# Patient Record
Sex: MALE | Race: OTHER | NOT HISPANIC OR LATINO | URBAN - METROPOLITAN AREA
[De-identification: names, ages, dates, MRNs, and addresses within clinical notes are randomized per-mention and may not be internally consistent; named-entity substitution may affect disease eponyms.]

---

## 2018-09-02 ENCOUNTER — INPATIENT (INPATIENT)
Facility: HOSPITAL | Age: 43
LOS: 2 days | Discharge: HOME | DRG: 439 | End: 2018-09-05
Attending: INTERNAL MEDICINE | Admitting: INTERNAL MEDICINE
Payer: COMMERCIAL

## 2018-09-02 VITALS
DIASTOLIC BLOOD PRESSURE: 86 MMHG | HEIGHT: 74 IN | WEIGHT: 250.45 LBS | SYSTOLIC BLOOD PRESSURE: 150 MMHG | TEMPERATURE: 99 F | OXYGEN SATURATION: 96 % | RESPIRATION RATE: 20 BRPM | HEART RATE: 80 BPM

## 2018-09-02 DIAGNOSIS — Z95.0 PRESENCE OF CARDIAC PACEMAKER: Chronic | ICD-10-CM

## 2018-09-02 LAB
ALBUMIN SERPL ELPH-MCNC: 4.1 G/DL — SIGNIFICANT CHANGE UP (ref 3.3–5)
ALP SERPL-CCNC: 100 U/L — SIGNIFICANT CHANGE UP (ref 40–120)
ALT FLD-CCNC: 67 U/L — HIGH (ref 10–45)
ANION GAP SERPL CALC-SCNC: 12 MMOL/L — SIGNIFICANT CHANGE UP (ref 5–17)
APTT BLD: 28.2 SEC — SIGNIFICANT CHANGE UP (ref 27.5–37.4)
AST SERPL-CCNC: 49 U/L — HIGH (ref 10–40)
BILIRUB SERPL-MCNC: 1.9 MG/DL — HIGH (ref 0.2–1.2)
BLD GP AB SCN SERPL QL: NEGATIVE — SIGNIFICANT CHANGE UP
BUN SERPL-MCNC: 16 MG/DL — SIGNIFICANT CHANGE UP (ref 7–23)
CALCIUM SERPL-MCNC: 9.1 MG/DL — SIGNIFICANT CHANGE UP (ref 8.4–10.5)
CHLORIDE SERPL-SCNC: 101 MMOL/L — SIGNIFICANT CHANGE UP (ref 96–108)
CO2 SERPL-SCNC: 25 MMOL/L — SIGNIFICANT CHANGE UP (ref 22–31)
CREAT SERPL-MCNC: 0.87 MG/DL — SIGNIFICANT CHANGE UP (ref 0.5–1.3)
GLUCOSE SERPL-MCNC: 161 MG/DL — HIGH (ref 70–99)
HCT VFR BLD CALC: 45.3 % — SIGNIFICANT CHANGE UP (ref 39–50)
HGB BLD-MCNC: 15.6 G/DL — SIGNIFICANT CHANGE UP (ref 13–17)
INR BLD: 1.3 — HIGH (ref 0.88–1.16)
LACTATE SERPL-SCNC: 1.5 MMOL/L — SIGNIFICANT CHANGE UP (ref 0.5–2)
LIDOCAIN IGE QN: >600 U/L — HIGH (ref 7–60)
MCHC RBC-ENTMCNC: 32.1 PG — SIGNIFICANT CHANGE UP (ref 27–34)
MCHC RBC-ENTMCNC: 34.4 G/DL — SIGNIFICANT CHANGE UP (ref 32–36)
MCV RBC AUTO: 93.2 FL — SIGNIFICANT CHANGE UP (ref 80–100)
PLATELET # BLD AUTO: 190 K/UL — SIGNIFICANT CHANGE UP (ref 150–400)
POTASSIUM SERPL-MCNC: 3.9 MMOL/L — SIGNIFICANT CHANGE UP (ref 3.5–5.3)
POTASSIUM SERPL-SCNC: 3.9 MMOL/L — SIGNIFICANT CHANGE UP (ref 3.5–5.3)
PROT SERPL-MCNC: 6.9 G/DL — SIGNIFICANT CHANGE UP (ref 6–8.3)
PROTHROM AB SERPL-ACNC: 14.5 SEC — HIGH (ref 9.8–12.7)
RBC # BLD: 4.86 M/UL — SIGNIFICANT CHANGE UP (ref 4.2–5.8)
RBC # FLD: 13.3 % — SIGNIFICANT CHANGE UP (ref 10.3–16.9)
RH IG SCN BLD-IMP: POSITIVE — SIGNIFICANT CHANGE UP
SODIUM SERPL-SCNC: 138 MMOL/L — SIGNIFICANT CHANGE UP (ref 135–145)
WBC # BLD: 15.2 K/UL — HIGH (ref 3.8–10.5)
WBC # FLD AUTO: 15.2 K/UL — HIGH (ref 3.8–10.5)

## 2018-09-02 PROCEDURE — 71045 X-RAY EXAM CHEST 1 VIEW: CPT | Mod: 26

## 2018-09-02 PROCEDURE — 99291 CRITICAL CARE FIRST HOUR: CPT

## 2018-09-02 RX ORDER — SODIUM CHLORIDE 9 MG/ML
1000 INJECTION, SOLUTION INTRAVENOUS
Qty: 0 | Refills: 0 | Status: DISCONTINUED | OUTPATIENT
Start: 2018-09-02 | End: 2018-09-05

## 2018-09-02 RX ORDER — HEPARIN SODIUM 5000 [USP'U]/ML
5000 INJECTION INTRAVENOUS; SUBCUTANEOUS EVERY 8 HOURS
Qty: 0 | Refills: 0 | Status: DISCONTINUED | OUTPATIENT
Start: 2018-09-02 | End: 2018-09-05

## 2018-09-02 RX ORDER — HYDROMORPHONE HYDROCHLORIDE 2 MG/ML
0.5 INJECTION INTRAMUSCULAR; INTRAVENOUS; SUBCUTANEOUS
Qty: 0 | Refills: 0 | Status: DISCONTINUED | OUTPATIENT
Start: 2018-09-02 | End: 2018-09-05

## 2018-09-02 RX ORDER — HYDROMORPHONE HYDROCHLORIDE 2 MG/ML
0.5 INJECTION INTRAMUSCULAR; INTRAVENOUS; SUBCUTANEOUS EVERY 4 HOURS
Qty: 0 | Refills: 0 | Status: DISCONTINUED | OUTPATIENT
Start: 2018-09-02 | End: 2018-09-02

## 2018-09-02 RX ORDER — HYDROMORPHONE HYDROCHLORIDE 2 MG/ML
1 INJECTION INTRAMUSCULAR; INTRAVENOUS; SUBCUTANEOUS EVERY 4 HOURS
Qty: 0 | Refills: 0 | Status: DISCONTINUED | OUTPATIENT
Start: 2018-09-02 | End: 2018-09-02

## 2018-09-02 RX ORDER — HYDROMORPHONE HYDROCHLORIDE 2 MG/ML
1 INJECTION INTRAMUSCULAR; INTRAVENOUS; SUBCUTANEOUS
Qty: 0 | Refills: 0 | Status: DISCONTINUED | OUTPATIENT
Start: 2018-09-02 | End: 2018-09-05

## 2018-09-02 RX ADMIN — SODIUM CHLORIDE 150 MILLILITER(S): 9 INJECTION, SOLUTION INTRAVENOUS at 19:09

## 2018-09-02 NOTE — H&P ADULT - NSHPLABSRESULTS_GEN_ALL_CORE
15.6   15.2  )-----------( 190      ( 02 Sep 2018 18:39 )             45.3       09-02    138  |  101  |  16  ----------------------------<  161<H>  3.9   |  25  |  0.87    Ca    9.1      02 Sep 2018 18:39    TPro  6.9  /  Alb  4.1  /  TBili  1.9<H>  /  DBili  x   /  AST  49<H>  /  ALT  67<H>  /  AlkPhos  100  09-02                  PT/INR - ( 02 Sep 2018 18:39 )   PT: 14.5 sec;   INR: 1.30          PTT - ( 02 Sep 2018 18:39 )  PTT:28.2 sec    Lactate Trend  09-02 @ 18:39 Lactate:1.5             CAPILLARY BLOOD GLUCOSE

## 2018-09-02 NOTE — H&P ADULT - ASSESSMENT
41yo male with a PMH of MI (2015), PPM (2015), HF (EF 20% in 2015, now 55% as of 07/2018), and h/o heavy etoh use (used to drink heavily, now 9-12drinks per week) p/w 10/10 abdominal pain associated with nausea and vomiting. Found to have gallstone pancreatitis.    GI  # Gallstone pancreatitis  - transfer from [facility] with gallstone pancreatitis. Will need to obtain more collaterol  - pt afebrile, VSS, WBC 15.2. LA 1.3. Pt is A&Ox3, protecting airway  - pt's last drink was friday, states he had a few mojitos  - GI consulted, rec CT abd with IV contrast  - Pt cannot undergo MRCP d/t having a PPM  - NPO  -   - dilaudid for pain and zofran for nausea  - strict I's and O's  - lipase >600  - f/u lipid panel  - CBC/CMP/lipase/coags in AM  - CT abd with contrast tonight, f/u with GI for further recs    Cardio  # HF  - h/o MI in 2015. Echo performed after showed EF of 20%  - pt follows with cardiology at [facility], echo performed two months ago reveals EF55%  - AICD placed 2015  - pt has METS >14  - on home Toprol XL 25mg PO daily and Lisinopril 2.5mg PO daily\  - EKG and Echo  - 43yo male with a PMH of MI (2015), PPM (2015), HF (EF 20% in 2015, now 55% as of 07/2018), and h/o heavy etoh use (used to drink heavily, now 9-12drinks per week) p/w 10/10 abdominal pain associated with nausea and vomiting. Found to have gallstone pancreatitis.    GI  # Gallstone pancreatitis  - transfer from [facility] with gallstone pancreatitis. Will need to obtain more collaterol  - pt afebrile, VSS, WBC 15.2. LA 1.3. Pt is A&Ox3, protecting airway  - pt's last drink was friday, states he had a few mojitos  - GI consulted, rec CT abd with IV contrast  - Pt cannot undergo MRCP d/t having a AICD  - NPO  -   - dilaudid for pain and zofran for nausea  - strict I's and O's  - lipase >600  - f/u lipid panel  - CBC/CMP/lipase/coags in AM  - CT abd with contrast tonight, f/u with GI for further recs    Cardio  # HF  - h/o MI in 2015. Echo performed after showed EF of 20%  - pt follows with cardiology at [facility], echo performed two months ago reveals EF55%  - AICD placed 2015  - pt has METS >14  - on home Toprol XL 25mg PO daily and Lisinopril 2.5mg PO daily\  - EKG and Echo  - 43yo male with a PMH of MI (2015), PPM (2015), HF (EF 20% in 2015, now 55% as of 07/2018), and h/o heavy etoh use (used to drink heavily, now 9-12drinks per week) p/w 10/10 abdominal pain associated with nausea and vomiting. Found to have gallstone pancreatitis.    GI  # Gallstone pancreatitis  - transfer from [facility] with gallstone pancreatitis. Will need to obtain more collaterol  - pt afebrile, VSS, WBC 15.2. LA 1.3. Pt is A&Ox3, protecting airway  - pt's last drink was friday, states he had a few mojitos  - GI consulted, rec CT abd with IV contrast  - Pt cannot undergo MRCP d/t having a AICD  - NPO  -   - dilaudid for pain and zofran for nausea  - strict I's and O's  - lipase >600  - f/u lipid panel  - CBC/CMP/lipase/coags in AM  - CT abd with contrast tonight, f/u with GI for further recs    Cardio  # HF  - h/o MI in 2015. Echo performed after showed EF of 20%  - pt follows with cardiology at [facility], echo performed two months ago reveals EF55%  - AICD placed 2015  - pt has METS >14  - on home Toprol XL 25mg PO daily and Lisinopril 2.5mg PO daily\  - EKG and Echo

## 2018-09-02 NOTE — PATIENT PROFILE ADULT. - BILL OF RIGHTS/ADMISSION INFORMATION PROVIDED TO:
patient tolerated po, ambulated without difficulty. will continue to monitor for safety and comfort. Patient

## 2018-09-02 NOTE — H&P ADULT - HISTORY OF PRESENT ILLNESS
41yo male with a PMH of MI (2015), PPM (2015), HF (EF 20% in 2015, now 55% as of 07/2018), and h/o heavy etoh use p/w 10/10 abdominal pain associated with nausea and vomiting. Pt states he was in his usual state of health when yesterday evening he began to feel intense pain in the URQ and ULQ of his abdomen. 	He describes the pain as crampy, constant, not relieved or made worse by anything.     He denies eating a meal prior to experiencing the pain. He was admitted [facility] found to have gallstone pancreatitis, now being transferred to [facility] for possible intervention. 43yo male with a PMH of MI (2015), AICD (2015), HF (EF 20% in 2015, now 55% as of 07/2018), and h/o heavy etoh use (used to drink heavily, now 9-12drinks per week) p/w 10/10 abdominal pain associated with nausea and vomiting. Pt states he was in his usual state of health when yesterday evening he began to feel intense pain in the URQ and ULQ of his abdomen accompanied by intense diaphoresis, nausea, and NBNB vomiting. He has never felt a similar pain before. He describes the pain as crampy, nonradiating, not relieved or made worse by anything.  He denies eating a meal prior to experiencing the pain. Pt admitted to [facility] yesterday found to have gallstone pancreatitis, now being transferred to [facility] for higher level of care. Today his ROS is negative except for mild abd pain to palpation. All of his doctors and medical documents are at [facility].

## 2018-09-02 NOTE — H&P ADULT - NSHPREVIEWOFSYSTEMS_GEN_ALL_CORE
CONSTITUTIONAL: No weakness, fevers or chills  EYES/ENT: No visual changes;  No vertigo or throat pain   NECK: No pain or stiffness  RESPIRATORY: No cough, wheezing, hemoptysis; No shortness of breath  CARDIOVASCULAR: No chest pain or palpitations  GASTROINTESTINAL: + abdominal pain. No epigastric pain. No nausea, vomiting, or hematemesis; No diarrhea or constipation. No melena or hematochezia.  GENITOURINARY: No dysuria, frequency or hematuria  NEUROLOGICAL: No numbness or weakness  SKIN: No itching, burning, rashes, or lesions   All other review of systems is negative unless indicated above.

## 2018-09-02 NOTE — H&P ADULT - ATTENDING COMMENTS
Pt afebrile and nontoxic. Pain controlled and no evidence of cholangitis. No antibiotics for now as discussed with GI. Continue IVF with EF55% , less concerned. continue off antiHtn tonight and provide pain control.

## 2018-09-02 NOTE — H&P ADULT - PMH
ETOH abuse  h/o etoh abuse now moderate drinker  Heart failure, unspecified HF chronicity, unspecified heart failure type

## 2018-09-02 NOTE — H&P ADULT - NSHPPHYSICALEXAM_GEN_ALL_CORE
PHYSICAL EXAM:  GENERAL: NAD, well-developed  HEAD:  Atraumatic, Normocephalic  EYES: EOMI, PERRLA, conjunctiva and sclera clear  NECK: Supple, No JVD  CHEST/LUNG: mild crackles at base b/l  HEART: Regular rate and rhythm; No murmurs, rubs, or gallops  ABDOMEN: Soft, tender to palpation RUQ and LUQ, Nondistended; Bowel sounds present  EXTREMITIES:, No clubbing, cyanosis, or edema  PSYCH: AAOx3  NEUROLOGY: non-focal  SKIN: No rashes or lesions

## 2018-09-03 DIAGNOSIS — Z91.89 OTHER SPECIFIED PERSONAL RISK FACTORS, NOT ELSEWHERE CLASSIFIED: ICD-10-CM

## 2018-09-03 DIAGNOSIS — I50.9 HEART FAILURE, UNSPECIFIED: ICD-10-CM

## 2018-09-03 DIAGNOSIS — K85.10 BILIARY ACUTE PANCREATITIS WITHOUT NECROSIS OR INFECTION: ICD-10-CM

## 2018-09-03 DIAGNOSIS — I10 ESSENTIAL (PRIMARY) HYPERTENSION: ICD-10-CM

## 2018-09-03 DIAGNOSIS — F10.10 ALCOHOL ABUSE, UNCOMPLICATED: ICD-10-CM

## 2018-09-03 DIAGNOSIS — R63.8 OTHER SYMPTOMS AND SIGNS CONCERNING FOOD AND FLUID INTAKE: ICD-10-CM

## 2018-09-03 LAB
ALBUMIN SERPL ELPH-MCNC: 3.6 G/DL — SIGNIFICANT CHANGE UP (ref 3.3–5)
ALP SERPL-CCNC: 91 U/L — SIGNIFICANT CHANGE UP (ref 40–120)
ALT FLD-CCNC: 49 U/L — HIGH (ref 10–45)
ANION GAP SERPL CALC-SCNC: 13 MMOL/L — SIGNIFICANT CHANGE UP (ref 5–17)
AST SERPL-CCNC: 49 U/L — HIGH (ref 10–40)
BASOPHILS NFR BLD AUTO: 0.1 % — SIGNIFICANT CHANGE UP (ref 0–2)
BILIRUB SERPL-MCNC: 1.8 MG/DL — HIGH (ref 0.2–1.2)
BUN SERPL-MCNC: 16 MG/DL — SIGNIFICANT CHANGE UP (ref 7–23)
CALCIUM SERPL-MCNC: 9.2 MG/DL — SIGNIFICANT CHANGE UP (ref 8.4–10.5)
CHLORIDE SERPL-SCNC: 99 MMOL/L — SIGNIFICANT CHANGE UP (ref 96–108)
CO2 SERPL-SCNC: 25 MMOL/L — SIGNIFICANT CHANGE UP (ref 22–31)
CREAT SERPL-MCNC: 0.75 MG/DL — SIGNIFICANT CHANGE UP (ref 0.5–1.3)
EOSINOPHIL NFR BLD AUTO: 0.1 % — SIGNIFICANT CHANGE UP (ref 0–6)
GLUCOSE SERPL-MCNC: 142 MG/DL — HIGH (ref 70–99)
HCT VFR BLD CALC: 45.5 % — SIGNIFICANT CHANGE UP (ref 39–50)
HGB BLD-MCNC: 15.2 G/DL — SIGNIFICANT CHANGE UP (ref 13–17)
LIDOCAIN IGE QN: >600 U/L — HIGH (ref 7–60)
LYMPHOCYTES # BLD AUTO: 6.2 % — LOW (ref 13–44)
MAGNESIUM SERPL-MCNC: 2.1 MG/DL — SIGNIFICANT CHANGE UP (ref 1.6–2.6)
MCHC RBC-ENTMCNC: 31.9 PG — SIGNIFICANT CHANGE UP (ref 27–34)
MCHC RBC-ENTMCNC: 33.4 G/DL — SIGNIFICANT CHANGE UP (ref 32–36)
MCV RBC AUTO: 95.4 FL — SIGNIFICANT CHANGE UP (ref 80–100)
MONOCYTES NFR BLD AUTO: 5.8 % — SIGNIFICANT CHANGE UP (ref 2–14)
NEUTROPHILS NFR BLD AUTO: 87.8 % — HIGH (ref 43–77)
PHOSPHATE SERPL-MCNC: 3.1 MG/DL — SIGNIFICANT CHANGE UP (ref 2.5–4.5)
PLATELET # BLD AUTO: 175 K/UL — SIGNIFICANT CHANGE UP (ref 150–400)
POTASSIUM SERPL-MCNC: SIGNIFICANT CHANGE UP MMOL/L (ref 3.5–5.3)
POTASSIUM SERPL-SCNC: SIGNIFICANT CHANGE UP MMOL/L (ref 3.5–5.3)
PROT SERPL-MCNC: 7.3 G/DL — SIGNIFICANT CHANGE UP (ref 6–8.3)
RBC # BLD: 4.77 M/UL — SIGNIFICANT CHANGE UP (ref 4.2–5.8)
RBC # FLD: 13.7 % — SIGNIFICANT CHANGE UP (ref 10.3–16.9)
SODIUM SERPL-SCNC: 137 MMOL/L — SIGNIFICANT CHANGE UP (ref 135–145)
WBC # BLD: 15.5 K/UL — HIGH (ref 3.8–10.5)
WBC # FLD AUTO: 15.5 K/UL — HIGH (ref 3.8–10.5)

## 2018-09-03 PROCEDURE — 93306 TTE W/DOPPLER COMPLETE: CPT | Mod: 26

## 2018-09-03 PROCEDURE — 99233 SBSQ HOSP IP/OBS HIGH 50: CPT | Mod: GC

## 2018-09-03 PROCEDURE — 74177 CT ABD & PELVIS W/CONTRAST: CPT | Mod: 26

## 2018-09-03 PROCEDURE — 76705 ECHO EXAM OF ABDOMEN: CPT | Mod: 26

## 2018-09-03 RX ORDER — ACETAMINOPHEN 500 MG
1000 TABLET ORAL ONCE
Qty: 0 | Refills: 0 | Status: COMPLETED | OUTPATIENT
Start: 2018-09-03 | End: 2018-09-03

## 2018-09-03 RX ORDER — LISINOPRIL 2.5 MG/1
2.5 TABLET ORAL DAILY
Qty: 0 | Refills: 0 | Status: DISCONTINUED | OUTPATIENT
Start: 2018-09-03 | End: 2018-09-05

## 2018-09-03 RX ORDER — HYDRALAZINE HCL 50 MG
25 TABLET ORAL ONCE
Qty: 0 | Refills: 0 | Status: DISCONTINUED | OUTPATIENT
Start: 2018-09-03 | End: 2018-09-03

## 2018-09-03 RX ORDER — ONDANSETRON 8 MG/1
4 TABLET, FILM COATED ORAL ONCE
Qty: 0 | Refills: 0 | Status: COMPLETED | OUTPATIENT
Start: 2018-09-03 | End: 2018-09-03

## 2018-09-03 RX ORDER — LABETALOL HCL 100 MG
5 TABLET ORAL ONCE
Qty: 0 | Refills: 0 | Status: DISCONTINUED | OUTPATIENT
Start: 2018-09-03 | End: 2018-09-03

## 2018-09-03 RX ORDER — METOPROLOL TARTRATE 50 MG
25 TABLET ORAL DAILY
Qty: 0 | Refills: 0 | Status: DISCONTINUED | OUTPATIENT
Start: 2018-09-03 | End: 2018-09-05

## 2018-09-03 RX ORDER — PANTOPRAZOLE SODIUM 20 MG/1
40 TABLET, DELAYED RELEASE ORAL DAILY
Qty: 0 | Refills: 0 | Status: DISCONTINUED | OUTPATIENT
Start: 2018-09-03 | End: 2018-09-05

## 2018-09-03 RX ORDER — LISINOPRIL 2.5 MG/1
2.5 TABLET ORAL ONCE
Qty: 0 | Refills: 0 | Status: DISCONTINUED | OUTPATIENT
Start: 2018-09-03 | End: 2018-09-03

## 2018-09-03 RX ORDER — PIPERACILLIN AND TAZOBACTAM 4; .5 G/20ML; G/20ML
3.38 INJECTION, POWDER, LYOPHILIZED, FOR SOLUTION INTRAVENOUS EVERY 6 HOURS
Qty: 0 | Refills: 0 | Status: DISCONTINUED | OUTPATIENT
Start: 2018-09-03 | End: 2018-09-03

## 2018-09-03 RX ADMIN — ONDANSETRON 4 MILLIGRAM(S): 8 TABLET, FILM COATED ORAL at 07:20

## 2018-09-03 RX ADMIN — SODIUM CHLORIDE 150 MILLILITER(S): 9 INJECTION, SOLUTION INTRAVENOUS at 09:22

## 2018-09-03 RX ADMIN — PIPERACILLIN AND TAZOBACTAM 200 GRAM(S): 4; .5 INJECTION, POWDER, LYOPHILIZED, FOR SOLUTION INTRAVENOUS at 11:57

## 2018-09-03 RX ADMIN — PIPERACILLIN AND TAZOBACTAM 200 GRAM(S): 4; .5 INJECTION, POWDER, LYOPHILIZED, FOR SOLUTION INTRAVENOUS at 01:55

## 2018-09-03 RX ADMIN — HYDROMORPHONE HYDROCHLORIDE 1 MILLIGRAM(S): 2 INJECTION INTRAMUSCULAR; INTRAVENOUS; SUBCUTANEOUS at 20:30

## 2018-09-03 RX ADMIN — LISINOPRIL 2.5 MILLIGRAM(S): 2.5 TABLET ORAL at 11:57

## 2018-09-03 RX ADMIN — PANTOPRAZOLE SODIUM 40 MILLIGRAM(S): 20 TABLET, DELAYED RELEASE ORAL at 06:17

## 2018-09-03 RX ADMIN — HYDROMORPHONE HYDROCHLORIDE 1 MILLIGRAM(S): 2 INJECTION INTRAMUSCULAR; INTRAVENOUS; SUBCUTANEOUS at 21:00

## 2018-09-03 RX ADMIN — PIPERACILLIN AND TAZOBACTAM 200 GRAM(S): 4; .5 INJECTION, POWDER, LYOPHILIZED, FOR SOLUTION INTRAVENOUS at 06:17

## 2018-09-03 RX ADMIN — Medication 400 MILLIGRAM(S): at 09:19

## 2018-09-03 RX ADMIN — HEPARIN SODIUM 5000 UNIT(S): 5000 INJECTION INTRAVENOUS; SUBCUTANEOUS at 06:16

## 2018-09-03 RX ADMIN — HYDROMORPHONE HYDROCHLORIDE 1 MILLIGRAM(S): 2 INJECTION INTRAMUSCULAR; INTRAVENOUS; SUBCUTANEOUS at 08:30

## 2018-09-03 RX ADMIN — HYDROMORPHONE HYDROCHLORIDE 1 MILLIGRAM(S): 2 INJECTION INTRAMUSCULAR; INTRAVENOUS; SUBCUTANEOUS at 14:20

## 2018-09-03 RX ADMIN — Medication 1000 MILLIGRAM(S): at 09:30

## 2018-09-03 RX ADMIN — HYDROMORPHONE HYDROCHLORIDE 1 MILLIGRAM(S): 2 INJECTION INTRAMUSCULAR; INTRAVENOUS; SUBCUTANEOUS at 14:28

## 2018-09-03 RX ADMIN — Medication 1.25 MILLIGRAM(S): at 09:05

## 2018-09-03 RX ADMIN — HEPARIN SODIUM 5000 UNIT(S): 5000 INJECTION INTRAVENOUS; SUBCUTANEOUS at 22:58

## 2018-09-03 RX ADMIN — HEPARIN SODIUM 5000 UNIT(S): 5000 INJECTION INTRAVENOUS; SUBCUTANEOUS at 14:28

## 2018-09-03 RX ADMIN — Medication 25 MILLIGRAM(S): at 11:57

## 2018-09-03 RX ADMIN — HYDROMORPHONE HYDROCHLORIDE 1 MILLIGRAM(S): 2 INJECTION INTRAMUSCULAR; INTRAVENOUS; SUBCUTANEOUS at 08:29

## 2018-09-03 NOTE — CONSULT NOTE ADULT - SUBJECTIVE AND OBJECTIVE BOX
HPI:  41yo male with a PMH of MI (2015), AICD (2015), CHF (EF 20% in 2015, now 55% as of 07/2018), and h/o heavy etoh use, sleeve gastroplasty who presents with abdominal pain. Patient describes RUQ pain, radiating across his abdomen, constant, 10/10 at its worse, associated with nbnb emesis. Pt tried to take motrin at home, with no relief, prompting him to go to [facility] ER. At [facility], he was found to have Bili  4.1-->2, -->144, -->117, Alk Phos 143-->144, lipase >700. Abd US showed many gallstones, CBD 0.4cm. Pt was treated for gallstone pacreatitis with IVF and pain control. Patient was transferred from [facility] for evaluation for ERCP      Allergies    No Known Drug Allergies  shellfish (Unknown)    Intolerances      Home Medications:  lisinopril 2.5 mg oral tablet: 1 tab(s) orally once a day (02 Sep 2018 21:25)  Toprol-XL 25 mg oral tablet, extended release: 1 tab(s) orally once a day (02 Sep 2018 21:24)    MEDICATIONS:  MEDICATIONS  (STANDING):  heparin  Injectable 5000 Unit(s) SubCutaneous every 8 hours  lactated ringers. 1000 milliLiter(s) (150 mL/Hr) IV Continuous <Continuous>  lisinopril 2.5 milliGRAM(s) Oral daily  metoprolol succinate ER 25 milliGRAM(s) Oral daily  pantoprazole  Injectable 40 milliGRAM(s) IV Push daily    MEDICATIONS  (PRN):  HYDROmorphone  Injectable 0.5 milliGRAM(s) IV Push every 3 hours PRN Moderate Pain (4 - 6)  HYDROmorphone  Injectable 1 milliGRAM(s) IV Push every 3 hours PRN Severe Pain (7 - 10)    PAST MEDICAL & SURGICAL HISTORY:  ETOH abuse: h/o etoh abuse now moderate drinker  Heart failure, unspecified HF chronicity, unspecified heart failure type  Artificial pacemaker  sleeve gastroplasty    FAMILY HISTORY:  No pertinent family history in first degree relatives    SOCIAL HISTORY:  [place]: denies  Alcohol: pt reports heavy etoh use prior to MI in 2015, but still drinks approx 9 drinks/week.  Illicit Drugs: denies    REVIEW OF SYSTEMS:  CONSTITUTIONAL: No weakness, fevers or chills  HEENT: No visual changes; No vertigo or throat pain   NECK: No pain or stiffness  RESPIRATORY: No cough, wheezing, hemoptysis; No shortness of breath  CARDIOVASCULAR: No chest pain or palpitations  GASTROINTESTINAL: + abdominal pain   GENITOURINARY: No dysuria, frequency or hematuria  NEUROLOGICAL: No numbness or weakness  SKIN: No itching, burning, rashes, or lesions   All other 10 review of systems is negative unless indicated above.    Vital Signs Last 24 Hrs  T(C): 37.3 (03 Sep 2018 09:27), Max: 37.9 (02 Sep 2018 22:32)  T(F): 99.2 (03 Sep 2018 09:27), Max: 100.3 (02 Sep 2018 22:32)  HR: 88 (03 Sep 2018 12:00) (66 - 108)  BP: 148/87 (03 Sep 2018 12:00) (126/78 - 191/105)  BP(mean): 109 (03 Sep 2018 12:00) (90 - 138)  RR: 17 (03 Sep 2018 12:00) (14 - 32)  SpO2: 96% (03 Sep 2018 12:00) (94% - 97%)    09-02 @ 07:01 - 09-03 @ 07:00  --------------------------------------------------------  IN: 2100 mL / OUT: 1000 mL / NET: 1100 mL    09-03 @ 07:01 - 09-03 @ 13:03  --------------------------------------------------------  IN: 950 mL / OUT: 0 mL / NET: 950 mL        PHYSICAL EXAM:    General: pt uncomfortable, grimacing in pain   Eyes: Anicteric sclerae, moist conjunctivae  HENT: Moist mucous membranes  Neck: Trachea midline, supple  Lungs: Normal respiratory effors and no intercostal retractions  Cardiovascular: RRR  Abdomen: Soft, diffusely tender to palpation, no distention, no peritoneal signs  Extremities: Normal range of motion, No clubbing, cyanosis or edema  Neurological: Alert and oriented x3  Skin: Warm and dry. No obvious rash    LABS:                        15.2   15.5  )-----------( 175      ( 03 Sep 2018 07:44 )             45.5     09-03    137  |  99  |  16  ----------------------------<  142<H>  See note   |  25  |  0.75    Ca    9.2      03 Sep 2018 07:44  Phos  3.1     09-03  Mg     2.1     09-03    TPro  7.3  /  Alb  3.6  /  TBili  1.8<H>  /  DBili  x   /  AST  49<H>  /  ALT  49<H>  /  AlkPhos  91  09-03        PT/INR - ( 02 Sep 2018 18:39 )   PT: 14.5 sec;   INR: 1.30          PTT - ( 02 Sep 2018 18:39 )  PTT:28.2 sec    RADIOLOGY & ADDITIONAL STUDIES:

## 2018-09-03 NOTE — PROGRESS NOTE ADULT - ASSESSMENT
43yo male with a PMH of MI (2015), PPM (2015), HF (EF 20% in 2015, now 55% as of 07/2018), and h/o heavy etoh use (used to drink heavily, now 9-12drinks per week) p/w 10/10 abdominal pain associated with nausea and vomiting. Found to have gallstone pancreatitis.    GI  # Gallstone pancreatitis  - transfer from [facility] with gallstone pancreatitis. Will need to obtain more collaterol  - pt afebrile, VSS, WBC 15.2. LA 1.3. Pt is A&Ox3, protecting airway  - pt's last drink was friday, states he had a few mojitos  - GI consulted, rec CT abd with IV contrast  - Pt cannot undergo MRCP d/t having a AICD  - NPO  -   - dilaudid for pain and zofran for nausea  - strict I's and O's  - lipase >600  - f/u lipid panel  - CBC/CMP/lipase/coags in AM  - CT abd with contrast tonight, f/u with GI for further recs    Cardio  # HF  - h/o MI in 2015. Echo performed after showed EF of 20%  - pt follows with cardiology at [facility], echo performed two months ago reveals EF55%  - AICD placed 2015  - pt has METS >14  - on home Toprol XL 25mg PO daily and Lisinopril 2.5mg PO daily\  - EKG and Echo    # htn episode  -

## 2018-09-03 NOTE — PROGRESS NOTE ADULT - ATTENDING COMMENTS
Abd soft and no guarding. C/O pain with palpation of epigastric area and on deep palapation of RUQ. Pt  nontoxic. Low grade temps. Pain controlled and  no evidence of cholangitis. LFt's noted. D/C antibiotics for now as discussed with GI. Continue monitoring, pain control and IVF on 7 lach.

## 2018-09-03 NOTE — DIETITIAN INITIAL EVALUATION ADULT. - OTHER INFO
43y/o M with a PMH of MI (2015), PPM (2015), HF (EF 20% in 2015, now 55% as of 07/2018), and h/o heavy etoh use (used to drink heavily, now 9-12drinks per week) p/w 10/10 abdominal pain associated with nausea and vomiting; Found to have gallstone pancreatitis. Pt is NPO and seen resting in bed. He denies current N/V. C/o abd pain; RN aware. Denies mechanical issues. PTA pt normally with a good appetite and intake; denies wt changes. H/o ETOH abuse noted; please add thiamine, folic acid, and MVI. Discussed PO advancement once medically feasible. Will follow.

## 2018-09-03 NOTE — CONSULT NOTE ADULT - ASSESSMENT
41yo male with a PMH of MI (2015), AICD (2015), CHF w/ EF 55%, and h/o heavy etoh use, sleeve gastroplasty who presents with pancreatitis    1. Pancreatitis: given US showing gallstones and hyperbilirubinemia, abnormal liver chemistries there is high suspicion for gallstone pancreatitis. Pt also with history of etoh use. triglycerides wnl.  -plan for ERCP tomorrow  -c/w IVF and pain control  -f/u ct abdomen/pelvis with IVF contrast 41yo male with a PMH of MI (2015), AICD (2015), CHF w/ EF 55%, and h/o heavy etoh use, sleeve gastroplasty who presents with pancreatitis    1. Pancreatitis: given US showing gallstones and hyperbilirubinemia, abnormal liver chemistries there is high suspicion for gallstone pancreatitis. Pt also with history of etoh use. triglycerides wnl.  -plan for ERCP tomorrow  -c/w IVF and pain control  -f/u ct abdomen/pelvis with IVF contrast   -NPO at midnight.    Case discussed with Dr. Juárez

## 2018-09-03 NOTE — PROGRESS NOTE ADULT - PROBLEM SELECTOR PLAN 1
Pt medical history of ETOH abuse presented with stable vital signs, ruq and luq pain, with wbc: 15.2. Pt found to have gallstone pancreatitis on abodminal ultrasound. Last drink was friday (8/31): few mojitos. VSS, nontoxic appearing. Plan is for ERCP tomorrow.  -ALT/AST: 67/49--->49/49  -Lipase: x>600  -f/u lipid panel  - f/u CT abd with IV contrast  - f/u GI consult  -c/w  @ 150 cc/hr  - dilaudid PRN for pain  - Pt cannot undergo MRCP d/t having a AICD  - NPO after midnight for ERCP tomorrow

## 2018-09-03 NOTE — PROGRESS NOTE ADULT - SUBJECTIVE AND OBJECTIVE BOX
INTERVAL HPI/OVERNIGHT EVENTS:  Patient was seen and examined at bedside.    No o/n events, patient resting comfortably. No complaints at this time. Patient denies: fever, chills, dizziness, weakness, HA, Changes in vision, CP, palpitations, SOB, cough, N/V/D/C, dysuria, changes in bowel movements, LE edema. ROS otherwise negative.        VITAL SIGNS:  T(F): 99.1 (09-03-18 @ 14:31)  HR: 74 (09-03-18 @ 16:00)  BP: 126/75 (09-03-18 @ 16:00)  RR: 18 (09-03-18 @ 16:00)  SpO2: 96% (09-03-18 @ 15:00)  Wt(kg): --    PHYSICAL EXAM:    Constitutional: female/male, WDWN, NAD  HEENT: PERRL, EOMI, sclera non-icteric, neck supple, trachea midline, no masses, no JVD, MMM, good dentition  Respiratory: CTA b/l, good air entry b/l, no wheezing, no rhonchi, no rales, without accessory muscle use and no intercostal retractions  Cardiovascular: RRR, normal S1S2, no M/R/G  Gastrointestinal: soft, NTND, no masses palpable, BS normal  Extremities: Warm, well perfused, pulses equal bilateral upper and lower extremities, no edema, no clubbing  Neurological: AAOx3, CN Grossly intact  Skin: Normal temperature, warm, dry    MEDICATIONS  (STANDING):  heparin  Injectable 5000 Unit(s) SubCutaneous every 8 hours  lactated ringers. 1000 milliLiter(s) (150 mL/Hr) IV Continuous <Continuous>  lisinopril 2.5 milliGRAM(s) Oral daily  metoprolol succinate ER 25 milliGRAM(s) Oral daily  pantoprazole  Injectable 40 milliGRAM(s) IV Push daily    MEDICATIONS  (PRN):  HYDROmorphone  Injectable 0.5 milliGRAM(s) IV Push every 3 hours PRN Moderate Pain (4 - 6)  HYDROmorphone  Injectable 1 milliGRAM(s) IV Push every 3 hours PRN Severe Pain (7 - 10)      Allergies    No Known Drug Allergies  shellfish (Unknown)    Intolerances        LABS:                        15.2   15.5  )-----------( 175      ( 03 Sep 2018 07:44 )             45.5     09-03    137  |  99  |  16  ----------------------------<  142<H>  See note   |  25  |  0.75    Ca    9.2      03 Sep 2018 07:44  Phos  3.1     09-03  Mg     2.1     09-03    TPro  7.3  /  Alb  3.6  /  TBili  1.8<H>  /  DBili  x   /  AST  49<H>  /  ALT  49<H>  /  AlkPhos  91  09-03    PT/INR - ( 02 Sep 2018 18:39 )   PT: 14.5 sec;   INR: 1.30          PTT - ( 02 Sep 2018 18:39 )  PTT:28.2 sec      RADIOLOGY & ADDITIONAL TESTS:  Reviewed INTERVAL HPI/OVERNIGHT EVENTS:  Patient was seen and examined at bedside. No acute distress but complains of abdominal.   No o/n events, patient resting comfortably. No complaints at this time. Patient denies: fever, chills, dizziness, weakness, HA, Changes in vision, CP, palpitations, SOB, cough, N/V/D/C, dysuria, changes in bowel movements, LE edema. ROS otherwise negative.        VITAL SIGNS:  T(F): 99.1 (09-03-18 @ 14:31)  HR: 74 (09-03-18 @ 16:00)  BP: 126/75 (09-03-18 @ 16:00)  RR: 18 (09-03-18 @ 16:00)  SpO2: 96% (09-03-18 @ 15:00)  Wt(kg): --    PHYSICAL EXAM:    Constitutional:  , pleasant NAD  HEENT: PERRL, EOMI, sclera non-icteric, neck supple, trachea midline, no masses, no JVD, MMM, good dentition  Respiratory: CTAB, good air entry b/l, no wheezing, no rhonchi, no rales, without accessory muscle use and no intercostal retractions  Cardiovascular: RRR, normal S1S2, no M/R/G  Gastrointestinal: + ann's sign, pain on palpation on upper  right lower quadrant, soft,  no masses palpable, hypoactive bowel sounds  Extremities: Warm, well perfused, pulses equal bilateral upper and lower extremities, no edema, no clubbing  Neurological: AAOx3  Skin: Normal temperature, warm, dry    MEDICATIONS  (STANDING):  heparin  Injectable 5000 Unit(s) SubCutaneous every 8 hours  lactated ringers. 1000 milliLiter(s) (150 mL/Hr) IV Continuous <Continuous>  lisinopril 2.5 milliGRAM(s) Oral daily  metoprolol succinate ER 25 milliGRAM(s) Oral daily  pantoprazole  Injectable 40 milliGRAM(s) IV Push daily    MEDICATIONS  (PRN):  HYDROmorphone  Injectable 0.5 milliGRAM(s) IV Push every 3 hours PRN Moderate Pain (4 - 6)  HYDROmorphone  Injectable 1 milliGRAM(s) IV Push every 3 hours PRN Severe Pain (7 - 10)      Allergies    No Known Drug Allergies  shellfish (Unknown)    Intolerances        LABS:                        15.2   15.5  )-----------( 175      ( 03 Sep 2018 07:44 )             45.5     09-03    137  |  99  |  16  ----------------------------<  142<H>  See note   |  25  |  0.75    Ca    9.2      03 Sep 2018 07:44  Phos  3.1     09-03  Mg     2.1     09-03    TPro  7.3  /  Alb  3.6  /  TBili  1.8<H>  /  DBili  x   /  AST  49<H>  /  ALT  49<H>  /  AlkPhos  91  09-03    PT/INR - ( 02 Sep 2018 18:39 )   PT: 14.5 sec;   INR: 1.30          PTT - ( 02 Sep 2018 18:39 )  PTT:28.2 sec      RADIOLOGY & ADDITIONAL TESTS:  Reviewed 7 [facility] Acceptance Note    41yo male with a PMH of MI (2015), AICD (2015), HF (EF 20% in 2015, now 55% as of 07/2018), and h/o heavy etoh use (used to drink heavily, now 9-12drinks per week) p/w 10/10 abdominal pain associated with nausea and vomiting. Pt states he was in his usual state of health when yesterday evening he began to feel intense pain in the URQ and ULQ of his abdomen accompanied by intense diaphoresis, nausea, and NBNB vomiting. He has never felt a similar pain before. He describes the pain as crampy, nonradiating, not relieved or made worse by anything.  He denies eating a meal prior to experiencing the pain. Pt admitted to [facility] yesterday found to have gallstone pancreatitis, now being transferred to [facility] for higher level of care. Today his ROS is negative except for mild abd pain to palpation. All of his doctors and medical documents are at [facility].     GI consulted, recommended CTabd with contrast. Any interventions will be planned for tomorrow.     INTERVAL HPI/OVERNIGHT EVENTS:  Patient was seen and examined at bedside. No acute distress but complains of abdominal.   No o/n events, patient resting comfortably. No complaints at this time. Patient denies: fever, chills, dizziness, weakness, HA, Changes in vision, CP, palpitations, SOB, cough, N/V/D/C, dysuria, changes in bowel movements, LE edema. ROS otherwise negative.        VITAL SIGNS:  T(F): 99.1 (09-03-18 @ 14:31)  HR: 74 (09-03-18 @ 16:00)  BP: 126/75 (09-03-18 @ 16:00)  RR: 18 (09-03-18 @ 16:00)  SpO2: 96% (09-03-18 @ 15:00)  Wt(kg): --    PHYSICAL EXAM:    Constitutional:  , pleasant NAD  HEENT: PERRL, EOMI, sclera non-icteric, neck supple, trachea midline, no masses, no JVD, MMM, good dentition  Respiratory: CTAB, good air entry b/l, no wheezing, no rhonchi, no rales, without accessory muscle use and no intercostal retractions  Cardiovascular: RRR, normal S1S2, no M/R/G  Gastrointestinal: + ann's sign, pain on palpation on upper  right lower quadrant, soft,  no masses palpable, hypoactive bowel sounds  Extremities: Warm, well perfused, pulses equal bilateral upper and lower extremities, no edema, no clubbing  Neurological: AAOx3  Skin: Normal temperature, warm, dry    MEDICATIONS  (STANDING):  heparin  Injectable 5000 Unit(s) SubCutaneous every 8 hours  lactated ringers. 1000 milliLiter(s) (150 mL/Hr) IV Continuous <Continuous>  lisinopril 2.5 milliGRAM(s) Oral daily  metoprolol succinate ER 25 milliGRAM(s) Oral daily  pantoprazole  Injectable 40 milliGRAM(s) IV Push daily    MEDICATIONS  (PRN):  HYDROmorphone  Injectable 0.5 milliGRAM(s) IV Push every 3 hours PRN Moderate Pain (4 - 6)  HYDROmorphone  Injectable 1 milliGRAM(s) IV Push every 3 hours PRN Severe Pain (7 - 10)      Allergies    No Known Drug Allergies  shellfish (Unknown)    Intolerances        LABS:                        15.2   15.5  )-----------( 175      ( 03 Sep 2018 07:44 )             45.5     09-03    137  |  99  |  16  ----------------------------<  142<H>  See note   |  25  |  0.75    Ca    9.2      03 Sep 2018 07:44  Phos  3.1     09-03  Mg     2.1     09-03    TPro  7.3  /  Alb  3.6  /  TBili  1.8<H>  /  DBili  x   /  AST  49<H>  /  ALT  49<H>  /  AlkPhos  91  09-03    PT/INR - ( 02 Sep 2018 18:39 )   PT: 14.5 sec;   INR: 1.30          PTT - ( 02 Sep 2018 18:39 )  PTT:28.2 sec      RADIOLOGY & ADDITIONAL TESTS:  Reviewed

## 2018-09-03 NOTE — PROGRESS NOTE ADULT - PROBLEM SELECTOR PLAN 3
Systolic Heart failure  - h/o MI in 2015. Echo performed after showed EF of 20%  - pt follows with cardiology at [facility], echo performed two months ago revealed EF55%  - AICD placed 2015  - pt has METS >14  - c/w Toprol XL 25mg PO daily and Lisinopril 2.5mg PO daily  - f/u EKG and Echo

## 2018-09-03 NOTE — PROGRESS NOTE ADULT - PROBLEM SELECTOR PLAN 5
Fluids:  @ 150 cc/hr  Electrolytes: replete as necessary, K>4, Mg>2   Nutrition: NPo for now  Bowel regimen: none  DVT ppx: heparin subQ  Code: Full  Disposition: [facility]

## 2018-09-03 NOTE — PROGRESS NOTE ADULT - SUBJECTIVE AND OBJECTIVE BOX
HOSPITAL COURSE  43yo male with a PMH of MI (2015), AICD (2015), HF (EF 20% in 2015, now 55% as of 07/2018), and h/o heavy etoh use (used to drink heavily, now 9-12drinks per week) p/w 10/10 abdominal pain associated with nausea and vomiting. Pt states he was in his usual state of health when yesterday evening he began to feel intense pain in the URQ and ULQ of his abdomen accompanied by intense diaphoresis, nausea, and NBNB vomiting. He has never felt a similar pain before. He describes the pain as crampy, nonradiating, not relieved or made worse by anything.  He denies eating a meal prior to experiencing the pain. Pt admitted to [facility] yesterday found to have gallstone pancreatitis, now being transferred to [facility] for higher level of care. Today his ROS is negative except for mild abd pain to palpation. All of his doctors and medical documents are at [facility].     GI consulted, recommended CTabd with contrast. Any interventions will be planned for tomorrow. Dispo: 7L      INTERVAL HPI/OVERNIGHT EVENTS: no acute events overnight    SUBJECTIVE: Patient seen and examined at bedside. He reports abdominal pain, nausea, and vomiting. He also complains of a HA today. His SBP was into the 190's this AM.      OBJECTIVE:    VITAL SIGNS:  ICU Vital Signs Last 24 Hrs  T(C): 37.3 (03 Sep 2018 09:27), Max: 37.9 (02 Sep 2018 22:32)  T(F): 99.2 (03 Sep 2018 09:27), Max: 100.3 (02 Sep 2018 22:32)  HR: 82 (03 Sep 2018 10:00) (66 - 108)  BP: 157/91 (03 Sep 2018 10:00) (137/88 - 191/105)  BP(mean): 111 (03 Sep 2018 10:00) (103 - 138)  ABP: --  ABP(mean): --  RR: 17 (03 Sep 2018 10:00) (14 - 32)  SpO2: 95% (03 Sep 2018 09:00) (94% - 97%)        09-02 @ 07:01  -  09-03 @ 07:00  --------------------------------------------------------  IN: 2100 mL / OUT: 1000 mL / NET: 1100 mL    09-03 @ 07:01  -  09-03 @ 12:02  --------------------------------------------------------  IN: 700 mL / OUT: 0 mL / NET: 700 mL      CAPILLARY BLOOD GLUCOSE          PHYSICAL EXAM:    General: mild distress  HEENT: NC/AT; PERRL, clear conjunctiva  Neck: supple  Respiratory: crackles at bases b/l  Cardiovascular: +S1/S2; RRR  Abdomen: soft. Tender to palpation RUQ and LUQ. ND; +BS x4  Extremities: WWP, 2+ peripheral pulses b/l. Minimal edema RLE  Skin: normal color and turgor; no rash  Neurological: A&Ox3, moving limbs spontaneously, 5/5 strength    MEDICATIONS:  MEDICATIONS  (STANDING):  heparin  Injectable 5000 Unit(s) SubCutaneous every 8 hours  lactated ringers. 1000 milliLiter(s) (150 mL/Hr) IV Continuous <Continuous>  lisinopril 2.5 milliGRAM(s) Oral daily  metoprolol succinate ER 25 milliGRAM(s) Oral daily  pantoprazole  Injectable 40 milliGRAM(s) IV Push daily  piperacillin/tazobactam IVPB. 3.375 Gram(s) IV Intermittent every 6 hours    MEDICATIONS  (PRN):  HYDROmorphone  Injectable 0.5 milliGRAM(s) IV Push every 3 hours PRN Moderate Pain (4 - 6)  HYDROmorphone  Injectable 1 milliGRAM(s) IV Push every 3 hours PRN Severe Pain (7 - 10)      ALLERGIES:  Allergies    No Known Drug Allergies  shellfish (Unknown)    Intolerances        LABS:                        15.2   15.5  )-----------( 175      ( 03 Sep 2018 07:44 )             45.5     09-03    137  |  99  |  16  ----------------------------<  142<H>  See note   |  25  |  0.75    Ca    9.2      03 Sep 2018 07:44  Phos  3.1     09-03  Mg     2.1     09-03    TPro  7.3  /  Alb  3.6  /  TBili  1.8<H>  /  DBili  x   /  AST  49<H>  /  ALT  49<H>  /  AlkPhos  91  09-03    PT/INR - ( 02 Sep 2018 18:39 )   PT: 14.5 sec;   INR: 1.30          PTT - ( 02 Sep 2018 18:39 )  PTT:28.2 sec      RADIOLOGY & ADDITIONAL TESTS: Reviewed.

## 2018-09-03 NOTE — PROGRESS NOTE ADULT - ASSESSMENT
41yo male with a PMH of MI (2015), PPM (2015), HF (EF 20% in 2015, now 55% as of 07/2018), and h/o heavy etoh use (used to drink heavily, now 9-12drinks per week) p/w 10/10 abdominal pain associated with nausea and vomiting. Found to have pancreatitis 2/2 gallstone on U/S, stepped down to 7 [facility] for ERCP tomorrow

## 2018-09-04 LAB
ALBUMIN SERPL ELPH-MCNC: 3.1 G/DL — LOW (ref 3.3–5)
ALP SERPL-CCNC: 135 U/L — HIGH (ref 40–120)
ALT FLD-CCNC: 56 U/L — HIGH (ref 10–45)
ANION GAP SERPL CALC-SCNC: 13 MMOL/L — SIGNIFICANT CHANGE UP (ref 5–17)
APTT BLD: 29.7 SEC — SIGNIFICANT CHANGE UP (ref 27.5–37.4)
AST SERPL-CCNC: 35 U/L — SIGNIFICANT CHANGE UP (ref 10–40)
BASOPHILS NFR BLD AUTO: 0.1 % — SIGNIFICANT CHANGE UP (ref 0–2)
BILIRUB SERPL-MCNC: 1.6 MG/DL — HIGH (ref 0.2–1.2)
BLD GP AB SCN SERPL QL: NEGATIVE — SIGNIFICANT CHANGE UP
BUN SERPL-MCNC: 13 MG/DL — SIGNIFICANT CHANGE UP (ref 7–23)
CALCIUM SERPL-MCNC: 9 MG/DL — SIGNIFICANT CHANGE UP (ref 8.4–10.5)
CHLORIDE SERPL-SCNC: 99 MMOL/L — SIGNIFICANT CHANGE UP (ref 96–108)
CO2 SERPL-SCNC: 27 MMOL/L — SIGNIFICANT CHANGE UP (ref 22–31)
CREAT SERPL-MCNC: 0.9 MG/DL — SIGNIFICANT CHANGE UP (ref 0.5–1.3)
EOSINOPHIL NFR BLD AUTO: 0.1 % — SIGNIFICANT CHANGE UP (ref 0–6)
GLUCOSE SERPL-MCNC: 120 MG/DL — HIGH (ref 70–99)
HCT VFR BLD CALC: 40.2 % — SIGNIFICANT CHANGE UP (ref 39–50)
HGB BLD-MCNC: 13 G/DL — SIGNIFICANT CHANGE UP (ref 13–17)
INR BLD: 1.35 — HIGH (ref 0.88–1.16)
LYMPHOCYTES # BLD AUTO: 9.3 % — LOW (ref 13–44)
MAGNESIUM SERPL-MCNC: 1.9 MG/DL — SIGNIFICANT CHANGE UP (ref 1.6–2.6)
MCHC RBC-ENTMCNC: 31.3 PG — SIGNIFICANT CHANGE UP (ref 27–34)
MCHC RBC-ENTMCNC: 32.3 G/DL — SIGNIFICANT CHANGE UP (ref 32–36)
MCV RBC AUTO: 96.9 FL — SIGNIFICANT CHANGE UP (ref 80–100)
MONOCYTES NFR BLD AUTO: 6.6 % — SIGNIFICANT CHANGE UP (ref 2–14)
NEUTROPHILS NFR BLD AUTO: 83.9 % — HIGH (ref 43–77)
PHOSPHATE SERPL-MCNC: 2.4 MG/DL — LOW (ref 2.5–4.5)
PLATELET # BLD AUTO: 141 K/UL — LOW (ref 150–400)
POTASSIUM SERPL-MCNC: 3.7 MMOL/L — SIGNIFICANT CHANGE UP (ref 3.5–5.3)
POTASSIUM SERPL-SCNC: 3.7 MMOL/L — SIGNIFICANT CHANGE UP (ref 3.5–5.3)
PROT SERPL-MCNC: 6.5 G/DL — SIGNIFICANT CHANGE UP (ref 6–8.3)
PROTHROM AB SERPL-ACNC: 15.1 SEC — HIGH (ref 9.8–12.7)
RBC # BLD: 4.15 M/UL — LOW (ref 4.2–5.8)
RBC # FLD: 13.4 % — SIGNIFICANT CHANGE UP (ref 10.3–16.9)
RH IG SCN BLD-IMP: POSITIVE — SIGNIFICANT CHANGE UP
SODIUM SERPL-SCNC: 139 MMOL/L — SIGNIFICANT CHANGE UP (ref 135–145)
WBC # BLD: 12.3 K/UL — HIGH (ref 3.8–10.5)
WBC # FLD AUTO: 12.3 K/UL — HIGH (ref 3.8–10.5)

## 2018-09-04 PROCEDURE — 43262 ENDO CHOLANGIOPANCREATOGRAPH: CPT

## 2018-09-04 PROCEDURE — 43264 ERCP REMOVE DUCT CALCULI: CPT

## 2018-09-04 PROCEDURE — 99233 SBSQ HOSP IP/OBS HIGH 50: CPT | Mod: GC

## 2018-09-04 RX ADMIN — HYDROMORPHONE HYDROCHLORIDE 1 MILLIGRAM(S): 2 INJECTION INTRAMUSCULAR; INTRAVENOUS; SUBCUTANEOUS at 01:20

## 2018-09-04 RX ADMIN — Medication 25 MILLIGRAM(S): at 06:46

## 2018-09-04 RX ADMIN — HYDROMORPHONE HYDROCHLORIDE 1 MILLIGRAM(S): 2 INJECTION INTRAMUSCULAR; INTRAVENOUS; SUBCUTANEOUS at 11:02

## 2018-09-04 RX ADMIN — HEPARIN SODIUM 5000 UNIT(S): 5000 INJECTION INTRAVENOUS; SUBCUTANEOUS at 16:15

## 2018-09-04 RX ADMIN — LISINOPRIL 2.5 MILLIGRAM(S): 2.5 TABLET ORAL at 06:46

## 2018-09-04 RX ADMIN — HEPARIN SODIUM 5000 UNIT(S): 5000 INJECTION INTRAVENOUS; SUBCUTANEOUS at 06:46

## 2018-09-04 RX ADMIN — HYDROMORPHONE HYDROCHLORIDE 1 MILLIGRAM(S): 2 INJECTION INTRAMUSCULAR; INTRAVENOUS; SUBCUTANEOUS at 06:45

## 2018-09-04 RX ADMIN — HYDROMORPHONE HYDROCHLORIDE 1 MILLIGRAM(S): 2 INJECTION INTRAMUSCULAR; INTRAVENOUS; SUBCUTANEOUS at 06:46

## 2018-09-04 RX ADMIN — HYDROMORPHONE HYDROCHLORIDE 1 MILLIGRAM(S): 2 INJECTION INTRAMUSCULAR; INTRAVENOUS; SUBCUTANEOUS at 11:15

## 2018-09-04 RX ADMIN — HYDROMORPHONE HYDROCHLORIDE 1 MILLIGRAM(S): 2 INJECTION INTRAMUSCULAR; INTRAVENOUS; SUBCUTANEOUS at 01:40

## 2018-09-04 RX ADMIN — HEPARIN SODIUM 5000 UNIT(S): 5000 INJECTION INTRAVENOUS; SUBCUTANEOUS at 21:00

## 2018-09-04 RX ADMIN — PANTOPRAZOLE SODIUM 40 MILLIGRAM(S): 20 TABLET, DELAYED RELEASE ORAL at 11:03

## 2018-09-04 NOTE — PROGRESS NOTE ADULT - ASSESSMENT
43yo male with a PMH of MI (2015), PPM (2015), HF (EF 20% in 2015, now 55% as of 07/2018), and h/o heavy etoh use (used to drink heavily, now 9-12drinks per week) p/w 10/10 abdominal pain associated with nausea and vomiting. Found to have pancreatitis 2/2 gallstone on U/S, stepped down to 7 [facility] for ERCP procedure 41yo male with a PMH of MI s/p AICD (2015), HF (EF 20% in 2015, now 55% as of 07/2018), and h/o heavy etoh use (used to drink heavily, now 9-12drinks per week) p/w 10/10 abdominal pain associated with nausea and vomiting. Found to have pancreatitis 2/2 gallstone on U/S, stepped down to [facility] for ERCP procedure

## 2018-09-04 NOTE — PROGRESS NOTE ADULT - PROBLEM SELECTOR PLAN 2
HTN   -c/w lisinopril 2.5 mg po daily  -c/w metoprolol succinate ER 25mg po qd -c/w lisinopril 2.5 mg po daily  -c/w metoprolol succinate ER 25mg po qd

## 2018-09-04 NOTE — PROGRESS NOTE ADULT - PROBLEM SELECTOR PLAN 1
Pt medical history of ETOH abuse presented with stable vital signs, ruq and luq pain, with wbc: 15.2. Pt found to have gallstone pancreatitis on abodminal ultrasound. Last drink was friday (8/31): few mojitos. VSS, nontoxic appearing. Plan is for ERCP  -ALT/AST: 67/49--->49/49  -Lipase: x>600  -f/u lipid panel  - f/u CT abd with IV contrast  - f/u GI consult  -c/w  @ 150 cc/hr  - dilaudid PRN for pain  - Pt cannot undergo MRCP d/t having a AICD  - NPO after midnight for ERCP tomorrow Pt medical history of ETOH abuse presented with stable vital signs, ruq and luq pain, with wbc: 15.2, Lipase: x>600, Amylase 613. Pt found to have gallstone pancreatitis on abdominal ultrasound. Last drink was friday (8/31): few mojitos. VSS, nontoxic appearing. Plan is for ERCP  -ALT/AST: 67/49--->49/49--->56/35 (improving)  - f/u CT abd with IV contrast  - f/u GI consult  -c/w  @ 150 cc/hr  - dilaudid PRN for pain  - Pt cannot undergo MRCP because of AICD Pt medical history of ETOH abuse presented with stable vital signs, ruq and luq pain, with wbc: 15.2, Lipase: x>600, Amylase 613. Pt found to have gallstone pancreatitis on abdominal ultrasound. Last drink was friday (8/31): few mojitos. VSS, nontoxic appearing. Plan is for ERCP  -ALT/AST: 67/49--->49/49--->56/35 (improving)  - f/u CT abd with IV contrast  - f/u GI consult  - c/w  @ 150 cc/hr  - dilaudid PRN for pain  - Pt cannot undergo MRCP because of AICD Pt medical history of ETOH abuse presented with stable vital signs, ruq and luq pain, with wbc: 15.2, Lipase: x>600, Amylase 613. Pt found to have gallstone pancreatitis on abdominal ultrasound. Last drink was friday (8/31): few mojitos. VSS, nontoxic appearing. Plan is for ERCP  -ALT/AST: 67/49--->49/49--->56/35 (improving)  - CT abd with IV contrast: severe pancreatitis with some early necrosis, ascites b/l pleural effusion  - f/u GI consult  - c/w  @ 150 cc/hr  - dilaudid PRN for pain  - Pt cannot undergo MRCP because of AICD

## 2018-09-04 NOTE — PROGRESS NOTE ADULT - PROBLEM SELECTOR PLAN 5
Fluids:  @ 150 cc/hr  Electrolytes: replete as necessary, K>4, Mg>2   Nutrition: NPo for now  Bowel regimen: none  DVT ppx: heparin subQ  Code: Full  Disposition: [facility] Fluids:  @ 150 cc/hr  Electrolytes: replete as necessary, K>4, Mg>2   Nutrition: NPO for now  Bowel regimen: none  DVT ppx: heparin subQ  Code: Full  Disposition: 7 [facility]

## 2018-09-04 NOTE — PROGRESS NOTE ADULT - SUBJECTIVE AND OBJECTIVE BOX
INTERVAL HPI/OVERNIGHT EVENTS:  Patient was seen and examined at bedside. Overnight ice packs for a 100.3 fever.  Fever likely secondary to pain from gallstone pancreatitis. Still complaining of abdominal pain but slightly alleviated by his hydromorphone 1mg IVP q 3 hours. Currently awaiting his ERCP scheduled today.       VITAL SIGNS:  T(F): 98.4 (09-04-18 @ 09:52)  HR: 72 (09-04-18 @ 09:20)  BP: 135/77 (09-04-18 @ 09:20)  RR: 16 (09-04-18 @ 09:20)  SpO2: 97% (09-04-18 @ 09:20)  Wt(kg): --    PHYSICAL EXAM:    Constitutional:  male, pleasant, NAD  HEENT: PERRL, EOMI, sclera non-icteric, neck supple, trachea midline, no masses, no JVD, MMM, good dentition  Respiratory: CTAB, good air entry b/l, no wheezing, no rhonchi, no rales, without accessory muscle   use and no intercostal retractions  Cardiovascular: RRR, normal S1S2, no M/R/G  Gastrointestinal: + ann's sign, pain on palpation on upper  right lower quadrant, soft,  no masses palpable, hypoactive bowel sounds  Extremities: Warm, well perfused, pulses equal bilateral upper and lower extremities, no edema, no clubbing  Neurological: AAOx3  Skin: Normal temperature, warm, dry          MEDICATIONS  (STANDING):  heparin  Injectable 5000 Unit(s) SubCutaneous every 8 hours  lactated ringers. 1000 milliLiter(s) (150 mL/Hr) IV Continuous <Continuous>  lisinopril 2.5 milliGRAM(s) Oral daily  metoprolol succinate ER 25 milliGRAM(s) Oral daily  pantoprazole  Injectable 40 milliGRAM(s) IV Push daily    MEDICATIONS  (PRN):  HYDROmorphone  Injectable 0.5 milliGRAM(s) IV Push every 3 hours PRN Moderate Pain (4 - 6)  HYDROmorphone  Injectable 1 milliGRAM(s) IV Push every 3 hours PRN Severe Pain (7 - 10)      Allergies    No Known Drug Allergies  shellfish (Unknown)    Intolerances        LABS:                        13.0   12.3  )-----------( 141      ( 04 Sep 2018 06:42 )             40.2     09-04    139  |  99  |  13  ----------------------------<  120<H>  3.7   |  27  |  0.90    Ca    9.0      04 Sep 2018 06:42  Phos  2.4     09-04  Mg     1.9     09-04    TPro  6.5  /  Alb  3.1<L>  /  TBili  1.6<H>  /  DBili  x   /  AST  35  /  ALT  56<H>  /  AlkPhos  135<H>  09-04    PT/INR - ( 04 Sep 2018 06:42 )   PT: 15.1 sec;   INR: 1.35          PTT - ( 04 Sep 2018 06:42 )  PTT:29.7 sec      RADIOLOGY & ADDITIONAL TESTS:  Reviewed Hospital Course:    41yo male with a PMH of MI s/p AICD (2015), HF (EF 20% in 2015, now 55% as of 07/2018), and h/o heavy etoh use (used to drink heavily, now 9-12drinks per week) p/w 10/10 abdominal pain associated with nausea and vomiting. Pt states he was in his usual state of health when yesterday evening he began to feel intense pain in the URQ and ULQ of his abdomen accompanied by intense diaphoresis, nausea, and NBNB vomiting. He has never felt a similar pain before. He describes the pain as crampy, nonradiating, not relieved or made worse by anything.  He denies eating a meal prior to experiencing the pain. Pt [facility] on 9found to have gallstone pancreatitis, now being transferred to [facility] for higher level of care. Today his ROS is negative except for mild abd pain to palpation. All of his doctors and medical documents are at [facility].     GI consulted, recommended CTabd with contrast. Any interventions will be planned for tomorrow.           INTERVAL HPI/OVERNIGHT EVENTS:  Patient was seen and examined at bedside. Overnight ice packs for a 100.3 fever.  Fever likely secondary to pain from gallstone pancreatitis. Still complaining of abdominal pain but slightly alleviated by his hydromorphone 1mg IVP q 3 hours. Currently awaiting his ERCP scheduled today.       VITAL SIGNS:  T(F): 98.4 (09-04-18 @ 09:52)  HR: 72 (09-04-18 @ 09:20)  BP: 135/77 (09-04-18 @ 09:20)  RR: 16 (09-04-18 @ 09:20)  SpO2: 97% (09-04-18 @ 09:20)  Wt(kg): --    PHYSICAL EXAM:    Constitutional:  male, pleasant, NAD  HEENT: PERRL, EOMI, sclera non-icteric, neck supple, trachea midline, no masses, no JVD, MMM, good dentition  Respiratory: CTAB, good air entry b/l, no wheezing, no rhonchi, no rales, without accessory muscle   use and no intercostal retractions  Cardiovascular: RRR, normal S1S2, no M/R/G  Gastrointestinal: + ann's sign, pain on palpation on upper  right lower quadrant, soft,  no masses palpable, hypoactive bowel sounds  Extremities: Warm, well perfused, pulses equal bilateral upper and lower extremities, no edema, no clubbing  Neurological: AAOx3  Skin: Normal temperature, warm, dry          MEDICATIONS  (STANDING):  heparin  Injectable 5000 Unit(s) SubCutaneous every 8 hours  lactated ringers. 1000 milliLiter(s) (150 mL/Hr) IV Continuous <Continuous>  lisinopril 2.5 milliGRAM(s) Oral daily  metoprolol succinate ER 25 milliGRAM(s) Oral daily  pantoprazole  Injectable 40 milliGRAM(s) IV Push daily    MEDICATIONS  (PRN):  HYDROmorphone  Injectable 0.5 milliGRAM(s) IV Push every 3 hours PRN Moderate Pain (4 - 6)  HYDROmorphone  Injectable 1 milliGRAM(s) IV Push every 3 hours PRN Severe Pain (7 - 10)      Allergies    No Known Drug Allergies  shellfish (Unknown)    Intolerances        LABS:                        13.0   12.3  )-----------( 141      ( 04 Sep 2018 06:42 )             40.2     09-04    139  |  99  |  13  ----------------------------<  120<H>  3.7   |  27  |  0.90    Ca    9.0      04 Sep 2018 06:42  Phos  2.4     09-04  Mg     1.9     09-04    TPro  6.5  /  Alb  3.1<L>  /  TBili  1.6<H>  /  DBili  x   /  AST  35  /  ALT  56<H>  /  AlkPhos  135<H>  09-04    PT/INR - ( 04 Sep 2018 06:42 )   PT: 15.1 sec;   INR: 1.35          PTT - ( 04 Sep 2018 06:42 )  PTT:29.7 sec      RADIOLOGY & ADDITIONAL TESTS:  Reviewed Hospital Course:    41yo male with a PMH of MI s/p AICD (2015), HF (EF 20% in 2015, now 55% as of 07/2018), and h/o heavy etoh use (used to drink heavily, now 9-12drinks per week) p/w 10/10 abdominal pain associated with nausea and vomiting. Pt states he was in his usual state of health when yesterday evening he began to feel intense pain in the URQ and ULQ of his abdomen accompanied by intense diaphoresis, nausea, and NBNB vomiting. He has never felt a similar pain before. He describes the pain as crampy, nonradiating, not relieved or made worse by anything.  He denies eating a meal prior to experiencing the pain. Pt admitted to [facility] on 9/2 found to have gallstone pancreatitis on U/S. He was transferred to [facility] for higher level of care.   GI consulted, recommended CTabd with contrast. Any interventions will be planned for tomorrow.           INTERVAL HPI/OVERNIGHT EVENTS:  Patient was seen and examined at bedside. Overnight ice packs for a 100.3 fever.  Fever likely secondary to pain from gallstone pancreatitis. Still complaining of abdominal pain but slightly alleviated by his hydromorphone 1mg IVP q 3 hours. Currently awaiting his ERCP scheduled today.       VITAL SIGNS:  T(F): 98.4 (09-04-18 @ 09:52)  HR: 72 (09-04-18 @ 09:20)  BP: 135/77 (09-04-18 @ 09:20)  RR: 16 (09-04-18 @ 09:20)  SpO2: 97% (09-04-18 @ 09:20)  Wt(kg): --    PHYSICAL EXAM:    Constitutional:  male, pleasant, NAD  HEENT: PERRL, EOMI, sclera non-icteric, neck supple, trachea midline, no masses, no JVD, MMM, good dentition  Respiratory: CTAB, good air entry b/l, no wheezing, no rhonchi, no rales, without accessory muscle   use and no intercostal retractions  Cardiovascular: RRR, normal S1S2, no M/R/G  Gastrointestinal: + ann's sign, pain on palpation on upper  right lower quadrant, soft,  no masses palpable, hypoactive bowel sounds  Extremities: Warm, well perfused, pulses equal bilateral upper and lower extremities, no edema, no clubbing  Neurological: AAOx3  Skin: Normal temperature, warm, dry          MEDICATIONS  (STANDING):  heparin  Injectable 5000 Unit(s) SubCutaneous every 8 hours  lactated ringers. 1000 milliLiter(s) (150 mL/Hr) IV Continuous <Continuous>  lisinopril 2.5 milliGRAM(s) Oral daily  metoprolol succinate ER 25 milliGRAM(s) Oral daily  pantoprazole  Injectable 40 milliGRAM(s) IV Push daily    MEDICATIONS  (PRN):  HYDROmorphone  Injectable 0.5 milliGRAM(s) IV Push every 3 hours PRN Moderate Pain (4 - 6)  HYDROmorphone  Injectable 1 milliGRAM(s) IV Push every 3 hours PRN Severe Pain (7 - 10)      Allergies    No Known Drug Allergies  shellfish (Unknown)    Intolerances        LABS:                        13.0   12.3  )-----------( 141      ( 04 Sep 2018 06:42 )             40.2     09-04    139  |  99  |  13  ----------------------------<  120<H>  3.7   |  27  |  0.90    Ca    9.0      04 Sep 2018 06:42  Phos  2.4     09-04  Mg     1.9     09-04    TPro  6.5  /  Alb  3.1<L>  /  TBili  1.6<H>  /  DBili  x   /  AST  35  /  ALT  56<H>  /  AlkPhos  135<H>  09-04    PT/INR - ( 04 Sep 2018 06:42 )   PT: 15.1 sec;   INR: 1.35          PTT - ( 04 Sep 2018 06:42 )  PTT:29.7 sec      RADIOLOGY & ADDITIONAL TESTS:  Reviewed Hospital Course:    41yo male with a PMH of MI s/p AICD (2015), HF (EF 20% in 2015, now 55% as of 07/2018), and h/o heavy etoh use (used to drink heavily, now 9-12drinks per week) p/w 10/10 abdominal pain associated with nausea and vomiting. Pt states he was in his usual state of health when yesterday evening he began to feel intense pain in the URQ and ULQ of his abdomen accompanied by intense diaphoresis, nausea, and NBNB vomiting. He has never felt a similar pain before. He describes the pain as crampy, nonradiating, not relieved or made worse by anything.  He denies eating a meal prior to experiencing the pain. Pt admitted to [facility] on 9/2 found to have gallstone pancreatitis on U/S. He was transferred to [facility] for higher level of care. CTabd with contrast showed  Severe acute pancreatitis with regions of early pancreatic necrosis with large volume of ascites. On 9/4 patient underwent ERCP and underwent sphincterotomy.        INTERVAL HPI/OVERNIGHT EVENTS:  Patient was seen and examined at bedside. Overnight ice packs for a 100.3 fever.  Fever likely secondary to pain from gallstone pancreatitis. Still complaining of abdominal pain but slightly alleviated by his hydromorphone 1mg IVP q 3 hours. Currently awaiting his ERCP scheduled today.       VITAL SIGNS:  T(F): 98.4 (09-04-18 @ 09:52)  HR: 72 (09-04-18 @ 09:20)  BP: 135/77 (09-04-18 @ 09:20)  RR: 16 (09-04-18 @ 09:20)  SpO2: 97% (09-04-18 @ 09:20)  Wt(kg): --    PHYSICAL EXAM:    Constitutional:  male, pleasant, NAD  HEENT: PERRL, EOMI, sclera non-icteric, neck supple, trachea midline, no masses, no JVD, MMM, good dentition  Respiratory: CTAB, good air entry b/l, no wheezing, no rhonchi, no rales, without accessory muscle   use and no intercostal retractions  Cardiovascular: RRR, normal S1S2, no M/R/G  Gastrointestinal: + ann's sign, pain on palpation on upper  right lower quadrant, soft,  no masses palpable, hypoactive bowel sounds  Extremities: Warm, well perfused, pulses equal bilateral upper and lower extremities, no edema, no clubbing  Neurological: AAOx3  Skin: Normal temperature, warm, dry          MEDICATIONS  (STANDING):  heparin  Injectable 5000 Unit(s) SubCutaneous every 8 hours  lactated ringers. 1000 milliLiter(s) (150 mL/Hr) IV Continuous <Continuous>  lisinopril 2.5 milliGRAM(s) Oral daily  metoprolol succinate ER 25 milliGRAM(s) Oral daily  pantoprazole  Injectable 40 milliGRAM(s) IV Push daily    MEDICATIONS  (PRN):  HYDROmorphone  Injectable 0.5 milliGRAM(s) IV Push every 3 hours PRN Moderate Pain (4 - 6)  HYDROmorphone  Injectable 1 milliGRAM(s) IV Push every 3 hours PRN Severe Pain (7 - 10)      Allergies    No Known Drug Allergies  shellfish (Unknown)    Intolerances        LABS:                        13.0   12.3  )-----------( 141      ( 04 Sep 2018 06:42 )             40.2     09-04    139  |  99  |  13  ----------------------------<  120<H>  3.7   |  27  |  0.90    Ca    9.0      04 Sep 2018 06:42  Phos  2.4     09-04  Mg     1.9     09-04    TPro  6.5  /  Alb  3.1<L>  /  TBili  1.6<H>  /  DBili  x   /  AST  35  /  ALT  56<H>  /  AlkPhos  135<H>  09-04    PT/INR - ( 04 Sep 2018 06:42 )   PT: 15.1 sec;   INR: 1.35          PTT - ( 04 Sep 2018 06:42 )  PTT:29.7 sec      RADIOLOGY & ADDITIONAL TESTS:  Reviewed

## 2018-09-04 NOTE — PROGRESS NOTE ADULT - PROBLEM SELECTOR PLAN 4
Currently not having any ETOH withdrawal symptoms. CIWA 0 currently. Last drink was friday (8/31): few mojitos.  -continue to monitor  -lorazepam prn if needed
Currently not having any ETOH withdrawal symptoms. CIWA 0 currently. Last drink was friday (8/31): few mojitos.  -continue to monitor  -lorazepam prn if needed

## 2018-09-04 NOTE — CONSULT NOTE ADULT - SUBJECTIVE AND OBJECTIVE BOX
GENERAL SURGERY CONSULT NOTE    HPI:  41yo male with a PMH of MI (2015), AICD (2015), HF (EF 20% in 2015, now 55% as of 07/2018), and h/o heavy etoh use (used to drink heavily, now 9-12drinks per week) p/w 10/10 abdominal pain associated with nausea and vomiting. Pt states he was in his usual state of health, until he began to feel intense pain in the URQ and ULQ of his abdomen accompanied by intense diaphoresis, nausea, and NBNB vomiting. Pt admitted to [facility] found to have gallstone pancreatitis, was transferred to [facility] for higher level of care. Patient is now s/p ERCP - CBD found to be dilated, due to stones, underwent Sphincterotomy and completion cholangiogram. Patient reports resolution of pain, no N/V.    Surgery consulted for definitive operative management.      PAST MEDICAL & SURGICAL HISTORY:  ETOH abuse: h/o etoh abuse now moderate drinker  Heart failure, unspecified HF chronicity, unspecified heart failure type  Artificial pacemaker      REVIEW OF SYSTEMS:   Pertinent positives/negatives noted in HPI.     HOME MEDICATIONS:    ALLERGIES:  Allergies    No Known Drug Allergies  shellfish (Unknown)    Intolerances        SOCIAL HISTORY:    FAMILY HISTORY:  No pertinent family history in first degree relatives      Vital Signs Last 24 Hrs  T(C): 36.9 (04 Sep 2018 09:52), Max: 38.2 (03 Sep 2018 20:40)  T(F): 98.4 (04 Sep 2018 09:52), Max: 100.8 (03 Sep 2018 20:40)  HR: 72 (04 Sep 2018 16:00) (72 - 86)  BP: 123/69 (04 Sep 2018 16:00) (123/69 - 158/93)  BP(mean): 89 (04 Sep 2018 16:00) (89 - 120)  RR: 14 (04 Sep 2018 16:00) (14 - 23)  SpO2: 93% (04 Sep 2018 16:00) (93% - 98%)    PHYSICAL EXAM:  General: NAD  Neurology: A&Ox3, MARIA x 4. Denies tremors or shakes  Eyes: PERRLA/ EOMI, anicteric  ENT/Neck: Neck supple, trachea midline.  Respiratory: CTA B/L.  CV: RRR  Abdominal: Soft, NT, ND   Extremities: No edema, + peripheral pulses      LABS:                        13.0   12.3  )-----------( 141      ( 04 Sep 2018 06:42 )             40.2     09-04    139  |  99  |  13  ----------------------------<  120<H>  3.7   |  27  |  0.90    Ca    9.0      04 Sep 2018 06:42  Phos  2.4     09-04  Mg     1.9     09-04    TPro  6.5  /  Alb  3.1<L>  /  TBili  1.6<H>  /  DBili  x   /  AST  35  /  ALT  56<H>  /  AlkPhos  135<H>  09-04    PT/INR - ( 04 Sep 2018 06:42 )   PT: 15.1 sec;   INR: 1.35          PTT - ( 04 Sep 2018 06:42 )  PTT:29.7 sec GENERAL SURGERY CONSULT NOTE    HPI:  41yo male with a PMH of MI (2015), AICD (2015), HF (EF 20% in 2015, now 55% as of 07/2018), and h/o heavy etoh use (used to drink heavily, now 9-12drinks per week) p/w 10/10 abdominal pain associated with nausea and vomiting to [facility]. At [facility], he was found to have Bili  4.1-->2, -->144, -->117, Alk Phos 143-->144, lipase >700. Abd US showed many gallstones, CBD 0.4cm. Pt was treated for gallstone pancreatitis with IVF and pain control,, was transferred to [facility] for ERCP. Patient is now s/p ERCP - CBD found to be dilated, due to stones, underwent Sphincterotomy and completion cholangiogram. Patient reports resolution of pain, no N/V.    Surgery consulted for definitive operative management.      PAST MEDICAL & SURGICAL HISTORY:  ETOH abuse: h/o etoh abuse now moderate drinker  Heart failure, unspecified HF chronicity, unspecified heart failure type  Artificial pacemaker      REVIEW OF SYSTEMS:   Pertinent positives/negatives noted in HPI.     HOME MEDICATIONS:    ALLERGIES:  Allergies    No Known Drug Allergies  shellfish (Unknown)    Intolerances        SOCIAL HISTORY:    FAMILY HISTORY:  No pertinent family history in first degree relatives      Vital Signs Last 24 Hrs  T(C): 36.9 (04 Sep 2018 09:52), Max: 38.2 (03 Sep 2018 20:40)  T(F): 98.4 (04 Sep 2018 09:52), Max: 100.8 (03 Sep 2018 20:40)  HR: 72 (04 Sep 2018 16:00) (72 - 86)  BP: 123/69 (04 Sep 2018 16:00) (123/69 - 158/93)  BP(mean): 89 (04 Sep 2018 16:00) (89 - 120)  RR: 14 (04 Sep 2018 16:00) (14 - 23)  SpO2: 93% (04 Sep 2018 16:00) (93% - 98%)    PHYSICAL EXAM:  General: NAD  Neurology: A&Ox3, MARIA x 4. Denies tremors or shakes  Eyes: PERRLA/ EOMI, anicteric  ENT/Neck: Neck supple, trachea midline.  Respiratory: CTA B/L.  CV: RRR  Abdominal: Soft, NT, ND   Extremities: No edema, + peripheral pulses      LABS:                        13.0   12.3  )-----------( 141      ( 04 Sep 2018 06:42 )             40.2     09-04    139  |  99  |  13  ----------------------------<  120<H>  3.7   |  27  |  0.90    Ca    9.0      04 Sep 2018 06:42  Phos  2.4     09-04  Mg     1.9     09-04    TPro  6.5  /  Alb  3.1<L>  /  TBili  1.6<H>  /  DBili  x   /  AST  35  /  ALT  56<H>  /  AlkPhos  135<H>  09-04    PT/INR - ( 04 Sep 2018 06:42 )   PT: 15.1 sec;   INR: 1.35          PTT - ( 04 Sep 2018 06:42 )  PTT:29.7 sec

## 2018-09-04 NOTE — PROGRESS NOTE ADULT - PROBLEM SELECTOR PLAN 3
Systolic Heart failure  - h/o MI in 2015. Echo performed after showed EF of 20%  - pt follows with cardiology at [facility], echo performed two months ago revealed EF55%  - AICD placed 2015  - pt has METS >14  - c/w Toprol XL 25mg PO daily and Lisinopril 2.5mg PO daily  - f/u EKG and Echo Systolic Heart failure  - h/o MI in 2015. Echo performed after showed EF of 20%  - pt follows with cardiology at [facility], echo performed two months ago revealed EF55%  - AICD placed 2015  - pt has Metabolic equivalent  >14, fit for ERCP procedure  - c/w Toprol XL 25mg PO daily and Lisinopril 2.5mg PO daily  - f/u EKG and Echo as needed

## 2018-09-04 NOTE — CONSULT NOTE ADULT - ASSESSMENT
43 yo M admitted with gallstone pancreatitis, now s/p ERCP with sphincterotomy.     PENDING 43 yo M admitted with gallstone pancreatitis, now s/p ERCP with sphincterotomy.     Given patient's clinical status and admission for acute pancreatitis, will hold off on any surgical intervention at this time  Would continue hydration, while monitoring cardiac function, patient does have history of CHF s/p AICD placement, with now normal EF.  Continue care per primary team    Discussed with surgical attending

## 2018-09-04 NOTE — PROGRESS NOTE ADULT - PROBLEM SELECTOR PLAN 6
1) PCP Contacted on Admission: (Y/N) --> Name & Phone #:  2) Date of Contact with PCP:  3) PCP Contacted at Discharge: (Y/N)  4) Summary of Handoff Given to PCP:   5) Post-Discharge Appointment Date and Location:
1) PCP Contacted on Admission: (Y/N) --> Name & Phone #:  2) Date of Contact with PCP:  3) PCP Contacted at Discharge: (Y/N)  4) Summary of Handoff Given to PCP:   5) Post-Discharge Appointment Date and Location:

## 2018-09-05 VITALS — TEMPERATURE: 98 F

## 2018-09-05 LAB
ALBUMIN SERPL ELPH-MCNC: 3.2 G/DL — LOW (ref 3.3–5)
ALP SERPL-CCNC: 105 U/L — SIGNIFICANT CHANGE UP (ref 40–120)
ALT FLD-CCNC: 36 U/L — SIGNIFICANT CHANGE UP (ref 10–45)
ANION GAP SERPL CALC-SCNC: 10 MMOL/L — SIGNIFICANT CHANGE UP (ref 5–17)
APTT BLD: 29.1 SEC — SIGNIFICANT CHANGE UP (ref 27.5–37.4)
AST SERPL-CCNC: 21 U/L — SIGNIFICANT CHANGE UP (ref 10–40)
BASOPHILS NFR BLD AUTO: 0.1 % — SIGNIFICANT CHANGE UP (ref 0–2)
BILIRUB DIRECT SERPL-MCNC: 0.3 MG/DL — HIGH (ref 0–0.2)
BILIRUB INDIRECT FLD-MCNC: 0.8 MG/DL — SIGNIFICANT CHANGE UP (ref 0.2–1)
BILIRUB SERPL-MCNC: 1.1 MG/DL — SIGNIFICANT CHANGE UP (ref 0.2–1.2)
BUN SERPL-MCNC: 14 MG/DL — SIGNIFICANT CHANGE UP (ref 7–23)
CALCIUM SERPL-MCNC: 9.3 MG/DL — SIGNIFICANT CHANGE UP (ref 8.4–10.5)
CHLORIDE SERPL-SCNC: 101 MMOL/L — SIGNIFICANT CHANGE UP (ref 96–108)
CO2 SERPL-SCNC: 30 MMOL/L — SIGNIFICANT CHANGE UP (ref 22–31)
CREAT SERPL-MCNC: 0.8 MG/DL — SIGNIFICANT CHANGE UP (ref 0.5–1.3)
EOSINOPHIL NFR BLD AUTO: 0 % — SIGNIFICANT CHANGE UP (ref 0–6)
GLUCOSE SERPL-MCNC: 141 MG/DL — HIGH (ref 70–99)
HCT VFR BLD CALC: 38.6 % — LOW (ref 39–50)
HGB BLD-MCNC: 12.9 G/DL — LOW (ref 13–17)
INR BLD: 1.19 — HIGH (ref 0.88–1.16)
LYMPHOCYTES # BLD AUTO: 10.9 % — LOW (ref 13–44)
MAGNESIUM SERPL-MCNC: 2 MG/DL — SIGNIFICANT CHANGE UP (ref 1.6–2.6)
MCHC RBC-ENTMCNC: 32.7 PG — SIGNIFICANT CHANGE UP (ref 27–34)
MCHC RBC-ENTMCNC: 33.4 G/DL — SIGNIFICANT CHANGE UP (ref 32–36)
MCV RBC AUTO: 97.7 FL — SIGNIFICANT CHANGE UP (ref 80–100)
MONOCYTES NFR BLD AUTO: 5.8 % — SIGNIFICANT CHANGE UP (ref 2–14)
NEUTROPHILS NFR BLD AUTO: 83.2 % — HIGH (ref 43–77)
PLATELET # BLD AUTO: 178 K/UL — SIGNIFICANT CHANGE UP (ref 150–400)
POTASSIUM SERPL-MCNC: 4 MMOL/L — SIGNIFICANT CHANGE UP (ref 3.5–5.3)
POTASSIUM SERPL-SCNC: 4 MMOL/L — SIGNIFICANT CHANGE UP (ref 3.5–5.3)
PROT SERPL-MCNC: 6.7 G/DL — SIGNIFICANT CHANGE UP (ref 6–8.3)
PROTHROM AB SERPL-ACNC: 13.2 SEC — HIGH (ref 9.8–12.7)
RBC # BLD: 3.95 M/UL — LOW (ref 4.2–5.8)
RBC # FLD: 13.3 % — SIGNIFICANT CHANGE UP (ref 10.3–16.9)
SODIUM SERPL-SCNC: 141 MMOL/L — SIGNIFICANT CHANGE UP (ref 135–145)
WBC # BLD: 10.1 K/UL — SIGNIFICANT CHANGE UP (ref 3.8–10.5)
WBC # FLD AUTO: 10.1 K/UL — SIGNIFICANT CHANGE UP (ref 3.8–10.5)

## 2018-09-05 PROCEDURE — 85730 THROMBOPLASTIN TIME PARTIAL: CPT

## 2018-09-05 PROCEDURE — 84100 ASSAY OF PHOSPHORUS: CPT

## 2018-09-05 PROCEDURE — 76705 ECHO EXAM OF ABDOMEN: CPT

## 2018-09-05 PROCEDURE — 80048 BASIC METABOLIC PNL TOTAL CA: CPT

## 2018-09-05 PROCEDURE — 80076 HEPATIC FUNCTION PANEL: CPT

## 2018-09-05 PROCEDURE — 82150 ASSAY OF AMYLASE: CPT

## 2018-09-05 PROCEDURE — 86850 RBC ANTIBODY SCREEN: CPT

## 2018-09-05 PROCEDURE — 83690 ASSAY OF LIPASE: CPT

## 2018-09-05 PROCEDURE — 99233 SBSQ HOSP IP/OBS HIGH 50: CPT | Mod: GC

## 2018-09-05 PROCEDURE — 83605 ASSAY OF LACTIC ACID: CPT

## 2018-09-05 PROCEDURE — 86900 BLOOD TYPING SEROLOGIC ABO: CPT

## 2018-09-05 PROCEDURE — 90686 IIV4 VACC NO PRSV 0.5 ML IM: CPT

## 2018-09-05 PROCEDURE — 74177 CT ABD & PELVIS W/CONTRAST: CPT

## 2018-09-05 PROCEDURE — C1769: CPT

## 2018-09-05 PROCEDURE — 85027 COMPLETE CBC AUTOMATED: CPT

## 2018-09-05 PROCEDURE — 83735 ASSAY OF MAGNESIUM: CPT

## 2018-09-05 PROCEDURE — 93306 TTE W/DOPPLER COMPLETE: CPT

## 2018-09-05 PROCEDURE — 71045 X-RAY EXAM CHEST 1 VIEW: CPT

## 2018-09-05 PROCEDURE — 85610 PROTHROMBIN TIME: CPT

## 2018-09-05 PROCEDURE — 85025 COMPLETE CBC W/AUTO DIFF WBC: CPT

## 2018-09-05 PROCEDURE — 80053 COMPREHEN METABOLIC PANEL: CPT

## 2018-09-05 PROCEDURE — 86901 BLOOD TYPING SEROLOGIC RH(D): CPT

## 2018-09-05 PROCEDURE — 80061 LIPID PANEL: CPT

## 2018-09-05 PROCEDURE — 36415 COLL VENOUS BLD VENIPUNCTURE: CPT

## 2018-09-05 RX ORDER — INFLUENZA VIRUS VACCINE 15; 15; 15; 15 UG/.5ML; UG/.5ML; UG/.5ML; UG/.5ML
0.5 SUSPENSION INTRAMUSCULAR ONCE
Qty: 0 | Refills: 0 | Status: COMPLETED | OUTPATIENT
Start: 2018-09-05 | End: 2018-09-05

## 2018-09-05 RX ADMIN — INFLUENZA VIRUS VACCINE 0.5 MILLILITER(S): 15; 15; 15; 15 SUSPENSION INTRAMUSCULAR at 14:29

## 2018-09-05 RX ADMIN — LISINOPRIL 2.5 MILLIGRAM(S): 2.5 TABLET ORAL at 06:26

## 2018-09-05 RX ADMIN — SODIUM CHLORIDE 150 MILLILITER(S): 9 INJECTION, SOLUTION INTRAVENOUS at 11:34

## 2018-09-05 RX ADMIN — HEPARIN SODIUM 5000 UNIT(S): 5000 INJECTION INTRAVENOUS; SUBCUTANEOUS at 13:51

## 2018-09-05 RX ADMIN — PANTOPRAZOLE SODIUM 40 MILLIGRAM(S): 20 TABLET, DELAYED RELEASE ORAL at 11:35

## 2018-09-05 RX ADMIN — HEPARIN SODIUM 5000 UNIT(S): 5000 INJECTION INTRAVENOUS; SUBCUTANEOUS at 06:26

## 2018-09-05 RX ADMIN — Medication 25 MILLIGRAM(S): at 06:26

## 2018-09-05 NOTE — PROGRESS NOTE ADULT - SUBJECTIVE AND OBJECTIVE BOX
INTERVAL HPI/OVERNIGHT EVENTS:  Patient was seen and examined at bedside. As per nurse and patient, no o/n events, patient resting comfortably. No complaints at this time. Patient denies: fever, chills, dizziness, weakness, HA, Changes in vision, CP, palpitations, SOB, cough, N/V/D/C, dysuria, changes in bowel movements, LE edema. ROS otherwise negative.    ICU Vital Signs Last 24 Hrs  T(C): 36.8 (05 Sep 2018 06:12), Max: 37.7 (04 Sep 2018 21:00)  T(F): 98.3 (05 Sep 2018 06:12), Max: 99.9 (04 Sep 2018 21:00)  HR: 60 (05 Sep 2018 04:29) (60 - 86)  BP: 143/84 (05 Sep 2018 04:29) (123/69 - 146/73)  BP(mean): 108 (05 Sep 2018 04:29) (89 - 108)  RR: 18 (05 Sep 2018 04:29) (14 - 19)  SpO2: 94% (05 Sep 2018 04:29) (92% - 97%)      09-03-18 @ 07:01  -  09-04-18 @ 07:00  --------------------------------------------------------  IN: 2000 mL / OUT: 400 mL / NET: 1600 mL    09-04-18 @ 07:01  -  09-05-18 @ 06:27  --------------------------------------------------------  IN: 2551 mL / OUT: 500 mL / NET: 2051 mL        PHYSICAL EXAM:    Constitutional: WDWN, NAD  HEENT: PERRL, EOMI, sclera non-icteric, neck supple, trachea midline, no masses, no JVD, MMM, good dentition  Respiratory: CTA b/l, good air entry b/l, no wheezing, no rhonchi, no rales, without accessory muscle use and no intercostal retractions  Cardiovascular: RRR, normal S1S2, no M/R/G  Gastrointestinal: soft, NTND, no masses palpable, BS normal  Extremities: Warm, well perfused, pulses equal bilateral upper and lower extremities, no edema, no clubbing  Neurological: AAOx3, CN Grossly intact  Skin: Normal temperature, warm, dry    MEDICATIONS  (STANDING):  heparin  Injectable 5000 Unit(s) SubCutaneous every 8 hours  lactated ringers. 1000 milliLiter(s) (150 mL/Hr) IV Continuous <Continuous>  lisinopril 2.5 milliGRAM(s) Oral daily  metoprolol succinate ER 25 milliGRAM(s) Oral daily  pantoprazole  Injectable 40 milliGRAM(s) IV Push daily    MEDICATIONS  (PRN):  HYDROmorphone  Injectable 0.5 milliGRAM(s) IV Push every 3 hours PRN Moderate Pain (4 - 6)  HYDROmorphone  Injectable 1 milliGRAM(s) IV Push every 3 hours PRN Severe Pain (7 - 10)      Allergies    No Known Drug Allergies  shellfish (Unknown)    Intolerances        LABS:                        13.0   12.3  )-----------( 141      ( 04 Sep 2018 06:42 )             40.2     09-04    139  |  99  |  13  ----------------------------<  120<H>  3.7   |  27  |  0.90    Ca    9.0      04 Sep 2018 06:42  Phos  2.4     09-04  Mg     1.9     09-04    TPro  6.5  /  Alb  3.1<L>  /  TBili  1.6<H>  /  DBili  x   /  AST  35  /  ALT  56<H>  /  AlkPhos  135<H>  09-04    PT/INR - ( 04 Sep 2018 06:42 )   PT: 15.1 sec;   INR: 1.35          PTT - ( 04 Sep 2018 06:42 )  PTT:29.7 sec      RADIOLOGY & ADDITIONAL TESTS:  Reviewed

## 2018-09-05 NOTE — DISCHARGE NOTE ADULT - CARE PROVIDER_API CALL
Rachel Montano), Surgery; Surgical Oncology  [address]  [address]  [place] [address]  Phone: [phone]  Fax: [phone]

## 2018-09-05 NOTE — PROGRESS NOTE ADULT - SUBJECTIVE AND OBJECTIVE BOX
Pt seen and examined at bedside  No new c/o  Feels a bit better  Some mild residual nausea, and abdominal pain      MEDICATIONS:  MEDICATIONS  (STANDING):  heparin  Injectable 5000 Unit(s) SubCutaneous every 8 hours  lactated ringers. 1000 milliLiter(s) (150 mL/Hr) IV Continuous <Continuous>  lisinopril 2.5 milliGRAM(s) Oral daily  metoprolol succinate ER 25 milliGRAM(s) Oral daily  pantoprazole  Injectable 40 milliGRAM(s) IV Push daily    MEDICATIONS  (PRN):  HYDROmorphone  Injectable 0.5 milliGRAM(s) IV Push every 3 hours PRN Moderate Pain (4 - 6)  HYDROmorphone  Injectable 1 milliGRAM(s) IV Push every 3 hours PRN Severe Pain (7 - 10)      Allergies  No Known Drug Allergies  shellfish (Unknown)    Intolerances        Vital Signs Last 24 Hrs  T(C): 36.9 (05 Sep 2018 14:04), Max: 37.7 (04 Sep 2018 21:00)  T(F): 98.5 (05 Sep 2018 14:04), Max: 99.9 (04 Sep 2018 21:00)  HR: 62 (05 Sep 2018 11:39) (60 - 86)  BP: 150/89 (05 Sep 2018 11:39) (137/70 - 160/91)  BP(mean): 114 (05 Sep 2018 11:39) (94 - 120)  RR: 18 (05 Sep 2018 11:39) (16 - 19)  SpO2: 97% (05 Sep 2018 11:39) (92% - 97%)    09-04 @ 07:01 - 09-05 @ 07:00  --------------------------------------------------------  IN: 3151 mL / OUT: 500 mL / NET: 2651 mL    09-05 @ 07:01 - 09-05 @ 17:25  --------------------------------------------------------  IN: 750 mL / OUT: 0 mL / NET: 750 mL      PHYSICAL EXAM:  General: Well developed; well nourished; in no acute distress  HEENT: MMM, conjunctiva and sclera clear  Gastrointestinal: Soft, BS+, mild tenderness epigastric area, NR, NG no masses or organs palpated  Skin: Warm and dry. No obvious rash      LABS:  CBC Full  -  ( 05 Sep 2018 06:50 )  WBC Count : 10.1 K/uL  Hemoglobin : 12.9 g/dL  Hematocrit : 38.6 %  Platelet Count - Automated : 178 K/uL  Mean Cell Volume : 97.7 fL  Mean Cell Hemoglobin : 32.7 pg  Mean Cell Hemoglobin Concentration : 33.4 g/dL  Auto Neutrophil % : 83.2 %  Auto Lymphocyte % : 10.9 %  Auto Monocyte % : 5.8 %  Auto Eosinophil % : 0.0 %  Auto Basophil % : 0.1 %    141  |  101  |  14  ----------------------------<  141<H>  4.0   |  30  |  0.80    Ca    9.3      05 Sep 2018 06:50  Phos  2.4     09-04  Mg     2.0     09-05    TPro  6.7  /  Alb  3.2<L>  /  TBili  1.1  /  DBili  0.3<H>  /  AST  21  /  ALT  36  /  AlkPhos  105  09-05    PT/INR - ( 05 Sep 2018 06:50 )   PT: 13.2 sec;   INR: 1.19       PTT - ( 05 Sep 2018 06:50 )  PTT:29.1 sec          RADIOLOGY & ADDITIONAL STUDIES (The following images were personally reviewed):

## 2018-09-05 NOTE — DISCHARGE NOTE ADULT - ADDITIONAL INSTRUCTIONS
Please follow up with your primary doctor about the possibility of surgical intervention post discharge. Please follow up with Dr. Rachel Montano from surgery within 1 week of discharge. Please call to make an appointment.

## 2018-09-05 NOTE — DISCHARGE NOTE ADULT - PLAN OF CARE
Resolution of symptoms You came to the hospital because of abdominal pain. We found that your abdominal pain was likely because of gallstone pancreatitis. In gallstone pancreatitis, gall stones lead to inflammation of your pancreas causing pain. We treated your pancreatitis with fluids and a procedure known as ERCP with sphincterotomy. This procedure allowed the stone to be removed. You likely may still have existing gallstones. However, at this time, there is no surgical intervention needed as an inpatient. Please follow up with your primary doctor about the possibility of surgical intervention post discharge. You have a history of heart failure. We repeated your echocardiogram to look at your heart while you were admitted here. Your ejection fraction was improved from prior studies. The ejection fraction measures how well your heart muscle contracts. Previously, your ejection fraction was around 20 but now it is around 55. We continued your lisinopril and Lopressor for your heart failure and hypertension. We continued your home lisinopril and lopressor. Please resume these as an outpatient. You came to the hospital because of abdominal pain. We found that your abdominal pain was likely because of gallstone pancreatitis. In gallstone pancreatitis, gall stones lead to inflammation of your pancreas causing pain. We treated your pancreatitis with fluids and a procedure known as ERCP with sphincterotomy. This procedure allowed the stone to be removed. You likely may still have existing gallstones. However, at this time, there is no surgical intervention needed as an inpatient. Please follow up with your primary doctor about the possibility of surgical intervention post discharge. Please follow up with Dr. Rachel Montano from surgery within 1 week of discharge. Please call to make an appointment.

## 2018-09-05 NOTE — DISCHARGE NOTE ADULT - MEDICATION SUMMARY - MEDICATIONS TO TAKE
I will START or STAY ON the medications listed below when I get home from the hospital:    lisinopril 2.5 mg oral tablet  -- 1 tab(s) by mouth once a day  -- Indication: For Htn    Toprol-XL 25 mg oral tablet, extended release  -- 1 tab(s) by mouth once a day  -- Indication: For Htn

## 2018-09-05 NOTE — DISCHARGE NOTE ADULT - CARE PLAN
Principal Discharge DX:	Gallstone pancreatitis  Goal:	Resolution of symptoms  Assessment and plan of treatment:	You came to the hospital because of abdominal pain. We found that your abdominal pain was likely because of gallstone pancreatitis. In gallstone pancreatitis, gall stones lead to inflammation of your pancreas causing pain. We treated your pancreatitis with fluids and a procedure known as ERCP with sphincterotomy. This procedure allowed the stone to be removed. You likely may still have existing gallstones. However, at this time, there is no surgical intervention needed as an inpatient. Please follow up with your primary doctor about the possibility of surgical intervention post discharge.  Secondary Diagnosis:	Heart failure  Assessment and plan of treatment:	You have a history of heart failure. We repeated your echocardiogram to look at your heart while you were admitted here. Your ejection fraction was improved from prior studies. The ejection fraction measures how well your heart muscle contracts. Previously, your ejection fraction was around 20 but now it is around 55. We continued your lisinopril and Lopressor for your heart failure and hypertension.  Secondary Diagnosis:	Hypertension  Assessment and plan of treatment:	We continued your home lisinopril and lopressor. Please resume these as an outpatient. Principal Discharge DX:	Gallstone pancreatitis  Goal:	Resolution of symptoms  Assessment and plan of treatment:	You came to the hospital because of abdominal pain. We found that your abdominal pain was likely because of gallstone pancreatitis. In gallstone pancreatitis, gall stones lead to inflammation of your pancreas causing pain. We treated your pancreatitis with fluids and a procedure known as ERCP with sphincterotomy. This procedure allowed the stone to be removed. You likely may still have existing gallstones. However, at this time, there is no surgical intervention needed as an inpatient. Please follow up with your primary doctor about the possibility of surgical intervention post discharge. Please follow up with Dr. Rachel Montano from surgery within 1 week of discharge. Please call to make an appointment.  Secondary Diagnosis:	Heart failure  Assessment and plan of treatment:	You have a history of heart failure. We repeated your echocardiogram to look at your heart while you were admitted here. Your ejection fraction was improved from prior studies. The ejection fraction measures how well your heart muscle contracts. Previously, your ejection fraction was around 20 but now it is around 55. We continued your lisinopril and Lopressor for your heart failure and hypertension.  Secondary Diagnosis:	Hypertension  Assessment and plan of treatment:	We continued your home lisinopril and lopressor. Please resume these as an outpatient.

## 2018-09-05 NOTE — DISCHARGE NOTE ADULT - PATIENT PORTAL LINK FT
You can access the [email or web address], offered by [facility], by registering with the following website: [email or web address]

## 2018-09-05 NOTE — DISCHARGE NOTE ADULT - HOSPITAL COURSE
43yo male with a PMH of MI (2015), AICD (2015), HF (EF 20% in 2015, now 55% as of 07/2018), and h/o heavy etoh use (used to drink heavily, now 9-12drinks per week) p/w 10/10 abdominal pain associated with nausea and vomiting. Pt stated he was in his usual state of health when he began to feel intense crampy, nonradiating pain in the URQ and ULQ of his abdomen accompanied by intense diaphoresis, nausea, and NBNB vomiting. Pt admitted to [facility] yesterday found to have gallstone pancreatitis, now being transferred to [facility] for higher level of care. Today his ROS is negative except for mild abd pain to palpation. All of his doctors and medical documents are at [facility].     GI consulted, recommended CTabd with contrast. Any interventions will be planned for tomorrow. 41yo male with a PMH of MI (2015), AICD (2015), HF (EF 20% in 2015, now 55% as of 07/2018), and h/o heavy etoh use (used to drink heavily, now 9-12 drinks per week) p/w 10/10 abdominal pain associated with nausea and vomiting. Pt stated he was in his usual state of health when he began to feel intense crampy, nonradiating pain in the URQ and ULQ of his abdomen accompanied by intense diaphoresis, nausea, and NBNB vomiting. Pt was initially admitted to [facility] but found to have gallstone pancreatitis and transferred to [facility] for higher level of care. At [facility], his ROS were negative except for mild abd pain to palpation. His CT abd with IV contrast showed severe pancreatitis with some early necrosis, ascites b/l pleural effusion. Lipase: x>600, Amylase 613. He was given dilaudid PRN for pain.on 9/4, patient received ERCP with sphincterotomy. He was seen by surgery who determined no need for surgery for now. On 9/5, he was completely asymptomatic and tolerating low fat, low residue diet. He is now hemodynamically stable for discharge.

## 2018-09-05 NOTE — PROGRESS NOTE ADULT - ASSESSMENT
41yo male with a PMH of MI (2015), AICD (2015), CHF w/ EF 55%, and h/o heavy etoh use, sleeve gastroplasty who presents with pancreatitis    # Pancreatitis:   - given US showing gallstones and hyperbilirubinemia, abnormal liver chemistries there is high suspicion for gallstone pancreatitis.   - Pt also with history of etoh use. triglycerides wnl.  - sp ERCP 9/4/18 with sludge removal, sphincterotomy, with no stones in CBD or residual stones  - pain control per primary team  - consider feeding patient if he is hungry and advance as tolerated    Case discussed with attending Dr. Franck SIMON following

## 2018-09-11 DIAGNOSIS — I50.20 UNSPECIFIED SYSTOLIC (CONGESTIVE) HEART FAILURE: ICD-10-CM

## 2018-09-11 DIAGNOSIS — I25.10 ATHEROSCLEROTIC HEART DISEASE OF NATIVE CORONARY ARTERY WITHOUT ANGINA PECTORIS: ICD-10-CM

## 2018-09-11 DIAGNOSIS — K85.11 BILIARY ACUTE PANCREATITIS WITH UNINFECTED NECROSIS: ICD-10-CM

## 2018-09-11 DIAGNOSIS — I11.0 HYPERTENSIVE HEART DISEASE WITH HEART FAILURE: ICD-10-CM

## 2018-09-11 DIAGNOSIS — K44.9 DIAPHRAGMATIC HERNIA WITHOUT OBSTRUCTION OR GANGRENE: ICD-10-CM

## 2018-09-11 DIAGNOSIS — F10.10 ALCOHOL ABUSE, UNCOMPLICATED: ICD-10-CM

## 2018-09-11 DIAGNOSIS — K85.10 BILIARY ACUTE PANCREATITIS WITHOUT NECROSIS OR INFECTION: ICD-10-CM

## 2018-09-11 DIAGNOSIS — Z95.810 PRESENCE OF AUTOMATIC (IMPLANTABLE) CARDIAC DEFIBRILLATOR: ICD-10-CM

## 2018-09-11 DIAGNOSIS — R18.8 OTHER ASCITES: ICD-10-CM

## 2018-09-11 DIAGNOSIS — E78.5 HYPERLIPIDEMIA, UNSPECIFIED: ICD-10-CM

## 2019-09-16 NOTE — DIETITIAN INITIAL EVALUATION ADULT. - NS AS NUTRI INTERV MEALS SNACK
Discharge instructions reviewed in detail.  Pt verbalized understanding.  No further questions or concerns.     
Pt hyperventilating talked with him to slow his breathing down however being I had to start a new IV this didn't seem to help. Pt started having cramping in both hands and arms, yelling out in pain. MD called in. Pt is feel a lot better now following meds. Monitor    
Pt remains a/o x 4, denies CP, SOB. Is sitting up watching TV, no distress noted  
Repeat troponin sent, pt has no chest pain or SOA.  
Started with CP this am while at work around 830ish, while at work his pain got worse feeling like he was going to passoiut and he knew he needed to be seen. Did have some nausea at lunch causing him not to be able to eat more then a few bites. Denies , fever/chills, abd. Is having palpitations and dull ache in his heart. Was seen at [facility] and had f/u with cardiology but didn't follow through. He is having the same s/sx today as when he was seen at [facility] other then at that time he did pass out. He is a/o x 4.   
CL--> FL--> low fat, Dash/TLC diet/Fat - modified diet/Mineral - modified diet

## 2021-05-05 NOTE — PATIENT PROFILE ADULT. - NSALCOHOLPROBLEMSRELYN_GEN_A_CORE_SD
Detail Level: Detailed
Quality 110: Preventive Care And Screening: Influenza Immunization: Influenza Immunization not Administered because Patient Refused.
no

## 2023-11-16 NOTE — PROGRESS NOTE ADULT - PROBLEM/PLAN-1
Inpatient Rehabilitation - Occupational Therapy Treatment Note    Psychiatric     Patient Name: Flako Coreas  : 1968  MRN: [id number]    Today's Date: 2023                 Admit Date: 2023         ICD-10-CM ICD-9-CM   1. Closed head injury with concussion, without loss of consciousness, subsequent encounter  S06.0X0D V58.89   2. 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    Acute CVA (cerebrovascular accident)    HTN (hypertension)    Late effect of cerebrovascular accident (CVA)    Concussion    Fall    Head injury, closed, with concussion    Alcohol abuse       Past Medical History:   Diagnosis Date    ADHD (attention deficit hyperactivity disorder)     On going issue    Anxiety     Lepe's esophagus     Benign prostatic hyperplasia Surgery in 2021    Clotting disorder Intestinal    Depression     Diverticulitis     Diverticulosis     Duodenitis     Enteritis     Failure to thrive (child)     Family history of blood clots     GERD (gastroesophageal reflux disease)     Hyperlipidemia     Hypertension     Hypertensive emergency     Low testosterone     Microcytosis     Pancreatitis     Pneumonia     PONV (postoperative nausea and vomiting)     Seasonal affective disorder     Shoulder injury     Stroke     Unexplained weight loss        Past Surgical History:   Procedure Laterality Date    COLON SURGERY      COLONOSCOPY      COLONOSCOPY N/A 2022    Procedure: COLONOSCOPY INTO CECUM WITH HOT SNARE POLYPECTOMY;  Surgeon:  Albert Gallo MD;  Location: [facility] ENDOSCOPY;  Service: Gastroenterology;  Laterality: N/A;  PRE- GI BLEED  POST- DIVERTICULOSIS, POLYP    ENDOSCOPY N/A 12/10/2022    Procedure: ESOPHAGOGASTRODUODENOSCOPY;  Surgeon: Albert Gallo MD;  Location: [facility] ENDOSCOPY;  Service: Gastroenterology;  Laterality: N/A;  PRE- DARK STOOLS  POST- BARRETTS ESOPHAGUS    FRACTURE SURGERY  1980    for broken arm    FRACTURE SURGERY      for broken hand    INCISION AND DRAINAGE ABSCESS  2015    anal    PROSTATE SURGERY      SMALL INTESTINE SURGERY      TONSILLECTOMY               IRF OT ASSESSMENT FLOWSHEET (last 12 hours)       IRF OT Evaluation and Treatment       Row Name 11/16/23 1218          OT Time and Intention    Document Type daily treatment  -KA     Mode of Treatment individual therapy;occupational therapy  -KA     Patient Effort good  -KA     Symptoms Noted During/After Treatment none  -KA       Row Name 11/16/23 1218          General Information    Patient Profile Reviewed yes  -KA     Patient/Family/Caregiver Comments/Observations Seated EOB upon arrival for therapy  -KA     Existing Precautions/Restrictions fall  -KA       Row Name 11/16/23 1218          Pain Assessment    Pretreatment Pain Rating 0/10 - no pain  -KA     Posttreatment Pain Rating 0/10 - no pain  -KA       Row Name 11/16/23 1218          Cognition/Psychosocial    Affect/Mental Status (Cognition) WFL  -KA     Orientation Status (Cognition) oriented x 4  -KA     Follows Commands (Cognition) follows one-step commands  -KA     Personal Safety Interventions fall prevention program maintained;gait belt;nonskid shoes/slippers when out of bed  -KA       Row Name 11/16/23 1218          Bathing    Comment (Bathing) Requested not to shower this AM  -KA       Row Name 11/16/23 1218          Upper Body Dressing    Comment (Upper Body Dressing) Already dressed seated EOB requesting to go to gym  -       Row Name 11/16/23 1218          Lower Body Dressing     [place] Level (Lower Body Dressing) doff;don;pants/bottoms;shoes/slippers;underwear;socks;set up;standby assist;contact guard assist  -KA     Position (Lower Body Dressing) edge of bed sitting  -       Row Name 11/16/23 1218          Grooming    [place] Level (Grooming) grooming skills;deodorant application;oral care regimen;wash face, hands;set up;standby assist  -KA     Position (Grooming) sink side;supported standing  -     Set-up Assistance (Grooming) obtain supplies  -       Row Name 11/16/23 1218          Functional Mobility    Functional Mobility- Ind. Level set up required;contact guard assist  -     Functional Mobility- Device walker, front-wheeled  -     Functional Mobility- Comment Performed functional mobility from his bed to the bathroom and back in room to his w/c with rwx  -       Row Name 11/16/23 1218          Sit-Stand Transfer    Sit-Stand [place] (Transfers) standby assist  -     Assistive Device (Sit-Stand Transfers) wheelchair;walker, front-wheeled  -       Row Name 11/16/23 1218          Stand-Sit Transfer    Stand-Sit [place] (Transfers) standby assist  -     Assistive Device (Stand-Sit Transfers) wheelchair;walker, front-wheeled  -       Row Name 11/16/23 1218          Shoulder (Therapeutic Exercise)    Shoulder Strengthening (Therapeutic Exercise) bilateral;flexion;extension;aBduction;aDduction;horizontal aBduction/aDduction;sitting;15 repititions;3 sets  6# weight. Maksim gonzalez machine 4 sets of 15 reps 20# each side  -       Row Name 11/16/23 1218          Elbow/Forearm (Therapeutic Exercise)    Elbow/Forearm Strengthening (Therapeutic Exercise) bilateral;flexion;extension;supination;pronation;sitting;15 repititions;3 sets  6#  -       Row Name 11/16/23 1218          Wrist (Therapeutic Exercise)    Wrist Strengthening (Therapeutic Exercise) bilateral;flexion;extension;15 repititions;3 sets  6#  -       Row Name 11/16/23 1218          Balance     Balance Interventions UE activity with balance activity;dynamic;moderate challenge  Pt performed functional mobility within gym reaching for rings while using Rwx for support. Required min-mod A for balance at times while reaching for rings. Did better with therapists cues to slow down movements and take his time to control his balance.  -BIBI       Row Name 11/16/23 1218          Positioning and Restraints    Pre-Treatment Position in bed  -KA     Post Treatment Position bed  -KA     In Bed supine;call light within reach;encouraged to call for assist;exit alarm on  -KA               User Key  (r) = Recorded By, (t) = Taken By, (c) = Cosigned By      Initials Name Effective Dates    BIBI OrtizCrystal, OT 09/22/22 -                      Occupational Therapy Education       Title: PT OT SLP Therapies (Done)       Topic: Occupational Therapy (Done)       Point: ADL training (Done)       Description:   Instruct learner(s) on proper safety adaptation and remediation techniques during self care or transfers.   Instruct in proper use of assistive devices.                  Learning Progress Summary             Patient Acceptance, E, VU by CC at 11/7/2023 3170    Comment: Explanation of OT services, evaluation results and goal setting                         Point: Home exercise program (Done)       Description:   Instruct learner(s) on appropriate technique for monitoring, assisting and/or progressing therapeutic exercises/activities.                  Learning Progress Summary             Patient Acceptance, E, VU by CC at 11/7/2023 164    Comment: Explanation of OT services, evaluation results and goal setting                         Point: Precautions (Done)       Description:   Instruct learner(s) on prescribed precautions during self-care and functional transfers.                  Learning Progress Summary             Patient Acceptance, E, VU by CC at 11/7/2023 5455    Comment: Explanation of OT services, evaluation  DISPLAY PLAN FREE TEXT results and goal setting                         Point: Body mechanics (Done)       Description:   Instruct learner(s) on proper positioning and spine alignment during self-care, functional mobility activities and/or exercises.                  Learning Progress Summary             Patient Acceptance, E, VU by  at 11/7/2023 9278    Comment: Explanation of OT services, evaluation results and goal setting                                         User Key       Initials Effective Dates Name Provider Type Discipline     06/16/21 -  Rosa Chris OTR Occupational Therapist OT                        OT Recommendation and Plan                         Time Calculation:      Time Calculation- OT       Row Name 11/16/23 1235             Time Calculation- OT    OT Start Time 0800  -KA      OT Stop Time 0830  -KA      OT Time Calculation (min) 30 min  -KA                User Key  (r) = Recorded By, (t) = Taken By, (c) = Cosigned By      Initials Name Provider Type    Crystal Enrique OT Occupational Therapist                  Therapy Charges for Today       Code Description Service Date Service Provider Modifiers Qty    [id number]  OT THER PROC EA 15 MIN 11/16/2023 Crystal Ortiz OT GO 1    [id number]  OT THERAPEUTIC ACT EA 15 MIN 11/16/2023 Crystal Ortiz OT GO 1                     Crystal Ortiz OT  11/16/2023

## 2024-01-22 ENCOUNTER — EMERGENCY (EMERGENCY)
Facility: HOSPITAL | Age: 49
LOS: 1 days | Discharge: HOME | End: 2024-01-22
Attending: STUDENT IN AN ORGANIZED HEALTH CARE EDUCATION/TRAINING PROGRAM
Payer: MEDICAID

## 2024-01-22 VITALS
WEIGHT: 174.61 LBS | TEMPERATURE: 99 F | SYSTOLIC BLOOD PRESSURE: 134 MMHG | DIASTOLIC BLOOD PRESSURE: 76 MMHG | RESPIRATION RATE: 20 BRPM | OXYGEN SATURATION: 93 % | HEART RATE: 98 BPM

## 2024-01-22 VITALS
DIASTOLIC BLOOD PRESSURE: 80 MMHG | SYSTOLIC BLOOD PRESSURE: 129 MMHG | HEART RATE: 85 BPM | OXYGEN SATURATION: 94 % | RESPIRATION RATE: 20 BRPM | TEMPERATURE: 99 F

## 2024-01-22 DIAGNOSIS — Z95.0 PRESENCE OF CARDIAC PACEMAKER: Chronic | ICD-10-CM

## 2024-01-22 PROBLEM — F10.10 ALCOHOL ABUSE, UNCOMPLICATED: Chronic | Status: ACTIVE | Noted: 2018-09-02

## 2024-01-22 PROBLEM — I50.9 HEART FAILURE, UNSPECIFIED: Chronic | Status: ACTIVE | Noted: 2018-09-02

## 2024-01-22 PROCEDURE — 71045 X-RAY EXAM CHEST 1 VIEW: CPT

## 2024-01-22 PROCEDURE — 99284 EMERGENCY DEPT VISIT MOD MDM: CPT

## 2024-01-22 PROCEDURE — 99283 EMERGENCY DEPT VISIT LOW MDM: CPT | Mod: 25

## 2024-01-22 RX ORDER — OLANZAPINE 15 MG/1
2.5 TABLET, FILM COATED ORAL ONCE
Refills: 0 | Status: DISCONTINUED | OUTPATIENT
Start: 2024-01-22 | End: 2024-01-22

## 2024-01-22 RX ORDER — OLANZAPINE 15 MG/1
2.5 TABLET, FILM COATED ORAL ONCE
Refills: 0 | Status: COMPLETED | OUTPATIENT
Start: 2024-01-22 | End: 2024-01-22

## 2024-01-22 RX ADMIN — OLANZAPINE 2.5 MILLIGRAM(S): 15 TABLET, FILM COATED ORAL at 14:29

## 2024-01-22 NOTE — ED ADULT NURSE REASSESSMENT NOTE - NS ED NURSE REASSESS COMMENT FT1
pt alert resting in bed 30, waiting transportation no new needs at this time , rr even and unlabored, bed locked and in lowest positioned safety maintained comfort monitored

## 2024-01-22 NOTE — ED PROVIDER NOTE - OBJECTIVE STATEMENT
48M, pmh of htn, CAD, s/p intracranial hemorrhage, trach dependent, sent to the emergency department for trach evaluation. patient  unable to provide reliable history. paperwork from nursing home states the patient pulled out his trach over the weekend but it was replaced by respiratory.

## 2024-01-22 NOTE — ED ADULT NURSE NOTE - ED STAT RN HANDOFF DETAILS
pt IV removed, and trache suctioned, tolerated well, [facility] at bedside to take patient back to [facility]

## 2024-01-22 NOTE — ED ADULT TRIAGE NOTE - CHIEF COMPLAINT QUOTE
SENT FROM NH FOR EVALUATION. PT PULLED OUT TRACH ON 1/20/24 WHICH WAS REINSERTED. ON CONTACT FOR VRE

## 2024-01-22 NOTE — ED PROVIDER NOTE - CLINICAL SUMMARY MEDICAL DECISION MAKING FREE TEXT BOX
48M presenting sent to the ED for trach assessment. patient currently calm and cooperative in ED. will obtain cxr to assess. will attempt to obtain additional collateral from nursing home staff or patient's pcp. 48M presenting sent to the ED for trach assessment. patient currently calm and cooperative in ED. will obtain cxr to assess. will attempt to obtain additional collateral from nursing home staff or patient's pcp.    spoke with patient's daughter who confirmed the patient recently pulled out his trach and attempted to pull it out today. facility requesting medication to keep patient calm. cannot prescribe medication to be given at facility but instructed daughter to discuss management with facility physician. trach in place on my review of the cxr. stable for outpatient followup.

## 2024-01-22 NOTE — ED ADULT NURSE NOTE - CAS EDN DISCHARGE ASSESSMENT
Can you find out how soon this cath needs to be scheduled please? Not sure who needs to be contacted as far as who contacted you to schedule cath.  trm   Alert and oriented to person, place and time/Patient baseline mental status/Awake

## 2024-01-22 NOTE — ED PROVIDER NOTE - NSFOLLOWUPINSTRUCTIONS_ED_ALL_ED_FT
You were seen in the emergency department for trach management. Patient was given Zyprexa for behavioral management.     Please follow-up with your primary care doctor within the next 24-48 hours.    If you have any worsening symptoms, severe headache, chest pain, trouble breathing, please return to the emergency department.

## 2024-01-22 NOTE — ED PROVIDER NOTE - PHYSICAL EXAMINATION
General: well appearing male, no acute distress   HEENT: normocephalic, atraumatic   Respiratory: normal work of breathing, trach in place  Skin: warm, dry

## 2024-01-22 NOTE — ED ADULT NURSE NOTE - NSFALLRISKINTERV_ED_ALL_ED

## 2024-01-22 NOTE — ED PROVIDER NOTE - CONDITION AT DISCHARGE:
Impression: Other secondary cataract, right eye: H26.491. Plan: Discussed risk/benefit/alternative to yag-capsulotomy, including risk of IOL dislocation and retinal detachment, pt wishes to proceed. ok for yag-cap OD in ASC RL1. Satisfactory

## 2024-01-22 NOTE — ED PROVIDER NOTE - PATIENT PORTAL LINK FT
You can access the FollowMyHealth Patient Portal offered by [facility] by registering at the following website: [email or web address]. By joining [employer]’s FollowMyHealth portal, you will also be able to view your health information using other applications (apps) compatible with our system.

## 2024-01-23 PROCEDURE — 71045 X-RAY EXAM CHEST 1 VIEW: CPT | Mod: 26

## 2024-02-05 ENCOUNTER — EMERGENCY (EMERGENCY)
Facility: HOSPITAL | Age: 49
LOS: 1 days | Discharge: HOME | End: 2024-02-05
Attending: EMERGENCY MEDICINE
Payer: MEDICAID

## 2024-02-05 VITALS
SYSTOLIC BLOOD PRESSURE: 120 MMHG | WEIGHT: 173.94 LBS | DIASTOLIC BLOOD PRESSURE: 80 MMHG | TEMPERATURE: 98 F | HEART RATE: 74 BPM | RESPIRATION RATE: 18 BRPM | OXYGEN SATURATION: 97 %

## 2024-02-05 VITALS
DIASTOLIC BLOOD PRESSURE: 65 MMHG | HEART RATE: 70 BPM | TEMPERATURE: 98 F | SYSTOLIC BLOOD PRESSURE: 101 MMHG | OXYGEN SATURATION: 98 % | RESPIRATION RATE: 17 BRPM

## 2024-02-05 DIAGNOSIS — Z95.0 PRESENCE OF CARDIAC PACEMAKER: Chronic | ICD-10-CM

## 2024-02-05 PROCEDURE — 71045 X-RAY EXAM CHEST 1 VIEW: CPT | Mod: 26

## 2024-02-05 PROCEDURE — 99284 EMERGENCY DEPT VISIT MOD MDM: CPT

## 2024-02-05 NOTE — ED ADULT NURSE NOTE - NSFALLRISKFACTORS_ED_ALL_ED
Pt TBI hx On Eliquis/Coagulation: Bleeding disorder either through use of anticoagulants or underlying clinical condition(s)

## 2024-02-05 NOTE — ED PROVIDER NOTE - OBJECTIVE STATEMENT
48 year old male presents for trach tube replacement. NH attempted to replace but was unable so sent patient to the ED for replacement. hx limited from pt.

## 2024-02-05 NOTE — ED ADULT NURSE NOTE - NSFALLRISKINTERV_ED_ALL_ED
Assistance OOB with selected safe patient handling equipment if applicable/Assistance with ambulation/Communicate fall risk and risk factors to all staff, patient, and family/Monitor gait and stability/Monitor for mental status changes and reorient to person, place, and time, as needed/Move patient closer to nursing station/within visual sight of ED staff/Provide patient with walking aids/Provide visual cue: yellow wristband, yellow gown, etc/Reinforce activity limits and safety measures with patient and family/Toileting schedule using arm’s reach rule for commode and bathroom/Use of alarms - bed, stretcher, chair and/or video monitoring/Call bell, personal items and telephone in reach/Instruct patient to call for assistance before getting out of bed/chair/stretcher/Non-slip footwear applied when patient is off stretcher/[place] to call system/Physically safe environment - no spills, clutter or unnecessary equipment/Purposeful Proactive Rounding/Room/bathroom lighting operational, light cord in reach

## 2024-02-05 NOTE — ED PROVIDER NOTE - PROGRESS NOTE
Patient's first and last name, , procedure, and correct site confirmed prior to the start of procedure.
Patient's first and last name, , procedure, and correct site confirmed prior to the start of procedure.
Stable.

## 2024-02-05 NOTE — ED PROVIDER NOTE - PATIENT PORTAL LINK FT
You can access the FollowMyHealth Patient Portal offered by [facility] by registering at the following website: [email or web address]. By joining [employer]’s FollowMyHealth portal, you will also be able to view your health information using other applications (apps) compatible with our system.

## 2024-02-05 NOTE — ED PROVIDER NOTE - PROGRESS NOTE DETAILS
trach replaced by surgical team. on cxr trach in place. will dc via ambulance. f/u PMD. return precautions discussed.

## 2024-02-05 NOTE — ED ADULT NURSE NOTE - NSFALLHARMRISKINTERV_ED_ALL_ED
Assistance OOB with selected safe patient handling equipment if applicable/Assistance with ambulation/Communicate risk of Fall with Harm to all staff, patient, and family/Monitor gait and stability/Monitor for mental status changes and reorient to person, place, and time, as needed/Move patient closer to nursing station/within visual sight of ED staff/Provide patient with walking aids/Provide visual cue: red socks, yellow wristband, yellow gown, etc/Reinforce activity limits and safety measures with patient and family/Toileting schedule using arm’s reach rule for commode and bathroom/Use of alarms - bed, stretcher, chair and/or video monitoring/Bed in lowest position, wheels locked, appropriate side rails in place/Call bell, personal items and telephone in reach/Instruct patient to call for assistance before getting out of bed/chair/stretcher/Non-slip footwear applied when patient is off stretcher/[place] to call system/Physically safe environment - no spills, clutter or unnecessary equipment/Purposeful Proactive Rounding/Room/bathroom lighting operational, light cord in reach

## 2024-02-08 ENCOUNTER — INPATIENT (INPATIENT)
Facility: HOSPITAL | Age: 49
LOS: 12 days | Discharge: NURSING, REHAB OR LONG-TERM CARE | DRG: 393 | End: 2024-02-21
Attending: INTERNAL MEDICINE | Admitting: INTERNAL MEDICINE
Payer: MEDICAID

## 2024-02-08 VITALS
TEMPERATURE: 99 F | HEART RATE: 90 BPM | OXYGEN SATURATION: 95 % | RESPIRATION RATE: 19 BRPM | DIASTOLIC BLOOD PRESSURE: 75 MMHG | SYSTOLIC BLOOD PRESSURE: 112 MMHG

## 2024-02-08 DIAGNOSIS — Z93.0 TRACHEOSTOMY STATUS: ICD-10-CM

## 2024-02-08 DIAGNOSIS — K94.23 GASTROSTOMY MALFUNCTION: ICD-10-CM

## 2024-02-08 DIAGNOSIS — I50.9 HEART FAILURE, UNSPECIFIED: ICD-10-CM

## 2024-02-08 DIAGNOSIS — R56.9 UNSPECIFIED CONVULSIONS: ICD-10-CM

## 2024-02-08 DIAGNOSIS — Z29.9 ENCOUNTER FOR PROPHYLACTIC MEASURES, UNSPECIFIED: ICD-10-CM

## 2024-02-08 DIAGNOSIS — I63.9 CEREBRAL INFARCTION, UNSPECIFIED: ICD-10-CM

## 2024-02-08 DIAGNOSIS — Z95.0 PRESENCE OF CARDIAC PACEMAKER: Chronic | ICD-10-CM

## 2024-02-08 DIAGNOSIS — I10 ESSENTIAL (PRIMARY) HYPERTENSION: ICD-10-CM

## 2024-02-08 DIAGNOSIS — I82.409 ACUTE EMBOLISM AND THROMBOSIS OF UNSPECIFIED DEEP VEINS OF UNSPECIFIED LOWER EXTREMITY: ICD-10-CM

## 2024-02-08 DIAGNOSIS — Z71.89 OTHER SPECIFIED COUNSELING: ICD-10-CM

## 2024-02-08 DIAGNOSIS — I26.99 OTHER PULMONARY EMBOLISM WITHOUT ACUTE COR PULMONALE: ICD-10-CM

## 2024-02-08 LAB
ALBUMIN SERPL ELPH-MCNC: 2.1 G/DL — LOW (ref 3.5–5)
ALP SERPL-CCNC: 75 U/L — SIGNIFICANT CHANGE UP (ref 40–120)
ALT FLD-CCNC: 11 U/L DA — SIGNIFICANT CHANGE UP (ref 10–60)
ANION GAP SERPL CALC-SCNC: 3 MMOL/L — LOW (ref 5–17)
AST SERPL-CCNC: 18 U/L — SIGNIFICANT CHANGE UP (ref 10–40)
BASOPHILS # BLD AUTO: 0.03 K/UL — SIGNIFICANT CHANGE UP (ref 0–0.2)
BASOPHILS NFR BLD AUTO: 0.4 % — SIGNIFICANT CHANGE UP (ref 0–2)
BILIRUB SERPL-MCNC: 0.3 MG/DL — SIGNIFICANT CHANGE UP (ref 0.2–1.2)
BLD GP AB SCN SERPL QL: SIGNIFICANT CHANGE UP
BUN SERPL-MCNC: 16 MG/DL — SIGNIFICANT CHANGE UP (ref 7–18)
CALCIUM SERPL-MCNC: 8.7 MG/DL — SIGNIFICANT CHANGE UP (ref 8.4–10.5)
CHLORIDE SERPL-SCNC: 105 MMOL/L — SIGNIFICANT CHANGE UP (ref 96–108)
CO2 SERPL-SCNC: 33 MMOL/L — HIGH (ref 22–31)
CREAT SERPL-MCNC: 0.55 MG/DL — SIGNIFICANT CHANGE UP (ref 0.5–1.3)
EGFR: 121 ML/MIN/1.73M2 — SIGNIFICANT CHANGE UP
EOSINOPHIL # BLD AUTO: 0.09 K/UL — SIGNIFICANT CHANGE UP (ref 0–0.5)
EOSINOPHIL NFR BLD AUTO: 1.3 % — SIGNIFICANT CHANGE UP (ref 0–6)
GLUCOSE SERPL-MCNC: 118 MG/DL — HIGH (ref 70–99)
HCT VFR BLD CALC: 32.2 % — LOW (ref 39–50)
HCT VFR BLD CALC: 34.3 % — LOW (ref 39–50)
HGB BLD-MCNC: 10.2 G/DL — LOW (ref 13–17)
HGB BLD-MCNC: 10.7 G/DL — LOW (ref 13–17)
IMM GRANULOCYTES NFR BLD AUTO: 0.1 % — SIGNIFICANT CHANGE UP (ref 0–0.9)
LYMPHOCYTES # BLD AUTO: 1.83 K/UL — SIGNIFICANT CHANGE UP (ref 1–3.3)
LYMPHOCYTES # BLD AUTO: 26.7 % — SIGNIFICANT CHANGE UP (ref 13–44)
MCHC RBC-ENTMCNC: 31.2 GM/DL — LOW (ref 32–36)
MCHC RBC-ENTMCNC: 31.7 GM/DL — LOW (ref 32–36)
MCHC RBC-ENTMCNC: 31.8 PG — SIGNIFICANT CHANGE UP (ref 27–34)
MCHC RBC-ENTMCNC: 32.9 PG — SIGNIFICANT CHANGE UP (ref 27–34)
MCV RBC AUTO: 102.1 FL — HIGH (ref 80–100)
MCV RBC AUTO: 103.9 FL — HIGH (ref 80–100)
MONOCYTES # BLD AUTO: 0.7 K/UL — SIGNIFICANT CHANGE UP (ref 0–0.9)
MONOCYTES NFR BLD AUTO: 10.2 % — SIGNIFICANT CHANGE UP (ref 2–14)
NEUTROPHILS # BLD AUTO: 4.19 K/UL — SIGNIFICANT CHANGE UP (ref 1.8–7.4)
NEUTROPHILS NFR BLD AUTO: 61.3 % — SIGNIFICANT CHANGE UP (ref 43–77)
NRBC # BLD: 0 /100 WBCS — SIGNIFICANT CHANGE UP (ref 0–0)
NRBC # BLD: 0 /100 WBCS — SIGNIFICANT CHANGE UP (ref 0–0)
PLATELET # BLD AUTO: 369 K/UL — SIGNIFICANT CHANGE UP (ref 150–400)
PLATELET # BLD AUTO: 409 K/UL — HIGH (ref 150–400)
POTASSIUM SERPL-MCNC: 3.7 MMOL/L — SIGNIFICANT CHANGE UP (ref 3.5–5.3)
POTASSIUM SERPL-SCNC: 3.7 MMOL/L — SIGNIFICANT CHANGE UP (ref 3.5–5.3)
PROT SERPL-MCNC: 7.7 G/DL — SIGNIFICANT CHANGE UP (ref 6–8.3)
RAPID RVP RESULT: SIGNIFICANT CHANGE UP
RBC # BLD: 3.1 M/UL — LOW (ref 4.2–5.8)
RBC # BLD: 3.36 M/UL — LOW (ref 4.2–5.8)
RBC # FLD: 13.5 % — SIGNIFICANT CHANGE UP (ref 10.3–14.5)
RBC # FLD: 13.6 % — SIGNIFICANT CHANGE UP (ref 10.3–14.5)
SARS-COV-2 RNA SPEC QL NAA+PROBE: SIGNIFICANT CHANGE UP
SODIUM SERPL-SCNC: 141 MMOL/L — SIGNIFICANT CHANGE UP (ref 135–145)
WBC # BLD: 6.5 K/UL — SIGNIFICANT CHANGE UP (ref 3.8–10.5)
WBC # BLD: 6.85 K/UL — SIGNIFICANT CHANGE UP (ref 3.8–10.5)
WBC # FLD AUTO: 6.5 K/UL — SIGNIFICANT CHANGE UP (ref 3.8–10.5)
WBC # FLD AUTO: 6.85 K/UL — SIGNIFICANT CHANGE UP (ref 3.8–10.5)

## 2024-02-08 PROCEDURE — 71045 X-RAY EXAM CHEST 1 VIEW: CPT | Mod: 26

## 2024-02-08 PROCEDURE — 93010 ELECTROCARDIOGRAM REPORT: CPT

## 2024-02-08 PROCEDURE — 99285 EMERGENCY DEPT VISIT HI MDM: CPT

## 2024-02-08 RX ORDER — OXYCODONE HYDROCHLORIDE 5 MG/1
1 TABLET ORAL
Refills: 0 | DISCHARGE

## 2024-02-08 RX ORDER — SODIUM CHLORIDE 9 MG/ML
1000 INJECTION, SOLUTION INTRAVENOUS
Refills: 0 | Status: DISCONTINUED | OUTPATIENT
Start: 2024-02-08 | End: 2024-02-13

## 2024-02-08 RX ORDER — SCOPALAMINE 1 MG/3D
1 PATCH, EXTENDED RELEASE TRANSDERMAL
Refills: 0 | DISCHARGE

## 2024-02-08 RX ORDER — ACETAMINOPHEN 500 MG
2 TABLET ORAL
Refills: 0 | DISCHARGE

## 2024-02-08 RX ORDER — SODIUM CHLORIDE 9 MG/ML
3 INJECTION INTRAMUSCULAR; INTRAVENOUS; SUBCUTANEOUS EVERY 8 HOURS
Refills: 0 | Status: DISCONTINUED | OUTPATIENT
Start: 2024-02-08 | End: 2024-02-09

## 2024-02-08 RX ORDER — ALBUTEROL 90 UG/1
3 AEROSOL, METERED ORAL
Refills: 0 | DISCHARGE

## 2024-02-08 RX ORDER — COLLAGENASE CLOSTRIDIUM HIST. 250 UNIT/G
1 OINTMENT (GRAM) TOPICAL
Refills: 0 | DISCHARGE

## 2024-02-08 RX ORDER — ASCORBIC ACID 60 MG
1 TABLET,CHEWABLE ORAL
Refills: 0 | DISCHARGE

## 2024-02-08 RX ORDER — METOPROLOL TARTRATE 50 MG
5 TABLET ORAL EVERY 12 HOURS
Refills: 0 | Status: DISCONTINUED | OUTPATIENT
Start: 2024-02-08 | End: 2024-02-17

## 2024-02-08 RX ORDER — MORPHINE SULFATE 50 MG/1
1 CAPSULE, EXTENDED RELEASE ORAL EVERY 6 HOURS
Refills: 0 | Status: DISCONTINUED | OUTPATIENT
Start: 2024-02-08 | End: 2024-02-14

## 2024-02-08 RX ORDER — VALPROIC ACID (AS SODIUM SALT) 250 MG/5ML
1250 SOLUTION, ORAL ORAL
Refills: 0 | Status: DISCONTINUED | OUTPATIENT
Start: 2024-02-08 | End: 2024-02-17

## 2024-02-08 RX ORDER — LISINOPRIL 2.5 MG/1
1 TABLET ORAL
Refills: 0 | DISCHARGE

## 2024-02-08 RX ORDER — CHLORHEXIDINE GLUCONATE 213 G/1000ML
1 SOLUTION TOPICAL
Refills: 0 | Status: DISCONTINUED | OUTPATIENT
Start: 2024-02-08 | End: 2024-02-21

## 2024-02-08 RX ORDER — LACOSAMIDE 50 MG/1
200 TABLET ORAL
Refills: 0 | Status: DISCONTINUED | OUTPATIENT
Start: 2024-02-08 | End: 2024-02-17

## 2024-02-08 RX ORDER — ATORVASTATIN CALCIUM 80 MG/1
1 TABLET, FILM COATED ORAL
Refills: 0 | DISCHARGE

## 2024-02-08 RX ORDER — LACOSAMIDE 50 MG/1
1 TABLET ORAL
Refills: 0 | DISCHARGE

## 2024-02-08 RX ORDER — METOPROLOL TARTRATE 50 MG
20 TABLET ORAL EVERY 12 HOURS
Refills: 0 | Status: DISCONTINUED | OUTPATIENT
Start: 2024-02-08 | End: 2024-02-08

## 2024-02-08 RX ORDER — VALPROIC ACID (AS SODIUM SALT) 250 MG/5ML
25 SOLUTION, ORAL ORAL
Refills: 0 | DISCHARGE

## 2024-02-08 RX ORDER — METOPROLOL TARTRATE 50 MG
1 TABLET ORAL
Refills: 0 | DISCHARGE

## 2024-02-08 RX ORDER — METOPROLOL TARTRATE 50 MG
1 TABLET ORAL
Qty: 0 | Refills: 0 | DISCHARGE

## 2024-02-08 RX ORDER — APIXABAN 2.5 MG/1
1 TABLET, FILM COATED ORAL
Refills: 0 | DISCHARGE

## 2024-02-08 RX ORDER — LEVETIRACETAM 250 MG/1
2000 TABLET, FILM COATED ORAL
Refills: 0 | Status: DISCONTINUED | OUTPATIENT
Start: 2024-02-08 | End: 2024-02-17

## 2024-02-08 RX ORDER — ALBUTEROL 90 UG/1
2.5 AEROSOL, METERED ORAL EVERY 6 HOURS
Refills: 0 | Status: DISCONTINUED | OUTPATIENT
Start: 2024-02-08 | End: 2024-02-21

## 2024-02-08 RX ORDER — LANOLIN ALCOHOL/MO/W.PET/CERES
1 CREAM (GRAM) TOPICAL
Refills: 0 | DISCHARGE

## 2024-02-08 RX ORDER — PSYLLIUM SEED (WITH DEXTROSE)
1 POWDER (GRAM) ORAL
Refills: 0 | DISCHARGE

## 2024-02-08 RX ORDER — FOLIC ACID 0.8 MG
1 TABLET ORAL
Refills: 0 | DISCHARGE

## 2024-02-08 RX ORDER — LEVETIRACETAM 250 MG/1
20 TABLET, FILM COATED ORAL
Refills: 0 | DISCHARGE

## 2024-02-08 RX ADMIN — LEVETIRACETAM 600 MILLIGRAM(S): 250 TABLET, FILM COATED ORAL at 21:05

## 2024-02-08 RX ADMIN — SODIUM CHLORIDE 75 MILLILITER(S): 9 INJECTION, SOLUTION INTRAVENOUS at 19:02

## 2024-02-08 RX ADMIN — Medication 62.5 MILLIGRAM(S): at 21:05

## 2024-02-08 RX ADMIN — LACOSAMIDE 140 MILLIGRAM(S): 50 TABLET ORAL at 21:05

## 2024-02-08 RX ADMIN — ALBUTEROL 2.5 MILLIGRAM(S): 90 AEROSOL, METERED ORAL at 20:13

## 2024-02-08 NOTE — PROGRESS NOTE ADULT - PROBLEM SELECTOR PLAN 4
C/w Metoprolol IV form   Hold Lisinopril (No NGT 2/2 risk for bleeding on insertion)  No PEG  May require other IV agents if BP not well controlled.

## 2024-02-08 NOTE — ED PROVIDER NOTE - CLINICAL SUMMARY MEDICAL DECISION MAKING FREE TEXT BOX
48-year-old male hx of HTN, CAD, ICH now bedbound/trach/PEG, BIBEMS from NH for dislosged PEG tube. Unable to replace - suspect tract is sealing. Spoke to Dr. Colbert of GI, plan for admission to Medicine and OR for repeat PEG placement jazmyn in a few days.

## 2024-02-08 NOTE — ED ADULT NURSE NOTE - NSFALLUNIVINTERV_ED_ALL_ED
Bed/Stretcher in lowest position, wheels locked, appropriate side rails in place/Call bell, personal items and telephone in reach/Instruct patient to call for assistance before getting out of bed/chair/stretcher/Non-slip footwear applied when patient is off stretcher/[place] to call system/Physically safe environment - no spills, clutter or unnecessary equipment/Purposeful proactive rounding/Room/bathroom lighting operational, light cord in reach

## 2024-02-08 NOTE — H&P ADULT - ASSESSMENT
ASSESSMENT  48 year old male from nursing home with medical history of  HTN, CAD, CHF, etoh use disorder in the past, intracranial hemorrhage bedbound s/p trach and peg presenting to ED for PEG tube malfunctio          _________CNS___________  Patient is at baseline mental status AAOX3     #Traumatic brain injury  -s/p trach and peg    #seizures  -c/w home meds vimpat, valproic acid, and Keppra         _________CVS___________  #HTN  -c/w lisinopril    #CAD  - history of MI in 2015 on eliquis  -c/w eliquis    #CHF  -hx of chf   -c/w lisinopril       _________RESP__________  #Tracheostomy  -Tracheostomy care      ___________GI____________  #Dislodged PEG Tube  -GI consulted Dr. Colbert for peg tube replacement     ________ RENAL__________  #No active issues     __________MSK___________  #No active issues       ___________ID____________  #No active issues       _________ENDO__________  #No active issues       ______HEME/ONC_______  #No active issues       _________SKIN____________  #No active issues       ________PROPHY_______  #DVT eliquis   #GI     ______GOC/DISPO___________  FULL CODE        ASSESSMENT  48 year old male from nursing home with medical history of  HTN, CAD, CHF, etoh use disorder in the past, intracranial hemorrhage bedbound s/p trach and peg presenting to ED for PEG tube malfunctio          _________CNS___________  Patient is at baseline mental status AAOX3     #Traumatic brain injury  -s/p trach and peg    #seizures  -c/w home meds vimpat, valproic acid, and Keppra         _________CVS___________  #HTN  -c/w lisinopril    #CAD  - history of MI in 2015 on eliquis?  -HOLD ELIQUIS AFTER LAST DOSE ON FRIDAY NIGHT. Plan for OR On monday with Dr. Colbert GI   -c/w eliquis    #CHF  -hx of chf   -c/w lisinopril       _________RESP__________  #Tracheostomy  -Tracheostomy care      ___________GI____________  #Dislodged PEG Tube  -GI consulted Dr. Colbert for peg tube replacement . Plan for OR On monday  -will place NGT in ED and f/u with xray     ________ RENAL__________  #No active issues     __________MSK___________  #No active issues       ___________ID____________  #No active issues       _________ENDO__________  #No active issues       ______HEME/ONC_______  #No active issues       _________SKIN____________  #No active issues       ________PROPHY_______  #DVT eliquis   #GI     ______GOC/DISPO___________  FULL CODE        ASSESSMENT  49yo M pt with PMHx CVA (3/30/2022 with residual aphasia and right sided residual weakness), seizures, HTN, HLD, CHF w/ ICD (initially rEF 30%->55-60% on 03/2021), MDD, Recurrent PE, Afib (on Eliquis), obesity s/p bariatric intervention  presenting to ED for PEG tube malfunction admitted for further care.           _________CNS___________  Patient is at baseline mental status AAOX3     #CVA/ TBI   -s/p trach and peg    #seizures  -c/w home meds vimpat, valproic acid, and Keppra         _________CVS___________  #HTN  -c/w lisinopril    #PE/ AFIb  -on eliquis and metopolol at home  -hold eliquis Friday night for planned OR on Monday    #CHF  -hx of chf   -c/w lisinopril, metoprolol       _________RESP__________  #Tracheostomy  -Tracheostomy care      ___________GI____________  #Dislodged PEG Tube  -GI consulted Dr. Colbert for peg tube replacement . Plan for OR On monday  -will place NGT in ED and f/u with xray   -last dose of eliquis will be friday night, hold after that for planned OR on monday     ________ RENAL__________  #No active issues     __________MSK___________  #No active issues       ___________ID____________  #No active issues       _________ENDO__________  #No active issues       ______HEME/ONC_______  #MDD  _________SKIN____________  #No active issues       ________PROPHY_______  #DVT eliquis   #GI      ______GOC/DISPO___________  FULL CODE  AI        ASSESSMENT  49yo M pt with PMHx CVA (3/30/2022 with residual aphasia and right sided residual weakness), seizures, HTN, HLD, CHF w/ ICD (initially rEF 30%->55-60% on 03/2021), MDD, Recurrent PE, Afib (on Eliquis), obesity s/p bariatric intervention  presenting to ED for PEG tube malfunction admitted for further care.           _________CNS___________  Patient is at baseline mental status AAOX3     #CVA/ TBI   -s/p trach and peg    #seizures  -c/w home meds vimpat, valproic acid, and Keppra         _________CVS___________  #HTN  -c/w lisinopril    #PE/ AFIb  -on eliquis and metopolol at home  -Patient with bleeding from nose during NGT insertion in ED, will hold eliquis for now   -SCD      #CHF  -hx of chf   -c/w lisinopril, metoprolol       _________RESP__________  #Tracheostomy  -Tracheostomy care      ___________GI____________  #Dislodged PEG Tube  -GI consulted Dr. Colbert for peg tube replacement . Plan for OR On monday  -Attempted to place NGT in ED however patient with significant bleeding from nose (pt is on eliquis). Will wait 24 hours before attempting to re insert NG tube   -NPO including medications     ________ RENAL__________  #No active issues     __________MSK___________  #No active issues       ___________ID____________  #No active issues       _________ENDO__________  #No active issues       ______HEME/ONC_______  #MDD  _________SKIN____________  #No active issues       ________PROPHY_______  #DVT SCD  #GI      ______GOC/DISPO___________  FULL CODE  AI

## 2024-02-08 NOTE — PROGRESS NOTE ADULT - ASSESSMENT
47yo M pt with PMHx CVA (3/30/2022 with residual aphasia and right sided residual weakness), seizures, HTN, HLD, CHF w/ ICD (initially rEF 30%->55-60% on 03/2021), MDD, Recurrent PE, Afib (on Eliquis), obesity s/p bariatric intervention presenting to ED for PEG tube malfunction admitted for further care. Per endorsement, PEG tube insertion attempted in ED without success. NG tube also attempted but due to bleeding the insertion was aborted and AC not restarted (home med). Patient was transferred to  for further care given that patient has a trach. Patient has copious amounts of secretion from trach requiring constant suctioning. Sputum cx sent. CXR noted. Afebrile. Concern for MDRO and therefore was placed on contact  isolation until cx comes back. GI consulted and examined patient at bedside. GI spoke with patient's proxy. Plan for OR on Monday for new PEG tube insertion.   Patient's seizure meds switched to IV form. BB IV. Monitor BP per unit protocol.

## 2024-02-08 NOTE — PROGRESS NOTE ADULT - PROBLEM SELECTOR PLAN 9
OR on Monday for PEG placement   AC on hold   IVF  Trach care  Sputum cx F/u  Contact Isolation for possible MDRO  Dispo: return to [facility] after PEG placement

## 2024-02-08 NOTE — CONSULT NOTE ADULT - ASSESSMENT
48M w/ dislodged PEG  - Endoscopic PEG placement scheduled for 2/12/24  - further care per primary team 48M w/ dislodged PEG  - Endoscopic PEG placement scheduled for 2/12/24  - As per  Valorie Buchanan (patient's daughter), the patient has partial mental capacity, therefore she will provide consent for peg tube placement  - further care per primary team 48M w/dysphagia and dislodged PEG.  - Endoscopic PEG tube placement scheduled for 2/12/24  - As per Valorie Dewayne (patient's daughter), the patient has partial mental capacity, therefore she will provide consent for the peg tube placement  - further care per primary team

## 2024-02-08 NOTE — CONSULT NOTE ADULT - SUBJECTIVE AND OBJECTIVE BOX
GI INITIAL CONSULT    HPI: 49yo M pt with PMHx CVA (3/30/2022 with residual aphasia and right sided residual weakness), seizures, HTN, HLD, CHF w/ ICD (initially rEF 30%->55-60% on 03/2021), MDD, Recurrent PE, Afib (on Eliquis), obesity s/p bariatric intervention  presenting to ED for PEG tube malfunction. Patient unsure how peg tube was dislodged but denies any abdominal pain. GI consulted to replace PEG tube endoscopically.    PMH: CVA, seizures, HTN, HLD, CHF w/ ICD, MDD, Recurrent PE, Afib,   PSH: Bariatric surgery    Meds: MEDICATIONS  (STANDING):  chlorhexidine 2% Cloths 1 Application(s) Topical <User Schedule>    MEDICATIONS  (PRN):  sodium chloride 0.9% for Nebulization 3 milliLiter(s) Nebulizer every 8 hours PRN Secretions    SH: not contributory  FH: not contributory    ROS:  CONSTITUTIONAL: No fever, weight loss, or fatigue  EYES: No eye pain, visual disturbances, or discharge  ENT:  No difficulty hearing, tinnitus, vertigo; No sinus or throat pain  NECK: No pain or stiffness  RESPIRATORY: No cough, wheezing, chills or hemoptysis, shortness of Breath  CARDIOVASCULAR: No chest pain, palpitations, passing out, dizziness, or leg swelling  GASTROINTESTINAL: see HPI  GENITOURINARY: No dysuria, frequency, hematuria, or incontinence  NEUROLOGICAL: No headaches, memory loss, loss of strength, numbness, or tremors  SKIN: No itching, burning, rashes, or lesions   MUSCULOSKELETAL: No arthralgia, myalgia, or back pain.     Vitals: Vital Signs Last 24 Hrs  T(C): 36.4 (02-08-24 @ 14:46), Max: 37.2 (02-08-24 @ 02:39)  T(F): 97.6 (02-08-24 @ 14:46), Max: 98.9 (02-08-24 @ 02:39)  HR: 79 (02-08-24 @ 14:46) (76 - 90)  BP: 141/86 (02-08-24 @ 14:46) (112/75 - 141/86)  RR: 18 (02-08-24 @ 14:46) (18 - 19)  SpO2: 100% (02-08-24 @ 14:46) (93% - 100%)                          10.7   6.50  )-----------( 409      ( 08 Feb 2024 14:05 )             34.3   02-08    141  |  105  |  16  ----------------------------<  118<H>  3.7   |  33<H>  |  0.55    Ca    8.7      08 Feb 2024 08:55    TPro  7.7  /  Alb  2.1<L>  /  TBili  0.3  /  DBili  x   /  AST  18  /  ALT  11  /  AlkPhos  75  02-08      Gen: NAD  CVS: S1/S2  Chest: CTABL  Abd: S/ND/NT    Imaging: < from: Xray Chest 1 View- PORTABLE-Urgent (Xray Chest 1 View- PORTABLE-Urgent .) (02.08.24 @ 10:17) >  IMPRESSION: Tracheostomy and pacer as before. No focal infiltrate. Some   elevation of the right hemidiaphragm. Heart is at the upper limits of   normal in its transthoracic diameter. Regional osseous structures   appropriate for age

## 2024-02-08 NOTE — ED ADULT NURSE NOTE - OBJECTIVE STATEMENT
the patient  is a 48 y Male   brought from [facility] .  pt  is alert . trach to trache collar . suctioned large amount of thick  clear  secretions . peg is  kept clamped , it is blocked . . sacral  decubitus the patient  is a 48 y Male   brought from [facility] .  pt  is alert . trach to trache collar . suctioned large amount of thick  clear  secretions . peg is  kept clamped , it is  dislodged  . . sacral  decubitus the patient  is a 48 y Male   brought from [facility] .  pt  is alert . trach to trache collar . suctioned large amount of thick  clear  secretions . peg is  kept clamped , it is  dislodged  . . sacral  decubitus 6X5 cm with 6 cm undermining  unstagable . decubitus dressing done . trache and peg dressing done

## 2024-02-08 NOTE — ED PROVIDER NOTE - OBJECTIVE STATEMENT
48-year-old male hx of HTN, CAD, ICH now bedbound/trach/PEG, BIBEMS from NH for dislosged PEG tube. Patient unable to provide further history.

## 2024-02-08 NOTE — ED ADULT TRIAGE NOTE - AS TEMP SITE
After Visit Summary   9/5/2017    Jannet San    MRN: [id number]           Patient Information     Date Of Birth          1991        Visit Information        Provider Department      9/5/2017 9:45 AM Leona Matthews MD [facility] [place]        Today's Diagnoses     Acute pyelonephritis    -  1    Severe episode of recurrent major depressive disorder, without psychotic features (H)        PTSD (post-traumatic stress disorder)           Follow-ups after your visit        Additional Services     MENTAL HEALTH REFERRAL       Your provider has referred you to: [facility] Hospitalization Program - [place] [phone] [email or web address]    All scheduling is subject to the client's specific insurance plan & benefits, provider/location availability, and provider clinical specialities.  Please arrive 15 minutes early for your first appointment and bring your completed paperwork.    Please be aware that coverage of these services is subject to the terms and limitations of your health insurance plan.  Call member services at your health plan with any benefit or coverage questions.                  Follow-up notes from your care team     Return in about 1 week (around 9/12/2017).      Your next 10 appointments already scheduled     Sep 12, 2017  8:45 AM CDT   (Arrive by 8:30 AM)   SHORT with MD [place] [facility]ijano ([facility] [place] )    [address] [place] [id number]   [phone]            Oct 20, 2017  4:30 PM CDT   (Arrive by 4:15 PM)   Return Visit with Melina Benson NP   [place] [facility] ([facility] [place] )    [address] [place] [address]  [place] [id number] [phone]              Who to contact     If you have questions or need follow up information about today's clinic visit or your schedule please contact [place] [facility]UIJAN[facility] directly at  "[phone].  Normal or non-critical lab and imaging results will be communicated to you by [email or web address], letter or phone within 4 business days after the clinic has received the results. If you do not hear from us within 7 days, please contact the clinic through [place] or phone. If you have a critical or abnormal lab result, we will notify you by phone as soon as possible.  Submit refill requests through Urbita or call your pharmacy and they will forward the refill request to us. Please allow 3 business days for your refill to be completed.          Additional Information About Your Visit        [email or web address] Information     Urbita gives you secure access to your electronic health record. If you see a primary care provider, you can also send messages to your care team and make appointments. If you have questions, please call your primary care clinic.  If you do not have a primary care provider, please call [phone] and they will assist you.        Care EveryWhere ID     This is your Care EveryWhere ID. This could be used by other organizations to access your [place] medical records  [id number]        Your Vitals Were     Pulse Temperature Respirations Height Pulse Oximetry Breastfeeding?    95 98.3  F (36.8  C) (Tympanic) 16 5' 11\" (1.803 m) 99% No    BMI (Body Mass Index)                   24.41 kg/m2            Blood Pressure from Last 3 Encounters:   09/05/17 112/64   08/30/17 113/70   08/24/17 117/86    Weight from Last 3 Encounters:   09/05/17 175 lb (79.4 kg)   08/24/17 176 lb (79.8 kg)   08/17/17 175 lb (79.4 kg)              We Performed the Following     CBC with platelets and differential     Comprehensive metabolic panel     Lactic acid     MENTAL HEALTH REFERRAL          Where to get your medicines      Some of these will need a paper prescription and others can be bought over the counter.  Ask your nurse if you have questions.     Bring a paper prescription for each of these medications     " HYDROcodone-acetaminophen 5-325 MG per tablet          Primary Care Provider Office Phone # Fax #    Leona Matthews [phone]       [facility] [place] [address]  [facility] [address]        Equal Access to Services     JIMENABARBARA NASIR : Hadii aad ku hadaylao Soomaali, waaxda luqadaha, qaybta kaalmada adeegyada, waxskyler idiin haykaylan adepepper an emilia hartmann. So [facility] [phone].    ATENCIÓN: Si habla español, tiene a mcdermott disposición servicios gratuitos de asistencia lingüística. Llame al [phone].    We comply with applicable federal civil rights laws and [place] laws. We do not discriminate on the basis of race, color, national origin, age, disability sex, sexual orientation or gender identity.            Thank you!     Thank you for choosing [facility]  for your care. Our goal is always to provide you with excellent care. Hearing back from our patients is one way we can continue to improve our services. Please take a few minutes to complete the written survey that you may receive in the mail after your visit with us. Thank you!             Your Updated Medication List - Protect others around you: Learn how to safely use, store and throw away your medicines at www.disposemymeds.org.          This list is accurate as of: 9/5/17 10:50 AM.  Always use your most recent med list.                   Brand Name Dispense Instructions for use Diagnosis    BENADRYL PO      Take 75 mg by mouth nightly as needed        ciprofloxacin 500 MG tablet    CIPRO    14 tablet    Take 1 tablet (500 mg) by mouth 2 times daily for 7 days        HYDROcodone-acetaminophen 5-325 MG per tablet    NORCO    15 tablet    Take 1-2 tablets by mouth every 4 hours as needed for moderate to severe pain    Acute pyelonephritis       ibuprofen 800 MG tablet    ADVIL/MOTRIN    40 tablet    Take 1 tablet (800 mg) by mouth every 8 hours as needed for moderate pain    Post-op pain       lisdexamfetamine 30 MG capsule     VYVANSE    60 capsule    Take 1 capsule (30 mg) by mouth 2 times daily    Attention deficit hyperactivity disorder (ADHD), predominantly inattentive type       MAGNESIUM OXIDE PO      Take 500 mg by mouth        MELATONIN PO      Take 10 mg by mouth At Bedtime        nitroFURantoin (macrocrystal-monohydrate) 100 MG capsule    MACROBID    14 capsule    Take 1 capsule (100 mg) by mouth 2 times daily        norethindrone-mestranol 1-50 MG-MCG Tabs    NORINYL1-50/ORTHO NOVUM 1-50    84 tablet    Take 1 tablet by mouth daily for 365 doses    Ovulation pain       order for DME     1 Units    Right ASO brace - right ankle    Acute right ankle pain       prazosin 1 MG capsule    MINIPRESS    60 capsule    Take 1 mg for two nights then increase    Attention deficit hyperactivity disorder (ADHD), predominantly inattentive type       sertraline 100 MG tablet    ZOLOFT    30 tablet    Take 1 tablet (100 mg) by mouth daily    Bipolar 2 disorder (H)       TYLENOL PO      Take 1,000 mg by mouth           oral

## 2024-02-08 NOTE — ED PROVIDER NOTE - PROGRESS NOTE DETAILS
Arthur:  Patient was endorsed to me by Dr. Guidry, awaiting labs and plan to admit to AI unit under Dr. Gottlieb.  Labs unremarkable.

## 2024-02-08 NOTE — CHART NOTE - NSCHARTNOTEFT_GEN_A_CORE
EVENT: Called to assess pt for attempting to pull trach    BRIEF HPI: 47yo M pt with PMH CVA (3/30/2022 with residual aphasia and right sided residual weakness), seizures, HTN, HLD, CHF w/ ICD (initially rEF 30%->55-60% on 03/2021), MDD, Recurrent PE, Afib (on Eliquis), obesity s/p bariatric intervention presenting to ED for PEG tube malfunction admitted for further care. Per endorsement, PEG tube insertion attempted in ED without success. NG tube also attempted but due to bleeding the insertion was aborted and AC not restarted (home med). Patient was transferred to  for further care given that patient has a trach. Patient has copious amounts of secretion from trach requiring constant suctioning. Sputum cx sent. CXR noted. Afebrile. Concern for MDRO and therefore was placed on contact  isolation until cx comes back. GI consulted and examined patient at bedside. GI spoke with patient's proxy. Plan for OR on Monday for new PEG tube insertion. Patient's seizure meds switched to IV form. BB IV. Monitor BP per unit protocol, on IV metoprolol w parameters.     OBJECTIVE:  Vital Signs Last 24 Hrs  T(C): 37.4 (08 Feb 2024 21:50), Max: 37.4 (08 Feb 2024 21:50)  T(F): 99.4 (08 Feb 2024 21:50), Max: 99.4 (08 Feb 2024 21:50)  HR: 71 (08 Feb 2024 21:50) (71 - 90)  BP: 123/81 (08 Feb 2024 21:50) (112/75 - 141/86)  BP(mean): --  RR: 20 (08 Feb 2024 21:50) (18 - 20)  SpO2: 98% (08 Feb 2024 21:50) (93% - 100%)    Parameters below as of 08 Feb 2024 21:50  Patient On (Oxygen Delivery Method): tracheostomy collar    FOCUSED PHYSICAL EXAM:  EXT: Rt arm splint LROM, LUE AROM  NEURO: Monosyllabic, no speaking valve, states first name  CV: S1, S2, regular  RESP: Even, unlabored    LABS:                        10.7   6.50  )-----------( 409      ( 08 Feb 2024 14:05 )             34.3     02-08    141  |  105  |  16  ----------------------------<  118<H>  3.7   |  33<H>  |  0.55    Ca    8.7      08 Feb 2024 08:55    TPro  7.7  /  Alb  2.1<L>  /  TBili  0.3  /  DBili  x   /  AST  18  /  ALT  11  /  AlkPhos  75  02-08    PLAN:   Left arm secured restraint to prevent pt injury X 24 hrs    FOLLOW UP / RESULT: effectiveness of intervention

## 2024-02-08 NOTE — PROGRESS NOTE ADULT - SUBJECTIVE AND OBJECTIVE BOX
DANNIE SHIRLEY    SCU progress note    INTERVAL HPI/OVERNIGHT EVENTS: Patient transferred from ED to SCU.     DNR [ ]   DNI  [  ]    Covid - 19 PCR:     The 4Ms    What Matters Most: see GOC  Age appropriate Medications/Screen for High Risk Medication: Yes  Mentation: see CAM below  Mobility: defer to physical exam    The Confusion Assessment Method (CAM) Diagnostic Algorithm     1: Acute Onset or Fluctuating Course  - Is there evidence of an acute change in mental status from the patient’s baseline? Did the (abnormal) behavior  fluctuate during the day, that is, tend to come and go, or increase and decrease in severity?  [ ] YES [x ] NO     2: Inattention  - Did the patient have difficulty focusing attention, being easily distractible, or having difficulty keeping track of what was being said?  [ ] YES [x ] NO     3: Disorganized thinking  -Was the patient’s thinking disorganized or incoherent, such as rambling or irrelevant conversation, unclear or illogical flow of ideas, or unpredictable switching from subject to subject?  [ ] YES [ ] NO Unable to assess    4: Altered Level of consciousness?  [ ] YES [x ] NO    The diagnosis of delirium by CAM requires the presence of features 1 and 2 and either 3 or 4.    PRESSORS: [ ] YES [ x] NO    Cardiovascular:  Heart Failure  Acute   Acute on Chronic  Chronic         Pulmonary:    Hematalogic:    Other:  chlorhexidine 2% Cloths 1 Application(s) Topical <User Schedule>    chlorhexidine 2% Cloths 1 Application(s) Topical <User Schedule>    Drug Dosing Weight    Weight (kg): 81.7 (08 Feb 2024 10:00)    CENTRAL LINE: [ ] YES [x ] NO  LOCATION:   DATE INSERTED:  REMOVE: [ ] YES [ ] NO  EXPLAIN:    FAULKNER: [ ] YES [x ] NO    DATE INSERTED:  REMOVE:  [ ] YES [ ] NO  EXPLAIN:    PAST MEDICAL & SURGICAL HISTORY:  Heart failure, unspecified HF chronicity, unspecified heart failure type      ETOH abuse  h/o etoh abuse now moderate drinker      Artificial pacemaker      ROS: Limited.     PHYSICAL EXAM:    GENERAL: NAD, well-groomed, well-developed  HEAD:  Atraumatic, Normocephalic  EYES: EOMI, PERRLA, conjunctiva and sclera clear  ENMT: No tonsillar erythema, exudates, or enlargement; Moist mucous membranes, Good dentition, No lesions  NECK: Supple, No JVD, Normal thyroid  NERVOUS SYSTEM:  Alert & Oriented X3, Good concentration; Motor Strength 5/5 B/L upper and lower extremities; DTRs 2+ intact and symmetric  CHEST/LUNG: Clear to percussion bilaterally; No rales, rhonchi, wheezing, or rubs  HEART: Regular rate and rhythm; No murmurs, rubs, or gallops  ABDOMEN: Soft, Nontender, Nondistended; Bowel sounds present  EXTREMITIES:  2+ Peripheral Pulses, No clubbing, cyanosis, or edema  LYMPH: No lymphadenopathy noted  SKIN: No rashes or lesions      LABS:  CBC Full  -  ( 08 Feb 2024 14:05 )  WBC Count : 6.50 K/uL  RBC Count : 3.36 M/uL  Hemoglobin : 10.7 g/dL  Hematocrit : 34.3 %  Platelet Count - Automated : 409 K/uL  Mean Cell Volume : 102.1 fl  Mean Cell Hemoglobin : 31.8 pg  Mean Cell Hemoglobin Concentration : 31.2 gm/dL  Auto Neutrophil # : x  Auto Lymphocyte # : x  Auto Monocyte # : x  Auto Eosinophil # : x  Auto Basophil # : x  Auto Neutrophil % : x  Auto Lymphocyte % : x  Auto Monocyte % : x  Auto Eosinophil % : x  Auto Basophil % : x    02-08    141  |  105  |  16  ----------------------------<  118<H>  3.7   |  33<H>  |  0.55    Ca    8.7      08 Feb 2024 08:55    TPro  7.7  /  Alb  2.1<L>  /  TBili  0.3  /  DBili  x   /  AST  18  /  ALT  11  /  AlkPhos  75  02-08      Urinalysis Basic - ( 08 Feb 2024 08:55 )    Color: x / Appearance: x / SG: x / pH: x  Gluc: 118 mg/dL / Ketone: x  / Bili: x / Urobili: x   Blood: x / Protein: x / Nitrite: x   Leuk Esterase: x / RBC: x / WBC x   Sq Epi: x / Non Sq Epi: x / Bacteria: x            [  ]  DVT Prophylaxis  [  ]  Nutrition, Brand, Rate         Goal Rate        Abnormal Nutritional Status -  Malnutrition   Cachexia      Morbid Obesity BMI >/=40    RADIOLOGY & ADDITIONAL STUDIES:  ***    Goals of Care Discussion with Family/Proxy/Other   - see note from/family meeting set up for...     DANNIE SHIRLEY    SCU progress note    INTERVAL HPI/OVERNIGHT EVENTS: Patient transferred from ED to SCU.     FULL CODE    Covid - 19 PCR: non-reactive 2/8/2023    The 4Ms    What Matters Most: see GOC  Age appropriate Medications/Screen for High Risk Medication: Yes  Mentation: see CAM below  Mobility: defer to physical exam    The Confusion Assessment Method (CAM) Diagnostic Algorithm     1: Acute Onset or Fluctuating Course  - Is there evidence of an acute change in mental status from the patient’s baseline? Did the (abnormal) behavior  fluctuate during the day, that is, tend to come and go, or increase and decrease in severity?  [ ] YES [x ] NO     2: Inattention  - Did the patient have difficulty focusing attention, being easily distractible, or having difficulty keeping track of what was being said?  [ ] YES [x ] NO     3: Disorganized thinking  -Was the patient’s thinking disorganized or incoherent, such as rambling or irrelevant conversation, unclear or illogical flow of ideas, or unpredictable switching from subject to subject?  [ ] YES [ ] NO Unable to assess    4: Altered Level of consciousness?  [ ] YES [x ] NO    The diagnosis of delirium by CAM requires the presence of features 1 and 2 and either 3 or 4.    PRESSORS: [ ] YES [ x] NO    Cardiovascular:  Heart Failure  Acute   Acute on Chronic  Chronic         Pulmonary:    Hematalogic:    Other:  chlorhexidine 2% Cloths 1 Application(s) Topical <User Schedule>    chlorhexidine 2% Cloths 1 Application(s) Topical <User Schedule>    Drug Dosing Weight    Weight (kg): 81.7 (08 Feb 2024 10:00)    CENTRAL LINE: [ ] YES [x ] NO  LOCATION:   DATE INSERTED:  REMOVE: [ ] YES [ ] NO  EXPLAIN:    FAULKNER: [ ] YES [x ] NO    DATE INSERTED:  REMOVE:  [ ] YES [ ] NO  EXPLAIN:    PAST MEDICAL & SURGICAL HISTORY:  Heart failure, unspecified HF chronicity, unspecified heart failure type      ETOH abuse  h/o etoh abuse now moderate drinker      Artificial pacemaker      ROS: Limited 2/2 confusion     PHYSICAL EXAM:  GENERAL: NAD  HEAD:  Atraumatic, Normocephalic  EYES: EOMI, PERRLA, conjunctiva and sclera clear  ENMT: No tonsillar erythema, exudates, or enlargement; Moist mucous membranes, Good dentition, No lesions  NECK: Trach collar   NERVOUS SYSTEM:  Alert & Oriented to self. When asked if he was at home or hospital, answered home. When asked if year was 1998 or 2024, answered 1998.  Good concentration; Right sided weakness. Residual aphasia. Upper exts AG.   CHEST/LUNG: Diminished at the bases bilaterally; No rales, rhonchi, wheezing, or rubs. Trach with copious amount of green and pink tinged sputum, thin.   HEART: Regular rate and rhythm; No murmurs, rubs, or gallops  ABDOMEN: Soft, Nontender, Nondistended; Bowel sounds present. Stoma from PEG tube, no secretion. Erythema to area.   EXTREMITIES:  2+ Peripheral Pulses, No clubbing, cyanosis, or edema  SKIN: Stage 4 sacral ulcer       LABS:  CBC Full  -  ( 08 Feb 2024 14:05 )  WBC Count : 6.50 K/uL  RBC Count : 3.36 M/uL  Hemoglobin : 10.7 g/dL  Hematocrit : 34.3 %  Platelet Count - Automated : 409 K/uL  Mean Cell Volume : 102.1 fl  Mean Cell Hemoglobin : 31.8 pg  Mean Cell Hemoglobin Concentration : 31.2 gm/dL  Auto Neutrophil # : x  Auto Lymphocyte # : x  Auto Monocyte # : x  Auto Eosinophil # : x  Auto Basophil # : x  Auto Neutrophil % : x  Auto Lymphocyte % : x  Auto Monocyte % : x  Auto Eosinophil % : x  Auto Basophil % : x    02-08    141  |  105  |  16  ----------------------------<  118<H>  3.7   |  33<H>  |  0.55    Ca    8.7      08 Feb 2024 08:55    TPro  7.7  /  Alb  2.1<L>  /  TBili  0.3  /  DBili  x   /  AST  18  /  ALT  11  /  AlkPhos  75  02-08      Urinalysis Basic - ( 08 Feb 2024 08:55 )    Color: x / Appearance: x / SG: x / pH: x  Gluc: 118 mg/dL / Ketone: x  / Bili: x / Urobili: x   Blood: x / Protein: x / Nitrite: x   Leuk Esterase: x / RBC: x / WBC x   Sq Epi: x / Non Sq Epi: x / Bacteria: x    [ x ]  DVT Prophylaxis. SCDs. AC on hold due to bleeding when ED resident attempted to insert NG tube   [  ]  Nutrition: NPO. On IVF    RADIOLOGY & ADDITIONAL STUDIES:   < from: Xray Chest 1 View- PORTABLE-Urgent (Xray Chest 1 View- PORTABLE-Urgent .) (02.08.24 @ 10:17) >  ACC: [id number] EXAM:  XR CHEST PORTABLE URGENT 1V   ORDERED BY: DEANGELO NARVAEZ     PROCEDURE DATE:  02/08/2024          INTERPRETATION:  EXAM: XR CHEST URGENT    INDICATION: Admitting Dxs: K94.23 GASTROSTOMY MALFUNCTION dislodged peg   HXR    COMPARISON: February 5, 2024    IMPRESSION: Tracheostomy and pacer as before. No focal infiltrate. Some   elevation of the right hemidiaphragm. Heart is at the upper limits of   normal in its transthoracic diameter. Regional osseous structures   appropriate for age    --- End of Report ---      REED DAVIS MD; Attending Radiologist  This document has been electronically signed. Feb 8 2024 10:53AM    < end of copied text >

## 2024-02-08 NOTE — PATIENT PROFILE ADULT - FALL HARM RISK - HARM RISK INTERVENTIONS

## 2024-02-08 NOTE — PROGRESS NOTE ADULT - PROBLEM SELECTOR PLAN 3
Hx of TBI and seizures   c/w home meds Vimpat, valproic acid, and Keppra IV form  Seizure precautions

## 2024-02-08 NOTE — H&P ADULT - HISTORY OF PRESENT ILLNESS
HPI: 48 year old male from nursing home with medical history of  HTN, CAD, CHF, etoh use disorder in the past, intracranial hemorrhage bedbound s/p trach and peg presenting to ED for PEG tube malfunction. Patient unsure how peg tube was dislodged but denies any abdominal pain. Patient reports no fevers, chills, headaches, dizziness, chest pain, cough, sob, abd pain, n/v, diarrhea, constipation hematuria, dysuria.     In ED VS: T 98.9, HR 90, /75, RR 19, Spo2 100% 4LNC  Peg tube unable to be replaced in ED, will admit for repeat PEG placement          PAST MEDICAL & SURGICAL HISTORY: CHF, CAD, HTN, TBI, Trach and PEG, Etoh use Dx     SOCIAL HX:  No current smoking, etoh, or drug use.     FAMILY HISTORY:  No pertinent family history in first degree relatives    :  No known cardiovascular family history     ROS:  See HPI     Allergies    shellfish (Unknown)  No Known Drug Allergies    Intolerances          PHYSICAL EXAM    ICU Vital Signs Last 24 Hrs  T(C): 37.1 (08 Feb 2024 07:32), Max: 37.2 (08 Feb 2024 02:39)  T(F): 98.7 (08 Feb 2024 07:32), Max: 98.9 (08 Feb 2024 02:39)  HR: 81 (08 Feb 2024 07:32) (81 - 90)  BP: 114/74 (08 Feb 2024 07:32) (112/75 - 114/74)  BP(mean): --  ABP: --  ABP(mean): --  RR: 18 (08 Feb 2024 07:32) (18 - 19)  SpO2: 93% (08 Feb 2024 07:32) (93% - 95%)    O2 Parameters below as of 08 Feb 2024 07:32  Patient On (Oxygen Delivery Method): tracheostomy collar  O2 Flow (L/min): 4          General: middle aged male lying in bed in no visible respiratory distress  HEENT:  left skull deformity    Lymphatic system: No LN  Lungs: Bilateral BS  Cardiovascular: Regular  Gastrointestinal: PEG Tube stoma site site c/d/i   Musculoskeletal: No clubbing.  Moves all extremities.    Skin: Warm.  Intact  Neurological: No motor or sensory deficit         LABS:                          10.2   6.85  )-----------( 369      ( 08 Feb 2024 08:55 )             32.2                                               02-08    141  |  105  |  16  ----------------------------<  118<H>  3.7   |  33<H>  |  0.55    Ca    8.7      08 Feb 2024 08:55    TPro  7.7  /  Alb  2.1<L>  /  TBili  0.3  /  DBili  x   /  AST  18  /  ALT  11  /  AlkPhos  75  02-08                                             Urinalysis Basic - ( 08 Feb 2024 08:55 )    Color: x / Appearance: x / SG: x / pH: x  Gluc: 118 mg/dL / Ketone: x  / Bili: x / Urobili: x   Blood: x / Protein: x / Nitrite: x   Leuk Esterase: x / RBC: x / WBC x   Sq Epi: x / Non Sq Epi: x / Bacteria: x                                                  LIVER FUNCTIONS - ( 08 Feb 2024 08:55 )  Alb: 2.1 g/dL / Pro: 7.7 g/dL / ALK PHOS: 75 U/L / ALT: 11 U/L DA / AST: 18 U/L / GGT: x                                                                                                                                       CXR:    ECHO:    MEDICATIONS  (STANDING):    MEDICATIONS  (PRN):         HPI: 49yo M pt with PMHx CVA (3/30/2022 with residual aphasia and right sided residual weakness), seizures, HTN, HLD, CHF w/ ICD (initially rEF 30%->55-60% on 03/2021), MDD, Recurrent PE, Afib (on Eliquis), obesity s/p bariatric intervention  presenting to ED for PEG tube malfunction. Patient unsure how peg tube was dislodged but denies any abdominal pain. Patient reports no fevers, chills, headaches, dizziness, chest pain, cough, sob, abd pain, n/v, diarrhea, constipation hematuria, dysuria.     In ED VS: T 98.9, HR 90, /75, RR 19, Spo2 100% 4LNC  Peg tube unable to be replaced in ED, will admit for repeat PEG placement          PAST MEDICAL & SURGICAL HISTORY: CHF, CAD, HTN, TBI, Trach and PEG, Etoh use Dx     SOCIAL HX:  No current smoking, etoh, or drug use.     FAMILY HISTORY:  No pertinent family history in first degree relatives    :  No known cardiovascular family history     ROS:  See HPI     Allergies    shellfish (Unknown)  No Known Drug Allergies    Intolerances          PHYSICAL EXAM    ICU Vital Signs Last 24 Hrs  T(C): 37.1 (08 Feb 2024 07:32), Max: 37.2 (08 Feb 2024 02:39)  T(F): 98.7 (08 Feb 2024 07:32), Max: 98.9 (08 Feb 2024 02:39)  HR: 81 (08 Feb 2024 07:32) (81 - 90)  BP: 114/74 (08 Feb 2024 07:32) (112/75 - 114/74)  BP(mean): --  ABP: --  ABP(mean): --  RR: 18 (08 Feb 2024 07:32) (18 - 19)  SpO2: 93% (08 Feb 2024 07:32) (93% - 95%)    O2 Parameters below as of 08 Feb 2024 07:32  Patient On (Oxygen Delivery Method): tracheostomy collar  O2 Flow (L/min): 4          General: middle aged male lying in bed in no visible respiratory distress  HEENT:  left skull deformity    Lymphatic system: No LN  Lungs: Bilateral BS  Cardiovascular: Regular  Gastrointestinal: PEG Tube stoma site site c/d/i   Musculoskeletal: No clubbing.  Moves all extremities.    Skin: Warm.  Intact  Neurological: No motor or sensory deficit         LABS:                          10.2   6.85  )-----------( 369      ( 08 Feb 2024 08:55 )             32.2                                               02-08    141  |  105  |  16  ----------------------------<  118<H>  3.7   |  33<H>  |  0.55    Ca    8.7      08 Feb 2024 08:55    TPro  7.7  /  Alb  2.1<L>  /  TBili  0.3  /  DBili  x   /  AST  18  /  ALT  11  /  AlkPhos  75  02-08                                             Urinalysis Basic - ( 08 Feb 2024 08:55 )    Color: x / Appearance: x / SG: x / pH: x  Gluc: 118 mg/dL / Ketone: x  / Bili: x / Urobili: x   Blood: x / Protein: x / Nitrite: x   Leuk Esterase: x / RBC: x / WBC x   Sq Epi: x / Non Sq Epi: x / Bacteria: x                                                  LIVER FUNCTIONS - ( 08 Feb 2024 08:55 )  Alb: 2.1 g/dL / Pro: 7.7 g/dL / ALK PHOS: 75 U/L / ALT: 11 U/L DA / AST: 18 U/L / GGT: x                                                                                                                                       CXR:    ECHO:    MEDICATIONS  (STANDING):    MEDICATIONS  (PRN):

## 2024-02-08 NOTE — H&P ADULT - ATTENDING COMMENTS
IMP: This is a 48 yr old man  with CVA (3/30/2022 with residual aphasia and right sided residual weakness), seizures, HTN, HLD, CHF w/ ICD (initially rEF 30%->55-60% on 03/2021), MDD, Recurrent PE, Afib (on Eliquis), obesity s/p bariatric intervention  presenting to ED for PEG tube malfunction admitted for further care.           _________CNS___________  Patient is at baseline mental status AAOX3     #CVA/ TBI   -s/p trach and peg    #seizures  -c/w home meds vimpat, valproic acid, and Keppra         _________CVS___________  #HTN  -c/w lisinopril    #PE/ AFIb  -on eliquis and metopolol at home  -Patient with bleeding from nose during NGT insertion in ED,  PEG replacement 2/12  -Hold eliqis 2/9 ( last dose )   -SCD      #CHF  -hx of chf   -c/w lisinopril, metoprolol       _________RESP__________  #Tracheostomy  -Tracheostomy care      ___________GI____________  #Dislodged PEG Tube  -GI consulted Dr. Colbert for peg tube replacement . Plan for OR On monday  -Attempted to place NGT in ED however patient with significant bleeding from nose (pt is on eliquis). Will wait 24 hours before attempting to re insert NG tube   -NPO including medications     ________ RENAL__________  #No active issues     __________MSK___________  #No active issues       ___________ID____________  #No active issues       _________ENDO__________  #No active issues       ______HEME/ONC_______  #MDD  _________SKIN____________  #No active issues       ________PROPHY_______  #DVT SCD  #GI      ______GOC/DISPO___________  FULL CODE

## 2024-02-09 LAB
ALBUMIN SERPL ELPH-MCNC: 1.9 G/DL — LOW (ref 3.5–5)
ALP SERPL-CCNC: 69 U/L — SIGNIFICANT CHANGE UP (ref 40–120)
ALT FLD-CCNC: 12 U/L DA — SIGNIFICANT CHANGE UP (ref 10–60)
ANION GAP SERPL CALC-SCNC: 5 MMOL/L — SIGNIFICANT CHANGE UP (ref 5–17)
APTT BLD: 35.6 SEC — SIGNIFICANT CHANGE UP (ref 24.5–35.6)
AST SERPL-CCNC: 13 U/L — SIGNIFICANT CHANGE UP (ref 10–40)
BILIRUB SERPL-MCNC: 0.3 MG/DL — SIGNIFICANT CHANGE UP (ref 0.2–1.2)
BUN SERPL-MCNC: 16 MG/DL — SIGNIFICANT CHANGE UP (ref 7–18)
CALCIUM SERPL-MCNC: 8.6 MG/DL — SIGNIFICANT CHANGE UP (ref 8.4–10.5)
CHLORIDE SERPL-SCNC: 108 MMOL/L — SIGNIFICANT CHANGE UP (ref 96–108)
CO2 SERPL-SCNC: 31 MMOL/L — SIGNIFICANT CHANGE UP (ref 22–31)
CREAT SERPL-MCNC: 0.52 MG/DL — SIGNIFICANT CHANGE UP (ref 0.5–1.3)
EGFR: 124 ML/MIN/1.73M2 — SIGNIFICANT CHANGE UP
GLUCOSE BLDC GLUCOMTR-MCNC: 120 MG/DL — HIGH (ref 70–99)
GLUCOSE BLDC GLUCOMTR-MCNC: 143 MG/DL — HIGH (ref 70–99)
GLUCOSE SERPL-MCNC: 127 MG/DL — HIGH (ref 70–99)
GRAM STN FLD: ABNORMAL
HCT VFR BLD CALC: 29.5 % — LOW (ref 39–50)
HGB BLD-MCNC: 9.5 G/DL — LOW (ref 13–17)
INR BLD: 1.23 RATIO — HIGH (ref 0.85–1.18)
MAGNESIUM SERPL-MCNC: 2.2 MG/DL — SIGNIFICANT CHANGE UP (ref 1.6–2.6)
MCHC RBC-ENTMCNC: 32.2 GM/DL — SIGNIFICANT CHANGE UP (ref 32–36)
MCHC RBC-ENTMCNC: 32.2 PG — SIGNIFICANT CHANGE UP (ref 27–34)
MCV RBC AUTO: 100 FL — SIGNIFICANT CHANGE UP (ref 80–100)
MRSA PCR RESULT.: SIGNIFICANT CHANGE UP
NRBC # BLD: 0 /100 WBCS — SIGNIFICANT CHANGE UP (ref 0–0)
PHOSPHATE SERPL-MCNC: 4.6 MG/DL — HIGH (ref 2.5–4.5)
PLATELET # BLD AUTO: 336 K/UL — SIGNIFICANT CHANGE UP (ref 150–400)
POTASSIUM SERPL-MCNC: 3.4 MMOL/L — LOW (ref 3.5–5.3)
POTASSIUM SERPL-SCNC: 3.4 MMOL/L — LOW (ref 3.5–5.3)
PROT SERPL-MCNC: 6.8 G/DL — SIGNIFICANT CHANGE UP (ref 6–8.3)
PROTHROM AB SERPL-ACNC: 13.9 SEC — HIGH (ref 9.5–13)
RBC # BLD: 2.95 M/UL — LOW (ref 4.2–5.8)
RBC # FLD: 13.2 % — SIGNIFICANT CHANGE UP (ref 10.3–14.5)
S AUREUS DNA NOSE QL NAA+PROBE: DETECTED
SODIUM SERPL-SCNC: 144 MMOL/L — SIGNIFICANT CHANGE UP (ref 135–145)
SPECIMEN SOURCE: SIGNIFICANT CHANGE UP
WBC # BLD: 4.84 K/UL — SIGNIFICANT CHANGE UP (ref 3.8–10.5)
WBC # FLD AUTO: 4.84 K/UL — SIGNIFICANT CHANGE UP (ref 3.8–10.5)

## 2024-02-09 RX ORDER — SODIUM CHLORIDE 9 MG/ML
1000 INJECTION, SOLUTION INTRAVENOUS
Refills: 0 | Status: DISCONTINUED | OUTPATIENT
Start: 2024-02-09 | End: 2024-02-13

## 2024-02-09 RX ORDER — MUPIROCIN 20 MG/G
1 OINTMENT TOPICAL
Refills: 0 | Status: COMPLETED | OUTPATIENT
Start: 2024-02-09 | End: 2024-02-14

## 2024-02-09 RX ORDER — POTASSIUM CHLORIDE 20 MEQ
10 PACKET (EA) ORAL
Refills: 0 | Status: COMPLETED | OUTPATIENT
Start: 2024-02-09 | End: 2024-02-09

## 2024-02-09 RX ORDER — ALBUMIN HUMAN 25 %
50 VIAL (ML) INTRAVENOUS EVERY 6 HOURS
Refills: 0 | Status: COMPLETED | OUTPATIENT
Start: 2024-02-09 | End: 2024-02-10

## 2024-02-09 RX ORDER — OLANZAPINE 15 MG/1
2.5 TABLET, FILM COATED ORAL ONCE
Refills: 0 | Status: COMPLETED | OUTPATIENT
Start: 2024-02-09 | End: 2024-02-09

## 2024-02-09 RX ADMIN — CHLORHEXIDINE GLUCONATE 1 APPLICATION(S): 213 SOLUTION TOPICAL at 05:39

## 2024-02-09 RX ADMIN — ALBUTEROL 2.5 MILLIGRAM(S): 90 AEROSOL, METERED ORAL at 16:16

## 2024-02-09 RX ADMIN — Medication 50 MILLILITER(S): at 18:30

## 2024-02-09 RX ADMIN — Medication 62.5 MILLIGRAM(S): at 13:07

## 2024-02-09 RX ADMIN — ALBUTEROL 2.5 MILLIGRAM(S): 90 AEROSOL, METERED ORAL at 20:09

## 2024-02-09 RX ADMIN — Medication 5 MILLIGRAM(S): at 05:38

## 2024-02-09 RX ADMIN — LACOSAMIDE 140 MILLIGRAM(S): 50 TABLET ORAL at 09:47

## 2024-02-09 RX ADMIN — LEVETIRACETAM 600 MILLIGRAM(S): 250 TABLET, FILM COATED ORAL at 21:43

## 2024-02-09 RX ADMIN — LACOSAMIDE 140 MILLIGRAM(S): 50 TABLET ORAL at 21:42

## 2024-02-09 RX ADMIN — MUPIROCIN 1 APPLICATION(S): 20 OINTMENT TOPICAL at 17:35

## 2024-02-09 RX ADMIN — Medication 100 MILLIEQUIVALENT(S): at 14:15

## 2024-02-09 RX ADMIN — SODIUM CHLORIDE 75 MILLILITER(S): 9 INJECTION, SOLUTION INTRAVENOUS at 18:07

## 2024-02-09 RX ADMIN — ALBUTEROL 2.5 MILLIGRAM(S): 90 AEROSOL, METERED ORAL at 03:17

## 2024-02-09 RX ADMIN — OLANZAPINE 2.5 MILLIGRAM(S): 15 TABLET, FILM COATED ORAL at 22:56

## 2024-02-09 RX ADMIN — ALBUTEROL 2.5 MILLIGRAM(S): 90 AEROSOL, METERED ORAL at 08:43

## 2024-02-09 RX ADMIN — Medication 62.5 MILLIGRAM(S): at 20:19

## 2024-02-09 RX ADMIN — Medication 62.5 MILLIGRAM(S): at 05:38

## 2024-02-09 RX ADMIN — LEVETIRACETAM 600 MILLIGRAM(S): 250 TABLET, FILM COATED ORAL at 10:25

## 2024-02-09 RX ADMIN — Medication 100 MILLIEQUIVALENT(S): at 15:18

## 2024-02-09 NOTE — PROGRESS NOTE ADULT - ASSESSMENT
47yo M pt with PMHx CVA (3/30/2022 with residual aphasia and right sided residual weakness), seizures, HTN, HLD, CHF w/ ICD (initially rEF 30%->55-60% on 03/2021), MDD, Recurrent PE, Afib (on Eliquis), obesity s/p bariatric intervention presenting to ED for PEG tube malfunction admitted for further care. Per endorsement, PEG tube insertion attempted in ED without success. NG tube also attempted but due to bleeding the insertion was aborted and AC not restarted (home med). Patient was transferred to  for further care given that patient has a trach. Patient has copious amounts of secretion from trach requiring constant suctioning. Sputum cx sent. CXR noted. Afebrile. Concern for MDRO and therefore was placed on contact  isolation until cx comes back. GI consulted and examined patient at bedside. GI spoke with patient's proxy. Plan for OR on Monday for new PEG tube insertion.   Patient's seizure meds switched to IV form. BB IV. Monitor BP per unit protocol.   02/09: OR on Monday for PEG. Dispo is to return to [facility] once PEg placed,

## 2024-02-09 NOTE — PROGRESS NOTE ADULT - PROBLEM SELECTOR PLAN 9
OR on Monday for PEG placement   AC on hold   IVF  Trach care  Sputum cx F/u  Contact Isolation for possible MDRO  Dispo: return to [facility] after PEG placement

## 2024-02-09 NOTE — PROGRESS NOTE ADULT - SUBJECTIVE AND OBJECTIVE BOX
DANNIE SHIRLEY    SCU progress note    INTERVAL HPI/OVERNIGHT EVENTS: No acute findings.     Full code    Covid - 19 PCR: non-reactive    The 4Ms    What Matters Most: see GOC  Age appropriate Medications/Screen for High Risk Medication: Yes  Mentation: see CAM below  Mobility: defer to physical exam    The Confusion Assessment Method (CAM) Diagnostic Algorithm     1: Acute Onset or Fluctuating Course  - Is there evidence of an acute change in mental status from the patient’s baseline? Did the (abnormal) behavior  fluctuate during the day, that is, tend to come and go, or increase and decrease in severity?  [ ] YES [x ] NO     2: Inattention  - Did the patient have difficulty focusing attention, being easily distractible, or having difficulty keeping track of what was being said?  [ ] YES [x ] NO     3: Disorganized thinking  -Was the patient’s thinking disorganized or incoherent, such as rambling or irrelevant conversation, unclear or illogical flow of ideas, or unpredictable switching from subject to subject?  [ ] YES [x ] NO    4: Altered Level of consciousness?  [ ] YES [x ] NO    The diagnosis of delirium by CAM requires the presence of features 1 and 2 and either 3 or 4.    PRESSORS: [ ] YES [x ] NO    Cardiovascular:  Heart Failure  Acute   Acute on Chronic  Chronic       metoprolol tartrate Injectable 5 milliGRAM(s) IV Push every 12 hours    Pulmonary:  albuterol    0.083% 2.5 milliGRAM(s) Nebulizer every 6 hours  sodium chloride 0.9% for Nebulization 3 milliLiter(s) Nebulizer every 8 hours PRN    Hematalogic:    Other:  chlorhexidine 2% Cloths 1 Application(s) Topical <User Schedule>  dextrose 5% + lactated ringers. 1000 milliLiter(s) IV Continuous <Continuous>  lacosamide IVPB 200 milliGRAM(s) IV Intermittent <User Schedule>  levETIRAcetam  IVPB 2000 milliGRAM(s) IV Intermittent <User Schedule>  morphine  - Injectable 1 milliGRAM(s) IV Push every 6 hours PRN  valproate sodium  IVPB 1250 milliGRAM(s) IV Intermittent <User Schedule>    albuterol    0.083% 2.5 milliGRAM(s) Nebulizer every 6 hours  chlorhexidine 2% Cloths 1 Application(s) Topical <User Schedule>  dextrose 5% + lactated ringers. 1000 milliLiter(s) IV Continuous <Continuous>  lacosamide IVPB 200 milliGRAM(s) IV Intermittent <User Schedule>  levETIRAcetam  IVPB 2000 milliGRAM(s) IV Intermittent <User Schedule>  metoprolol tartrate Injectable 5 milliGRAM(s) IV Push every 12 hours  morphine  - Injectable 1 milliGRAM(s) IV Push every 6 hours PRN  sodium chloride 0.9% for Nebulization 3 milliLiter(s) Nebulizer every 8 hours PRN  valproate sodium  IVPB 1250 milliGRAM(s) IV Intermittent <User Schedule>    Drug Dosing Weight    Weight (kg): 81.7 (08 Feb 2024 10:00)    CENTRAL LINE: [ ] YES [x ] NO  LOCATION:   DATE INSERTED:  REMOVE: [ ] YES [ ] NO  EXPLAIN:    FAULKNER: [ ] YES [x ] NO    DATE INSERTED:  REMOVE:  [ ] YES [ ] NO  EXPLAIN:    PAST MEDICAL & SURGICAL HISTORY:  Heart failure, unspecified HF chronicity, unspecified heart failure type      ETOH abuse  h/o etoh abuse now moderate drinker    Artificial pacemaker    ROS: Limited. No pain.     PHYSICAL EXAM:  GENERAL: NAD  HEAD:  Atraumatic, Normocephalic  EYES: EOMI, PERRLA, conjunctiva and sclera clear  ENMT: No tonsillar erythema, exudates, or enlargement; Moist mucous membranes, Good dentition, No lesions  NECK: Trach collar   NERVOUS SYSTEM:  Alert & Oriented to self. When asked if he was at home or hospital, answered home. When asked if year was 1998 or 2024, answered 1998.  Good concentration; Right sided weakness. Residual aphasia. Upper exts AG.   CHEST/LUNG: Diminished at the bases bilaterally; No rales, rhonchi, wheezing, or rubs. Trach with copious amount of green and pink tinged sputum, thin.   HEART: Regular rate and rhythm; No murmurs, rubs, or gallops  ABDOMEN: Soft, Nontender, Nondistended; Bowel sounds present. Stoma from PEG tube, no secretion. Erythema to area.   EXTREMITIES:  2+ Peripheral Pulses, No clubbing, cyanosis, or edema  SKIN: Stage 4 sacral ulcer         LABS:  CBC Full  -  ( 09 Feb 2024 06:17 )  WBC Count : 4.84 K/uL  RBC Count : 2.95 M/uL  Hemoglobin : 9.5 g/dL  Hematocrit : 29.5 %  Platelet Count - Automated : 336 K/uL  Mean Cell Volume : 100.0 fl  Mean Cell Hemoglobin : 32.2 pg  Mean Cell Hemoglobin Concentration : 32.2 gm/dL  Auto Neutrophil # : x  Auto Lymphocyte # : x  Auto Monocyte # : x  Auto Eosinophil # : x  Auto Basophil # : x  Auto Neutrophil % : x  Auto Lymphocyte % : x  Auto Monocyte % : x  Auto Eosinophil % : x  Auto Basophil % : x    02-09    144  |  108  |  16  ----------------------------<  127<H>  3.4<L>   |  31  |  0.52    Ca    8.6      09 Feb 2024 06:17  Phos  4.6     02-09  Mg     2.2     02-09    TPro  6.8  /  Alb  1.9<L>  /  TBili  0.3  /  DBili  x   /  AST  13  /  ALT  12  /  AlkPhos  69  02-09    PT/INR - ( 09 Feb 2024 06:17 )   PT: 13.9 sec;   INR: 1.23 ratio         PTT - ( 09 Feb 2024 06:17 )  PTT:35.6 sec  Urinalysis Basic - ( 09 Feb 2024 06:17 )    Color: x / Appearance: x / SG: x / pH: x  Gluc: 127 mg/dL / Ketone: x  / Bili: x / Urobili: x   Blood: x / Protein: x / Nitrite: x   Leuk Esterase: x / RBC: x / WBC x   Sq Epi: x / Non Sq Epi: x / Bacteria: x    [ x ]  DVT Prophylaxis  [ x ] NPO for PEG    RADIOLOGY & ADDITIONAL STUDIES:   < from: Xray Chest 1 View- PORTABLE-Urgent (Xray Chest 1 View- PORTABLE-Urgent .) (02.08.24 @ 10:17) >    ACC: [id number] EXAM:  XR CHEST PORTABLE URGENT 1V   ORDERED BY: DEANGELO NARVAEZ     PROCEDURE DATE:  02/08/2024          INTERPRETATION:  EXAM: XR CHEST URGENT    INDICATION: Admitting Dxs: K94.23 GASTROSTOMY MALFUNCTION dislodged peg   HXR    COMPARISON: February 5, 2024    IMPRESSION: Tracheostomy and pacer as before. No focal infiltrate. Some   elevation of the right hemidiaphragm. Heart is at the upper limits of   normal in its transthoracic diameter. Regional osseous structures   appropriate for age    --- End of Report ---      REED AUSTER MD; Attending Radiologist  This document has been electronically signed. Feb 8 2024 10:53AM    < end of copied text >

## 2024-02-10 LAB
-  AZTREONAM: SIGNIFICANT CHANGE UP
-  CEFEPIME: SIGNIFICANT CHANGE UP
-  CEFTAZIDIME: SIGNIFICANT CHANGE UP
-  CIPROFLOXACIN: SIGNIFICANT CHANGE UP
-  IMIPENEM: SIGNIFICANT CHANGE UP
-  LEVOFLOXACIN: SIGNIFICANT CHANGE UP
-  MEROPENEM: SIGNIFICANT CHANGE UP
-  PIPERACILLIN/TAZOBACTAM: SIGNIFICANT CHANGE UP
ANION GAP SERPL CALC-SCNC: 3 MMOL/L — LOW (ref 5–17)
BUN SERPL-MCNC: 11 MG/DL — SIGNIFICANT CHANGE UP (ref 7–18)
CALCIUM SERPL-MCNC: 8.6 MG/DL — SIGNIFICANT CHANGE UP (ref 8.4–10.5)
CHLORIDE SERPL-SCNC: 110 MMOL/L — HIGH (ref 96–108)
CO2 SERPL-SCNC: 33 MMOL/L — HIGH (ref 22–31)
CREAT SERPL-MCNC: 0.38 MG/DL — LOW (ref 0.5–1.3)
CULTURE RESULTS: ABNORMAL
EGFR: 136 ML/MIN/1.73M2 — SIGNIFICANT CHANGE UP
GLUCOSE BLDC GLUCOMTR-MCNC: 109 MG/DL — HIGH (ref 70–99)
GLUCOSE BLDC GLUCOMTR-MCNC: 98 MG/DL — SIGNIFICANT CHANGE UP (ref 70–99)
GLUCOSE SERPL-MCNC: 106 MG/DL — HIGH (ref 70–99)
HCT VFR BLD CALC: 27.6 % — LOW (ref 39–50)
HGB BLD-MCNC: 8.8 G/DL — LOW (ref 13–17)
MAGNESIUM SERPL-MCNC: 2.1 MG/DL — SIGNIFICANT CHANGE UP (ref 1.6–2.6)
MCHC RBC-ENTMCNC: 31.9 GM/DL — LOW (ref 32–36)
MCHC RBC-ENTMCNC: 32.2 PG — SIGNIFICANT CHANGE UP (ref 27–34)
MCV RBC AUTO: 101.1 FL — HIGH (ref 80–100)
METHOD TYPE: SIGNIFICANT CHANGE UP
NRBC # BLD: 0 /100 WBCS — SIGNIFICANT CHANGE UP (ref 0–0)
ORGANISM # SPEC MICROSCOPIC CNT: ABNORMAL
ORGANISM # SPEC MICROSCOPIC CNT: ABNORMAL
PLATELET # BLD AUTO: 293 K/UL — SIGNIFICANT CHANGE UP (ref 150–400)
POTASSIUM SERPL-MCNC: 3 MMOL/L — LOW (ref 3.5–5.3)
POTASSIUM SERPL-SCNC: 3 MMOL/L — LOW (ref 3.5–5.3)
RBC # BLD: 2.73 M/UL — LOW (ref 4.2–5.8)
RBC # FLD: 13.1 % — SIGNIFICANT CHANGE UP (ref 10.3–14.5)
SODIUM SERPL-SCNC: 146 MMOL/L — HIGH (ref 135–145)
SPECIMEN SOURCE: SIGNIFICANT CHANGE UP
WBC # BLD: 4.67 K/UL — SIGNIFICANT CHANGE UP (ref 3.8–10.5)
WBC # FLD AUTO: 4.67 K/UL — SIGNIFICANT CHANGE UP (ref 3.8–10.5)

## 2024-02-10 RX ORDER — OLANZAPINE 15 MG/1
2.5 TABLET, FILM COATED ORAL EVERY 12 HOURS
Refills: 0 | Status: DISCONTINUED | OUTPATIENT
Start: 2024-02-10 | End: 2024-02-11

## 2024-02-10 RX ORDER — POTASSIUM CHLORIDE 20 MEQ
10 PACKET (EA) ORAL
Refills: 0 | Status: COMPLETED | OUTPATIENT
Start: 2024-02-10 | End: 2024-02-10

## 2024-02-10 RX ADMIN — Medication 50 MILLILITER(S): at 05:14

## 2024-02-10 RX ADMIN — LACOSAMIDE 140 MILLIGRAM(S): 50 TABLET ORAL at 21:09

## 2024-02-10 RX ADMIN — MUPIROCIN 1 APPLICATION(S): 20 OINTMENT TOPICAL at 18:44

## 2024-02-10 RX ADMIN — Medication 100 MILLIEQUIVALENT(S): at 16:52

## 2024-02-10 RX ADMIN — Medication 50 MILLILITER(S): at 12:30

## 2024-02-10 RX ADMIN — Medication 50 MILLILITER(S): at 00:05

## 2024-02-10 RX ADMIN — OLANZAPINE 2.5 MILLIGRAM(S): 15 TABLET, FILM COATED ORAL at 20:15

## 2024-02-10 RX ADMIN — Medication 62.5 MILLIGRAM(S): at 22:13

## 2024-02-10 RX ADMIN — LEVETIRACETAM 600 MILLIGRAM(S): 250 TABLET, FILM COATED ORAL at 21:09

## 2024-02-10 RX ADMIN — Medication 62.5 MILLIGRAM(S): at 12:29

## 2024-02-10 RX ADMIN — MUPIROCIN 1 APPLICATION(S): 20 OINTMENT TOPICAL at 05:15

## 2024-02-10 RX ADMIN — LACOSAMIDE 140 MILLIGRAM(S): 50 TABLET ORAL at 09:51

## 2024-02-10 RX ADMIN — CHLORHEXIDINE GLUCONATE 1 APPLICATION(S): 213 SOLUTION TOPICAL at 05:28

## 2024-02-10 RX ADMIN — Medication 62.5 MILLIGRAM(S): at 05:14

## 2024-02-10 RX ADMIN — LEVETIRACETAM 600 MILLIGRAM(S): 250 TABLET, FILM COATED ORAL at 09:51

## 2024-02-10 RX ADMIN — ALBUTEROL 2.5 MILLIGRAM(S): 90 AEROSOL, METERED ORAL at 15:38

## 2024-02-10 RX ADMIN — Medication 50 MILLILITER(S): at 18:31

## 2024-02-10 RX ADMIN — ALBUTEROL 2.5 MILLIGRAM(S): 90 AEROSOL, METERED ORAL at 20:36

## 2024-02-10 RX ADMIN — Medication 100 MILLIEQUIVALENT(S): at 18:30

## 2024-02-10 RX ADMIN — Medication 5 MILLIGRAM(S): at 05:15

## 2024-02-10 RX ADMIN — Medication 100 MILLIEQUIVALENT(S): at 12:31

## 2024-02-10 RX ADMIN — ALBUTEROL 2.5 MILLIGRAM(S): 90 AEROSOL, METERED ORAL at 08:52

## 2024-02-10 NOTE — PROGRESS NOTE ADULT - PROBLEM SELECTOR PLAN 1
PEG tube dislodged   s/p attempt for NGT insertion on 2/8 aborted after bleeding noted in setting of AC.   Plan for OR on Monday for new PEG insertion  AC on hold (since 2/8)

## 2024-02-10 NOTE — PROGRESS NOTE ADULT - PROBLEM SELECTOR PLAN 7
Trach care  Frequent suctioning  Copious amount of secretion   Sputum cx growing moderate Pseudomonas aeruginosa . Afebrile , no leukocytosis.   Contact isolation.

## 2024-02-10 NOTE — DIETITIAN INITIAL EVALUATION ADULT - MALNUTRITION
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

## 2024-02-10 NOTE — DIETITIAN INITIAL EVALUATION ADULT - ADD RECOMMEND
(nutren or other 2 kcal formula RTH not available)/ closet Nepro (1.8 kcal/ ml): Nepro 45x24 (1080 ml,1944 kcals,87 gm protein).Add 1 Pkt Prosource TID (+45gm protein, 180 kcals). (+ Vitamin C/ MVI as PTA as medically feasible). MD to monitor. RD available.

## 2024-02-10 NOTE — DIETITIAN INITIAL EVALUATION ADULT - REASON INDICATOR FOR ASSESSMENT
Met with pt to discuss role as  in helping pt to develop discharge plan and to help pt carry out their plan  Pt lives in a house with her   Pt has 6 SUZANNE  Pt has 13 steps on the inside  Pt has her bedroom on the 2nd floor  Pt has bathroom on the 1rst and 2nd floor  Pt has no DME at home  Pt and her  both cook  Pt and her  both drive  Pt has never had home care or any services in the home  Pt uses the [facility]  Pt's PCP is Dr Roberto Bailon  Pt with no Case Management needs  MST 2 or greater. NPO day #3, Admit w/ mallfunctioning GT (now out)

## 2024-02-10 NOTE — CHART NOTE - NSCHARTNOTEFT_GEN_A_CORE
Following pt teaching and positioning , NGT /dobhoff inserted via R nare without problem. Air bolus auscultated in stomach.   Pt tolerated procedure well . D/w pt safety measures. Pt nods head , expressing understanding, will not pull on NGT.     F/u CXR for placement.     Adamaris Vargas NP Medicine /SCU

## 2024-02-10 NOTE — DIETITIAN INITIAL EVALUATION ADULT - PERTINENT LABORATORY DATA
02-09    144  |  108  |  16  ----------------------------<  127<H>  3.4<L>   |  31  |  0.52    Ca    8.6      09 Feb 2024 06:17  Phos  4.6     02-09  Mg     2.2     02-09    TPro  6.8  /  Alb  1.9<L>  /  TBili  0.3  /  DBili  x   /  AST  13  /  ALT  12  /  AlkPhos  69  02-09  POCT Blood Glucose.: 120 mg/dL (02-09-24 @ 17:37)

## 2024-02-10 NOTE — PROGRESS NOTE ADULT - ASSESSMENT
47yo M pt with PMHx CVA (3/30/2022 with residual aphasia and right sided residual weakness), seizures, HTN, HLD, CHF w/ ICD (initially rEF 30%->55-60% on 03/2021), MDD, Recurrent PE, Afib (on Eliquis), obesity s/p bariatric intervention presenting to ED for PEG tube malfunction admitted for further care. Per endorsement, PEG tube insertion attempted in ED without success. NG tube also attempted but due to bleeding the insertion was aborted and AC not restarted (home med). Patient was transferred to  for further care given that patient has a trach. Patient has copious amounts of secretion from trach requiring constant suctioning. Sputum cx sent. CXR noted. Afebrile. Concern for MDRO and therefore was placed on contact  isolation until cx comes back. GI consulted and examined patient at bedside. GI spoke with patient's proxy. Plan for OR on Monday for new PEG tube insertion.   Patient's seizure meds switched to IV form. BB IV. Monitor BP per unit protocol.   02/09: OR on Monday for PEG. Dispo is to return to [facility] once PEg placed,

## 2024-02-10 NOTE — DIETITIAN INITIAL EVALUATION ADULT - NSICDXPASTMEDICALHX_GEN_ALL_CORE_FT
PAST MEDICAL HISTORY:  ETOH abuse h/o etoh abuse now moderate drinker    Heart failure, unspecified HF chronicity, unspecified heart failure type

## 2024-02-10 NOTE — CHART NOTE - NSCHARTNOTEFT_GEN_A_CORE
EVENT:   2/09/24, 10;41pm, patient with hx. ETOH abuse, now moderate drinker, CVA with residual aphasia and right sided weakness, became restless and agitated. He attempted to jump out of bed. Gait - unsteady.    HPI:    49 y/o male pt with PMH CVA (3/30/2022 with residual aphasia and right sided residual weakness), seizures, HTN, HLD, CHF w/ ICD (initially rEF 30%->55-60% on 03/2021), MDD, Recurrent PE, Afib (on Eliquis), obesity s/p bariatric intervention  presenting to ED for PEG tube malfunction. Patient unsure how peg tube was dislodged but denies any abdominal pain. Patient reports no fevers, chills, headaches, dizziness, chest pain, cough, sob, abd pain, n/v, diarrhea, constipation hematuria, dysuria.   OBJECTIVE:  Vital Signs Last 24 Hrs  T(C): 36.9 (10 Feb 2024 05:00), Max: 36.9 (10 Feb 2024 05:00)  T(F): 98.4 (10 Feb 2024 05:00), Max: 98.4 (10 Feb 2024 05:00)  HR: 62 (10 Feb 2024 05:00) (62 - 85)  BP: 120/80 (10 Feb 2024 05:00) (97/65 - 120/80)  BP(mean): --  RR: 18 (10 Feb 2024 05:00) (18 - 18)  SpO2: 100% (10 Feb 2024 05:00) (95% - 100%)    Parameters below as of 10 Feb 2024 05:00  Patient On (Oxygen Delivery Method): hydrotrach  O2 Flow (L/min): 4      FOCUSED PHYSICAL EXAM:    LABS:                        9.5    4.84  )-----------( 336      ( 09 Feb 2024 06:17 )             29.5     02-09    144  |  108  |  16  ----------------------------<  127<H>  3.4<L>   |  31  |  0.52    Ca    8.6      09 Feb 2024 06:17  Phos  4.6     02-09  Mg     2.2     02-09    TPro  6.8  /  Alb  1.9<L>  /  TBili  0.3  /  DBili  x   /  AST  13  /  ALT  12  /  AlkPhos  69  02-09      EKG:   IMGAGING:    ASSESSMENT:  HPI:  HPI: 47yo M pt with PMHx CVA (3/30/2022 with residual aphasia and right sided residual weakness), seizures, HTN, HLD, CHF w/ ICD (initially rEF 30%->55-60% on 03/2021), MDD, Recurrent PE, Afib (on Eliquis), obesity s/p bariatric intervention  presenting to ED for PEG tube malfunction. Patient unsure how peg tube was dislodged but denies any abdominal pain. Patient reports no fevers, chills, headaches, dizziness, chest pain, cough, sob, abd pain, n/v, diarrhea, constipation hematuria, dysuria.     In ED VS: T 98.9, HR 90, /75, RR 19, Spo2 100% 4LNC  Peg tube unable to be replaced in ED, will admit for repeat PEG placement          PAST MEDICAL & SURGICAL HISTORY: CHF, CAD, HTN, TBI, Trach and PEG, Etoh use Dx     SOCIAL HX:  No current smoking, etoh, or drug use.     FAMILY HISTORY:  No pertinent family history in first degree relatives    :  No known cardiovascular family history     ROS:  See HPI     Allergies    shellfish (Unknown)  No Known Drug Allergies    Intolerances          PHYSICAL EXAM    ICU Vital Signs Last 24 Hrs  T(C): 37.1 (08 Feb 2024 07:32), Max: 37.2 (08 Feb 2024 02:39)  T(F): 98.7 (08 Feb 2024 07:32), Max: 98.9 (08 Feb 2024 02:39)  HR: 81 (08 Feb 2024 07:32) (81 - 90)  BP: 114/74 (08 Feb 2024 07:32) (112/75 - 114/74)  BP(mean): --  ABP: --  ABP(mean): --  RR: 18 (08 Feb 2024 07:32) (18 - 19)  SpO2: 93% (08 Feb 2024 07:32) (93% - 95%)    O2 Parameters below as of 08 Feb 2024 07:32  Patient On (Oxygen Delivery Method): tracheostomy collar  O2 Flow (L/min): 4          General: middle aged male lying in bed in no visible respiratory distress  HEENT:  left skull deformity    Lymphatic system: No LN  Lungs: Bilateral BS  Cardiovascular: Regular  Gastrointestinal: PEG Tube stoma site site c/d/i   Musculoskeletal: No clubbing.  Moves all extremities.    Skin: Warm.  Intact  Neurological: No motor or sensory deficit         LABS:                          10.2   6.85  )-----------( 369      ( 08 Feb 2024 08:55 )             32.2                                               02-08    141  |  105  |  16  ----------------------------<  118<H>  3.7   |  33<H>  |  0.55    Ca    8.7      08 Feb 2024 08:55    TPro  7.7  /  Alb  2.1<L>  /  TBili  0.3  /  DBili  x   /  AST  18  /  ALT  11  /  AlkPhos  75  02-08          PLAN:   - Zyprexa 2.5 mg IM x 1 dose PRN for agitation.    FOLLOW UP / RESULT:  - Re-evaluate patient condition,   - Maintain safety and comfort measures,  - Continue supportive care.

## 2024-02-10 NOTE — PROGRESS NOTE ADULT - PROBLEM SELECTOR PLAN 4
Controlled  C/w Metoprolol IV form   Hold Lisinopril (No NGT 2/2 risk for bleeding on insertion)  No PEG  May require other IV agents if BP not well controlled.

## 2024-02-10 NOTE — PROGRESS NOTE ADULT - SUBJECTIVE AND OBJECTIVE BOX
DANNIE SHIRLEY    SCU progress note    INTERVAL HPI/OVERNIGHT EVENTS: ***    DNR [ ]   DNI  [  ]    Covid - 19 PCR: SARS-CoV-2: Lisatec (08 Feb 2024 16:00)      The 4Ms    What Matters Most: see GOC  Age appropriate Medications/Screen for High Risk Medication: Yes  Mentation: see CAM below  Mobility: defer to physical exam    The Confusion Assessment Method (CAM) Diagnostic Algorithm     1: Acute Onset or Fluctuating Course  - Is there evidence of an acute change in mental status from the patient’s baseline? Did the (abnormal) behavior  fluctuate during the day, that is, tend to come and go, or increase and decrease in severity?  [ ] YES [ ] NO     2: Inattention  - Did the patient have difficulty focusing attention, being easily distractible, or having difficulty keeping track of what was being said?  [ ] YES [ ] NO     3: Disorganized thinking  -Was the patient’s thinking disorganized or incoherent, such as rambling or irrelevant conversation, unclear or illogical flow of ideas, or unpredictable switching from subject to subject?  [ ] YES [ ] NO    4: Altered Level of consciousness?  [ ] YES [ ] NO    The diagnosis of delirium by CAM requires the presence of features 1 and 2 and either 3 or 4.    PRESSORS: [ ] YES [ ] NO    Cardiovascular:  Heart Failure  Acute   Acute on Chronic  Chronic       metoprolol tartrate Injectable 5 milliGRAM(s) IV Push every 12 hours    Pulmonary:  albuterol    0.083% 2.5 milliGRAM(s) Nebulizer every 6 hours    Hematologic:    Other:  albumin human 25% IVPB 50 milliLiter(s) IV Intermittent every 6 hours  chlorhexidine 2% Cloths 1 Application(s) Topical <User Schedule>  dextrose 5% + lactated ringers. 1000 milliLiter(s) IV Continuous <Continuous>  dextrose 5% + lactated ringers. 1000 milliLiter(s) IV Continuous <Continuous>  lacosamide IVPB 200 milliGRAM(s) IV Intermittent <User Schedule>  levETIRAcetam  IVPB 2000 milliGRAM(s) IV Intermittent <User Schedule>  morphine  - Injectable 1 milliGRAM(s) IV Push every 6 hours PRN  mupirocin 2% Ointment 1 Application(s) Both Nostrils two times a day  valproate sodium  IVPB 1250 milliGRAM(s) IV Intermittent <User Schedule>    albumin human 25% IVPB 50 milliLiter(s) IV Intermittent every 6 hours  albuterol    0.083% 2.5 milliGRAM(s) Nebulizer every 6 hours  chlorhexidine 2% Cloths 1 Application(s) Topical <User Schedule>  dextrose 5% + lactated ringers. 1000 milliLiter(s) IV Continuous <Continuous>  dextrose 5% + lactated ringers. 1000 milliLiter(s) IV Continuous <Continuous>  lacosamide IVPB 200 milliGRAM(s) IV Intermittent <User Schedule>  levETIRAcetam  IVPB 2000 milliGRAM(s) IV Intermittent <User Schedule>  metoprolol tartrate Injectable 5 milliGRAM(s) IV Push every 12 hours  morphine  - Injectable 1 milliGRAM(s) IV Push every 6 hours PRN  mupirocin 2% Ointment 1 Application(s) Both Nostrils two times a day  valproate sodium  IVPB 1250 milliGRAM(s) IV Intermittent <User Schedule>    Drug Dosing Weight    Weight (kg): 81.7 (08 Feb 2024 10:00)    CENTRAL LINE: [ ] YES [ ] NO  LOCATION:   DATE INSERTED:  REMOVE: [ ] YES [ ] NO  EXPLAIN:    FAULKNER: [ ] YES [ ] NO    DATE INSERTED:  REMOVE:  [ ] YES [ ] NO  EXPLAIN:    PAST MEDICAL & SURGICAL HISTORY:  Heart failure, unspecified HF chronicity, unspecified heart failure type      ETOH abuse  h/o etoh abuse now moderate drinker      Artificial pacemaker                        PHYSICAL EXAM:        LABS:  CBC Full  -  ( 09 Feb 2024 06:17 )  WBC Count : 4.84 K/uL  RBC Count : 2.95 M/uL  Hemoglobin : 9.5 g/dL  Hematocrit : 29.5 %  Platelet Count - Automated : 336 K/uL  Mean Cell Volume : 100.0 fl  Mean Cell Hemoglobin : 32.2 pg  Mean Cell Hemoglobin Concentration : 32.2 gm/dL  Auto Neutrophil # : x  Auto Lymphocyte # : x  Auto Monocyte # : x  Auto Eosinophil # : x  Auto Basophil # : x  Auto Neutrophil % : x  Auto Lymphocyte % : x  Auto Monocyte % : x  Auto Eosinophil % : x  Auto Basophil % : x    02-09    144  |  108  |  16  ----------------------------<  127<H>  3.4<L>   |  31  |  0.52    Ca    8.6      09 Feb 2024 06:17  Phos  4.6     02-09  Mg     2.2     02-09    TPro  6.8  /  Alb  1.9<L>  /  TBili  0.3  /  DBili  x   /  AST  13  /  ALT  12  /  AlkPhos  69  02-09    PT/INR - ( 09 Feb 2024 06:17 )   PT: 13.9 sec;   INR: 1.23 ratio         PTT - ( 09 Feb 2024 06:17 )  PTT:35.6 sec  Urinalysis Basic - ( 09 Feb 2024 06:17 )    Color: x / Appearance: x / SG: x / pH: x  Gluc: 127 mg/dL / Ketone: x  / Bili: x / Urobili: x   Blood: x / Protein: x / Nitrite: x   Leuk Esterase: x / RBC: x / WBC x   Sq Epi: x / Non Sq Epi: x / Bacteria: x            [  ]  DVT Prophylaxis  [  ]   Abnormal Nutritional Status -  Malnutrition   Cachexia      Morbid Obesity BMI >/=40  Diet, NPO (02-08-24 @ 12:25) [Active]          RADIOLOGY & ADDITIONAL STUDIES:  ***    Goals of Care Discussion with Family/Proxy/Other   - see note from/family meeting set up for...     DANNIE SHIRLEY    SCU progress note    INTERVAL HPI/OVERNIGHT EVENTS: ***S/p Zyprexa early this morning for agitation, attempting OOB.   Pt seen and examined this morning. Pt awake/alert, able to speak few words at a time, endorses feeling okay. NAD.  Reinforced safety precautions to pt. Pt agrees. Continue  safety measures.     Full code.     Covid - 19 PCR: SARS-CoV-2: NotDetec (08 Feb 2024 16:00)      The 4Ms    What Matters Most: see GOC  Age appropriate Medications/Screen for High Risk Medication: Yes  Mentation: see CAM below  Mobility: defer to physical exam    The Confusion Assessment Method (CAM) Diagnostic Algorithm     1: Acute Onset or Fluctuating Course  - Is there evidence of an acute change in mental status from the patient’s baseline? Did the (abnormal) behavior  fluctuate during the day, that is, tend to come and go, or increase and decrease in severity?  [ ] YES [ x] NO     2: Inattention  - Did the patient have difficulty focusing attention, being easily distractible, or having difficulty keeping track of what was being said?  [ ] YES x[ ] NO       3: Disorganized thinking  -Was the patient’s thinking disorganized or incoherent, such as rambling or irrelevant conversation, unclear or illogical flow of ideas, or unpredictable switching from subject to subject?  [ ] YES [x ] NO    4: Altered Level of consciousness?  [ ] YES [ x] NO    The diagnosis of delirium by CAM requires the presence of features 1 and 2 and either 3 or 4.    PRESSORS: [ ] YES [ x] NO    Cardiovascular:  Heart Failure  Acute   Acute on Chronic  Chronic       metoprolol tartrate Injectable 5 milliGRAM(s) IV Push every 12 hours    Pulmonary:  albuterol    0.083% 2.5 milliGRAM(s) Nebulizer every 6 hours    Hematologic:    Other:  albumin human 25% IVPB 50 milliLiter(s) IV Intermittent every 6 hours  chlorhexidine 2% Cloths 1 Application(s) Topical <User Schedule>  dextrose 5% + lactated ringers. 1000 milliLiter(s) IV Continuous <Continuous>  dextrose 5% + lactated ringers. 1000 milliLiter(s) IV Continuous <Continuous>  lacosamide IVPB 200 milliGRAM(s) IV Intermittent <User Schedule>  levETIRAcetam  IVPB 2000 milliGRAM(s) IV Intermittent <User Schedule>  morphine  - Injectable 1 milliGRAM(s) IV Push every 6 hours PRN  mupirocin 2% Ointment 1 Application(s) Both Nostrils two times a day  valproate sodium  IVPB 1250 milliGRAM(s) IV Intermittent <User Schedule>    albumin human 25% IVPB 50 milliLiter(s) IV Intermittent every 6 hours  albuterol    0.083% 2.5 milliGRAM(s) Nebulizer every 6 hours  chlorhexidine 2% Cloths 1 Application(s) Topical <User Schedule>  dextrose 5% + lactated ringers. 1000 milliLiter(s) IV Continuous <Continuous>  dextrose 5% + lactated ringers. 1000 milliLiter(s) IV Continuous <Continuous>  lacosamide IVPB 200 milliGRAM(s) IV Intermittent <User Schedule>  levETIRAcetam  IVPB 2000 milliGRAM(s) IV Intermittent <User Schedule>  metoprolol tartrate Injectable 5 milliGRAM(s) IV Push every 12 hours  morphine  - Injectable 1 milliGRAM(s) IV Push every 6 hours PRN  mupirocin 2% Ointment 1 Application(s) Both Nostrils two times a day  valproate sodium  IVPB 1250 milliGRAM(s) IV Intermittent <User Schedule>    Drug Dosing Weight    Weight (kg): 81.7 (08 Feb 2024 10:00)    CENTRAL LINE: [ ] YES [x ] NO  LOCATION:   DATE INSERTED:  REMOVE: [ ] YES [ ] NO  EXPLAIN:    FAULKNER: [ ] YES [x ] NO    DATE INSERTED:  REMOVE:  [ ] YES [ ] NO  EXPLAIN:    PAST MEDICAL & SURGICAL HISTORY:  Heart failure, unspecified HF chronicity, unspecified heart failure type      ETOH abuse  h/o etoh abuse now moderate drinker  Artificial pacemaker    PHYSICAL EXAM:  GENERAL: NAD  HEAD:  Left skull deformity.   EYES: EOMI, PERRLA, conjunctiva and sclera clear  ENMT: No tonsillar erythema, exudates, or enlargement; Moist mucous membranes, Good dentition, No lesions  NECK: Supple, No JVD,Tracheostomy.   NERVOUS SYSTEM:  Alert & Oriented to self and place. Speaks few words. Follows few commands.  Motor Strength 5/5 left side extremities. Flaccid RUE, RLE.   CHEST/LUNG:Tracheostomy . Clear breath sounds. No rales Clear to percussion bilaterally; No rales, rhonchi, wheezing, or rubs  HEART: Regular rate and rhythm; No murmurs, rubs, or gallops  ABDOMEN: Soft, Nontender, Nondistended; Bowel sounds present. Dsg dry/intact.   EXTREMITIES:  2+ Peripheral Pulses, No clubbing, cyanosis, or edema  LYMPH: No lymphadenopathy noted  SKIN: Stage 4 sacral ulcer.         LABS:           CBC Full  -  ( 09 Feb 2024 06:17 )  WBC Count : 4.84 K/uL  RBC Count : 2.95 M/uL  Hemoglobin : 9.5 g/dL  Hematocrit : 29.5 %  Platelet Count - Automated : 336 K/uL  Mean Cell Volume : 100.0 fl  Mean Cell Hemoglobin : 32.2 pg  Mean Cell Hemoglobin Concentration : 32.2 gm/dL  Auto Neutrophil # : x  Auto Lymphocyte # : x  Auto Monocyte # : x  Auto Eosinophil # : x  Auto Basophil # : x  Auto Neutrophil % : x  Auto Lymphocyte % : x  Auto Monocyte % : x  Auto Eosinophil % : x  Auto Basophil % : x    02-09    144  |  108  |  16  ----------------------------<  127<H>  3.4<L>   |  31  |  0.52    Ca    8.6      09 Feb 2024 06:17  Phos  4.6     02-09  Mg     2.2     02-09    TPro  6.8  /  Alb  1.9<L>  /  TBili  0.3  /  DBili  x   /  AST  13  /  ALT  12  /  AlkPhos  69  02-09    PT/INR - ( 09 Feb 2024 06:17 )   PT: 13.9 sec;   INR: 1.23 ratio         PTT - ( 09 Feb 2024 06:17 )  PTT:35.6 sec  Urinalysis Basic - ( 09 Feb 2024 06:17 )    Color: x / Appearance: x / SG: x / pH: x  Gluc: 127 mg/dL / Ketone: x  / Bili: x / Urobili: x   Blood: x / Protein: x / Nitrite: x   Leuk Esterase: x / RBC: x / WBC x   Sq Epi: x / Non Sq Epi: x / Bacteria: x            [  ]  DVT Prophylaxis  [  ]   Abnormal Nutritional Status -  Malnutrition   Cachexia        Diet, NPO (02-08-24 @ 12:25) [Active]      RADIOLOGY & ADDITIONAL STUDIES:  ***    < from: Xray Chest 1 View- PORTABLE-Urgent (Xray Chest 1 View- PORTABLE-Urgent .) (02.08.24 @ 10:17) >  IMPRESSION: Tracheostomy and pacer as before. No focal infiltrate. Some   elevation of the right hemidiaphragm. Heart is at the upper limits of   normal in its transthoracic diameter. Regional osseous structures   appropriate for age    < end of copied text >      Goals of Care Discussion with Family/Proxy/Other   - see note from/family meeting set up for...

## 2024-02-10 NOTE — CHART NOTE - NSCHARTNOTEFT_GEN_A_CORE
Notified by RN of pt pulled out NGT.   C/w IVF .     Adamaris Vargas NP Medicine Notified by RN of pt pulled out NGT, and trying to get OOB.   Zyprexa 2.5mg IM q12h prn agitation.   C/w IVF .     Adamaris Vargas NP Medicine

## 2024-02-10 NOTE — DIETITIAN INITIAL EVALUATION ADULT - OTHER INFO
Patient with bleeding from nose during NGT insertion in ED,  PEG replacement 2/12. Hold eliquis 2/9 ( last dose ) noted. Pt seen, on IVF. Discussed w/ NP.  PMH: CVA, seizures, HTN, HLD, CHF w/ ICD, MDD, Recurrent PE, Afib, PSH: Bariatric surgery noted. Stage 4 sacral ulcer noted. Pt noted on 1000mg Vitamin C and MVI 1/ day via GT. Pt pulled out trach1/22 (ED sheet, wt 79.2 kg). Noted w/ PCM (mild degree) PTA

## 2024-02-10 NOTE — PROGRESS NOTE ADULT - PROBLEM SELECTOR PLAN 5
Hx of recurrent PE  AC on hold 2/2 bleeding  Monitor CBC   Continue supplemental oxygen .   Tolerating 4L hydrotrache   Monitor oxygenation.

## 2024-02-10 NOTE — DIETITIAN INITIAL EVALUATION ADULT - PERTINENT MEDS FT
MEDICATIONS  (STANDING):  albumin human 25% IVPB 50 milliLiter(s) IV Intermittent every 6 hours  albuterol    0.083% 2.5 milliGRAM(s) Nebulizer every 6 hours  chlorhexidine 2% Cloths 1 Application(s) Topical <User Schedule>  dextrose 5% + lactated ringers. 1000 milliLiter(s) (75 mL/Hr) IV Continuous <Continuous>  dextrose 5% + lactated ringers. 1000 milliLiter(s) (75 mL/Hr) IV Continuous <Continuous>  lacosamide IVPB 200 milliGRAM(s) IV Intermittent <User Schedule>  levETIRAcetam  IVPB 2000 milliGRAM(s) IV Intermittent <User Schedule>  metoprolol tartrate Injectable 5 milliGRAM(s) IV Push every 12 hours  mupirocin 2% Ointment 1 Application(s) Both Nostrils two times a day  valproate sodium  IVPB 1250 milliGRAM(s) IV Intermittent <User Schedule>    MEDICATIONS  (PRN):  morphine  - Injectable 1 milliGRAM(s) IV Push every 6 hours PRN Severe Pain (7 - 10)

## 2024-02-10 NOTE — PROGRESS NOTE ADULT - PROBLEM SELECTOR PLAN 9
OR on Monday for PEG placement   AC on hold   IVF  Trach care  Sputum cx F/u  Contact Isolation for possible MDRO  Dispo: return to [facility] after PEG placement

## 2024-02-10 NOTE — PROGRESS NOTE ADULT - NS ATTEND AMEND GEN_ALL_CORE FT
Problem/Plan - 1:  ·  Problem: PEG tube malfunction.   ·  Plan: PEG tube dislodged   s/p attempt for NGT insertion on 2/8 aborted after bleeding noted in setting of AC.   Plan for OR on Monday for new PEG insertion  AC on hold (since 2/8).     Problem/Plan - 2:  ·  Problem: CVA (cerebrovascular accident).   ·  Plan: #Hx of TBI  s/p trach and peg  Safety precautions.     Problem/Plan - 3:  ·  Problem: Seizures.   ·  Plan: Hx of TBI and seizures   c/w home meds Vimpat, valproic acid, and Keppra IV form  Seizure precautions.     Problem/Plan - 4:  ·  Problem: HTN (hypertension).   ·  Plan: Controlled  C/w Metoprolol IV form   Hold Lisinopril (No NGT 2/2 risk for bleeding on insertion)  No PEG  May require other IV agents if BP not well controlled.     Problem/Plan - 5:  ·  Problem: Pulmonary embolism.   ·  Plan: Hx of recurrent PE  AC on hold 2/2 bleeding  Monitor CBC   Continue supplemental oxygen .   Tolerating 4L hydrotrache   Monitor oxygenation.     Problem/Plan - 6:  ·  Problem: CHF (congestive heart failure).   ·  Plan: Hx of CHF w/ ICD (initially rEF 30%->55-60% on 03/2021), MDD.   No signs of overload  CXR noted.

## 2024-02-11 LAB
ANION GAP SERPL CALC-SCNC: 4 MMOL/L — LOW (ref 5–17)
BUN SERPL-MCNC: 9 MG/DL — SIGNIFICANT CHANGE UP (ref 7–18)
CALCIUM SERPL-MCNC: 8.9 MG/DL — SIGNIFICANT CHANGE UP (ref 8.4–10.5)
CHLORIDE SERPL-SCNC: 108 MMOL/L — SIGNIFICANT CHANGE UP (ref 96–108)
CO2 SERPL-SCNC: 30 MMOL/L — SIGNIFICANT CHANGE UP (ref 22–31)
CREAT SERPL-MCNC: 0.5 MG/DL — SIGNIFICANT CHANGE UP (ref 0.5–1.3)
EGFR: 125 ML/MIN/1.73M2 — SIGNIFICANT CHANGE UP
GLUCOSE BLDC GLUCOMTR-MCNC: 104 MG/DL — HIGH (ref 70–99)
GLUCOSE BLDC GLUCOMTR-MCNC: 115 MG/DL — HIGH (ref 70–99)
GLUCOSE BLDC GLUCOMTR-MCNC: 96 MG/DL — SIGNIFICANT CHANGE UP (ref 70–99)
GLUCOSE BLDC GLUCOMTR-MCNC: 96 MG/DL — SIGNIFICANT CHANGE UP (ref 70–99)
GLUCOSE SERPL-MCNC: 100 MG/DL — HIGH (ref 70–99)
HCT VFR BLD CALC: 33.7 % — LOW (ref 39–50)
HGB BLD-MCNC: 10.5 G/DL — LOW (ref 13–17)
MAGNESIUM SERPL-MCNC: 2.2 MG/DL — SIGNIFICANT CHANGE UP (ref 1.6–2.6)
MCHC RBC-ENTMCNC: 31.2 GM/DL — LOW (ref 32–36)
MCHC RBC-ENTMCNC: 31.7 PG — SIGNIFICANT CHANGE UP (ref 27–34)
MCV RBC AUTO: 101.8 FL — HIGH (ref 80–100)
NRBC # BLD: 0 /100 WBCS — SIGNIFICANT CHANGE UP (ref 0–0)
PLATELET # BLD AUTO: 308 K/UL — SIGNIFICANT CHANGE UP (ref 150–400)
POTASSIUM SERPL-MCNC: 3.4 MMOL/L — LOW (ref 3.5–5.3)
POTASSIUM SERPL-MCNC: 4 MMOL/L — SIGNIFICANT CHANGE UP (ref 3.5–5.3)
POTASSIUM SERPL-SCNC: 3.4 MMOL/L — LOW (ref 3.5–5.3)
POTASSIUM SERPL-SCNC: 4 MMOL/L — SIGNIFICANT CHANGE UP (ref 3.5–5.3)
RBC # BLD: 3.31 M/UL — LOW (ref 4.2–5.8)
RBC # FLD: 13 % — SIGNIFICANT CHANGE UP (ref 10.3–14.5)
SODIUM SERPL-SCNC: 142 MMOL/L — SIGNIFICANT CHANGE UP (ref 135–145)
WBC # BLD: 3.96 K/UL — SIGNIFICANT CHANGE UP (ref 3.8–10.5)
WBC # FLD AUTO: 3.96 K/UL — SIGNIFICANT CHANGE UP (ref 3.8–10.5)

## 2024-02-11 RX ORDER — POTASSIUM CHLORIDE 20 MEQ
10 PACKET (EA) ORAL
Refills: 0 | Status: COMPLETED | OUTPATIENT
Start: 2024-02-11 | End: 2024-02-11

## 2024-02-11 RX ADMIN — Medication 5 MILLIGRAM(S): at 05:35

## 2024-02-11 RX ADMIN — LACOSAMIDE 140 MILLIGRAM(S): 50 TABLET ORAL at 10:39

## 2024-02-11 RX ADMIN — Medication 62.5 MILLIGRAM(S): at 08:50

## 2024-02-11 RX ADMIN — ALBUTEROL 2.5 MILLIGRAM(S): 90 AEROSOL, METERED ORAL at 16:21

## 2024-02-11 RX ADMIN — LACOSAMIDE 140 MILLIGRAM(S): 50 TABLET ORAL at 22:55

## 2024-02-11 RX ADMIN — Medication 62.5 MILLIGRAM(S): at 13:51

## 2024-02-11 RX ADMIN — Medication 62.5 MILLIGRAM(S): at 20:47

## 2024-02-11 RX ADMIN — CHLORHEXIDINE GLUCONATE 1 APPLICATION(S): 213 SOLUTION TOPICAL at 05:36

## 2024-02-11 RX ADMIN — MUPIROCIN 1 APPLICATION(S): 20 OINTMENT TOPICAL at 05:35

## 2024-02-11 RX ADMIN — ALBUTEROL 2.5 MILLIGRAM(S): 90 AEROSOL, METERED ORAL at 03:18

## 2024-02-11 RX ADMIN — MUPIROCIN 1 APPLICATION(S): 20 OINTMENT TOPICAL at 18:23

## 2024-02-11 RX ADMIN — ALBUTEROL 2.5 MILLIGRAM(S): 90 AEROSOL, METERED ORAL at 20:06

## 2024-02-11 RX ADMIN — ALBUTEROL 2.5 MILLIGRAM(S): 90 AEROSOL, METERED ORAL at 09:00

## 2024-02-11 RX ADMIN — Medication 100 MILLIEQUIVALENT(S): at 04:34

## 2024-02-11 RX ADMIN — LEVETIRACETAM 600 MILLIGRAM(S): 250 TABLET, FILM COATED ORAL at 22:32

## 2024-02-11 RX ADMIN — Medication 100 MILLIEQUIVALENT(S): at 03:24

## 2024-02-11 RX ADMIN — Medication 5 MILLIGRAM(S): at 17:32

## 2024-02-11 RX ADMIN — LEVETIRACETAM 600 MILLIGRAM(S): 250 TABLET, FILM COATED ORAL at 11:13

## 2024-02-11 RX ADMIN — Medication 100 MILLIEQUIVALENT(S): at 05:46

## 2024-02-11 NOTE — PROGRESS NOTE ADULT - PROBLEM SELECTOR PLAN 1
PEG tube dislodged   s/p attempt for NGT insertion on 2/8 aborted after bleeding noted in setting of AC.   AC on hold (since 2/8)  NGT placed 2/10 which pt pulled out.   New PEG placement Monday, 2/12

## 2024-02-11 NOTE — PROGRESS NOTE ADULT - PROBLEM SELECTOR PLAN 4
Controlled  C/w Metoprolol IV form   Hold Lisinopril in setting of no enteral route.   May require other IV agents if BP not well controlled.

## 2024-02-11 NOTE — PROGRESS NOTE ADULT - PROBLEM SELECTOR PLAN 9
OR on Monday for PEG placement   AC on hold   IVF  Trach care  Contact Isolation for possible MDRO  Dispo: return to [facility] after PEG placement

## 2024-02-11 NOTE — PROGRESS NOTE ADULT - SUBJECTIVE AND OBJECTIVE BOX
DANNIE SHIRLEY    SCU progress note    INTERVAL HPI/OVERNIGHT EVENTS: ***    DNR [ ]   DNI  [  ]    Covid - 19 PCR: SARS-CoV-2: Lisatec (08 Feb 2024 16:00)      The 4Ms    What Matters Most: see GOC  Age appropriate Medications/Screen for High Risk Medication: Yes  Mentation: see CAM below  Mobility: defer to physical exam    The Confusion Assessment Method (CAM) Diagnostic Algorithm     1: Acute Onset or Fluctuating Course  - Is there evidence of an acute change in mental status from the patient’s baseline? Did the (abnormal) behavior  fluctuate during the day, that is, tend to come and go, or increase and decrease in severity?  [ ] YES [ ] NO     2: Inattention  - Did the patient have difficulty focusing attention, being easily distractible, or having difficulty keeping track of what was being said?  [ ] YES [ ] NO     3: Disorganized thinking  -Was the patient’s thinking disorganized or incoherent, such as rambling or irrelevant conversation, unclear or illogical flow of ideas, or unpredictable switching from subject to subject?  [ ] YES [ ] NO    4: Altered Level of consciousness?  [ ] YES [ ] NO    The diagnosis of delirium by CAM requires the presence of features 1 and 2 and either 3 or 4.    PRESSORS: [ ] YES [ ] NO    Cardiovascular:  Heart Failure  Acute   Acute on Chronic  Chronic       metoprolol tartrate Injectable 5 milliGRAM(s) IV Push every 12 hours    Pulmonary:  albuterol    0.083% 2.5 milliGRAM(s) Nebulizer every 6 hours    Hematologic:    Other:  chlorhexidine 2% Cloths 1 Application(s) Topical <User Schedule>  dextrose 5% + lactated ringers. 1000 milliLiter(s) IV Continuous <Continuous>  dextrose 5% + lactated ringers. 1000 milliLiter(s) IV Continuous <Continuous>  lacosamide IVPB 200 milliGRAM(s) IV Intermittent <User Schedule>  levETIRAcetam  IVPB 2000 milliGRAM(s) IV Intermittent <User Schedule>  morphine  - Injectable 1 milliGRAM(s) IV Push every 6 hours PRN  mupirocin 2% Ointment 1 Application(s) Both Nostrils two times a day  valproate sodium  IVPB 1250 milliGRAM(s) IV Intermittent <User Schedule>    albuterol    0.083% 2.5 milliGRAM(s) Nebulizer every 6 hours  chlorhexidine 2% Cloths 1 Application(s) Topical <User Schedule>  dextrose 5% + lactated ringers. 1000 milliLiter(s) IV Continuous <Continuous>  dextrose 5% + lactated ringers. 1000 milliLiter(s) IV Continuous <Continuous>  lacosamide IVPB 200 milliGRAM(s) IV Intermittent <User Schedule>  levETIRAcetam  IVPB 2000 milliGRAM(s) IV Intermittent <User Schedule>  metoprolol tartrate Injectable 5 milliGRAM(s) IV Push every 12 hours  morphine  - Injectable 1 milliGRAM(s) IV Push every 6 hours PRN  mupirocin 2% Ointment 1 Application(s) Both Nostrils two times a day  valproate sodium  IVPB 1250 milliGRAM(s) IV Intermittent <User Schedule>    Drug Dosing Weight    Weight (kg): 81.7 (08 Feb 2024 10:00)    CENTRAL LINE: [ ] YES [ ] NO  LOCATION:   DATE INSERTED:  REMOVE: [ ] YES [ ] NO  EXPLAIN:    FAULKNER: [ ] YES [ ] NO    DATE INSERTED:  REMOVE:  [ ] YES [ ] NO  EXPLAIN:    PAST MEDICAL & SURGICAL HISTORY:  Heart failure, unspecified HF chronicity, unspecified heart failure type      ETOH abuse  h/o etoh abuse now moderate drinker      Artificial pacemaker                        PHYSICAL EXAM:        LABS:  CBC Full  -  ( 11 Feb 2024 06:47 )  WBC Count : 3.96 K/uL  RBC Count : 3.31 M/uL  Hemoglobin : 10.5 g/dL  Hematocrit : 33.7 %  Platelet Count - Automated : 308 K/uL  Mean Cell Volume : 101.8 fl  Mean Cell Hemoglobin : 31.7 pg  Mean Cell Hemoglobin Concentration : 31.2 gm/dL  Auto Neutrophil # : x  Auto Lymphocyte # : x  Auto Monocyte # : x  Auto Eosinophil # : x  Auto Basophil # : x  Auto Neutrophil % : x  Auto Lymphocyte % : x  Auto Monocyte % : x  Auto Eosinophil % : x  Auto Basophil % : x    02-11    142  |  108  |  9   ----------------------------<  100<H>  4.0   |  30  |  0.50    Ca    8.9      11 Feb 2024 06:47  Mg     2.2     02-11        Urinalysis Basic - ( 11 Feb 2024 06:47 )    Color: x / Appearance: x / SG: x / pH: x  Gluc: 100 mg/dL / Ketone: x  / Bili: x / Urobili: x   Blood: x / Protein: x / Nitrite: x   Leuk Esterase: x / RBC: x / WBC x   Sq Epi: x / Non Sq Epi: x / Bacteria: x            [  ]  DVT Prophylaxis  [  ]   Abnormal Nutritional Status -  Malnutrition   Cachexia      Morbid Obesity BMI >/=40  Diet, NPO (02-08-24 @ 12:25) [Active]          RADIOLOGY & ADDITIONAL STUDIES:  ***    Goals of Care Discussion with Family/Proxy/Other   - see note from/family meeting set up for...     DANNIE SHIRLEY    SCU progress note    INTERVAL HPI/OVERNIGHT EVENTS: ***Required Zyprexa overnight for agitation.  Pt seen and examined this morning. Pt awake/alert, speaking "No, no " and gesturing to abd when I informed him of PEG placement for tomorrow.  Per GI notes: pt sister to give consent.     Full code.     Covid - 19 PCR: SARS-CoV-2: NotDetec (08 Feb 2024 16:00)      The 4Ms    What Matters Most: see GOC  Age appropriate Medications/Screen for High Risk Medication: Yes  Mentation: see CAM below  Mobility: defer to physical exam    The Confusion Assessment Method (CAM) Diagnostic Algorithm     1: Acute Onset or Fluctuating Course  - Is there evidence of an acute change in mental status from the patient’s baseline? Did the (abnormal) behavior  fluctuate during the day, that is, tend to come and go, or increase and decrease in severity?  [ ] YES [x ] NO     2: Inattention  - Did the patient have difficulty focusing attention, being easily distractible, or having difficulty keeping track of what was being said?  [ ] YES [ ] NO  Unable to assess (x)     3: Disorganized thinking  -Was the patient’s thinking disorganized or incoherent, such as rambling or irrelevant conversation, unclear or illogical flow of ideas, or unpredictable switching from subject to subject?  [ ] YES [ ] NO  Unable to assess (x)    4: Altered Level of consciousness?  [ ] YES [x ] NO    The diagnosis of delirium by CAM requires the presence of features 1 and 2 and either 3 or 4.    PRESSORS: [ ] YES [ x] NO    Cardiovascular:  Heart Failure  Acute   Acute on Chronic  Chronic       metoprolol tartrate Injectable 5 milliGRAM(s) IV Push every 12 hours    Pulmonary:  albuterol    0.083% 2.5 milliGRAM(s) Nebulizer every 6 hours    Hematologic:    Other:  chlorhexidine 2% Cloths 1 Application(s) Topical <User Schedule>  dextrose 5% + lactated ringers. 1000 milliLiter(s) IV Continuous <Continuous>  dextrose 5% + lactated ringers. 1000 milliLiter(s) IV Continuous <Continuous>  lacosamide IVPB 200 milliGRAM(s) IV Intermittent <User Schedule>  levETIRAcetam  IVPB 2000 milliGRAM(s) IV Intermittent <User Schedule>  morphine  - Injectable 1 milliGRAM(s) IV Push every 6 hours PRN  mupirocin 2% Ointment 1 Application(s) Both Nostrils two times a day  valproate sodium  IVPB 1250 milliGRAM(s) IV Intermittent <User Schedule>    albuterol    0.083% 2.5 milliGRAM(s) Nebulizer every 6 hours  chlorhexidine 2% Cloths 1 Application(s) Topical <User Schedule>  dextrose 5% + lactated ringers. 1000 milliLiter(s) IV Continuous <Continuous>  dextrose 5% + lactated ringers. 1000 milliLiter(s) IV Continuous <Continuous>  lacosamide IVPB 200 milliGRAM(s) IV Intermittent <User Schedule>  levETIRAcetam  IVPB 2000 milliGRAM(s) IV Intermittent <User Schedule>  metoprolol tartrate Injectable 5 milliGRAM(s) IV Push every 12 hours  morphine  - Injectable 1 milliGRAM(s) IV Push every 6 hours PRN  mupirocin 2% Ointment 1 Application(s) Both Nostrils two times a day  valproate sodium  IVPB 1250 milliGRAM(s) IV Intermittent <User Schedule>    Drug Dosing Weight    Weight (kg): 81.7 (08 Feb 2024 10:00)    CENTRAL LINE: [ ] YES [ x] NO  LOCATION:   DATE INSERTED:  REMOVE: [ ] YES [ ] NO  EXPLAIN:    FAULKNER: [ ] YES [x ] NO    DATE INSERTED:  REMOVE:  [ ] YES [ ] NO  EXPLAIN:    PAST MEDICAL & SURGICAL HISTORY:  Heart failure, unspecified HF chronicity, unspecified heart failure type      ETOH abuse  h/o etoh abuse now moderate drinker  Artificial pacemaker      PHYSICAL EXAM:  GENERAL: NAD  HEAD:  left skull deformity  EYES: EOMI, PERRLA, conjunctiva and sclera clear  ENMT: No tonsillar erythema, exudates  NECK: Supple, No JVD, Tracheostomy  NERVOUS SYSTEM: Awake/alert, oriented to self and place. Mouths few words. Follows few simple commands. Motor Strength 5/5 LUE and LLE. Flaccid right side extremities.   CHEST/LUNG:Trach to O2 4L hydrotrache.  Clear upper lung fields  bilaterally; No rales, rhonchi, wheezing, or rubs. Suctioning small  amt auf white  tracheal secretions.  HEART: Regular rate and rhythm; No murmurs, rubs, or gallops  ABDOMEN: Soft, Nontender, Nondistended; Bowel sounds present. Allevyn foam dsg C/D/I  EXTREMITIES:Muscle wasting BLE.   2+ Peripheral Pulses, No clubbing, cyanosis, or edema  LYMPH: No lymphadenopathy noted  SKIN: St IV Sacral ulcer         LABS:  CBC Full  -  ( 11 Feb 2024 06:47 )  WBC Count : 3.96 K/uL  RBC Count : 3.31 M/uL  Hemoglobin : 10.5 g/dL  Hematocrit : 33.7 %  Platelet Count - Automated : 308 K/uL  Mean Cell Volume : 101.8 fl  Mean Cell Hemoglobin : 31.7 pg  Mean Cell Hemoglobin Concentration : 31.2 gm/dL  Auto Neutrophil # : x  Auto Lymphocyte # : x  Auto Monocyte # : x  Auto Eosinophil # : x  Auto Basophil # : x  Auto Neutrophil % : x  Auto Lymphocyte % : x  Auto Monocyte % : x  Auto Eosinophil % : x  Auto Basophil % : x    02-11    142  |  108  |  9   ----------------------------<  100<H>  4.0   |  30  |  0.50    Ca    8.9      11 Feb 2024 06:47  Mg     2.2     02-11        Urinalysis Basic - ( 11 Feb 2024 06:47 )    Color: x / Appearance: x / SG: x / pH: x  Gluc: 100 mg/dL / Ketone: x  / Bili: x / Urobili: x   Blood: x / Protein: x / Nitrite: x   Leuk Esterase: x / RBC: x / WBC x   Sq Epi: x / Non Sq Epi: x / Bacteria: x        [  ]  DVT Prophylaxis  [  ]   Abnormal Nutritional Status -  Malnutrition   Cachexia        Diet, NPO (02-08-24 @ 12:25) [Active]    RADIOLOGY & ADDITIONAL STUDIES:  ***  < from: Xray Chest 1 View- PORTABLE-Urgent (Xray Chest 1 View- PORTABLE-Urgent .) (02.08.24 @ 10:17) >  COMPARISON: February 5, 2024    IMPRESSION: Tracheostomy and pacer as before. No focal infiltrate. Some   elevation of the right hemidiaphragm. Heart is at the upper limits of   normal in its transthoracic diameter. Regional osseous structures   appropriate for age    < end of copied text >      Goals of Care Discussion with Family/Proxy/Other   - see note from/family meeting set up for...

## 2024-02-11 NOTE — PROGRESS NOTE ADULT - PROBLEM SELECTOR PLAN 3
Hx of TBI and seizures   c/w home meds lacosamide, valproic acid, and Keppra IV form  Seizure precautions

## 2024-02-11 NOTE — PROGRESS NOTE ADULT - NS ATTEND AMEND GEN_ALL_CORE FT
Problem/Plan - 1:  ·  Problem: PEG tube malfunction.   ·  Plan: PEG tube dislodged   s/p attempt for NGT insertion on 2/8 aborted after bleeding noted in setting of AC.   AC on hold (since 2/8)  NGT placed 2/10 which pt pulled out.   New PEG placement Monday, 2/12.     Problem/Plan - 2:  ·  Problem: CVA (cerebrovascular accident).   ·  Plan: #Hx of TBI  s/p trach and peg  Continue lacosamide, keppra, valproate   Safety precautions.     Problem/Plan - 3:  ·  Problem: Seizures.   ·  Plan: Hx of TBI and seizures   c/w home meds lacosamide, valproic acid, and Keppra IV form  Seizure precautions.     Problem/Plan - 4:  ·  Problem: HTN (hypertension).   ·  Plan: Controlled  C/w Metoprolol IV form   Hold Lisinopril in setting of no enteral route.   May require other IV agents if BP not well controlled.     Problem/Plan - 5:  ·  Problem: Pulmonary embolism.   ·  Plan: Hx of recurrent PE  AC on hold 2/2 bleeding  Monitor CBC   Continue supplemental oxygen .   Tolerating 4L hydrotrache   Monitor oxygenation.     Problem/Plan - 6:  ·  Problem: CHF (congestive heart failure).   ·  Plan: Hx of CHF w/ ICD (initially rEF 30%->55-60% on 03/2021), MDD.   No signs of overload  CXR noted.     Problem/Plan - 7:  ·  Problem: Tracheostomy present.   ·  Plan: Trach care  Frequent suctioning  Copious amount of secretion   Sputum cx growing moderate Pseudomonas aeruginosa . Afebrile , no leukocytosis.   Contact isolation.

## 2024-02-11 NOTE — PROGRESS NOTE ADULT - ASSESSMENT
47yo M pt with PMHx CVA (3/30/2022 with residual aphasia and right sided residual weakness), seizures, HTN, HLD, CHF w/ ICD (initially rEF 30%->55-60% on 03/2021), MDD, Recurrent PE, Afib (on Eliquis), obesity s/p bariatric intervention presenting to ED for PEG tube malfunction admitted for further care. Per endorsement, PEG tube insertion attempted in ED without success. NG tube also attempted but due to bleeding the insertion was aborted and AC not restarted (home med). Patient was transferred to  for further care given that patient has a trach. Patient has copious amounts of secretion from trach requiring constant suctioning. Sputum cx sent. CXR noted. Afebrile. Concern for MDRO and therefore was placed on contact  isolation until cx comes back. GI consulted and examined patient at bedside. GI spoke with patient's proxy. Plan for OR on Monday for new PEG tube insertion.   Patient's seizure meds switched to IV form. BB IV. Monitor BP per unit protocol.   02/09: OR on Monday for PEG. Dispo is to return to [facility] once PEg placed,

## 2024-02-12 DIAGNOSIS — D63.8 ANEMIA IN OTHER CHRONIC DISEASES CLASSIFIED ELSEWHERE: ICD-10-CM

## 2024-02-12 DIAGNOSIS — G93.41 METABOLIC ENCEPHALOPATHY: ICD-10-CM

## 2024-02-12 DIAGNOSIS — J96.21 ACUTE AND CHRONIC RESPIRATORY FAILURE WITH HYPOXIA: ICD-10-CM

## 2024-02-12 LAB
ANION GAP SERPL CALC-SCNC: 3 MMOL/L — LOW (ref 5–17)
BUN SERPL-MCNC: 6 MG/DL — LOW (ref 7–18)
CALCIUM SERPL-MCNC: 8.5 MG/DL — SIGNIFICANT CHANGE UP (ref 8.4–10.5)
CHLORIDE SERPL-SCNC: 109 MMOL/L — HIGH (ref 96–108)
CO2 SERPL-SCNC: 30 MMOL/L — SIGNIFICANT CHANGE UP (ref 22–31)
CREAT SERPL-MCNC: 0.52 MG/DL — SIGNIFICANT CHANGE UP (ref 0.5–1.3)
CULTURE RESULTS: SIGNIFICANT CHANGE UP
EGFR: 124 ML/MIN/1.73M2 — SIGNIFICANT CHANGE UP
GLUCOSE BLDC GLUCOMTR-MCNC: 83 MG/DL — SIGNIFICANT CHANGE UP (ref 70–99)
GLUCOSE BLDC GLUCOMTR-MCNC: 89 MG/DL — SIGNIFICANT CHANGE UP (ref 70–99)
GLUCOSE SERPL-MCNC: 87 MG/DL — SIGNIFICANT CHANGE UP (ref 70–99)
HCT VFR BLD CALC: 30.1 % — LOW (ref 39–50)
HGB BLD-MCNC: 9.4 G/DL — LOW (ref 13–17)
MAGNESIUM SERPL-MCNC: 1.9 MG/DL — SIGNIFICANT CHANGE UP (ref 1.6–2.6)
MCHC RBC-ENTMCNC: 31.2 GM/DL — LOW (ref 32–36)
MCHC RBC-ENTMCNC: 32.2 PG — SIGNIFICANT CHANGE UP (ref 27–34)
MCV RBC AUTO: 103.1 FL — HIGH (ref 80–100)
NRBC # BLD: 0 /100 WBCS — SIGNIFICANT CHANGE UP (ref 0–0)
PHOSPHATE SERPL-MCNC: 3.3 MG/DL — SIGNIFICANT CHANGE UP (ref 2.5–4.5)
PLATELET # BLD AUTO: 307 K/UL — SIGNIFICANT CHANGE UP (ref 150–400)
POTASSIUM SERPL-MCNC: 3.6 MMOL/L — SIGNIFICANT CHANGE UP (ref 3.5–5.3)
POTASSIUM SERPL-SCNC: 3.6 MMOL/L — SIGNIFICANT CHANGE UP (ref 3.5–5.3)
RBC # BLD: 2.92 M/UL — LOW (ref 4.2–5.8)
RBC # FLD: 13 % — SIGNIFICANT CHANGE UP (ref 10.3–14.5)
SODIUM SERPL-SCNC: 142 MMOL/L — SIGNIFICANT CHANGE UP (ref 135–145)
SPECIMEN SOURCE: SIGNIFICANT CHANGE UP
WBC # BLD: 5.45 K/UL — SIGNIFICANT CHANGE UP (ref 3.8–10.5)
WBC # FLD AUTO: 5.45 K/UL — SIGNIFICANT CHANGE UP (ref 3.8–10.5)

## 2024-02-12 PROCEDURE — 88305 TISSUE EXAM BY PATHOLOGIST: CPT | Mod: 26

## 2024-02-12 PROCEDURE — 88312 SPECIAL STAINS GROUP 1: CPT | Mod: 26

## 2024-02-12 PROCEDURE — 99233 SBSQ HOSP IP/OBS HIGH 50: CPT

## 2024-02-12 RX ORDER — OLANZAPINE 15 MG/1
2.5 TABLET, FILM COATED ORAL EVERY 12 HOURS
Refills: 0 | Status: DISCONTINUED | OUTPATIENT
Start: 2024-02-12 | End: 2024-02-13

## 2024-02-12 RX ADMIN — Medication 62.5 MILLIGRAM(S): at 12:54

## 2024-02-12 RX ADMIN — ALBUTEROL 2.5 MILLIGRAM(S): 90 AEROSOL, METERED ORAL at 14:32

## 2024-02-12 RX ADMIN — Medication 5 MILLIGRAM(S): at 06:24

## 2024-02-12 RX ADMIN — MUPIROCIN 1 APPLICATION(S): 20 OINTMENT TOPICAL at 06:15

## 2024-02-12 RX ADMIN — LEVETIRACETAM 600 MILLIGRAM(S): 250 TABLET, FILM COATED ORAL at 10:51

## 2024-02-12 RX ADMIN — Medication 62.5 MILLIGRAM(S): at 06:15

## 2024-02-12 RX ADMIN — LEVETIRACETAM 600 MILLIGRAM(S): 250 TABLET, FILM COATED ORAL at 21:14

## 2024-02-12 RX ADMIN — OLANZAPINE 2.5 MILLIGRAM(S): 15 TABLET, FILM COATED ORAL at 21:21

## 2024-02-12 RX ADMIN — LACOSAMIDE 140 MILLIGRAM(S): 50 TABLET ORAL at 21:14

## 2024-02-12 RX ADMIN — MUPIROCIN 1 APPLICATION(S): 20 OINTMENT TOPICAL at 17:58

## 2024-02-12 RX ADMIN — ALBUTEROL 2.5 MILLIGRAM(S): 90 AEROSOL, METERED ORAL at 08:16

## 2024-02-12 RX ADMIN — LACOSAMIDE 140 MILLIGRAM(S): 50 TABLET ORAL at 10:51

## 2024-02-12 RX ADMIN — CHLORHEXIDINE GLUCONATE 1 APPLICATION(S): 213 SOLUTION TOPICAL at 06:24

## 2024-02-12 RX ADMIN — Medication 62.5 MILLIGRAM(S): at 21:14

## 2024-02-12 RX ADMIN — Medication 5 MILLIGRAM(S): at 17:58

## 2024-02-12 RX ADMIN — ALBUTEROL 2.5 MILLIGRAM(S): 90 AEROSOL, METERED ORAL at 20:52

## 2024-02-12 NOTE — PROGRESS NOTE ADULT - PROBLEM SELECTOR PLAN 1
Patient pulled out Peg tube at rehab.  Unable to place new Peg in ED.  Dr Filemon SIMON was unable to fine window in endoscopy this morning. IR reviewed old CT Abdomen and agrees.  Surgery called for consult.  S/P NGT insertion on 2/8 aborted after bleeding  AC placed on hold.  Nasal feeding tube placed 2/10 which patient pulled out.

## 2024-02-12 NOTE — CONSULT NOTE ADULT - NS ATTEND AMEND GEN_ALL_CORE FT
Impression: This is a 47 Y/O male from [facility] . Presenting to ED for PEG tube malfunction. For pulmonary evaluation due to Chronic Respiratory Failure , Has Trach . Has Chronic Pulmonary Embolism and off AC due to GI Bleed. Sputum cx growing moderate Pseudomonas aeruginosa .     Suggestion:  O2 saturation 98% and continue hydrotrach O2 supplement.  Contact isolation.  Oral, trach care, suction.  Not a candidate for decannulation at this time. Can have speaking valve as tolerated .   Aspiration precautions with HOB elevation.   On Albuterol via nebulization Q 6 Hours.

## 2024-02-12 NOTE — PROGRESS NOTE ADULT - ASSESSMENT
displaced peg, unable to be replaced vie endo or IR  hx bariatric Sx in past  hiatal hernia reported at endoscopy      Case discussed withDr Elmore, on call Gen Surgeon who will examine pt and determine if pt amenable to Open Gastrostomy tube placement  ivf  tube feeding as needed  will follow

## 2024-02-12 NOTE — PROGRESS NOTE ADULT - PROBLEM SELECTOR PLAN 2
Continue hydro-trach collar.    Monitor oxygen saturation.  Continue bronchodilators via nebulizer.  CXR shows elevated right hemidiaphragm. +AICD  Speech and swallow for [place] valve.

## 2024-02-12 NOTE — CONSULT NOTE ADULT - SUBJECTIVE AND OBJECTIVE BOX
Time of visit:    CHIEF COMPLAINT: Patient is a 48y old  Male who presents with a chief complaint of malfunction peg tube (11 Feb 2024 09:43)      HPI:  HPI: 49yo M pt with PMHx CVA (3/30/2022 with residual aphasia and right sided residual weakness), seizures, HTN, HLD, CHF w/ ICD (initially rEF 30%->55-60% on 03/2021), MDD, Recurrent PE, Afib (on Eliquis), obesity s/p bariatric intervention  presenting to ED for PEG tube malfunction. Patient unsure how peg tube was dislodged but denies any abdominal pain. Patient reports no fevers, chills, headaches, dizziness, chest pain, cough, sob, abd pain, n/v, diarrhea, constipation hematuria, dysuria.     In ED VS: T 98.9, HR 90, /75, RR 19, Spo2 100% 4LNC  Peg tube unable to be replaced in ED, will admit for repeat PEG placement          PAST MEDICAL & SURGICAL HISTORY: CHF, CAD, HTN, TBI, Trach and PEG, Etoh use Dx     SOCIAL HX:  No current smoking, etoh, or drug use.     FAMILY HISTORY:  No pertinent family history in first degree relatives    :  No known cardiovascular family history     ROS:  See HPI     Allergies    shellfish (Unknown)  No Known Drug Allergies    Intolerances          PHYSICAL EXAM    ICU Vital Signs Last 24 Hrs  T(C): 37.1 (08 Feb 2024 07:32), Max: 37.2 (08 Feb 2024 02:39)  T(F): 98.7 (08 Feb 2024 07:32), Max: 98.9 (08 Feb 2024 02:39)  HR: 81 (08 Feb 2024 07:32) (81 - 90)  BP: 114/74 (08 Feb 2024 07:32) (112/75 - 114/74)  BP(mean): --  ABP: --  ABP(mean): --  RR: 18 (08 Feb 2024 07:32) (18 - 19)  SpO2: 93% (08 Feb 2024 07:32) (93% - 95%)    O2 Parameters below as of 08 Feb 2024 07:32  Patient On (Oxygen Delivery Method): tracheostomy collar  O2 Flow (L/min): 4              LABS:                          10.2   6.85  )-----------( 369      ( 08 Feb 2024 08:55 )             32.2                                               02-08    141  |  105  |  16  ----------------------------<  118<H>  3.7   |  33<H>  |  0.55    Ca    8.7      08 Feb 2024 08:55    TPro  7.7  /  Alb  2.1<L>  /  TBili  0.3  /  DBili  x   /  AST  18  /  ALT  11  /  AlkPhos  75  02-08                                             Urinalysis Basic - ( 08 Feb 2024 08:55 )    Color: x / Appearance: x / SG: x / pH: x  Gluc: 118 mg/dL / Ketone: x  / Bili: x / Urobili: x   Blood: x / Protein: x / Nitrite: x   Leuk Esterase: x / RBC: x / WBC x   Sq Epi: x / Non Sq Epi: x / Bacteria: x                                                  LIVER FUNCTIONS - ( 08 Feb 2024 08:55 )  Alb: 2.1 g/dL / Pro: 7.7 g/dL / ALK PHOS: 75 U/L / ALT: 11 U/L DA / AST: 18 U/L / GGT: x                                                                                                                                       CXR:    ECHO:    MEDICATIONS  (STANDING):    MEDICATIONS  (PRN):         (08 Feb 2024 11:44)   Patient seen and examined.     PAST MEDICAL & SURGICAL HISTORY:  Heart failure, unspecified HF chronicity, unspecified heart failure type      ETOH abuse  h/o etoh abuse now moderate drinker      Artificial pacemaker          Allergies    shellfish (Unknown)  No Known Drug Allergies    Intolerances        MEDICATIONS  (STANDING):  albuterol    0.083% 2.5 milliGRAM(s) Nebulizer every 6 hours  chlorhexidine 2% Cloths 1 Application(s) Topical <User Schedule>  dextrose 5% + lactated ringers. 1000 milliLiter(s) (75 mL/Hr) IV Continuous <Continuous>  dextrose 5% + lactated ringers. 1000 milliLiter(s) (75 mL/Hr) IV Continuous <Continuous>  lacosamide IVPB 200 milliGRAM(s) IV Intermittent <User Schedule>  levETIRAcetam  IVPB 2000 milliGRAM(s) IV Intermittent <User Schedule>  metoprolol tartrate Injectable 5 milliGRAM(s) IV Push every 12 hours  mupirocin 2% Ointment 1 Application(s) Both Nostrils two times a day  valproate sodium  IVPB 1250 milliGRAM(s) IV Intermittent <User Schedule>      MEDICATIONS  (PRN):  morphine  - Injectable 1 milliGRAM(s) IV Push every 6 hours PRN Severe Pain (7 - 10)   Medications up to date at time of exam.    Medications up to date at time of exam.    FAMILY HISTORY:  No pertinent family history in first degree relatives        SOCIAL HISTORY  REVIEW OF SYSTEMS:    CONSTITUTIONAL:  No fevers, chills on exam.     HEENT:  No runny nose.    CARDIOVASCULAR:  No facial grimace of chest discomfort.     RESPIRATORY:  No s/sx of SOB. No cough , nor wheezing.    GASTROINTESTINAL:  No facial grimace of  abdominal pain. No vomiting nor diarrhea.    GENITOURINARY: No hematuria.     NEUROLOGIC:  No seizure on exam.     PSYCHIATRIC:  No emotional distress on exam .     PHYSICAL EXAMINATION:      Vital Signs Last 24 Hrs  T(C): 37 (12 Feb 2024 12:47), Max: 37.1 (11 Feb 2024 20:15)  T(F): 98.6 (12 Feb 2024 12:47), Max: 98.8 (11 Feb 2024 20:15)  HR: 64 (12 Feb 2024 12:47) (60 - 76)  BP: 119/74 (12 Feb 2024 12:47) (116/84 - 133/89)  BP(mean): --  RR: 16 (12 Feb 2024 12:47) (16 - 20)  SpO2: 98% (12 Feb 2024 12:47) (96% - 99%)    Parameters below as of 12 Feb 2024 12:47  Patient On (Oxygen Delivery Method): tracheostomy collar       (if applicable)    General : Alert and follow simple command. No acute distress .       HEENT: Left skull deformity . No nasal tenderness.  Mucous membranes are moist.     NECK: Has non infected Trach .    LUNGS: Non labored. No wheezing. No use of accessory muscle.     HEART: S1 S2 irregular rate .    ABDOMEN: Soft, nontender, and nondistended. Active bowel sounds.     EXTREMITIES: Total care. Non ambulatory.     NEUROLOGIC: Awake, alert, oriented x 1. Right side weakness.     SKIN: Warm, dry, good turgor. Wound care to Sacral ulcer.       LABS:                        9.4    5.45  )-----------( 307      ( 12 Feb 2024 06:00 )             30.1     02-12    142  |  109<H>  |  6<L>  ----------------------------<  87  3.6   |  30  |  0.52    Ca    8.5      12 Feb 2024 06:00  Phos  3.3     02-12  Mg     1.9     02-12        Urinalysis Basic - ( 12 Feb 2024 06:00 )    Color: x / Appearance: x / SG: x / pH: x  Gluc: 87 mg/dL / Ketone: x  / Bili: x / Urobili: x   Blood: x / Protein: x / Nitrite: x   Leuk Esterase: x / RBC: x / WBC x   Sq Epi: x / Non Sq Epi: x / Bacteria: x    MICROBIOLOGY: (if applicable)    RADIOLOGY & ADDITIONAL STUDIES:  EKG:   CXR: < from: Xray Chest 1 View- PORTABLE-Urgent (Xray Chest 1 View- PORTABLE-Urgent .) (02.08.24 @ 10:17) >    ACC: [id number] EXAM:  XR CHEST PORTABLE URGENT 1V   ORDERED BY: DEANGELO NARVAEZ     PROCEDURE DATE:  02/08/2024          INTERPRETATION:  EXAM: XR CHEST URGENT    INDICATION: Admitting Dxs: K94.23 GASTROSTOMY MALFUNCTION dislodged peg   HXR    COMPARISON: February 5, 2024    IMPRESSION: Tracheostomy and pacer as before. No focal infiltrate. Some   elevation of the right hemidiaphragm. Heart is at the upper limits of   normal in its transthoracic diameter. Regional osseous structures   appropriate for age    --- End of Report ---            REED DAVIS MD; Attending Radiologist  This document has been electronically signed. Feb 8 2024 10:53AM    < end of copied text >    ECHO:    IMPRESSION: 48y Male PAST MEDICAL & SURGICAL HISTORY:  Heart failure, unspecified HF chronicity, unspecified heart failure type      ETOH abuse  h/o etoh abuse now moderate drinker      Artificial pacemaker    Impression: This is a 47 Y/O male from [facility] . Presenting to ED for PEG tube malfunction. For pulmonary evaluation due to Chronic Respiratory Failure , Has Trach . Has Chronic Pulmonary Embolism and off AC due to GI Bleed. Sputum cx growing moderate Pseudomonas aeruginosa .     Suggestion:  O2 saturation 98% and continue hydrotrach O2 supplement.  Contact isolation.  Oral, trach care, suction.  Not a candidate for decannulation at this time. Can have speaking valve as tolerated .   Aspiration precautions with HOB elevation.   On Albuterol via nebulization Q 6 Hours.

## 2024-02-12 NOTE — SPEAKING VALVE EVALUATION - COMMENTS
Patient seen for passy-luis alfredo speaking valve evaluation. Low pressure valve placed on the hub of the trach.

## 2024-02-12 NOTE — SPEAKING VALVE EVALUATION - RECOMMENDATIONS
Pt is a candidate to wear PMV as tolerated: for increased durations of time (10, 20, 30 mins, etc) without s/s of distress, in order to restore airway patency to oral and nasal cavities, and improve voice production.

## 2024-02-12 NOTE — ADVANCED PRACTICE NURSE CONSULT - RECOMMEDATIONS
-Clean all wounds with normal saline and apply skin prep to the surrounding skin  -Apply a non-adherent dry dressing to the L. Upper Abdominal Quadrant stoma Daily PRN  -Pack the Coccyx wound with Silver Calcium Alginate (Aquacel Ag) and cover with a Foam dressing Q 72hrs PRN  -Elevate/float the patients heels using heel protectors and reposition the patient Q 2hrs using wedges or pillows

## 2024-02-12 NOTE — PROGRESS NOTE ADULT - PROBLEM SELECTOR PLAN 3
+Hx TBI with seizures.  Continue keppra, vimpat and valproic acid. Send drug levels in am.  Maintain seizure precautions.

## 2024-02-12 NOTE — PROGRESS NOTE ADULT - SUBJECTIVE AND OBJECTIVE BOX
Called for pt with displaced PEG tube that GI is unable to replace due to finding of hiatal hernia.  Pt is also post bariatric surgery in past.  PEG has been replaced numerous times at [facility] when tube displaces, however this hospital admission this was deemed not amenable and further a review by IR showed no window for IR placement of a gastrostomy tube.  Pt denies abd pain.   ICU Vital Signs Last 24 Hrs  T(C): 37 (12 Feb 2024 12:47), Max: 37.1 (11 Feb 2024 20:15)  T(F): 98.6 (12 Feb 2024 12:47), Max: 98.8 (11 Feb 2024 20:15)  HR: 64 (12 Feb 2024 12:47) (60 - 74)  BP: 119/74 (12 Feb 2024 12:47) (116/84 - 127/86)  BP(mean): --  ABP: --  ABP(mean): --  RR: 16 (12 Feb 2024 12:47) (16 - 20)  SpO2: 98% (12 Feb 2024 12:47) (96% - 99%)    O2 Parameters below as of 12 Feb 2024 12:47  Patient On (Oxygen Delivery Method): tracheostomy collar        Alert  ICU Vital Signs Last 24 Hrs  T(C): 37 (12 Feb 2024 12:47), Max: 37.1 (11 Feb 2024 20:15)  T(F): 98.6 (12 Feb 2024 12:47), Max: 98.8 (11 Feb 2024 20:15)  HR: 64 (12 Feb 2024 12:47) (60 - 74)  BP: 119/74 (12 Feb 2024 12:47) (116/84 - 127/86)  BP(mean): --  ABP: --  ABP(mean): --  RR: 16 (12 Feb 2024 12:47) (16 - 20)  SpO2: 98% (12 Feb 2024 12:47) (96% - 99%)    O2 Parameters below as of 12 Feb 2024 12:47  Patient On (Oxygen Delivery Method): tracheostomy collar        Abd: soft nt nd,  granulation at prior PEG tube site, no purulence, no gastric contents leaking.  several small healed incision sites noted in epigastric area.  no hernias, no fluctuance no crepitus                          9.4    5.45  )-----------( 307      ( 12 Feb 2024 06:00 )             30.1     02-12    142  |  109<H>  |  6<L>  ----------------------------<  87  3.6   |  30  |  0.52    Ca    8.5      12 Feb 2024 06:00  Phos  3.3     02-12  Mg     1.9     02-12

## 2024-02-12 NOTE — SPEAKING VALVE EVALUATION - SLP PERTINENT HISTORY OF CURRENT PROBLEM
As per chart review, pt is  47yo M pt with PMHx CVA (3/30/2022 with residual aphasia and right sided residual weakness), seizures, HTN, HLD, CHF w/ ICD (initially rEF 30%->55-60% on 03/2021), MDD, Recurrent PE, Afib (on Eliquis), obesity s/p bariatric intervention  presenting to ED for PEG tube malfunction. Patient unsure how peg tube was dislodged but denies any abdominal pain. Patient reports no fevers, chills, headaches, dizziness, chest pain, cough, sob, abd pain, n/v, diarrhea, constipation hematuria, dysuria.

## 2024-02-12 NOTE — PROGRESS NOTE ADULT - NS ATTEND AMEND GEN_ALL_CORE FT
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 Now pending surgical PEG placement after unable to replace endoscopically w/ GI.    ASSESSMENT:  #PEG malfunction  #CVA  #Tracheostomy status  #Seizures  #H/o recurrent PE  #Afib  #HFmrEF    Plan:  - unable to place PEG w/ GI 2/12 d/t anatomy limitations and h/o bypass  - IR and GI agree w/ surgical consultation  - f/u surgery recs for surgical PEG placement  - patient has repeatedly ripped out NGT in the interim  - holding AC pending procedure  - home AEDs for seizure  - PRN IV antihypertensives pending enteral access   - cont 4L hydrotrach  - routine trach care and suctioning  - passymuir valve eval

## 2024-02-12 NOTE — PROGRESS NOTE ADULT - PROBLEM SELECTOR PLAN 5
Hiistory of CHF with ICD  Strict I&Os  Daily weights.  Continue metoprolol 5mg IVP every 12 hours with parameters.

## 2024-02-12 NOTE — PROGRESS NOTE ADULT - SUBJECTIVE AND OBJECTIVE BOX
DANNIE SHIRLEY    SCU progress note    INTERVAL HPI/OVERNIGHT EVENTS: ***Intermittently agitated. Requiring mittens. Continues to try and pull out trach and pulling out IV lines.    DNR [ ]   DNI  [  ]   FULL CODE    Covid - 19 PCR:     The 4Ms    What Matters Most: see GOC  Age appropriate Medications/Screen for High Risk Medication: Yes  Mentation: see CAM below  Mobility: defer to physical exam    The Confusion Assessment Method (CAM) Diagnostic Algorithm     1: Acute Onset or Fluctuating Course  - Is there evidence of an acute change in mental status from the patient’s baseline? Did the (abnormal) behavior  fluctuate during the day, that is, tend to come and go, or increase and decrease in severity?  [x ] YES [ ] NO     2: Inattention  - Did the patient have difficulty focusing attention, being easily distractible, or having difficulty keeping track of what was being said?  [ ] YES [ ] NO  unable to access     3: Disorganized thinking  -Was the patient’s thinking disorganized or incoherent, such as rambling or irrelevant conversation, unclear or illogical flow of ideas, or unpredictable switching from subject to subject?  [ ] YES [ ] NO   unable to access    4: Altered Level of consciousness?  [ ] YES [x ] NO    The diagnosis of delirium by CAM requires the presence of features 1 and 2 and either 3 or 4.    PRESSORS: [ ] YES [x ] NO    Cardiovascular:  Heart Failure  Acute   Acute on Chronic  Chronic       metoprolol tartrate Injectable 5 milliGRAM(s) IV Push every 12 hours    Pulmonary:  albuterol    0.083% 2.5 milliGRAM(s) Nebulizer every 6 hours    Hematalogic:    Other:  chlorhexidine 2% Cloths 1 Application(s) Topical <User Schedule>  dextrose 5% + lactated ringers. 1000 milliLiter(s) IV Continuous <Continuous>  dextrose 5% + lactated ringers. 1000 milliLiter(s) IV Continuous <Continuous>  lacosamide IVPB 200 milliGRAM(s) IV Intermittent <User Schedule>  levETIRAcetam  IVPB 2000 milliGRAM(s) IV Intermittent <User Schedule>  morphine  - Injectable 1 milliGRAM(s) IV Push every 6 hours PRN  mupirocin 2% Ointment 1 Application(s) Both Nostrils two times a day  valproate sodium  IVPB 1250 milliGRAM(s) IV Intermittent <User Schedule>    albuterol    0.083% 2.5 milliGRAM(s) Nebulizer every 6 hours  chlorhexidine 2% Cloths 1 Application(s) Topical <User Schedule>  dextrose 5% + lactated ringers. 1000 milliLiter(s) IV Continuous <Continuous>  dextrose 5% + lactated ringers. 1000 milliLiter(s) IV Continuous <Continuous>  lacosamide IVPB 200 milliGRAM(s) IV Intermittent <User Schedule>  levETIRAcetam  IVPB 2000 milliGRAM(s) IV Intermittent <User Schedule>  metoprolol tartrate Injectable 5 milliGRAM(s) IV Push every 12 hours  morphine  - Injectable 1 milliGRAM(s) IV Push every 6 hours PRN  mupirocin 2% Ointment 1 Application(s) Both Nostrils two times a day  valproate sodium  IVPB 1250 milliGRAM(s) IV Intermittent <User Schedule>    Drug Dosing Weight    Weight (kg): 81.7 (08 Feb 2024 10:00)    CENTRAL LINE: [ ] YES [x ] NO  LOCATION:   DATE INSERTED:  REMOVE: [ ] YES [ ] NO  EXPLAIN:    KAUSHIK: [ ] YES [x ] NO    DATE INSERTED:  REMOVE:  [ ] YES [ ] NO  EXPLAIN:    PAST MEDICAL & SURGICAL HISTORY:  Heart failure, unspecified HF chronicity, unspecified heart failure type      ETOH abuse  h/o etoh abuse       Artificial pacemaker                  02-11 @ 07:01  -  02-12 @ 07:00  --------------------------------------------------------  IN: 0 mL / OUT: 1000 mL / NET: -1000 mL            PHYSICAL EXAM:    GENERAL: NAD, well-groomed, well-developed  HEAD:  +Craniofacial defect.  EYES: EOMI, PERRLA, conjunctiva and sclera clear  ENMT: No tonsillar erythema, exudates  NECK: Supple, No JVD, tracheostomy intact  NERVOUS SYSTEM:  Awake and alert. Right sided hemiparesis. Partially contracted right side.  Moving left side well.  Follows simple commands.  CHEST/LUNG: Clear to percussion bilaterally; No rales, rhonchi, wheezing, or rubs  HEART: Regular rate and rhythm; No murmurs, rubs, or gallops  ABDOMEN: Soft, Nontender, Nondistended; Bowel sounds present. Old Peg site covered with dry sterile dressing.  EXTREMITIES:  Right hemiparesis. Moving left side well.  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 x 2.5cm x 3cm) with bone, pink tissue, drainage, white wound edges, and undermining (up to 4cm at 360 degrees)  Left Upper Abdominal Quadrant pink stoma with scant drainage on the dressing      LABS:  CBC Full  -  ( 12 Feb 2024 06:00 )  WBC Count : 5.45 K/uL  RBC Count : 2.92 M/uL  Hemoglobin : 9.4 g/dL  Hematocrit : 30.1 %  Platelet Count - Automated : 307 K/uL  Mean Cell Volume : 103.1 fl  Mean Cell Hemoglobin : 32.2 pg  Mean Cell Hemoglobin Concentration : 31.2 gm/dL  Auto Neutrophil # : x  Auto Lymphocyte # : x  Auto Monocyte # : x  Auto Eosinophil # : x  Auto Basophil # : x  Auto Neutrophil % : x  Auto Lymphocyte % : x  Auto Monocyte % : x  Auto Eosinophil % : x  Auto Basophil % : x    02-12    142  |  109<H>  |  6<L>  ----------------------------<  87  3.6   |  30  |  0.52    Ca    8.5      12 Feb 2024 06:00  Phos  3.3     02-12  Mg     1.9     02-12        Urinalysis Basic - ( 12 Feb 2024 06:00 )    Color: x / Appearance: x / SG: x / pH: x  Gluc: 87 mg/dL / Ketone: x  / Bili: x / Urobili: x   Blood: x / Protein: x / Nitrite: x   Leuk Esterase: x / RBC: x / WBC x   Sq Epi: x / Non Sq Epi: x / Bacteria: x            [  ]  DVT Prophylaxis  [  ]  Nutrition, Brand, Rate         Goal Rate        Abnormal Nutritional Status -  Malnutrition   Cachexia       RADIOLOGY & ADDITIONAL STUDIES:  ***  < from: Xray Chest 1 View- PORTABLE-Urgent (Xray Chest 1 View- PORTABLE-Urgent .) (02.08.24 @ 10:17) >  INTERPRETATION:  EXAM: XR CHEST URGENT    INDICATION: Admitting Dxs: K94.23 GASTROSTOMY MALFUNCTION dislodged peg   HXR    COMPARISON: February 5, 2024    IMPRESSION: Tracheostomy and pacer as before. No focal infiltrate. Some   elevation of the right hemidiaphragm. Heart is at the upper limits of   normal in its transthoracic diameter. Regional osseous structures   appropriate for age    --- End of Report ---            REED DAVIS MD; Attending Radiologist  This document has been electronically signed. Feb 8 2024 10:53AM    < end of copied text >    Goals of Care Discussion with Family/Proxy/Other   - see note from

## 2024-02-12 NOTE — SPEAKING VALVE EVALUATION - REMOVE VALVE FOR
SpO2 < 92%/Increase RR (1.5 x baseline)/Increased HR (1.5 x baseline)/Increased work of breathing/Evidence of air trapping/Excessive coughing/Sleep/Subjective complaints

## 2024-02-12 NOTE — PROGRESS NOTE ADULT - PROBLEM SELECTOR PLAN 8
Positive history of recurrent PEs  AC currently on hold secondary to bleeding.  trend CBC  Monitor oxygen saturation.  SCB for DVT prophylaxis.

## 2024-02-12 NOTE — PROGRESS NOTE ADULT - PROBLEM SELECTOR PLAN 9
SCB for DVT prophylaxis  Continue to hold AC  Surgical consult for Peg placement.  Continue oxygen support via hydro-trache  Monitor saturation.  Speech eval for Passey [place] valve.  Continue antiepileptics.  Contact Isolation for possible MDRO  Dispo: return to [facility] after PEG placement.

## 2024-02-12 NOTE — ADVANCED PRACTICE NURSE CONSULT - ASSESSMENT
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 Upper Abdominal Quadrant pink stoma with scant drainage on the dressing

## 2024-02-12 NOTE — PROGRESS NOTE ADULT - ASSESSMENT
47yo M pt with PMHx CVA (3/30/2022 with residual aphasia and right sided residual weakness), seizures, HTN, HLD, CHF w/ ICD (initially rEF 30%->55-60% on 03/2021), MDD, Recurrent PE, Afib (on Eliquis), obesity s/p bariatric intervention presenting to ED for PEG tube malfunction admitted for further care. Per endorsement, PEG tube insertion attempted in ED without success. NG tube also attempted but due to bleeding the insertion was aborted and AC not restarted (home med). Patient was transferred to  for further care given that patient has a trach. Patient has copious amounts of secretion from trach requiring constant suctioning. Sputum cx sent. CXR noted. Afebrile. Concern for MDRO and therefore was placed on contact  isolation until cx comes back. GI consulted and examined patient at bedside. GI spoke with patient's proxy. Plan for OR on Monday for new PEG tube insertion.   Patient's seizure meds switched to IV form. BB IV. Monitor BP per unit protocol.   02/09: OR on Monday for PEG. Dispo is to return to [facility] once PEg placed,   2/12  GI had no window to place peg. IR agrees with GI.  Surgery called for consult.

## 2024-02-12 NOTE — SPEAKING VALVE EVALUATION - OBSERVATIONS
Patient remained dysphonic; Vocal quality was strained. Patient tolerated valve >10 mins. Vital signs stable throughout assessment (FIO2 93%). No signs of respiratory distress. No increased work of breathing. No signs of air trapping. Valve remained on after end of exam.

## 2024-02-13 LAB
ALBUMIN SERPL ELPH-MCNC: 2.2 G/DL — LOW (ref 3.5–5)
ALP SERPL-CCNC: 63 U/L — SIGNIFICANT CHANGE UP (ref 40–120)
ALT FLD-CCNC: 11 U/L DA — SIGNIFICANT CHANGE UP (ref 10–60)
ANION GAP SERPL CALC-SCNC: 6 MMOL/L — SIGNIFICANT CHANGE UP (ref 5–17)
AST SERPL-CCNC: 15 U/L — SIGNIFICANT CHANGE UP (ref 10–40)
BILIRUB SERPL-MCNC: 0.3 MG/DL — SIGNIFICANT CHANGE UP (ref 0.2–1.2)
BUN SERPL-MCNC: 9 MG/DL — SIGNIFICANT CHANGE UP (ref 7–18)
CALCIUM SERPL-MCNC: 8.6 MG/DL — SIGNIFICANT CHANGE UP (ref 8.4–10.5)
CHLORIDE SERPL-SCNC: 107 MMOL/L — SIGNIFICANT CHANGE UP (ref 96–108)
CO2 SERPL-SCNC: 29 MMOL/L — SIGNIFICANT CHANGE UP (ref 22–31)
CREAT SERPL-MCNC: 0.52 MG/DL — SIGNIFICANT CHANGE UP (ref 0.5–1.3)
EGFR: 124 ML/MIN/1.73M2 — SIGNIFICANT CHANGE UP
GLUCOSE BLDC GLUCOMTR-MCNC: 73 MG/DL — SIGNIFICANT CHANGE UP (ref 70–99)
GLUCOSE BLDC GLUCOMTR-MCNC: 81 MG/DL — SIGNIFICANT CHANGE UP (ref 70–99)
GLUCOSE BLDC GLUCOMTR-MCNC: 83 MG/DL — SIGNIFICANT CHANGE UP (ref 70–99)
GLUCOSE BLDC GLUCOMTR-MCNC: 89 MG/DL — SIGNIFICANT CHANGE UP (ref 70–99)
GLUCOSE SERPL-MCNC: 93 MG/DL — SIGNIFICANT CHANGE UP (ref 70–99)
HCT VFR BLD CALC: 31.6 % — LOW (ref 39–50)
HGB BLD-MCNC: 9.9 G/DL — LOW (ref 13–17)
MAGNESIUM SERPL-MCNC: 1.9 MG/DL — SIGNIFICANT CHANGE UP (ref 1.6–2.6)
MCHC RBC-ENTMCNC: 31.3 GM/DL — LOW (ref 32–36)
MCHC RBC-ENTMCNC: 32.1 PG — SIGNIFICANT CHANGE UP (ref 27–34)
MCV RBC AUTO: 102.6 FL — HIGH (ref 80–100)
NRBC # BLD: 0 /100 WBCS — SIGNIFICANT CHANGE UP (ref 0–0)
PHOSPHATE SERPL-MCNC: 4 MG/DL — SIGNIFICANT CHANGE UP (ref 2.5–4.5)
PLATELET # BLD AUTO: 278 K/UL — SIGNIFICANT CHANGE UP (ref 150–400)
POTASSIUM SERPL-MCNC: 3.2 MMOL/L — LOW (ref 3.5–5.3)
POTASSIUM SERPL-SCNC: 3.2 MMOL/L — LOW (ref 3.5–5.3)
PROT SERPL-MCNC: 6.6 G/DL — SIGNIFICANT CHANGE UP (ref 6–8.3)
RBC # BLD: 3.08 M/UL — LOW (ref 4.2–5.8)
RBC # FLD: 12.8 % — SIGNIFICANT CHANGE UP (ref 10.3–14.5)
SODIUM SERPL-SCNC: 142 MMOL/L — SIGNIFICANT CHANGE UP (ref 135–145)
SURGICAL PATHOLOGY STUDY: SIGNIFICANT CHANGE UP
WBC # BLD: 4.3 K/UL — SIGNIFICANT CHANGE UP (ref 3.8–10.5)
WBC # FLD AUTO: 4.3 K/UL — SIGNIFICANT CHANGE UP (ref 3.8–10.5)

## 2024-02-13 PROCEDURE — 99233 SBSQ HOSP IP/OBS HIGH 50: CPT

## 2024-02-13 RX ORDER — SODIUM CHLORIDE 9 MG/ML
1000 INJECTION, SOLUTION INTRAVENOUS
Refills: 0 | Status: DISCONTINUED | OUTPATIENT
Start: 2024-02-13 | End: 2024-02-17

## 2024-02-13 RX ORDER — POTASSIUM CHLORIDE 20 MEQ
10 PACKET (EA) ORAL
Refills: 0 | Status: COMPLETED | OUTPATIENT
Start: 2024-02-13 | End: 2024-02-13

## 2024-02-13 RX ADMIN — Medication 62.5 MILLIGRAM(S): at 20:50

## 2024-02-13 RX ADMIN — MUPIROCIN 1 APPLICATION(S): 20 OINTMENT TOPICAL at 18:11

## 2024-02-13 RX ADMIN — LACOSAMIDE 140 MILLIGRAM(S): 50 TABLET ORAL at 08:33

## 2024-02-13 RX ADMIN — Medication 62.5 MILLIGRAM(S): at 04:31

## 2024-02-13 RX ADMIN — ALBUTEROL 2.5 MILLIGRAM(S): 90 AEROSOL, METERED ORAL at 14:34

## 2024-02-13 RX ADMIN — SODIUM CHLORIDE 75 MILLILITER(S): 9 INJECTION, SOLUTION INTRAVENOUS at 16:06

## 2024-02-13 RX ADMIN — Medication 100 MILLIEQUIVALENT(S): at 12:10

## 2024-02-13 RX ADMIN — CHLORHEXIDINE GLUCONATE 1 APPLICATION(S): 213 SOLUTION TOPICAL at 05:14

## 2024-02-13 RX ADMIN — ALBUTEROL 2.5 MILLIGRAM(S): 90 AEROSOL, METERED ORAL at 03:14

## 2024-02-13 RX ADMIN — SODIUM CHLORIDE 75 MILLILITER(S): 9 INJECTION, SOLUTION INTRAVENOUS at 04:32

## 2024-02-13 RX ADMIN — ALBUTEROL 2.5 MILLIGRAM(S): 90 AEROSOL, METERED ORAL at 09:50

## 2024-02-13 RX ADMIN — Medication 5 MILLIGRAM(S): at 18:08

## 2024-02-13 RX ADMIN — ALBUTEROL 2.5 MILLIGRAM(S): 90 AEROSOL, METERED ORAL at 20:19

## 2024-02-13 RX ADMIN — LACOSAMIDE 140 MILLIGRAM(S): 50 TABLET ORAL at 21:14

## 2024-02-13 RX ADMIN — LEVETIRACETAM 600 MILLIGRAM(S): 250 TABLET, FILM COATED ORAL at 21:14

## 2024-02-13 RX ADMIN — Medication 100 MILLIEQUIVALENT(S): at 10:58

## 2024-02-13 RX ADMIN — MUPIROCIN 1 APPLICATION(S): 20 OINTMENT TOPICAL at 05:14

## 2024-02-13 RX ADMIN — Medication 100 MILLIEQUIVALENT(S): at 09:53

## 2024-02-13 RX ADMIN — LEVETIRACETAM 600 MILLIGRAM(S): 250 TABLET, FILM COATED ORAL at 09:18

## 2024-02-13 RX ADMIN — Medication 62.5 MILLIGRAM(S): at 13:41

## 2024-02-13 NOTE — PROGRESS NOTE ADULT - NS ATTEND AMEND GEN_ALL_CORE FT
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 Now pending surgical PEG placement after unable to replace endoscopically w/ GI.    ASSESSMENT:  #PEG malfunction  #CVA  #Tracheostomy status  #Seizures  #H/o recurrent PE  #Afib  #HFmrEF    Plan:  - unable to place PEG w/ GI 2/12 d/t anatomy limitations and h/o bypass  - IR and GI agree w/ surgical consultation  - f/u surgery recs for surgical PEG placement - pending OR date  - patient has repeatedly ripped out NGT in the interim  - holding AC pending procedure  - home AEDs for seizure  - PRN IV antihypertensives pending enteral access   - cont 4L hydrotrach  - routine trach care and suctioning  - passymuir valve eval passed

## 2024-02-13 NOTE — PROGRESS NOTE ADULT - PROBLEM SELECTOR PLAN 2
Continue hydro-trach collar.    Monitor oxygen saturation.  Continue bronchodilators via nebulizer.  CXR shows elevated right hemidiaphragm. +AICD  Speech eval appreciated. Passey [place] attached to trach. Tolerated for 30 minutes

## 2024-02-13 NOTE — PROGRESS NOTE ADULT - SUBJECTIVE AND OBJECTIVE BOX
DANNIE SHIRLEY    SCU progress note    INTERVAL HPI/OVERNIGHT EVENTS: ***Received Zyprexa lat last night secondary to agitation.    DNR [ ]   DNI  [  ]  FULL CODE    Covid - 19 PCR:     The 4Ms    What Matters Most: see GOC  Age appropriate Medications/Screen for High Risk Medication: Yes  Mentation: see CAM below  Mobility: defer to physical exam    The Confusion Assessment Method (CAM) Diagnostic Algorithm     1: Acute Onset or Fluctuating Course  - Is there evidence of an acute change in mental status from the patient’s baseline? Did the (abnormal) behavior  fluctuate during the day, that is, tend to come and go, or increase and decrease in severity?  [ ] YES [x ] NO     2: Inattention  - Did the patient have difficulty focusing attention, being easily distractible, or having difficulty keeping track of what was being said?  [ ] YES [ ] NO   Unable to access     3: Disorganized thinking  -Was the patient’s thinking disorganized or incoherent, such as rambling or irrelevant conversation, unclear or illogical flow of ideas, or unpredictable switching from subject to subject?  [ ] YES [ ] NO   Unable to access    4: Altered Level of consciousness?  [ ] YES [ ] NO    The diagnosis of delirium by CAM requires the presence of features 1 and 2 and either 3 or 4.    PRESSORS: [ ] YES [x ] NO    Cardiovascular:  Heart Failure  Acute   Acute on Chronic  Chronic       metoprolol tartrate Injectable 5 milliGRAM(s) IV Push every 12 hours    Pulmonary:  albuterol    0.083% 2.5 milliGRAM(s) Nebulizer every 6 hours    Hematalogic:    Other:  chlorhexidine 2% Cloths 1 Application(s) Topical <User Schedule>  dextrose 5% + lactated ringers. 1000 milliLiter(s) IV Continuous <Continuous>  lacosamide IVPB 200 milliGRAM(s) IV Intermittent <User Schedule>  levETIRAcetam  IVPB 2000 milliGRAM(s) IV Intermittent <User Schedule>  morphine  - Injectable 1 milliGRAM(s) IV Push every 6 hours PRN  mupirocin 2% Ointment 1 Application(s) Both Nostrils two times a day  OLANZapine Injectable 2.5 milliGRAM(s) IntraMuscular every 12 hours PRN  potassium chloride  10 mEq/100 mL IVPB 10 milliEquivalent(s) IV Intermittent every 1 hour  valproate sodium  IVPB 1250 milliGRAM(s) IV Intermittent <User Schedule>    albuterol    0.083% 2.5 milliGRAM(s) Nebulizer every 6 hours  chlorhexidine 2% Cloths 1 Application(s) Topical <User Schedule>  dextrose 5% + lactated ringers. 1000 milliLiter(s) IV Continuous <Continuous>  lacosamide IVPB 200 milliGRAM(s) IV Intermittent <User Schedule>  levETIRAcetam  IVPB 2000 milliGRAM(s) IV Intermittent <User Schedule>  metoprolol tartrate Injectable 5 milliGRAM(s) IV Push every 12 hours  morphine  - Injectable 1 milliGRAM(s) IV Push every 6 hours PRN  mupirocin 2% Ointment 1 Application(s) Both Nostrils two times a day  OLANZapine Injectable 2.5 milliGRAM(s) IntraMuscular every 12 hours PRN  potassium chloride  10 mEq/100 mL IVPB 10 milliEquivalent(s) IV Intermittent every 1 hour  valproate sodium  IVPB 1250 milliGRAM(s) IV Intermittent <User Schedule>    Drug Dosing Weight    Weight (kg): 81.7 (08 Feb 2024 10:00)    CENTRAL LINE: [ ] YES [x ] NO  LOCATION:   DATE INSERTED:  REMOVE: [ ] YES [ ] NO  EXPLAIN:    FAULKNER: [ ] YES [x ] NO    DATE INSERTED:  REMOVE:  [ ] YES [ ] NO  EXPLAIN:    PAST MEDICAL & SURGICAL HISTORY:  Heart failure, unspecified HF chronicity, unspecified heart failure type      ETOH abuse  h/o etoh abuse now moderate drinker      Artificial pacemaker                        PHYSICAL EXAM:    GENERAL: NAD, well-groomed, well-developed  HEAD:  +craniofacial defect  EYES: EOMI, PERRLA, conjunctiva and sclera clear  ENMT: No tonsillar erythema, exudates  NECK: Supple, No JVD, tracheostomy intact  NERVOUS SYSTEM:  Awake and alert. Intermittent agitation overnight. +R  CHEST/LUNG: Clear to percussion bilaterally; No rales, rhonchi, wheezing, or rubs  HEART: Regular rate and rhythm; No murmurs, rubs, or gallops  ABDOMEN: Soft, Nontender, Nondistended; Bowel sounds present  EXTREMITIES:  2+ Peripheral Pulses, No clubbing, cyanosis, or edema  LYMPH: No lymphadenopathy noted  SKIN: No rashes or lesions      LABS:  CBC Full  -  ( 13 Feb 2024 05:22 )  WBC Count : 4.30 K/uL  RBC Count : 3.08 M/uL  Hemoglobin : 9.9 g/dL  Hematocrit : 31.6 %  Platelet Count - Automated : 278 K/uL  Mean Cell Volume : 102.6 fl  Mean Cell Hemoglobin : 32.1 pg  Mean Cell Hemoglobin Concentration : 31.3 gm/dL  Auto Neutrophil # : x  Auto Lymphocyte # : x  Auto Monocyte # : x  Auto Eosinophil # : x  Auto Basophil # : x  Auto Neutrophil % : x  Auto Lymphocyte % : x  Auto Monocyte % : x  Auto Eosinophil % : x  Auto Basophil % : x    02-13    142  |  107  |  9   ----------------------------<  93  3.2<L>   |  29  |  0.52    Ca    8.6      13 Feb 2024 05:22  Phos  4.0     02-13  Mg     1.9     02-13    TPro  6.6  /  Alb  2.2<L>  /  TBili  0.3  /  DBili  x   /  AST  15  /  ALT  11  /  AlkPhos  63  02-13      Urinalysis Basic - ( 13 Feb 2024 05:22 )    Color: x / Appearance: x / SG: x / pH: x  Gluc: 93 mg/dL / Ketone: x  / Bili: x / Urobili: x   Blood: x / Protein: x / Nitrite: x   Leuk Esterase: x / RBC: x / WBC x   Sq Epi: x / Non Sq Epi: x / Bacteria: x            [  ]  DVT Prophylaxis  [  ]  Nutrition, Brand, Rate         Goal Rate        Abnormal Nutritional Status -  Malnutrition   Cachexia      Morbid Obesity BMI >/=40    RADIOLOGY & ADDITIONAL STUDIES:  ***    Goals of Care Discussion with Family/Proxy/Other   - see note from/family meeting set up for...     DANNIE SHIRLEY    SCU progress note    INTERVAL HPI/OVERNIGHT EVENTS: ***Received Zyprexa lat last night secondary to agitation.    DNR [ ]   DNI  [  ]  FULL CODE    Covid - 19 PCR:     The 4Ms    What Matters Most: see GOC  Age appropriate Medications/Screen for High Risk Medication: Yes  Mentation: see CAM below  Mobility: defer to physical exam    The Confusion Assessment Method (CAM) Diagnostic Algorithm     1: Acute Onset or Fluctuating Course  - Is there evidence of an acute change in mental status from the patient’s baseline? Did the (abnormal) behavior  fluctuate during the day, that is, tend to come and go, or increase and decrease in severity?  [ ] YES [x ] NO     2: Inattention  - Did the patient have difficulty focusing attention, being easily distractible, or having difficulty keeping track of what was being said?  [ ] YES [ ] NO   Unable to access     3: Disorganized thinking  -Was the patient’s thinking disorganized or incoherent, such as rambling or irrelevant conversation, unclear or illogical flow of ideas, or unpredictable switching from subject to subject?  [ ] YES [ ] NO   Unable to access    4: Altered Level of consciousness?  [ ] YES [ ] NO    The diagnosis of delirium by CAM requires the presence of features 1 and 2 and either 3 or 4.    PRESSORS: [ ] YES [x ] NO    Cardiovascular:  Heart Failure  Acute   Acute on Chronic  Chronic       metoprolol tartrate Injectable 5 milliGRAM(s) IV Push every 12 hours    Pulmonary:  albuterol    0.083% 2.5 milliGRAM(s) Nebulizer every 6 hours    Hematalogic:    Other:  chlorhexidine 2% Cloths 1 Application(s) Topical <User Schedule>  dextrose 5% + lactated ringers. 1000 milliLiter(s) IV Continuous <Continuous>  lacosamide IVPB 200 milliGRAM(s) IV Intermittent <User Schedule>  levETIRAcetam  IVPB 2000 milliGRAM(s) IV Intermittent <User Schedule>  morphine  - Injectable 1 milliGRAM(s) IV Push every 6 hours PRN  mupirocin 2% Ointment 1 Application(s) Both Nostrils two times a day  OLANZapine Injectable 2.5 milliGRAM(s) IntraMuscular every 12 hours PRN  potassium chloride  10 mEq/100 mL IVPB 10 milliEquivalent(s) IV Intermittent every 1 hour  valproate sodium  IVPB 1250 milliGRAM(s) IV Intermittent <User Schedule>    albuterol    0.083% 2.5 milliGRAM(s) Nebulizer every 6 hours  chlorhexidine 2% Cloths 1 Application(s) Topical <User Schedule>  dextrose 5% + lactated ringers. 1000 milliLiter(s) IV Continuous <Continuous>  lacosamide IVPB 200 milliGRAM(s) IV Intermittent <User Schedule>  levETIRAcetam  IVPB 2000 milliGRAM(s) IV Intermittent <User Schedule>  metoprolol tartrate Injectable 5 milliGRAM(s) IV Push every 12 hours  morphine  - Injectable 1 milliGRAM(s) IV Push every 6 hours PRN  mupirocin 2% Ointment 1 Application(s) Both Nostrils two times a day  OLANZapine Injectable 2.5 milliGRAM(s) IntraMuscular every 12 hours PRN  potassium chloride  10 mEq/100 mL IVPB 10 milliEquivalent(s) IV Intermittent every 1 hour  valproate sodium  IVPB 1250 milliGRAM(s) IV Intermittent <User Schedule>    Drug Dosing Weight    Weight (kg): 81.7 (08 Feb 2024 10:00)    CENTRAL LINE: [ ] YES [x ] NO  LOCATION:   DATE INSERTED:  REMOVE: [ ] YES [ ] NO  EXPLAIN:    FAULKNER: [ ] YES [x ] NO    DATE INSERTED:  REMOVE:  [ ] YES [ ] NO  EXPLAIN:    PAST MEDICAL & SURGICAL HISTORY:  Heart failure, unspecified HF chronicity, unspecified heart failure type      ETOH abuse  h/o etoh abuse now moderate drinker      Artificial pacemaker                        PHYSICAL EXAM:    GENERAL: NAD, well-groomed, well-developed  HEAD:  +craniofacial defect  EYES: EOMI, PERRLA, conjunctiva and sclera clear  ENMT: No tonsillar erythema, exudates  NECK: Supple, No JVD, tracheostomy intact  NERVOUS SYSTEM:  Awake and alert. Intermittent agitation overnight. +R sided hemiparesis. Partially contracted right side.  Moving left side well. Intermittently following commands.  CHEST/LUNG: Clear to percussion bilaterally; No rales, rhonchi, wheezing, or rubs  HEART: Regular rate and rhythm; No murmurs, rubs, or gallops  ABDOMEN: Soft, Nontender, Nondistended; Bowel sounds present. old Peg site covered with DSD.  EXTREMITIES:  +Right sided hemiparesis. +Contractures right side. Moving left arm and leg well. +Splint/ arm brace right arm.  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 x 2.5cm x 3cm) with bone, pink tissue, drainage, white wound edges, and undermining (up to 4cm at 360 degrees)  Left Upper Abdominal Quadrant pink stoma with scant drainage on the dressing          LABS:  CBC Full  -  ( 13 Feb 2024 05:22 )  WBC Count : 4.30 K/uL  RBC Count : 3.08 M/uL  Hemoglobin : 9.9 g/dL  Hematocrit : 31.6 %  Platelet Count - Automated : 278 K/uL  Mean Cell Volume : 102.6 fl  Mean Cell Hemoglobin : 32.1 pg  Mean Cell Hemoglobin Concentration : 31.3 gm/dL  Auto Neutrophil # : x  Auto Lymphocyte # : x  Auto Monocyte # : x  Auto Eosinophil # : x  Auto Basophil # : x  Auto Neutrophil % : x  Auto Lymphocyte % : x  Auto Monocyte % : x  Auto Eosinophil % : x  Auto Basophil % : x    02-13    142  |  107  |  9   ----------------------------<  93  3.2<L>   |  29  |  0.52    Ca    8.6      13 Feb 2024 05:22  Phos  4.0     02-13  Mg     1.9     02-13    TPro  6.6  /  Alb  2.2<L>  /  TBili  0.3  /  DBili  x   /  AST  15  /  ALT  11  /  AlkPhos  63  02-13      Urinalysis Basic - ( 13 Feb 2024 05:22 )    Color: x / Appearance: x / SG: x / pH: x  Gluc: 93 mg/dL / Ketone: x  / Bili: x / Urobili: x   Blood: x / Protein: x / Nitrite: x   Leuk Esterase: x / RBC: x / WBC x   Sq Epi: x / Non Sq Epi: x / Bacteria: x            [  ]  DVT Prophylaxis  [  ]  Nutrition, Brand, Rate         Goal Rate        Abnormal Nutritional Status -  Malnutrition   Cachexia          RADIOLOGY & ADDITIONAL STUDIES:  ***  < from: Xray Chest 1 View- PORTABLE-Urgent (Xray Chest 1 View- PORTABLE-Urgent .) (02.08.24 @ 10:17) >  COMPARISON: February 5, 2024    IMPRESSION: Tracheostomy and pacer as before. No focal infiltrate. Some   elevation of the right hemidiaphragm. Heart is at the upper limits of   normal in its transthoracic diameter. Regional osseous structures   appropriate for age    --- End of Report ---    < end of copied text >    Goals of Care Discussion with Family/Proxy/Other

## 2024-02-13 NOTE — PROGRESS NOTE ADULT - SUBJECTIVE AND OBJECTIVE BOX
Time of Visit:  Patient seen and examined.     MEDICATIONS  (STANDING):  albuterol    0.083% 2.5 milliGRAM(s) Nebulizer every 6 hours  chlorhexidine 2% Cloths 1 Application(s) Topical <User Schedule>  dextrose 5% + lactated ringers. 1000 milliLiter(s) (75 mL/Hr) IV Continuous <Continuous>  lacosamide IVPB 200 milliGRAM(s) IV Intermittent <User Schedule>  levETIRAcetam  IVPB 2000 milliGRAM(s) IV Intermittent <User Schedule>  metoprolol tartrate Injectable 5 milliGRAM(s) IV Push every 12 hours  mupirocin 2% Ointment 1 Application(s) Both Nostrils two times a day  valproate sodium  IVPB 1250 milliGRAM(s) IV Intermittent <User Schedule>      MEDICATIONS  (PRN):  morphine  - Injectable 1 milliGRAM(s) IV Push every 6 hours PRN Severe Pain (7 - 10)       Medications up to date at time of exam.      PHYSICAL EXAMINATION:  Patient has no new complaints.  GENERAL: The patient is a well-developed, well-nourished, in no apparent distress.     Vital Signs Last 24 Hrs  T(C): 36.5 (13 Feb 2024 11:40), Max: 36.7 (12 Feb 2024 20:20)  T(F): 97.7 (13 Feb 2024 11:40), Max: 98.1 (12 Feb 2024 20:20)  HR: 65 (13 Feb 2024 18:12) (60 - 86)  BP: 126/75 (13 Feb 2024 18:12) (107/70 - 142/83)  BP(mean): 97 (12 Feb 2024 20:20) (97 - 97)  RR: 17 (13 Feb 2024 11:40) (17 - 18)  SpO2: 96% (13 Feb 2024 11:40) (91% - 100%)    Parameters below as of 13 Feb 2024 16:29  Patient On (Oxygen Delivery Method): Hydrotrach  O2 Flow (L/min): 1     (if applicable)    Chest Tube (if applicable)    HEENT: Head is normocephalic and atraumatic. Extraocular muscles are intact. Mucous membranes are moist.     NECK: Supple, no palpable adenopathy. + Trach     LUNGS: Clear to auscultation, no wheezing, rales, or rhonchi.    HEART: Regular rate and rhythm without murmur.    ABDOMEN: Soft, nontender, and nondistended.  No hepatosplenomegaly is noted.    : No painful voiding, no pelvic pain    EXTREMITIES: Without any cyanosis, clubbing, rash, lesions or edema.    NEUROLOGIC: Awake, alert, oriented, grossly intact    SKIN: Warm, dry, good turgor.      LABS:                        9.9    4.30  )-----------( 278      ( 13 Feb 2024 05:22 )             31.6     02-13    142  |  107  |  9   ----------------------------<  93  3.2<L>   |  29  |  0.52    Ca    8.6      13 Feb 2024 05:22  Phos  4.0     02-13  Mg     1.9     02-13    TPro  6.6  /  Alb  2.2<L>  /  TBili  0.3  /  DBili  x   /  AST  15  /  ALT  11  /  AlkPhos  63  02-13      Urinalysis Basic - ( 13 Feb 2024 05:22 )    Color: x / Appearance: x / SG: x / pH: x  Gluc: 93 mg/dL / Ketone: x  / Bili: x / Urobili: x   Blood: x / Protein: x / Nitrite: x   Leuk Esterase: x / RBC: x / WBC x   Sq Epi: x / Non Sq Epi: x / Bacteria: x                      MICROBIOLOGY: (if applicable)    RADIOLOGY & ADDITIONAL STUDIES:  EKG:   CXR:  ECHO:    IMPRESSION: 48y Male PAST MEDICAL & SURGICAL HISTORY:  Heart failure, unspecified HF chronicity, unspecified heart failure type      ETOH abuse  h/o etoh abuse now moderate drinker      Artificial pacemaker       p/w           RECOMMENDATIONS:   Time of Visit:  Patient seen and examined.     MEDICATIONS  (STANDING):  albuterol    0.083% 2.5 milliGRAM(s) Nebulizer every 6 hours  chlorhexidine 2% Cloths 1 Application(s) Topical <User Schedule>  dextrose 5% + lactated ringers. 1000 milliLiter(s) (75 mL/Hr) IV Continuous <Continuous>  lacosamide IVPB 200 milliGRAM(s) IV Intermittent <User Schedule>  levETIRAcetam  IVPB 2000 milliGRAM(s) IV Intermittent <User Schedule>  metoprolol tartrate Injectable 5 milliGRAM(s) IV Push every 12 hours  mupirocin 2% Ointment 1 Application(s) Both Nostrils two times a day  valproate sodium  IVPB 1250 milliGRAM(s) IV Intermittent <User Schedule>      MEDICATIONS  (PRN):  morphine  - Injectable 1 milliGRAM(s) IV Push every 6 hours PRN Severe Pain (7 - 10)       Medications up to date at time of exam.      PHYSICAL EXAMINATION:  Patient has no new complaints.  GENERAL: The patient is a well-developed, well-nourished, in no apparent distress.     Vital Signs Last 24 Hrs  T(C): 36.5 (13 Feb 2024 11:40), Max: 36.7 (12 Feb 2024 20:20)  T(F): 97.7 (13 Feb 2024 11:40), Max: 98.1 (12 Feb 2024 20:20)  HR: 65 (13 Feb 2024 18:12) (60 - 86)  BP: 126/75 (13 Feb 2024 18:12) (107/70 - 142/83)  BP(mean): 97 (12 Feb 2024 20:20) (97 - 97)  RR: 17 (13 Feb 2024 11:40) (17 - 18)  SpO2: 96% (13 Feb 2024 11:40) (91% - 100%)    Parameters below as of 13 Feb 2024 16:29  Patient On (Oxygen Delivery Method): Hydrotrach  O2 Flow (L/min): 1     (if applicable)    Chest Tube (if applicable)    HEENT: Head is normocephalic and atraumatic. Extraocular muscles are intact. Mucous membranes are moist.     NECK: Supple, no palpable adenopathy. + Trach     LUNGS: Clear to auscultation, no wheezing, rales, or rhonchi.    HEART: Regular rate and rhythm without murmur.    ABDOMEN: Soft, nontender, and nondistended.  No hepatosplenomegaly is noted.    : No painful voiding, no pelvic pain    EXTREMITIES: Without any cyanosis, clubbing, rash, lesions or edema.    NEUROLOGIC: Awake, alert, left side weakness     SKIN: Warm, dry, good turgor.      LABS:                        9.9    4.30  )-----------( 278      ( 13 Feb 2024 05:22 )             31.6     02-13    142  |  107  |  9   ----------------------------<  93  3.2<L>   |  29  |  0.52    Ca    8.6      13 Feb 2024 05:22  Phos  4.0     02-13  Mg     1.9     02-13    TPro  6.6  /  Alb  2.2<L>  /  TBili  0.3  /  DBili  x   /  AST  15  /  ALT  11  /  AlkPhos  63  02-13      Urinalysis Basic - ( 13 Feb 2024 05:22 )    Color: x / Appearance: x / SG: x / pH: x  Gluc: 93 mg/dL / Ketone: x  / Bili: x / Urobili: x   Blood: x / Protein: x / Nitrite: x   Leuk Esterase: x / RBC: x / WBC x   Sq Epi: x / Non Sq Epi: x / Bacteria: x      MICROBIOLOGY: (if applicable)    RADIOLOGY & ADDITIONAL STUDIES:  EKG:   CXR:  ECHO:    IMPRESSION: 48y Male PAST MEDICAL & SURGICAL HISTORY:  Heart failure, unspecified HF chronicity, unspecified heart failure type      ETOH abuse  h/o etoh abuse now moderate drinker      Artificial pacemaker       p/w         Impression: This is a 49 Y/O male from [facility] . Presenting to ED for PEG tube malfunction. For pulmonary evaluation due to Chronic Respiratory Failure , Has Trach . Has Chronic Pulmonary Embolism and off AC due to GI Bleed. Sputum cx growing moderate Pseudomonas aeruginosa .     Suggestion:  O2 saturation 98% and continue hydrotrach O2 supplement.  Contact isolation.  Oral, trach care, suction.  Not a candidate for decannulation at this time. Can have speaking valve as tolerated .   Aspiration precautions with HOB elevation.   On Albuterol via nebulization Q 6 Hours.

## 2024-02-13 NOTE — PROGRESS NOTE ADULT - ASSESSMENT
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 Per endorsement, PEG tube insertion attempted in ED without success. NG tube also attempted but due to bleeding the insertion was aborted and AC not restarted (home med). Patient was transferred to  for further care given that patient has a trach. Patient has copious amounts of secretion from trach requiring constant suctioning. Sputum cx sent. CXR noted. Afebrile. Concern for MDRO and therefore was placed on contact  isolation until cx comes back. GI consulted and examined patient at bedside. GI spoke with patient's proxy. Plan for OR on Monday for new PEG tube insertion.   Patient's seizure meds switched to IV form. BB IV. Monitor BP per unit protocol.   02/09: OR on Monday for PEG. Dispo is to return to [facility] once PEg placed,   2/12  GI had no window to place peg. IR agrees with GI.  Surgery called for consult.

## 2024-02-13 NOTE — CHART NOTE - NSCHARTNOTEFT_GEN_A_CORE
Daughter  Valorie Buchanan  ([phone])  updated on test results and treatment plan.  Discussed that surgery will place a new gastrostomy tube, Date of procedure is not known yet.  All questions answered.

## 2024-02-13 NOTE — PROGRESS NOTE ADULT - PROBLEM SELECTOR PLAN 9
SCB for DVT prophylaxis  Continue to hold AC  Surgical consult for Peg placement.  Continue oxygen support via hydro-trache  Monitor saturation.  Speech eval for [place] valve.  Continue antiepileptics.  Contact Isolation for possible MDRO  Dispo: return to [facility] after PEG placement.

## 2024-02-14 LAB
ALBUMIN SERPL ELPH-MCNC: 2.2 G/DL — LOW (ref 3.5–5)
ALP SERPL-CCNC: 62 U/L — SIGNIFICANT CHANGE UP (ref 40–120)
ALT FLD-CCNC: 13 U/L DA — SIGNIFICANT CHANGE UP (ref 10–60)
ANION GAP SERPL CALC-SCNC: 7 MMOL/L — SIGNIFICANT CHANGE UP (ref 5–17)
AST SERPL-CCNC: 22 U/L — SIGNIFICANT CHANGE UP (ref 10–40)
BILIRUB SERPL-MCNC: 0.2 MG/DL — SIGNIFICANT CHANGE UP (ref 0.2–1.2)
BUN SERPL-MCNC: 6 MG/DL — LOW (ref 7–18)
CALCIUM SERPL-MCNC: 8.3 MG/DL — LOW (ref 8.4–10.5)
CHLORIDE SERPL-SCNC: 107 MMOL/L — SIGNIFICANT CHANGE UP (ref 96–108)
CO2 SERPL-SCNC: 29 MMOL/L — SIGNIFICANT CHANGE UP (ref 22–31)
CREAT SERPL-MCNC: 0.5 MG/DL — SIGNIFICANT CHANGE UP (ref 0.5–1.3)
EGFR: 125 ML/MIN/1.73M2 — SIGNIFICANT CHANGE UP
GLUCOSE BLDC GLUCOMTR-MCNC: 83 MG/DL — SIGNIFICANT CHANGE UP (ref 70–99)
GLUCOSE BLDC GLUCOMTR-MCNC: 84 MG/DL — SIGNIFICANT CHANGE UP (ref 70–99)
GLUCOSE SERPL-MCNC: 83 MG/DL — SIGNIFICANT CHANGE UP (ref 70–99)
HCT VFR BLD CALC: 32 % — LOW (ref 39–50)
HGB BLD-MCNC: 10.1 G/DL — LOW (ref 13–17)
MAGNESIUM SERPL-MCNC: 1.9 MG/DL — SIGNIFICANT CHANGE UP (ref 1.6–2.6)
MCHC RBC-ENTMCNC: 31.6 GM/DL — LOW (ref 32–36)
MCHC RBC-ENTMCNC: 31.9 PG — SIGNIFICANT CHANGE UP (ref 27–34)
MCV RBC AUTO: 100.9 FL — HIGH (ref 80–100)
NRBC # BLD: 0 /100 WBCS — SIGNIFICANT CHANGE UP (ref 0–0)
PHOSPHATE SERPL-MCNC: 3.3 MG/DL — SIGNIFICANT CHANGE UP (ref 2.5–4.5)
PLATELET # BLD AUTO: 260 K/UL — SIGNIFICANT CHANGE UP (ref 150–400)
POTASSIUM SERPL-MCNC: 3.4 MMOL/L — LOW (ref 3.5–5.3)
POTASSIUM SERPL-SCNC: 3.4 MMOL/L — LOW (ref 3.5–5.3)
PROT SERPL-MCNC: 6.7 G/DL — SIGNIFICANT CHANGE UP (ref 6–8.3)
RBC # BLD: 3.17 M/UL — LOW (ref 4.2–5.8)
RBC # FLD: 13.1 % — SIGNIFICANT CHANGE UP (ref 10.3–14.5)
SODIUM SERPL-SCNC: 143 MMOL/L — SIGNIFICANT CHANGE UP (ref 135–145)
VALPROATE SERPL-MCNC: 114 UG/ML — HIGH (ref 50–100)
WBC # BLD: 4.42 K/UL — SIGNIFICANT CHANGE UP (ref 3.8–10.5)
WBC # FLD AUTO: 4.42 K/UL — SIGNIFICANT CHANGE UP (ref 3.8–10.5)

## 2024-02-14 PROCEDURE — 99233 SBSQ HOSP IP/OBS HIGH 50: CPT

## 2024-02-14 RX ORDER — POTASSIUM CHLORIDE 20 MEQ
10 PACKET (EA) ORAL
Refills: 0 | Status: COMPLETED | OUTPATIENT
Start: 2024-02-14 | End: 2024-02-15

## 2024-02-14 RX ORDER — MORPHINE SULFATE 50 MG/1
1 CAPSULE, EXTENDED RELEASE ORAL EVERY 6 HOURS
Refills: 0 | Status: DISCONTINUED | OUTPATIENT
Start: 2024-02-14 | End: 2024-02-21

## 2024-02-14 RX ORDER — OLANZAPINE 15 MG/1
2.5 TABLET, FILM COATED ORAL ONCE
Refills: 0 | Status: COMPLETED | OUTPATIENT
Start: 2024-02-14 | End: 2024-02-14

## 2024-02-14 RX ADMIN — LACOSAMIDE 140 MILLIGRAM(S): 50 TABLET ORAL at 22:42

## 2024-02-14 RX ADMIN — LEVETIRACETAM 600 MILLIGRAM(S): 250 TABLET, FILM COATED ORAL at 21:53

## 2024-02-14 RX ADMIN — Medication 62.5 MILLIGRAM(S): at 21:57

## 2024-02-14 RX ADMIN — Medication 62.5 MILLIGRAM(S): at 06:16

## 2024-02-14 RX ADMIN — LACOSAMIDE 140 MILLIGRAM(S): 50 TABLET ORAL at 09:10

## 2024-02-14 RX ADMIN — MUPIROCIN 1 APPLICATION(S): 20 OINTMENT TOPICAL at 06:22

## 2024-02-14 RX ADMIN — Medication 5 MILLIGRAM(S): at 06:18

## 2024-02-14 RX ADMIN — ALBUTEROL 2.5 MILLIGRAM(S): 90 AEROSOL, METERED ORAL at 08:16

## 2024-02-14 RX ADMIN — ALBUTEROL 2.5 MILLIGRAM(S): 90 AEROSOL, METERED ORAL at 20:08

## 2024-02-14 RX ADMIN — Medication 62.5 MILLIGRAM(S): at 13:00

## 2024-02-14 RX ADMIN — Medication 100 MILLIEQUIVALENT(S): at 22:54

## 2024-02-14 RX ADMIN — ALBUTEROL 2.5 MILLIGRAM(S): 90 AEROSOL, METERED ORAL at 03:09

## 2024-02-14 RX ADMIN — OLANZAPINE 2.5 MILLIGRAM(S): 15 TABLET, FILM COATED ORAL at 01:34

## 2024-02-14 RX ADMIN — CHLORHEXIDINE GLUCONATE 1 APPLICATION(S): 213 SOLUTION TOPICAL at 06:27

## 2024-02-14 RX ADMIN — ALBUTEROL 2.5 MILLIGRAM(S): 90 AEROSOL, METERED ORAL at 15:26

## 2024-02-14 RX ADMIN — Medication 5 MILLIGRAM(S): at 18:19

## 2024-02-14 NOTE — PROGRESS NOTE ADULT - NS ATTEND AMEND GEN_ALL_CORE FT
Agree with above. Prior PEG site granulated closed with no obvious open stoma tract. IR and GI unable to replace gastrostomy tube.   For laparoscopic possible open placement of gastrostomy tube tomorrow.

## 2024-02-14 NOTE — CHART NOTE - NSCHARTNOTEFT_GEN_A_CORE
EVENT: Potassium: 3.4 mmol/L (02.14.24 @ 09:10)    BRIEF HPI: 49yo M pt with PMH CVA (3/30/2022 with residual aphasia and right sided residual weakness), seizures, HTN, HDL, CHF w/ ICD (initially rEF 30%->55-60% on 03/2021), MDD, Recurrent PE, Afib (on Eliquis), obesity s/p bariatric intervention presenting to ED for PEG tube malfunction admitted for further care. Per endorsement, PEG tube insertion attempted in ED without success. NG tube also attempted but due to bleeding the insertion was aborted and AC not restarted (home med). Patient was transferred to  for further care given that patient has a trach. Patient has copious amounts of secretion from trach requiring constant suctioning. Sputum cx sent. CXR noted. Afebrile. Concern for MDRO and therefore was placed on contact  isolation until cx comes back. GI consulted and examined patient at bedside. GI spoke with patient's proxy. Plan for OR 2/15 consent obtained from daughter. Dispo is to return to [facility] once PEG placed,    OBJECTIVE:  Vital Signs Last 24 Hrs  T(C): 36.6 (14 Feb 2024 20:38), Max: 36.9 (14 Feb 2024 05:30)  T(F): 97.9 (14 Feb 2024 20:38), Max: 98.4 (14 Feb 2024 05:30)  HR: 64 (14 Feb 2024 21:11) (59 - 73)  BP: 119/76 (14 Feb 2024 20:38) (111/71 - 126/81)  BP(mean): --  RR: 18 (14 Feb 2024 21:11) (15 - 18)  SpO2: 99% (14 Feb 2024 21:11) (93% - 100%)    Parameters below as of 14 Feb 2024 21:11  Patient On (Oxygen Delivery Method): hydro-trach  O2 Flow (L/min): 4    FOCUSED PHYSICAL EXAM:  CV: S1 S2, regular  RESP: Unlabored    LABS:                        10.1   4.42  )-----------( 260      ( 14 Feb 2024 09:10 )             32.0     02-14    143  |  107  |  6<L>  ----------------------------<  83  3.4<L>   |  29  |  0.50    Ca    8.3<L>      14 Feb 2024 09:10  Phos  3.3     02-14  Mg     1.9     02-14    TPro  6.7  /  Alb  2.2<L>  /  TBili  0.2  /  DBili  x   /  AST  22  /  ALT  13  /  Alk Phos  62  02-14    PROBLEM: Hypokalemia probably due to poor oral intake                                PLAN:   Potassium chloride  10 mEq/100 mL IVPB 10 milliequivalent(s) IV Intermittent every 1 hour X 3    FOLLOW UP / RESULT: AM labs

## 2024-02-14 NOTE — PROGRESS NOTE ADULT - PROBLEM SELECTOR PLAN 4
Secondary to CVA  Maintain seizure precautions.  Maintain Safety measures.  Keep head of bed elevated at all times.

## 2024-02-14 NOTE — PROGRESS NOTE ADULT - PROBLEM SELECTOR PLAN 2
Continue hydro-trach collar.    Monitor oxygen saturation.  Continue bronchodilators via nebulizer.  CXR shows elevated right hemidiaphragm. +AICD  Speech eval appreciated. Passey [place] attached to trach. Tolerated for 30-40 minutes.

## 2024-02-14 NOTE — CHART NOTE - NSCHARTNOTEFT_GEN_A_CORE
[place] interpretor [id number]  Explained to mother at bedside that Dr Elmore will perform  surgery tomorrow to insert feeding tube.  All questions answered.

## 2024-02-14 NOTE — PROGRESS NOTE ADULT - PROBLEM SELECTOR PLAN 9
SCB for DVT prophylaxis  Continue to hold AC  Surgical consult for Peg placement.  Continue oxygen support via hydro-trache  Monitor saturation.  Speech eval for Passey Mcdonough valve.  Continue antiepileptics.  Contact Isolation for possible MDRO  Dispo: return to [facility] after PEG placement. SCB for DVT prophylaxis  Continue to hold   Surgical consult for Peg placement.  Continue oxygen support via hydro-trache  Monitor saturation.  Continue using Passey Shani valve.  Continue antiepileptics.  Contact Isolation for possible MDRO  Dispo: return to [facility] after PEG placement.  Medically optimized for Peg placement tomorrow.

## 2024-02-14 NOTE — PROGRESS NOTE ADULT - ASSESSMENT
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 Per endorsement, PEG tube insertion attempted in ED without success. NG tube also attempted but due to bleeding the insertion was aborted and AC not restarted (home med). Patient was transferred to  for further care given that patient has a trach. Patient has copious amounts of secretion from trach requiring constant suctioning. Sputum cx sent. CXR noted. Afebrile. Concern for MDRO and therefore was placed on contact  isolation until cx comes back. GI consulted and examined patient at bedside. GI spoke with patient's proxy. Plan for OR on Monday for new PEG tube insertion.   Patient's seizure meds switched to IV form. BB IV. Monitor BP per unit protocol.   02/09: OR on Monday for PEG. Dispo is to return to [facility] once PEg placed,   2/12  GI had no window to place peg. IR agrees with GI.  Surgery called for consult.

## 2024-02-14 NOTE — PROGRESS NOTE ADULT - SUBJECTIVE AND OBJECTIVE BOX
DANNIE SHIRLEY    SCU progress note    INTERVAL HPI/OVERNIGHT EVENTS: ***No overnight events.    DNR [ ]   DNI  [  ]   FULL CODE    Covid - 19 PCR:     The 4Ms    What Matters Most: see GOC  Age appropriate Medications/Screen for High Risk Medication: Yes  Mentation: see CAM below  Mobility: defer to physical exam    The Confusion Assessment Method (CAM) Diagnostic Algorithm     1: Acute Onset or Fluctuating Course  - Is there evidence of an acute change in mental status from the patient’s baseline? Did the (abnormal) behavior  fluctuate during the day, that is, tend to come and go, or increase and decrease in severity?  [x ] YES [ ] NO     2: Inattention  - Did the patient have difficulty focusing attention, being easily distractible, or having difficulty keeping track of what was being said?  [ ] YES [ ] NO   Unable to access     3: Disorganized thinking  -Was the patient’s thinking disorganized or incoherent, such as rambling or irrelevant conversation, unclear or illogical flow of ideas, or unpredictable switching from subject to subject?  [ ] YES [ ] NO  Unable to access    4: Altered Level of consciousness?  [ ] YES [ ] NO    The diagnosis of delirium by CAM requires the presence of features 1 and 2 and either 3 or 4.    PRESSORS: [ ] YES [x ] NO    Cardiovascular:  Heart Failure  Acute   Acute on Chronic  Chronic       metoprolol tartrate Injectable 5 milliGRAM(s) IV Push every 12 hours    Pulmonary:  albuterol    0.083% 2.5 milliGRAM(s) Nebulizer every 6 hours    Hematalogic:    Other:  chlorhexidine 2% Cloths 1 Application(s) Topical <User Schedule>  dextrose 5% + lactated ringers. 1000 milliLiter(s) IV Continuous <Continuous>  lacosamide IVPB 200 milliGRAM(s) IV Intermittent <User Schedule>  levETIRAcetam  IVPB 2000 milliGRAM(s) IV Intermittent <User Schedule>  morphine  - Injectable 1 milliGRAM(s) IV Push every 6 hours PRN  valproate sodium  IVPB 1250 milliGRAM(s) IV Intermittent <User Schedule>    albuterol    0.083% 2.5 milliGRAM(s) Nebulizer every 6 hours  chlorhexidine 2% Cloths 1 Application(s) Topical <User Schedule>  dextrose 5% + lactated ringers. 1000 milliLiter(s) IV Continuous <Continuous>  lacosamide IVPB 200 milliGRAM(s) IV Intermittent <User Schedule>  levETIRAcetam  IVPB 2000 milliGRAM(s) IV Intermittent <User Schedule>  metoprolol tartrate Injectable 5 milliGRAM(s) IV Push every 12 hours  morphine  - Injectable 1 milliGRAM(s) IV Push every 6 hours PRN  valproate sodium  IVPB 1250 milliGRAM(s) IV Intermittent <User Schedule>    Drug Dosing Weight    Weight (kg): 81.7 (08 Feb 2024 10:00)    CENTRAL LINE: [ ] YES [x ] NO  LOCATION:   DATE INSERTED:  REMOVE: [ ] YES [ ] NO  EXPLAIN:    FAULKNER: [ ] YES [x ] NO    DATE INSERTED:  REMOVE:  [ ] YES [ ] NO  EXPLAIN:    PAST MEDICAL & SURGICAL HISTORY:  Heart failure, unspecified HF chronicity, unspecified heart failure type      ETOH abuse  h/o etoh abuse now moderate drinker      Artificial pacemaker                        PHYSICAL EXAM:    GENERAL: NAD, well-groomed, well-developed  HEAD:  +Craniofacial defect  EYES: EOMI, PERRLA, conjunctiva and sclera clear  ENMT: No tonsillar erythema, exudates  NECK: Supple, No JVD, tracheostomy intact  NERVOUS SYSTEM:  Awake and alert. Intermittent agitation overnight. +R sided hemiparesis. Partially contracted right side.  Moving left side well. Intermittently following commands.  CHEST/LUNG: Clear to percussion bilaterally; No rales, rhonchi, wheezing, or rubs  HEART: Regular rate and rhythm; No murmurs, rubs, or gallops  ABDOMEN: Soft, Nontender, Nondistended; Bowel sounds present;  Dressing over old Peg site dry and intact  EXTREMITIES:+Right sided hemiparesis. +Contractures right side. Moving left arm and leg well. +Splint/ arm brace right arm.   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 (4cm x 2.5cm x 3cm) with bone, pink tissue, drainage, white wound edges, and undermining (up to 4cm at 360 degrees)  Left Upper Abdominal Quadrant pink stoma with scant drainage on the dressing      LABS:  CBC Full  -  ( 14 Feb 2024 09:10 )  WBC Count : 4.42 K/uL  RBC Count : 3.17 M/uL  Hemoglobin : 10.1 g/dL  Hematocrit : 32.0 %  Platelet Count - Automated : 260 K/uL  Mean Cell Volume : 100.9 fl  Mean Cell Hemoglobin : 31.9 pg  Mean Cell Hemoglobin Concentration : 31.6 gm/dL  Auto Neutrophil # : x  Auto Lymphocyte # : x  Auto Monocyte # : x  Auto Eosinophil # : x  Auto Basophil # : x  Auto Neutrophil % : x  Auto Lymphocyte % : x  Auto Monocyte % : x  Auto Eosinophil % : x  Auto Basophil % : x    02-13    142  |  107  |  9   ----------------------------<  93  3.2<L>   |  29  |  0.52    Ca    8.6      13 Feb 2024 05:22  Phos  4.0     02-13  Mg     1.9     02-13    TPro  6.6  /  Alb  2.2<L>  /  TBili  0.3  /  DBili  x   /  AST  15  /  ALT  11  /  AlkPhos  63  02-13      Urinalysis Basic - ( 13 Feb 2024 05:22 )    Color: x / Appearance: x / SG: x / pH: x  Gluc: 93 mg/dL / Ketone: x  / Bili: x / Urobili: x   Blood: x / Protein: x / Nitrite: x   Leuk Esterase: x / RBC: x / WBC x   Sq Epi: x / Non Sq Epi: x / Bacteria: x            [  ]  DVT Prophylaxis  [  ]  Nutrition, Brand, Rate         Goal Rate        Abnormal Nutritional Status -  Malnutrition   Cachexia          RADIOLOGY & ADDITIONAL STUDIES:  ***  < from: Xray Chest 1 View- PORTABLE-Urgent (Xray Chest 1 View- PORTABLE-Urgent .) (02.08.24 @ 10:17) >  COMPARISON: February 5, 2024    IMPRESSION: Tracheostomy and pacer as before. No focal infiltrate. Some   elevation of the right hemidiaphragm. Heart is at the upper limits of   normal in its transthoracic diameter. Regional osseous structures   appropriate for age    --- End of Report ---      < end of copied text >    Goals of Care Discussion with Family/Proxy/Other

## 2024-02-14 NOTE — PROGRESS NOTE ADULT - ASSESSMENT
47 y/o male with displaced peg w/ hx of bariatric surgery     GI reported no safe window during endoscopy. IR reviewed prior CT abdomen and agrees.    Plan   - plan for laparoscopic, possible open gastrostomy tube placement - date tbd  - tube feeds as needed  - IVF  - medical optimization / medical clearance for OR, pt will undergo general anesthesia  - remainder of care as per primary team          47 y/o male with displaced peg w/ hx of bariatric surgery     GI reported no safe window during endoscopy. IR reviewed prior CT abdomen and agrees.    Plan   - plan for laparoscopic, possible open gastrostomy tube placement tomorrow 2/15  - NPO / hold feeds at midnight   - IVF when NPO  - pre op labs   - medical optimization / document medical clearance for OR, pt will undergo general anesthesia  - remainder of care as per primary team

## 2024-02-14 NOTE — PROGRESS NOTE ADULT - PROBLEM SELECTOR PLAN 1
Patient pulled out Peg tube at rehab.  Unable to place new Peg in ED.  Dr Filemon SIMON was unable to fine window in endoscopy this morning. IR reviewed old CT Abdomen and agrees.  Surgery called for consult. Dr Elmore following  S/P NGT insertion on 2/8 aborted after bleeding  AC placed on hold.  Nasal feeding tube placed 2/10 which patient pulled out. Patient pulled out Peg tube at rehab.  Unable to place new Peg in ED.  Dr Filemon SIMON was unable to fine window in endoscopy this morning. IR reviewed old CT Abdomen and agrees.  Surgery called for consult. Dr Elmore following  S/P NGT insertion on 2/8 aborted after bleeding  AC placed on hold.  Nasal feeding tube placed 2/10 which patient pulled out.  Dr Elmore will place g tube tomorrow 2/15.

## 2024-02-14 NOTE — PROGRESS NOTE ADULT - SUBJECTIVE AND OBJECTIVE BOX
INTERVAL HPI/OVERNIGHT EVENTS:  Pt resting comfortably. No acute complaints. No overnight events.         MEDICATIONS  (STANDING):  albuterol    0.083% 2.5 milliGRAM(s) Nebulizer every 6 hours  chlorhexidine 2% Cloths 1 Application(s) Topical <User Schedule>  dextrose 5% + lactated ringers. 1000 milliLiter(s) (75 mL/Hr) IV Continuous <Continuous>  lacosamide IVPB 200 milliGRAM(s) IV Intermittent <User Schedule>  levETIRAcetam  IVPB 2000 milliGRAM(s) IV Intermittent <User Schedule>  metoprolol tartrate Injectable 5 milliGRAM(s) IV Push every 12 hours  valproate sodium  IVPB 1250 milliGRAM(s) IV Intermittent <User Schedule>    MEDICATIONS  (PRN):  morphine  - Injectable 1 milliGRAM(s) IV Push every 6 hours PRN Severe Pain (7 - 10)      Vital Signs Last 24 Hrs  T(C): 36.9 (14 Feb 2024 05:30), Max: 36.9 (14 Feb 2024 05:30)  T(F): 98.4 (14 Feb 2024 05:30), Max: 98.4 (14 Feb 2024 05:30)  HR: 62 (14 Feb 2024 05:30) (62 - 75)  BP: 126/81 (14 Feb 2024 05:30) (121/76 - 153/89)  BP(mean): 111 (13 Feb 2024 20:25) (111 - 111)  RR: 18 (14 Feb 2024 05:30) (18 - 18)  SpO2: 100% (14 Feb 2024 05:30) (91% - 100%)    Parameters below as of 14 Feb 2024 05:30  Patient On (Oxygen Delivery Method): hydro trach        Physical:  General: NAD. Aphasic  Abdomen: Soft nondistended, nontender. PEG site with granulation tissue.     I&O's Detail      LABS:                        10.1   4.42  )-----------( 260      ( 14 Feb 2024 09:10 )             32.0             02-14    143  |  107  |  6<L>  ----------------------------<  83  3.4<L>   |  29  |  0.50    Ca    8.3<L>      14 Feb 2024 09:10  Phos  3.3     02-14  Mg     1.9     02-14    TPro  6.7  /  Alb  2.2<L>  /  TBili  0.2  /  DBili  x   /  AST  22  /  ALT  13  /  AlkPhos  62  02-14      48y.o. Male

## 2024-02-14 NOTE — PROGRESS NOTE ADULT - SUBJECTIVE AND OBJECTIVE BOX
Time of Visit:  Patient seen and examined.     MEDICATIONS  (STANDING):  albuterol    0.083% 2.5 milliGRAM(s) Nebulizer every 6 hours  chlorhexidine 2% Cloths 1 Application(s) Topical <User Schedule>  dextrose 5% + lactated ringers. 1000 milliLiter(s) (75 mL/Hr) IV Continuous <Continuous>  lacosamide IVPB 200 milliGRAM(s) IV Intermittent <User Schedule>  levETIRAcetam  IVPB 2000 milliGRAM(s) IV Intermittent <User Schedule>  metoprolol tartrate Injectable 5 milliGRAM(s) IV Push every 12 hours  valproate sodium  IVPB 1250 milliGRAM(s) IV Intermittent <User Schedule>      MEDICATIONS  (PRN):  morphine  - Injectable 1 milliGRAM(s) IV Push every 6 hours PRN Severe Pain (7 - 10)       Medications up to date at time of exam.      PHYSICAL EXAMINATION:  Patient has no new complaints.  GENERAL: The patient is a well-developed, well-nourished, in no apparent distress.     Vital Signs Last 24 Hrs  T(C): 36.6 (14 Feb 2024 13:41), Max: 36.9 (14 Feb 2024 05:30)  T(F): 97.8 (14 Feb 2024 13:41), Max: 98.4 (14 Feb 2024 05:30)  HR: 72 (14 Feb 2024 16:20) (59 - 75)  BP: 124/85 (14 Feb 2024 13:41) (121/76 - 153/89)  BP(mean): 111 (13 Feb 2024 20:25) (111 - 111)  RR: 18 (14 Feb 2024 16:20) (15 - 18)  SpO2: 93% (14 Feb 2024 16:20) (91% - 100%)    Parameters below as of 14 Feb 2024 16:20  Patient On (Oxygen Delivery Method): Hydrotrach  O2 Flow (L/min): 4     (if applicable)    Chest Tube (if applicable)    HEENT: Head is normocephalic and atraumatic. Extraocular muscles are intact. Mucous membranes are moist.     NECK: Supple, no palpable adenopathy.    LUNGS: Clear to auscultation, no wheezing, rales, or rhonchi.    HEART: Regular rate and rhythm without murmur.    ABDOMEN: Soft, nontender, and nondistended.  No hepatosplenomegaly is noted.    : No painful voiding, no pelvic pain    EXTREMITIES: Without any cyanosis, clubbing, rash, lesions or edema.    NEUROLOGIC: Awake, alert, oriented, grossly intact    SKIN: Warm, dry, good turgor.      LABS:                        10.1   4.42  )-----------( 260      ( 14 Feb 2024 09:10 )             32.0     02-14    143  |  107  |  6<L>  ----------------------------<  83  3.4<L>   |  29  |  0.50    Ca    8.3<L>      14 Feb 2024 09:10  Phos  3.3     02-14  Mg     1.9     02-14    TPro  6.7  /  Alb  2.2<L>  /  TBili  0.2  /  DBili  x   /  AST  22  /  ALT  13  /  AlkPhos  62  02-14      Urinalysis Basic - ( 14 Feb 2024 09:10 )    Color: x / Appearance: x / SG: x / pH: x  Gluc: 83 mg/dL / Ketone: x  / Bili: x / Urobili: x   Blood: x / Protein: x / Nitrite: x   Leuk Esterase: x / RBC: x / WBC x   Sq Epi: x / Non Sq Epi: x / Bacteria: x                      MICROBIOLOGY: (if applicable)    RADIOLOGY & ADDITIONAL STUDIES:  EKG:   CXR:  ECHO:    IMPRESSION: 48y Male PAST MEDICAL & SURGICAL HISTORY:  Heart failure, unspecified HF chronicity, unspecified heart failure type      ETOH abuse  h/o etoh abuse now moderate drinker      Artificial pacemaker       p/w           RECOMMENDATIONS:   Time of Visit:  Patient seen and examined.     MEDICATIONS  (STANDING):  albuterol    0.083% 2.5 milliGRAM(s) Nebulizer every 6 hours  chlorhexidine 2% Cloths 1 Application(s) Topical <User Schedule>  dextrose 5% + lactated ringers. 1000 milliLiter(s) (75 mL/Hr) IV Continuous <Continuous>  lacosamide IVPB 200 milliGRAM(s) IV Intermittent <User Schedule>  levETIRAcetam  IVPB 2000 milliGRAM(s) IV Intermittent <User Schedule>  metoprolol tartrate Injectable 5 milliGRAM(s) IV Push every 12 hours  valproate sodium  IVPB 1250 milliGRAM(s) IV Intermittent <User Schedule>      MEDICATIONS  (PRN):  morphine  - Injectable 1 milliGRAM(s) IV Push every 6 hours PRN Severe Pain (7 - 10)       Medications up to date at time of exam.      PHYSICAL EXAMINATION:  Patient has no new complaints.  GENERAL: The patient is a well-developed, well-nourished, in no apparent distress.     Vital Signs Last 24 Hrs  T(C): 36.6 (14 Feb 2024 13:41), Max: 36.9 (14 Feb 2024 05:30)  T(F): 97.8 (14 Feb 2024 13:41), Max: 98.4 (14 Feb 2024 05:30)  HR: 72 (14 Feb 2024 16:20) (59 - 75)  BP: 124/85 (14 Feb 2024 13:41) (121/76 - 153/89)  BP(mean): 111 (13 Feb 2024 20:25) (111 - 111)  RR: 18 (14 Feb 2024 16:20) (15 - 18)  SpO2: 93% (14 Feb 2024 16:20) (91% - 100%)    Parameters below as of 14 Feb 2024 16:20  Patient On (Oxygen Delivery Method): Hydrotrach  O2 Flow (L/min): 4     (if applicable)    Chest Tube (if applicable)    HEENT: Head is normocephalic and atraumatic. Extraocular muscles are intact. Mucous membranes are moist.     NECK: Supple, no palpable adenopathy. + trach     LUNGS: Clear to auscultation, no wheezing, rales, or rhonchi.    HEART: Regular rate and rhythm without murmur.    ABDOMEN: Soft, nontender, and nondistended.  No hepatosplenomegaly is noted.    : No painful voiding, no pelvic pain    EXTREMITIES: Without any cyanosis, clubbing, rash, lesions or edema.    NEUROLOGIC: Awake, alert, oriented, grossly intact    SKIN: Warm, dry, good turgor.      LABS:                        10.1   4.42  )-----------( 260      ( 14 Feb 2024 09:10 )             32.0     02-14    143  |  107  |  6<L>  ----------------------------<  83  3.4<L>   |  29  |  0.50    Ca    8.3<L>      14 Feb 2024 09:10  Phos  3.3     02-14  Mg     1.9     02-14    TPro  6.7  /  Alb  2.2<L>  /  TBili  0.2  /  DBili  x   /  AST  22  /  ALT  13  /  AlkPhos  62  02-14      Urinalysis Basic - ( 14 Feb 2024 09:10 )    Color: x / Appearance: x / SG: x / pH: x  Gluc: 83 mg/dL / Ketone: x  / Bili: x / Urobili: x   Blood: x / Protein: x / Nitrite: x   Leuk Esterase: x / RBC: x / WBC x   Sq Epi: x / Non Sq Epi: x / Bacteria: x        MICROBIOLOGY: (if applicable)    RADIOLOGY & ADDITIONAL STUDIES:  EKG:   CXR:  ECHO:    IMPRESSION: 48y Male PAST MEDICAL & SURGICAL HISTORY:  Heart failure, unspecified HF chronicity, unspecified heart failure type      ETOH abuse  h/o etoh abuse now moderate drinker      Artificial pacemaker       p/w           Impression: This is a 47 Y/O male from [facility] . Presenting to ED for PEG tube malfunction. For pulmonary evaluation due to Chronic Respiratory Failure , Has Trach . Has Chronic Pulmonary Embolism and off AC due to GI Bleed. Sputum cx growing moderate Pseudomonas aeruginosa .     Suggestion:  O2 saturation 98% and continue hydrotrach O2 supplement.  Contact isolation.  Oral, trach care, suction.  Not a candidate for decannulation at this time. Can have speaking valve as tolerated .   Aspiration precautions with HOB elevation.   On Albuterol via nebulization Q 6 Hours.  For surgical insertion of  J-tube 1/15

## 2024-02-14 NOTE — PROGRESS NOTE ADULT - NS ATTEND AMEND GEN_ALL_CORE FT
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 Now pending surgical PEG placement after unable to replace endoscopically w/ GI.    ASSESSMENT:  #PEG malfunction  #CVA  #Tracheostomy status  #Seizures  #H/o recurrent PE  #Afib  #HFmrEF    Plan:  - unable to place PEG w/ GI 2/12 d/t anatomy limitations and h/o bypass  - IR and GI agree w/ surgical consultation  - f/u surgery recs for surgical PEG placement - pending OR date  - patient has repeatedly ripped out NGT in the interim  - holding AC pending procedure  - home AEDs for seizure  - PRN IV antihypertensives pending enteral access   - cont 4L hydrotrach  - routine trach care and suctioning  - passymuir valve eval passed - cont SLP follow-up 49yo man w/ PMH trach s/p CVA (3/2022, residual aphasia and R weakness), seizures, HTN, HFmrEF, h/o recurrent PE, Afib on Eliquis, obesity s/p gastric bypass originally presented for PEG malfunction with course c/b difficulty replacing PEG or obtaining NG access due to anatomy and h/o gastric bypass. Now pending surgical PEG placement after unable to replace endoscopically w/ GI.    ASSESSMENT:  #PEG malfunction  #CVA  #Tracheostomy status  #Seizures  #H/o recurrent PE  #Afib  #HFmrEF    Plan:  - unable to place PEG w/ GI 2/12 d/t anatomy limitations and h/o bypass  - IR and GI agree w/ surgical consultation  - f/u surgery recs for surgical PEG placement - pending OR date  - patient at baseline has debility and cannot assess metabolic equivalents (METs) but is otherwise medically optimized for OR for PEG placement  - patient has repeatedly ripped out NGT in the interim  - holding AC pending procedure, resume post PEG  - home AEDs for seizure  - PRN IV antihypertensives pending enteral access   - cont 4L hydrotrach  - routine trach care and suctioning  - passymuir valve eval passed - cont SLP follow-up

## 2024-02-15 LAB
ALBUMIN SERPL ELPH-MCNC: 2.1 G/DL — LOW (ref 3.5–5)
ALP SERPL-CCNC: 60 U/L — SIGNIFICANT CHANGE UP (ref 40–120)
ALT FLD-CCNC: 12 U/L DA — SIGNIFICANT CHANGE UP (ref 10–60)
ANION GAP SERPL CALC-SCNC: 7 MMOL/L — SIGNIFICANT CHANGE UP (ref 5–17)
APTT BLD: 40 SEC — HIGH (ref 24.5–35.6)
APTT BLD: 40.1 SEC — HIGH (ref 24.5–35.6)
AST SERPL-CCNC: 17 U/L — SIGNIFICANT CHANGE UP (ref 10–40)
BILIRUB SERPL-MCNC: 0.2 MG/DL — SIGNIFICANT CHANGE UP (ref 0.2–1.2)
BLD GP AB SCN SERPL QL: SIGNIFICANT CHANGE UP
BUN SERPL-MCNC: 4 MG/DL — LOW (ref 7–18)
CALCIUM SERPL-MCNC: 8.2 MG/DL — LOW (ref 8.4–10.5)
CHLORIDE SERPL-SCNC: 106 MMOL/L — SIGNIFICANT CHANGE UP (ref 96–108)
CO2 SERPL-SCNC: 29 MMOL/L — SIGNIFICANT CHANGE UP (ref 22–31)
CREAT SERPL-MCNC: 0.38 MG/DL — LOW (ref 0.5–1.3)
EGFR: 136 ML/MIN/1.73M2 — SIGNIFICANT CHANGE UP
GLUCOSE BLDC GLUCOMTR-MCNC: 108 MG/DL — HIGH (ref 70–99)
GLUCOSE BLDC GLUCOMTR-MCNC: 76 MG/DL — SIGNIFICANT CHANGE UP (ref 70–99)
GLUCOSE SERPL-MCNC: 99 MG/DL — SIGNIFICANT CHANGE UP (ref 70–99)
HCT VFR BLD CALC: 30 % — LOW (ref 39–50)
HGB BLD-MCNC: 9.8 G/DL — LOW (ref 13–17)
INR BLD: 1.37 RATIO — HIGH (ref 0.85–1.18)
INR BLD: 1.72 RATIO — HIGH (ref 0.85–1.18)
INR BLD: 2.19 RATIO — HIGH (ref 0.85–1.18)
LEVETIRACETAM SERPL-MCNC: 38.9 UG/ML — SIGNIFICANT CHANGE UP (ref 10–40)
MAGNESIUM SERPL-MCNC: 1.9 MG/DL — SIGNIFICANT CHANGE UP (ref 1.6–2.6)
MCHC RBC-ENTMCNC: 32.3 PG — SIGNIFICANT CHANGE UP (ref 27–34)
MCHC RBC-ENTMCNC: 32.7 GM/DL — SIGNIFICANT CHANGE UP (ref 32–36)
MCV RBC AUTO: 99 FL — SIGNIFICANT CHANGE UP (ref 80–100)
NRBC # BLD: 0 /100 WBCS — SIGNIFICANT CHANGE UP (ref 0–0)
PHOSPHATE SERPL-MCNC: 3.2 MG/DL — SIGNIFICANT CHANGE UP (ref 2.5–4.5)
PLATELET # BLD AUTO: 261 K/UL — SIGNIFICANT CHANGE UP (ref 150–400)
POTASSIUM SERPL-MCNC: 3.3 MMOL/L — LOW (ref 3.5–5.3)
POTASSIUM SERPL-SCNC: 3.3 MMOL/L — LOW (ref 3.5–5.3)
PROT SERPL-MCNC: 6.3 G/DL — SIGNIFICANT CHANGE UP (ref 6–8.3)
PROTHROM AB SERPL-ACNC: 15.5 SEC — HIGH (ref 9.5–13)
PROTHROM AB SERPL-ACNC: 19.3 SEC — HIGH (ref 9.5–13)
PROTHROM AB SERPL-ACNC: 24.4 SEC — HIGH (ref 9.5–13)
RBC # BLD: 3.03 M/UL — LOW (ref 4.2–5.8)
RBC # FLD: 12.8 % — SIGNIFICANT CHANGE UP (ref 10.3–14.5)
SODIUM SERPL-SCNC: 142 MMOL/L — SIGNIFICANT CHANGE UP (ref 135–145)
WBC # BLD: 3.8 K/UL — SIGNIFICANT CHANGE UP (ref 3.8–10.5)
WBC # FLD AUTO: 3.8 K/UL — SIGNIFICANT CHANGE UP (ref 3.8–10.5)

## 2024-02-15 PROCEDURE — 43653 LAPAROSCOPY GASTROSTOMY: CPT

## 2024-02-15 PROCEDURE — 99233 SBSQ HOSP IP/OBS HIGH 50: CPT

## 2024-02-15 PROCEDURE — 43653 LAPAROSCOPY GASTROSTOMY: CPT | Mod: AS

## 2024-02-15 DEVICE — IMPLANTABLE DEVICE: Type: IMPLANTABLE DEVICE | Status: FUNCTIONAL

## 2024-02-15 DEVICE — GUIDEWIRE SENSOR DUAL-FLEX NITINOL STRAIGHT .035" X 150CM: Type: IMPLANTABLE DEVICE | Status: FUNCTIONAL

## 2024-02-15 RX ORDER — PHYTONADIONE (VIT K1) 5 MG
5 TABLET ORAL ONCE
Refills: 0 | Status: COMPLETED | OUTPATIENT
Start: 2024-02-15 | End: 2024-02-15

## 2024-02-15 RX ORDER — POTASSIUM CHLORIDE 20 MEQ
10 PACKET (EA) ORAL
Refills: 0 | Status: COMPLETED | OUTPATIENT
Start: 2024-02-15 | End: 2024-02-15

## 2024-02-15 RX ORDER — POTASSIUM CHLORIDE 20 MEQ
10 PACKET (EA) ORAL
Refills: 0 | Status: DISCONTINUED | OUTPATIENT
Start: 2024-02-15 | End: 2024-02-15

## 2024-02-15 RX ORDER — CEFEPIME 1 G/1
2000 INJECTION, POWDER, FOR SOLUTION INTRAMUSCULAR; INTRAVENOUS EVERY 8 HOURS
Refills: 0 | Status: DISCONTINUED | OUTPATIENT
Start: 2024-02-15 | End: 2024-02-16

## 2024-02-15 RX ADMIN — Medication 62.5 MILLIGRAM(S): at 06:27

## 2024-02-15 RX ADMIN — Medication 100 MILLIEQUIVALENT(S): at 08:52

## 2024-02-15 RX ADMIN — LACOSAMIDE 140 MILLIGRAM(S): 50 TABLET ORAL at 09:24

## 2024-02-15 RX ADMIN — Medication 62.5 MILLIGRAM(S): at 23:21

## 2024-02-15 RX ADMIN — CHLORHEXIDINE GLUCONATE 1 APPLICATION(S): 213 SOLUTION TOPICAL at 06:31

## 2024-02-15 RX ADMIN — Medication 100 MILLIEQUIVALENT(S): at 02:04

## 2024-02-15 RX ADMIN — LEVETIRACETAM 600 MILLIGRAM(S): 250 TABLET, FILM COATED ORAL at 08:51

## 2024-02-15 RX ADMIN — Medication 100 MILLIEQUIVALENT(S): at 10:18

## 2024-02-15 RX ADMIN — ALBUTEROL 2.5 MILLIGRAM(S): 90 AEROSOL, METERED ORAL at 16:08

## 2024-02-15 RX ADMIN — Medication 101 MILLIGRAM(S): at 10:18

## 2024-02-15 RX ADMIN — Medication 5 MILLIGRAM(S): at 06:55

## 2024-02-15 RX ADMIN — LACOSAMIDE 140 MILLIGRAM(S): 50 TABLET ORAL at 23:19

## 2024-02-15 RX ADMIN — ALBUTEROL 2.5 MILLIGRAM(S): 90 AEROSOL, METERED ORAL at 02:29

## 2024-02-15 RX ADMIN — Medication 100 MILLIEQUIVALENT(S): at 00:22

## 2024-02-15 RX ADMIN — CEFEPIME 100 MILLIGRAM(S): 1 INJECTION, POWDER, FOR SOLUTION INTRAMUSCULAR; INTRAVENOUS at 14:45

## 2024-02-15 RX ADMIN — Medication 62.5 MILLIGRAM(S): at 13:08

## 2024-02-15 NOTE — PROGRESS NOTE ADULT - PROBLEM SELECTOR PLAN 8
Positive history of recurrent PEs  AC currently on hold secondary to bleeding.  trend CBC  Monitor oxygen saturation.  SCB for DVT prophylaxis. Positive history of recurrent PEs  AC currently on hold secondary to bleeding.  trend CBC  Monitor oxygen saturation.  SCDs for DVT prophylaxis.

## 2024-02-15 NOTE — PROGRESS NOTE ADULT - ASSESSMENT
47yo M pt with PMHx CVA (3/30/2022 with residual aphasia and right sided residual weakness), seizures, HTN, HLD, CHF w/ ICD (initially rEF 30%->55-60% on 03/2021), MDD, Recurrent PE, Afib (on Eliquis), obesity s/p bariatric intervention presenting to ED for PEG tube malfunction admitted for further care. Per endorsement, PEG tube insertion attempted in ED without success. NG tube also attempted but due to bleeding the insertion was aborted and AC not restarted (home med). Patient was transferred to  for further care given that patient has a trach. Patient has copious amounts of secretion from trach requiring constant suctioning. Sputum cx sent. CXR noted. Afebrile. Concern for MDRO and therefore was placed on contact  isolation until cx comes back. GI consulted and examined patient at bedside. GI spoke with patient's proxy. Plan for OR on Monday for new PEG tube insertion.   Patient's seizure meds switched to IV form. BB IV. Monitor BP per unit protocol.   02/09: OR on Monday for PEG. Dispo is to return to [facility] once PEg placed,   2/12  GI had no window to place peg. IR agrees with GI.  Surgery called for consult.  02/15/2024: OR today for PEG placement with Dr. Elmore. NPO. INR this AM 2.19.               47yo M pt with PMHx CVA (3/30/2022 with residual aphasia and right sided residual weakness), seizures, HTN, HLD, CHF w/ ICD (initially rEF 30%->55-60% on 03/2021), MDD, Recurrent PE, Afib (on Eliquis), obesity s/p bariatric intervention presenting to ED for PEG tube malfunction admitted for further care. Per endorsement, PEG tube insertion attempted in ED without success. NG tube also attempted but due to bleeding the insertion was aborted and AC not restarted (home med). Patient was transferred to  for further care given that patient has a trach. Patient has copious amounts of secretion from trach requiring constant suctioning. Sputum cx sent. CXR noted. Afebrile. Concern for MDRO and therefore was placed on contact  isolation until cx comes back. GI consulted and examined patient at bedside. GI spoke with patient's proxy. Plan for OR on Monday for new PEG tube insertion.   Patient's seizure meds switched to IV form. BB IV. Monitor BP per unit protocol.   02/09: OR on Monday for PEG. Dispo is to return to [facility] once PEg placed,   2/12  GI had no window to place peg. IR agrees with GI.  Surgery called for consult.  02/15/2024: OR today for PEG placement with Dr. Elmore. NPO. INR this AM 2.19. Vitamin K 5 mg IVPB ordered STAT. Plan of care discussed with intensivist.             47yo M pt with PMHx CVA (3/30/2022 with residual aphasia and right sided residual weakness), seizures, HTN, HLD, CHF w/ ICD (initially rEF 30%->55-60% on 03/2021), MDD, Recurrent PE, Afib (on Eliquis), obesity s/p bariatric intervention presenting to ED for PEG tube malfunction admitted for further care. Per endorsement, PEG tube insertion attempted in ED without success. NG tube also attempted but due to bleeding the insertion was aborted and AC not restarted (home med). Patient was transferred to  for further care given that patient has a trach. Patient has copious amounts of secretion from trach requiring constant suctioning. Sputum cx sent. CXR noted. Afebrile. Concern for MDRO and therefore was placed on contact  isolation until cx comes back. GI consulted and examined patient at bedside. GI spoke with patient's proxy. Plan for OR on Monday for new PEG tube insertion.   Patient's seizure meds switched to IV form. BB IV. Monitor BP per unit protocol.   02/09: OR on Monday for PEG. Dispo is to return to [facility] once PEg placed,   2/12  GI had no window to place peg. IR agrees with GI.  Surgery called for consult.  02/15/2024: OR today for PEG placement with Dr. Elmore this afternoon. NPO. IVF. INR this AM 2.19. S/p Vitamin K 5 mg IVPB and 1 unit FFP.  Plan of care discussed with intensivist and surgical team.

## 2024-02-15 NOTE — PROGRESS NOTE ADULT - NS ATTEND AMEND GEN_ALL_CORE FT
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 Now pending surgical PEG placement after unable to replace endoscopically w/ GI.    ASSESSMENT:  #PEG malfunction  #CVA  #Tracheostomy status  #Seizures  #H/o recurrent PE  #Afib  #HFmrEF    Plan:  - unable to place PEG w/ GI 2/12 d/t anatomy limitations and h/o bypass  - IR and GI agree w/ surgical consultation  - for PEG placement with gen SX today, mgmt appreciated  - patient at baseline has debility and cannot assess metabolic equivalents (METs) but is otherwise medically optimized for OR for PEG placement  - patient has repeatedly ripped out prior NGTs  - give 1x dose vitamin K and FFP for elevated INR suspect 2/2 insufficient hepatic factor production and lack of oral intake  - holding AC pending procedure, resume post PEG  - home AEDs for seizure  - PRN IV antihypertensives pending enteral access   - cont 4L hydrotrach  - routine trach care and suctioning  - passymuir valve eval passed - cont SLP follow-up

## 2024-02-15 NOTE — PROGRESS NOTE ADULT - PROBLEM SELECTOR PLAN 6
Continue metoprolol 5mg IV every 12 hours with parameters.  Continue to monitor. Continue metoprolol 5mg IV every 12 hours with parameters.  Continue to monitor.  Controlled.

## 2024-02-15 NOTE — CHART NOTE - NSCHARTNOTEFT_GEN_A_CORE
Assessment:     Factors impacting intake: [ ] none [ ] nausea  [ ] vomiting [ ] diarrhea [ ] constipation  [ ]chewing problems [ ] swallowing issues  [ ] other:     Diet Presciption: Diet, NPO (24 @ 12:25)    Intake:     Daily Weight in k.1 (10 Feb 2024 05:00)    % Weight Change    Pertinent Medications: MEDICATIONS  (STANDING):  albuterol    0.083% 2.5 milliGRAM(s) Nebulizer every 6 hours  chlorhexidine 2% Cloths 1 Application(s) Topical <User Schedule>  dextrose 5% + lactated ringers. 1000 milliLiter(s) (75 mL/Hr) IV Continuous <Continuous>  lacosamide IVPB 200 milliGRAM(s) IV Intermittent <User Schedule>  levETIRAcetam  IVPB 2000 milliGRAM(s) IV Intermittent <User Schedule>  metoprolol tartrate Injectable 5 milliGRAM(s) IV Push every 12 hours  valproate sodium  IVPB 1250 milliGRAM(s) IV Intermittent <User Schedule>    MEDICATIONS  (PRN):  morphine  - Injectable 1 milliGRAM(s) IV Push every 6 hours PRN Severe Pain (7 - 10)    Pertinent Labs: 02-15 Na142 mmol/L Glu 99 mg/dL K+ 3.3 mmol/L<L> Cr  0.38 mg/dL<L> BUN 4 mg/dL<L> 02-15 Phos 3.2 mg/dL 02-15 Alb 2.1 g/dL<L>     CAPILLARY BLOOD GLUCOSE      POCT Blood Glucose.: 84 mg/dL (2024 17:14)  POCT Blood Glucose.: 83 mg/dL (2024 11:34)      Skin:     Estimated Needs:   [ ] no change since previous assessment  [ ] recalculated:     Previous Nutrition Diagnosis:   [ ] Inadequate Energy Intake [ ]Inadequate Oral Intake [ ] Excessive Energy Intake   [ ] Underweight [ ] Increased Nutrient Needs [ ] Overweight/Obesity  [ ] Swallowing Difficult   [ ] Altered GI Function [ ] Unintended Weight Loss [ ] Food & Nutrition Related Knowledge Deficit [ ] Malnutrition   [ ] Not Ready for Diet/Life Style Changes     Nutrition Diagnosis is [ ] ongoing  [ ] Improving   [ ] resolved [ ] not applicable     New Nutrition Diagnosis: [ ] not applicable       Interventions:   Recommend  [ ] Change Diet To:  [ ] Nutrition Supplement  [ ] Nutrition Support  [ ] Other:     Monitoring and Evaluation:   [ ] PO intake [ x ] Tolerance to diet prescription [ x ] weights [ x ] labs[ x ] follow up per protocol  [ ] other: Assessment:        Nutrition reassessment for follow-up. Chart reviewed, pt visited, confused, lethargy, malfunctioned PEG with h/o Bariatric surgery, NPO x 8d, pending GT insertion by surgery today    Factors impacting intake: [ ] none [ ] nausea  [ ] vomiting [ ] diarrhea [ ] constipation  [ ]chewing problems [ x ] swallowing issues  [ x ] other: acute on chronic comorbidities     Diet Prescription: Diet, NPO (24 @ 12:25)    Daily Weight in k.1 (10 Feb 2024 05:00)    % Weight Change    Pertinent Medications: MEDICATIONS  (STANDING):  albuterol    0.083% 2.5 milliGRAM(s) Nebulizer every 6 hours  chlorhexidine 2% Cloths 1 Application(s) Topical <User Schedule>  dextrose 5% + lactated ringers. 1000 milliLiter(s) (75 mL/Hr) IV Continuous <Continuous>  lacosamide IVPB 200 milliGRAM(s) IV Intermittent <User Schedule>  levETIRAcetam  IVPB 2000 milliGRAM(s) IV Intermittent <User Schedule>  metoprolol tartrate Injectable 5 milliGRAM(s) IV Push every 12 hours  valproate sodium  IVPB 1250 milliGRAM(s) IV Intermittent <User Schedule>    MEDICATIONS  (PRN):  morphine  - Injectable 1 milliGRAM(s) IV Push every 6 hours PRN Severe Pain (7 - 10)    Pertinent Labs: 02-15 Na142 mmol/L Glu 99 mg/dL K+ 3.3 mmol/L<L> Cr  0.38 mg/dL<L> BUN 4 mg/dL<L> 02-15 Phos 3.2 mg/dL 02-15 Alb 2.1 g/dL<L>     CAPILLARY BLOOD GLUCOSE    POCT Blood Glucose.: 84 mg/dL (2024 17:14)  POCT Blood Glucose.: 83 mg/dL (2024 11:34)    Skin: Pressure Injury: stage IV, wound    Estimated Needs:   [ x ] no change since previous assessment  [ ] recalculated:     Previous Nutrition Diagnosis:    [ x ] Malnutrition     Nutrition Diagnosis is [ x ] ongoing  [ ] Improving   [ ] resolved [ ] not applicable     New Nutrition Diagnosis: [ x ] not applicable     Interventions/Recommend  [ x ] Change Diet To: Advance diet or consider alternate nutrition support if NPO prolonged further as medically feasible   [ ] Nutrition Supplement  [ x ] Nutrition Support: as per RD Initial Assessment: "(nutren or other 2 kcal formula RTH not available)/ closet Nepro (1.8 kcal/ ml): Nepro 45x24 (1080 ml,1944 kcals,87 gm protein).Add 1 Pkt Prosource TID (+45gm protein, 180 kcals). (+ Vitamin C/ MVI as PTA as medically feasible)" from chart   [ x ] Other: Discussed with RN    Monitoring and Evaluation:   [ ] PO intake [ x ] Tolerance to diet prescription [ x ] weights [ x ] labs[ x ] follow up per protocol  [ ] other: Assessment:        Nutrition reassessment for follow-up. Chart reviewed, pt visited, confused, lethargy, malfunctioned PEG with h/o Bariatric surgery, GI, Surgery on the case; NPO x 8d, pending GT insertion by surgery today per team     Factors impacting intake: [ x ] swallowing issues  [ x ] other: acute on chronic comorbidities; cognitive deficit      Diet Prescription: Diet, NPO (24 @ 12:25)    Daily Weight in k.1 (10 Feb 2024 05:00)    % Weight Change    Pertinent Medications: MEDICATIONS  (STANDING):  albuterol    0.083% 2.5 milliGRAM(s) Nebulizer every 6 hours  chlorhexidine 2% Cloths 1 Application(s) Topical <User Schedule>  dextrose 5% + lactated ringers. 1000 milliLiter(s) (75 mL/Hr) IV Continuous <Continuous>  lacosamide IVPB 200 milliGRAM(s) IV Intermittent <User Schedule>  levETIRAcetam  IVPB 2000 milliGRAM(s) IV Intermittent <User Schedule>  metoprolol tartrate Injectable 5 milliGRAM(s) IV Push every 12 hours  valproate sodium  IVPB 1250 milliGRAM(s) IV Intermittent <User Schedule>    MEDICATIONS  (PRN):  morphine  - Injectable 1 milliGRAM(s) IV Push every 6 hours PRN Severe Pain (7 - 10)    Pertinent Labs: 02-15 Na142 mmol/L Glu 99 mg/dL K+ 3.3 mmol/L<L> Cr  0.38 mg/dL<L> BUN 4 mg/dL<L> 02-15 Phos 3.2 mg/dL 02-15 Alb 2.1 g/dL<L>     CAPILLARY BLOOD GLUCOSE    POCT Blood Glucose.: 84 mg/dL (2024 17:14)  POCT Blood Glucose.: 83 mg/dL (2024 11:34)    Skin: Pressure Injury: stage IV, wound    Estimated Needs:   [ x ] no change since previous assessment  [ ] recalculated:     Previous Nutrition Diagnosis:    [ x ] Malnutrition (mild)    Nutrition Diagnosis is [ x ] ongoing  [ ] Improving   [ ] resolved [ ] not applicable     New Nutrition Diagnosis: [ x ] not applicable     Interventions/Recommend  [ x ] Change Diet To: Advance diet or consider alternate nutrition support if NPO prolonged further as medically feasible   [ ] Nutrition Supplement  [ x ] Nutrition Support: as per RD Initial Assessment: "(nutren or other 2 kcal formula RTH not available)/ closet Nepro (1.8 kcal/ ml): Nepro 45x24 (1080 ml,1944 kcals,87 gm protein).Add 1 Pkt Prosource TID (+45gm protein, 180 kcals). (+ Vitamin C/ MVI as PTA as medically feasible)" from chart   [ x ] Other: Discussed with RN    Monitoring and Evaluation:   [ ] PO intake [ x ] Tolerance to diet prescription [ x ] weights [ x ] labs[ x ] follow up per protocol  [ ] other:

## 2024-02-15 NOTE — BRIEF OPERATIVE NOTE - NSICDXBRIEFPOSTOP_GEN_ALL_CORE_FT
POST-OP DIAGNOSIS:  Dysphagia following cerebrovascular accident (CVA) 15-Feb-2024 22:52:49  Rhonda Elmore  S/P laparoscopic sleeve gastrectomy 15-Feb-2024 22:53:02  Rhonda Elmore

## 2024-02-15 NOTE — PROGRESS NOTE ADULT - PROBLEM SELECTOR PLAN 1
Patient pulled out Peg tube at rehab.  Unable to place new Peg in ED.  Dr Filemon SIMON was unable to fine window in endoscopy this morning. IR reviewed old CT Abdomen and agrees.  Surgery called for consult. Dr Elmore following  S/P NGT insertion on 2/8 aborted after bleeding  AC placed on hold.  Nasal feeding tube placed 2/10 which patient pulled out.  Dr Elmore will place g tube tomorrow 2/15. Patient pulled out Peg tube at rehab.  Unable to place new Peg in ED.  Dr Filemon SIMON was unable to fine window in endoscopy this morning. IR reviewed old CT Abdomen and agrees.  Surgery called for consult. Dr Elmore following  S/P NGT insertion on 2/8 aborted after bleeding  On IVF  AC placed on hold.   INR 2.19  S/p Vitamin K 5 mg IV and 1 unit FFP  Discussed with surgical team   Nasal feeding tube placed 2/10 which patient pulled out.  Dr Elmore will place g tube today 2/15 in PM  NPO

## 2024-02-15 NOTE — PROGRESS NOTE ADULT - PROBLEM SELECTOR PLAN 2
Continue hydro-trach collar.    Monitor oxygen saturation.  Continue bronchodilators via nebulizer.  CXR shows elevated right hemidiaphragm. +AICD  Speech eval appreciated. [place] attached to trach. Tolerated for 30-40 minutes. Continue hydro-trach collar @ 4L     Monitor oxygen saturation.  Continue bronchodilators via nebulizer.  CXR shows elevated right hemidiaphragm. +AICD  Speech eval appreciated. [place] attached to trach.

## 2024-02-15 NOTE — BRIEF OPERATIVE NOTE - NSICDXBRIEFPROCEDURE_GEN_ALL_CORE_FT
PROCEDURES:  Gastrostomy tube 15-Feb-2024 21:26:12  Kayleigh Gottlieb   PROCEDURES:  Laparoscopic gastrostomy 15-Feb-2024 22:51:17  Rhonda Elmore

## 2024-02-15 NOTE — BRIEF OPERATIVE NOTE - NSICDXBRIEFPREOP_GEN_ALL_CORE_FT
PRE-OP DIAGNOSIS:  Dysphagia S/P CVA (cerebrovascular accident) 15-Feb-2024 22:51:49  Rhonda Elmore  History of sleeve gastrectomy 15-Feb-2024 22:52:23  Rhonda Elmore

## 2024-02-15 NOTE — PROGRESS NOTE ADULT - NS ATTEND AMEND GEN_ALL_CORE FT
Impression: This is a 47 Y/O male from [facility] . Presenting to ED for PEG tube malfunction. For pulmonary evaluation due to Chronic Respiratory Failure , Has Trach . Has Chronic Pulmonary Embolism and off AC due to GI Bleed. Sputum cx growing moderate Pseudomonas aeruginosa .     Suggestion:  O2 saturation 96% and continue hydrotrach O2 supplement.  Oral, trach care, suction.  Not a candidate for decannulation at this time. Good candidate for speaking valve as tolerated .   Aspiration precautions with HOB elevation.   On Albuterol via nebulization Q 6 Hours.   Awaiting for Peg tube placement.

## 2024-02-15 NOTE — PROGRESS NOTE ADULT - SUBJECTIVE AND OBJECTIVE BOX
Time of Visit:  Patient seen and examined.     MEDICATIONS  (STANDING):  albuterol    0.083% 2.5 milliGRAM(s) Nebulizer every 6 hours  cefepime   IVPB 2000 milliGRAM(s) IV Intermittent every 8 hours  chlorhexidine 2% Cloths 1 Application(s) Topical <User Schedule>  dextrose 5% + lactated ringers. 1000 milliLiter(s) (75 mL/Hr) IV Continuous <Continuous>  lacosamide IVPB 200 milliGRAM(s) IV Intermittent <User Schedule>  levETIRAcetam  IVPB 2000 milliGRAM(s) IV Intermittent <User Schedule>  metoprolol tartrate Injectable 5 milliGRAM(s) IV Push every 12 hours  valproate sodium  IVPB 1250 milliGRAM(s) IV Intermittent <User Schedule>      MEDICATIONS  (PRN):  morphine  - Injectable 1 milliGRAM(s) IV Push every 6 hours PRN Severe Pain (7 - 10)       Medications up to date at time of exam.      PHYSICAL EXAMINATION:    Vital Signs Last 24 Hrs  T(C): 36.4 (15 Feb 2024 13:56), Max: 36.6 (14 Feb 2024 20:38)  T(F): 97.6 (15 Feb 2024 13:56), Max: 97.9 (14 Feb 2024 20:38)  HR: 59 (15 Feb 2024 13:56) (59 - 72)  BP: 118/82 (15 Feb 2024 13:56) (111/71 - 134/88)  BP(mean): --  RR: 18 (15 Feb 2024 13:56) (16 - 18)  SpO2: 99% (15 Feb 2024 13:56) (93% - 100%)    Parameters below as of 15 Feb 2024 13:56  Patient On (Oxygen Delivery Method): Hydro Trach  O2 Flow (L/min): 4     General : Alert and follow simple command. No acute distress .       HEENT: Left skull deformity . No nasal tenderness.  Mucous membranes are moist.     NECK: Has non infected Trach .    LUNGS: Non labored. No wheezing. No use of accessory muscle.     HEART: S1 S2 irregular rate .    ABDOMEN: Soft, nontender, and nondistended. Active bowel sounds.     EXTREMITIES: Total care. Non ambulatory.     NEUROLOGIC: Awake, alert, oriented x 1. Right side weakness.     SKIN: Warm, dry, good turgor. Wound care to Sacral ulcer.     LABS:                        9.8    3.80  )-----------( 261      ( 15 Feb 2024 05:57 )             30.0     02-15    142  |  106  |  4<L>  ----------------------------<  99  3.3<L>   |  29  |  0.38<L>    Ca    8.2<L>      15 Feb 2024 05:57  Phos  3.2     02-15  Mg     1.9     02-15    TPro  6.3  /  Alb  2.1<L>  /  TBili  0.2  /  DBili  x   /  AST  17  /  ALT  12  /  AlkPhos  60  02-15    PT/INR - ( 15 Feb 2024 12:39 )   PT: 19.3 sec;   INR: 1.72 ratio         PTT - ( 15 Feb 2024 12:39 )  PTT:40.1 sec  Urinalysis Basic - ( 15 Feb 2024 05:57 )    Color: x / Appearance: x / SG: x / pH: x  Gluc: 99 mg/dL / Ketone: x  / Bili: x / Urobili: x   Blood: x / Protein: x / Nitrite: x   Leuk Esterase: x / RBC: x / WBC x   Sq Epi: x / Non Sq Epi: x / Bacteria: x      MICROBIOLOGY: (if applicable)    RADIOLOGY & ADDITIONAL STUDIES:  EKG:   CXR:  ECHO:    IMPRESSION: 48y Male PAST MEDICAL & SURGICAL HISTORY:  Heart failure, unspecified HF chronicity, unspecified heart failure type      ETOH abuse  h/o etoh abuse now moderate drinker      Artificial pacemaker       Impression: This is a 47 Y/O male from [facility] . Presenting to ED for PEG tube malfunction. For pulmonary evaluation due to Chronic Respiratory Failure , Has Trach . Has Chronic Pulmonary Embolism and off AC due to GI Bleed. Sputum cx growing moderate Pseudomonas aeruginosa .     Suggestion:  O2 saturation 96% and continue hydrotrach O2 supplement.  Oral, trach care, suction.  Not a candidate for decannulation at this time. Good candidate for speaking valve as tolerated .   Aspiration precautions with HOB elevation.   On Albuterol via nebulization Q 6 Hours.   Awaiting for Peg tube placement.

## 2024-02-15 NOTE — PROGRESS NOTE ADULT - SUBJECTIVE AND OBJECTIVE BOX
DANNIE SHIRLEY    SCU progress note    INTERVAL HPI/OVERNIGHT EVENTS: ***    DNR [ ]   DNI  [  ]    Covid - 19 PCR:     The 4Ms    What Matters Most: see GOC  Age appropriate Medications/Screen for High Risk Medication: Yes  Mentation: see CAM below  Mobility: defer to physical exam    The Confusion Assessment Method (CAM) Diagnostic Algorithm     1: Acute Onset or Fluctuating Course  - Is there evidence of an acute change in mental status from the patient’s baseline? Did the (abnormal) behavior  fluctuate during the day, that is, tend to come and go, or increase and decrease in severity?  [ ] YES [ ] NO     2: Inattention  - Did the patient have difficulty focusing attention, being easily distractible, or having difficulty keeping track of what was being said?  [ ] YES [ ] NO     3: Disorganized thinking  -Was the patient’s thinking disorganized or incoherent, such as rambling or irrelevant conversation, unclear or illogical flow of ideas, or unpredictable switching from subject to subject?  [ ] YES [ ] NO    4: Altered Level of consciousness?  [ ] YES [ ] NO    The diagnosis of delirium by CAM requires the presence of features 1 and 2 and either 3 or 4.    PRESSORS: [ ] YES [ ] NO    Cardiovascular:  Heart Failure  Acute   Acute on Chronic  Chronic       metoprolol tartrate Injectable 5 milliGRAM(s) IV Push every 12 hours    Pulmonary:  albuterol    0.083% 2.5 milliGRAM(s) Nebulizer every 6 hours    Hematalogic:    Other:  chlorhexidine 2% Cloths 1 Application(s) Topical <User Schedule>  dextrose 5% + lactated ringers. 1000 milliLiter(s) IV Continuous <Continuous>  lacosamide IVPB 200 milliGRAM(s) IV Intermittent <User Schedule>  levETIRAcetam  IVPB 2000 milliGRAM(s) IV Intermittent <User Schedule>  morphine  - Injectable 1 milliGRAM(s) IV Push every 6 hours PRN  potassium chloride  10 mEq/100 mL IVPB 10 milliEquivalent(s) IV Intermittent every 1 hour  valproate sodium  IVPB 1250 milliGRAM(s) IV Intermittent <User Schedule>    albuterol    0.083% 2.5 milliGRAM(s) Nebulizer every 6 hours  chlorhexidine 2% Cloths 1 Application(s) Topical <User Schedule>  dextrose 5% + lactated ringers. 1000 milliLiter(s) IV Continuous <Continuous>  lacosamide IVPB 200 milliGRAM(s) IV Intermittent <User Schedule>  levETIRAcetam  IVPB 2000 milliGRAM(s) IV Intermittent <User Schedule>  metoprolol tartrate Injectable 5 milliGRAM(s) IV Push every 12 hours  morphine  - Injectable 1 milliGRAM(s) IV Push every 6 hours PRN  potassium chloride  10 mEq/100 mL IVPB 10 milliEquivalent(s) IV Intermittent every 1 hour  valproate sodium  IVPB 1250 milliGRAM(s) IV Intermittent <User Schedule>    Drug Dosing Weight    Weight (kg): 81.7 (08 Feb 2024 10:00)    CENTRAL LINE: [ ] YES [ ] NO  LOCATION:   DATE INSERTED:  REMOVE: [ ] YES [ ] NO  EXPLAIN:    FAULKNER: [ ] YES [ ] NO    DATE INSERTED:  REMOVE:  [ ] YES [ ] NO  EXPLAIN:    PAST MEDICAL & SURGICAL HISTORY:  Heart failure, unspecified HF chronicity, unspecified heart failure type      ETOH abuse  h/o etoh abuse now moderate drinker      Artificial pacemaker                        PHYSICAL EXAM:    GENERAL: NAD, well-groomed, well-developed  HEAD:  Atraumatic, Normocephalic  EYES: EOMI, PERRLA, conjunctiva and sclera clear  ENMT: No tonsillar erythema, exudates, or enlargement; Moist mucous membranes, Good dentition, No lesions  NECK: Supple, No JVD, Normal thyroid  NERVOUS SYSTEM:  Alert & Oriented X3, Good concentration; Motor Strength 5/5 B/L upper and lower extremities; DTRs 2+ intact and symmetric  CHEST/LUNG: Clear to percussion bilaterally; No rales, rhonchi, wheezing, or rubs  HEART: Regular rate and rhythm; No murmurs, rubs, or gallops  ABDOMEN: Soft, Nontender, Nondistended; Bowel sounds present  EXTREMITIES:  2+ Peripheral Pulses, No clubbing, cyanosis, or edema  LYMPH: No lymphadenopathy noted  SKIN: No rashes or lesions      LABS:  CBC Full  -  ( 15 Feb 2024 05:57 )  WBC Count : 3.80 K/uL  RBC Count : 3.03 M/uL  Hemoglobin : 9.8 g/dL  Hematocrit : 30.0 %  Platelet Count - Automated : 261 K/uL  Mean Cell Volume : 99.0 fl  Mean Cell Hemoglobin : 32.3 pg  Mean Cell Hemoglobin Concentration : 32.7 gm/dL  Auto Neutrophil # : x  Auto Lymphocyte # : x  Auto Monocyte # : x  Auto Eosinophil # : x  Auto Basophil # : x  Auto Neutrophil % : x  Auto Lymphocyte % : x  Auto Monocyte % : x  Auto Eosinophil % : x  Auto Basophil % : x    02-15    142  |  106  |  4<L>  ----------------------------<  99  3.3<L>   |  29  |  0.38<L>    Ca    8.2<L>      15 Feb 2024 05:57  Phos  3.2     02-15  Mg     1.9     02-15    TPro  6.3  /  Alb  2.1<L>  /  TBili  0.2  /  DBili  x   /  AST  17  /  ALT  12  /  AlkPhos  60  02-15    PT/INR - ( 15 Feb 2024 05:57 )   PT: 24.4 sec;   INR: 2.19 ratio           Urinalysis Basic - ( 15 Feb 2024 05:57 )    Color: x / Appearance: x / SG: x / pH: x  Gluc: 99 mg/dL / Ketone: x  / Bili: x / Urobili: x   Blood: x / Protein: x / Nitrite: x   Leuk Esterase: x / RBC: x / WBC x   Sq Epi: x / Non Sq Epi: x / Bacteria: x            [  ]  DVT Prophylaxis  [  ]  Nutrition, Brand, Rate         Goal Rate        Abnormal Nutritional Status -  Malnutrition   Cachexia      Morbid Obesity BMI >/=40    RADIOLOGY & ADDITIONAL STUDIES:  ***    Goals of Care Discussion with Family/Proxy/Other   - see note from/family meeting set up for...     DANNIE SHIRLEY    SCU progress note    INTERVAL HPI/OVERNIGHT EVENTS: No acute events.     Covid - 19 PCR: Non-reactive.     The 4Ms    What Matters Most: see GOC  Age appropriate Medications/Screen for High Risk Medication: Yes  Mentation: see CAM below  Mobility: defer to physical exam    The Confusion Assessment Method (CAM) Diagnostic Algorithm     1: Acute Onset or Fluctuating Course  - Is there evidence of an acute change in mental status from the patient’s baseline? Did the (abnormal) behavior  fluctuate during the day, that is, tend to come and go, or increase and decrease in severity?  [ ] YES [ ] NO Unable to assess     2: Inattention  - Did the patient have difficulty focusing attention, being easily distractible, or having difficulty keeping track of what was being said?  [x ] YES [ ] NO      3: Disorganized thinking  -Was the patient’s thinking disorganized or incoherent, such as rambling or irrelevant conversation, unclear or illogical flow of ideas, or unpredictable switching from subject to subject?  [ ] YES [ ] NO Unable to assess    4: Altered Level of consciousness?  [ ] YES [ ] NO Unable to assess    The diagnosis of delirium by CAM requires the presence of features 1 and 2 and either 3 or 4.    PRESSORS: [ ] YES [x ] NO    Cardiovascular:  Heart Failure  Acute   Acute on Chronic  Chronic       metoprolol tartrate Injectable 5 milliGRAM(s) IV Push every 12 hours    Pulmonary:  albuterol    0.083% 2.5 milliGRAM(s) Nebulizer every 6 hours    Hematalogic:    Other:  chlorhexidine 2% Cloths 1 Application(s) Topical <User Schedule>  dextrose 5% + lactated ringers. 1000 milliLiter(s) IV Continuous <Continuous>  lacosamide IVPB 200 milliGRAM(s) IV Intermittent <User Schedule>  levETIRAcetam  IVPB 2000 milliGRAM(s) IV Intermittent <User Schedule>  morphine  - Injectable 1 milliGRAM(s) IV Push every 6 hours PRN  potassium chloride  10 mEq/100 mL IVPB 10 milliEquivalent(s) IV Intermittent every 1 hour  valproate sodium  IVPB 1250 milliGRAM(s) IV Intermittent <User Schedule>    albuterol    0.083% 2.5 milliGRAM(s) Nebulizer every 6 hours  chlorhexidine 2% Cloths 1 Application(s) Topical <User Schedule>  dextrose 5% + lactated ringers. 1000 milliLiter(s) IV Continuous <Continuous>  lacosamide IVPB 200 milliGRAM(s) IV Intermittent <User Schedule>  levETIRAcetam  IVPB 2000 milliGRAM(s) IV Intermittent <User Schedule>  metoprolol tartrate Injectable 5 milliGRAM(s) IV Push every 12 hours  morphine  - Injectable 1 milliGRAM(s) IV Push every 6 hours PRN  potassium chloride  10 mEq/100 mL IVPB 10 milliEquivalent(s) IV Intermittent every 1 hour  valproate sodium  IVPB 1250 milliGRAM(s) IV Intermittent <User Schedule>    Drug Dosing Weight    Weight (kg): 81.7 (08 Feb 2024 10:00)    CENTRAL LINE: [ ] YES [x ] NO  LOCATION:   DATE INSERTED:  REMOVE: [ ] YES [ ] NO  EXPLAIN:    FAULKNER: [ ] YES [ x] NO    DATE INSERTED:  REMOVE:  [ ] YES [ ] NO  EXPLAIN:    PAST MEDICAL & SURGICAL HISTORY:  Heart failure, unspecified HF chronicity, unspecified heart failure type      ETOH abuse  h/o etoh abuse now moderate drinker      Artificial pacemaker    ROS: Unattainable  PHYSICAL EXAM:    GENERAL: NAD  HEAD:  Atraumatic, Normocephalic  EYES: PERRLA, conjunctiva and sclera clear  ENMT:  Moist mucous membranes  NECK: Trach collar.   NERVOUS SYSTEM:  Alert to self. Opens eys spontaneously. Follows simple commands. Non-verbal. , Good concentration ;Right side hemiparesis.   CHEST/LUNG: Rhonchi bilaterally; No rales, wheezing, or rubs. Copious amounts of green thin sputum from trach.   HEART: Regular rate and rhythm; No murmurs, rubs, or gallops  ABDOMEN: Soft, Nontender, Nondistended; Bowel sounds present. Stoma from previous PEG.   EXTREMITIES:  2+ Peripheral Pulses, No clubbing, cyanosis, or edema  LYMPH: No lymphadenopathy noted  SKIN: Stage 4 sacral wound with foul odor and moderate amount of green drainage.       LABS:  CBC Full  -  ( 15 Feb 2024 05:57 )  WBC Count : 3.80 K/uL  RBC Count : 3.03 M/uL  Hemoglobin : 9.8 g/dL  Hematocrit : 30.0 %  Platelet Count - Automated : 261 K/uL  Mean Cell Volume : 99.0 fl  Mean Cell Hemoglobin : 32.3 pg  Mean Cell Hemoglobin Concentration : 32.7 gm/dL  Auto Neutrophil # : x  Auto Lymphocyte # : x  Auto Monocyte # : x  Auto Eosinophil # : x  Auto Basophil # : x  Auto Neutrophil % : x  Auto Lymphocyte % : x  Auto Monocyte % : x  Auto Eosinophil % : x  Auto Basophil % : x    02-15    142  |  106  |  4<L>  ----------------------------<  99  3.3<L>   |  29  |  0.38<L>    Ca    8.2<L>      15 Feb 2024 05:57  Phos  3.2     02-15  Mg     1.9     02-15    TPro  6.3  /  Alb  2.1<L>  /  TBili  0.2  /  DBili  x   /  AST  17  /  ALT  12  /  AlkPhos  60  02-15    PT/INR - ( 15 Feb 2024 05:57 )   PT: 24.4 sec;   INR: 2.19 ratio        Urinalysis Basic - ( 15 Feb 2024 05:57 )    Color: x / Appearance: x / SG: x / pH: x  Gluc: 99 mg/dL / Ketone: x  / Bili: x / Urobili: x   Blood: x / Protein: x / Nitrite: x   Leuk Esterase: x / RBC: x / WBC x   Sq Epi: x / Non Sq Epi: x / Bacteria: x      [x  ]  DVT Prophylaxis: SCDs  [ x ]  NutritionNPO    RADIOLOGY & ADDITIONAL STUDIES:   < from: Xray Chest 1 View- PORTABLE-Urgent (Xray Chest 1 View- PORTABLE-Urgent .) (02.08.24 @ 10:17) >  ACC: [id number] EXAM:  XR CHEST PORTABLE URGENT 1V   ORDERED BY: DEANGELO NARVAEZ     PROCEDURE DATE:  02/08/2024          INTERPRETATION:  EXAM: XR CHEST URGENT    INDICATION: Admitting Dxs: K94.23 GASTROSTOMY MALFUNCTION dislodged peg   HXR    COMPARISON: February 5, 2024    IMPRESSION: Tracheostomy and pacer as before. No focal infiltrate. Some   elevation of the right hemidiaphragm. Heart is at the upper limits of   normal in its transthoracic diameter. Regional osseous structures   appropriate for age    --- End of Report ---            REED DAVIS MD; Attending Radiologist  This document has been electronically signed. Feb 8 2024 10:53AM    < end of copied text >  < from: CT Abdomen and Pelvis w/ IV Cont (09.03.18 @ 17:40) >    EXAM:  CT ABDOMEN AND PELVIS IC                          PROCEDURE DATE:  09/03/2018          INTERPRETATION:  CLINICAL INDICATION: 42-year-old with abdominal pain for   3 days; history of gastric sleeve surgery; for detection of gallstone   pancreatitis.        FINDINGS: CT of the abdomen and pelvis was performed with the   administration of intravenous contrast. Reconstructions were performed in   the sagittal and coronal planes. No prior studies available for   comparison. Reference is made to prior abdominal ultrasound dated   9/3/2018.    Evaluation of the lower chest demonstrates partially visualized severe   gynecomastia. Partially visualized permanent pacemaker leads within the   right atrium, right ventricle and coronary sinus. There is a small left   pleural effusion and bibasilar compressive atelectasis with possible   early consolidation within the left lower lobe. Evaluation of the abdomen   demonstrates that the liver is enlarged with the right lobe spanning 21.3   cm. Thespleen, bilateral adrenal glands and bilateral kidneys are   unremarkable aside from a nonobstructing calculus within the interpolar   region of the left kidney measuring 3.2 cm and a cyst within the   posterior interpolar region of the right kidney measuring 4.5 cm. No   evidence of cholelithiasis or choledocholithiasis; gallbladder is   partially collapsed. Evaluation of the pancreas demonstrates extensive   peripancreatic edematous infiltration with regions of diminished   parenchymal perfusion, specifically within the tail and also at the   junction of the neck and body. No significant pancreatic ductal   dilatation and negative for biliary ductal dilatation. Evaluation of the   gastrointestinal tract demonstrates a small hiatal hernia. Post surgical   changes of gastric sleeve procedure. No bowel thickening or bowel   obstruction. Evaluation of the pelvis demonstrates the prostate, seminal   vesicles and bladder are normal in appearance. There is a large volume of   free fluid extending into the perihepatic and perisplenic regions and   also collecting within the pelvis. There is no adenopathy. No significant   vascular calcification. The opacified aspects of the hepatic, portal,   splenic, superior mesenteric vein, bilateral renal veins and IVC   demonstrates no agnes intraluminal thrombus. Evaluation of the osseous   structures demonstrates no destructive lesion.      IMPRESSION:    Severe acute pancreatitis with regions of early pancreatic necrosis;   large volume of ascites.    Small bilateral pleural effusions with possible early consolidation   within the left lower lobe.      "Thank you for the opportunity to participate in the care of this   patient."        JABARI ONOFRE M.D., ATTENDING RADIOLOGIST  Thisdocument has been electronically signed. Sep  4 2018  9:07AM             < end of copied text >

## 2024-02-15 NOTE — PROGRESS NOTE ADULT - PROBLEM SELECTOR PLAN 7
H&H low but stable  AC on hold  Trend CBC. H&H low but stable  AC on hold  Trend CBC.    # elevated INR 2.19  S/p Vitamin K 5 mg IVPB  S/p 1 unit FFP

## 2024-02-15 NOTE — PROGRESS NOTE ADULT - PROBLEM SELECTOR PLAN 5
Hiistory of CHF with ICD  Strict I&Os  Daily weights.  Continue metoprolol 5mg IVP every 12 hours with parameters. History of CHF with ICD  Strict I&Os  Daily weights.  Continue metoprolol 5mg IVP every 12 hours with parameters.  Last ECHO 9/2018: Ejection fraction of 70%. No evidence for any hemodynamically significant valvular disease.  Monitor for fluid overload.

## 2024-02-15 NOTE — CHART NOTE - NSCHARTNOTEFT_GEN_A_CORE
Received pt s/p OR for PEG placement     Pt seen and evaluated at bedside, he appears awake and alert, nonverbal. +Trach. Able to follow simple commands. PEG site is clean, dry, and intact. Abdominal binder in place.

## 2024-02-15 NOTE — PROGRESS NOTE ADULT - PROBLEM SELECTOR PLAN 9
SCB for DVT prophylaxis  Continue to hold   Surgical consult for Peg placement.  Continue oxygen support via hydro-trache  Monitor saturation.  Continue using Passey [place] valve.  Continue antiepileptics.  Contact Isolation for possible MDRO  Dispo: return to [facility] after PEG placement.  Medically optimized for Peg placement tomorrow. SCB for DVT prophylaxis  Continue to hold AC  Dr. Elmore to insert PEG tube today 2/15  Continue oxygen support via hydro-trache  Monitor saturation.  Continue using Passey [place] valve.  Continue antiepileptics.  Contact Isolation for possible MDRO  Dispo: return to [facility] after PEG placement.

## 2024-02-16 LAB
ALBUMIN SERPL ELPH-MCNC: 2 G/DL — LOW (ref 3.5–5)
ALP SERPL-CCNC: 56 U/L — SIGNIFICANT CHANGE UP (ref 40–120)
ALT FLD-CCNC: 13 U/L DA — SIGNIFICANT CHANGE UP (ref 10–60)
ANION GAP SERPL CALC-SCNC: 4 MMOL/L — LOW (ref 5–17)
AST SERPL-CCNC: 13 U/L — SIGNIFICANT CHANGE UP (ref 10–40)
BILIRUB SERPL-MCNC: 0.2 MG/DL — SIGNIFICANT CHANGE UP (ref 0.2–1.2)
BUN SERPL-MCNC: 6 MG/DL — LOW (ref 7–18)
CALCIUM SERPL-MCNC: 8.2 MG/DL — LOW (ref 8.4–10.5)
CHLORIDE SERPL-SCNC: 106 MMOL/L — SIGNIFICANT CHANGE UP (ref 96–108)
CO2 SERPL-SCNC: 30 MMOL/L — SIGNIFICANT CHANGE UP (ref 22–31)
CREAT SERPL-MCNC: 0.57 MG/DL — SIGNIFICANT CHANGE UP (ref 0.5–1.3)
EGFR: 120 ML/MIN/1.73M2 — SIGNIFICANT CHANGE UP
GLUCOSE BLDC GLUCOMTR-MCNC: 108 MG/DL — HIGH (ref 70–99)
GLUCOSE BLDC GLUCOMTR-MCNC: 72 MG/DL — SIGNIFICANT CHANGE UP (ref 70–99)
GLUCOSE BLDC GLUCOMTR-MCNC: 80 MG/DL — SIGNIFICANT CHANGE UP (ref 70–99)
GLUCOSE BLDC GLUCOMTR-MCNC: 93 MG/DL — SIGNIFICANT CHANGE UP (ref 70–99)
GLUCOSE SERPL-MCNC: 92 MG/DL — SIGNIFICANT CHANGE UP (ref 70–99)
HCT VFR BLD CALC: 31.2 % — LOW (ref 39–50)
HGB BLD-MCNC: 10.1 G/DL — LOW (ref 13–17)
MAGNESIUM SERPL-MCNC: 1.7 MG/DL — SIGNIFICANT CHANGE UP (ref 1.6–2.6)
MCHC RBC-ENTMCNC: 32.2 PG — SIGNIFICANT CHANGE UP (ref 27–34)
MCHC RBC-ENTMCNC: 32.4 GM/DL — SIGNIFICANT CHANGE UP (ref 32–36)
MCV RBC AUTO: 99.4 FL — SIGNIFICANT CHANGE UP (ref 80–100)
NRBC # BLD: 0 /100 WBCS — SIGNIFICANT CHANGE UP (ref 0–0)
PHOSPHATE SERPL-MCNC: 4.2 MG/DL — SIGNIFICANT CHANGE UP (ref 2.5–4.5)
PLATELET # BLD AUTO: 237 K/UL — SIGNIFICANT CHANGE UP (ref 150–400)
POTASSIUM SERPL-MCNC: 3.5 MMOL/L — SIGNIFICANT CHANGE UP (ref 3.5–5.3)
POTASSIUM SERPL-SCNC: 3.5 MMOL/L — SIGNIFICANT CHANGE UP (ref 3.5–5.3)
PROT SERPL-MCNC: 6.1 G/DL — SIGNIFICANT CHANGE UP (ref 6–8.3)
RBC # BLD: 3.14 M/UL — LOW (ref 4.2–5.8)
RBC # FLD: 12.9 % — SIGNIFICANT CHANGE UP (ref 10.3–14.5)
SODIUM SERPL-SCNC: 140 MMOL/L — SIGNIFICANT CHANGE UP (ref 135–145)
WBC # BLD: 5.93 K/UL — SIGNIFICANT CHANGE UP (ref 3.8–10.5)
WBC # FLD AUTO: 5.93 K/UL — SIGNIFICANT CHANGE UP (ref 3.8–10.5)

## 2024-02-16 PROCEDURE — 99233 SBSQ HOSP IP/OBS HIGH 50: CPT

## 2024-02-16 RX ORDER — SODIUM CHLORIDE 9 MG/ML
500 INJECTION, SOLUTION INTRAVENOUS ONCE
Refills: 0 | Status: COMPLETED | OUTPATIENT
Start: 2024-02-16 | End: 2024-02-16

## 2024-02-16 RX ORDER — SODIUM HYPOCHLORITE 0.125 %
1 SOLUTION, NON-ORAL MISCELLANEOUS EVERY 24 HOURS
Refills: 0 | Status: DISCONTINUED | OUTPATIENT
Start: 2024-02-16 | End: 2024-02-21

## 2024-02-16 RX ORDER — APIXABAN 2.5 MG/1
5 TABLET, FILM COATED ORAL
Refills: 0 | Status: DISCONTINUED | OUTPATIENT
Start: 2024-02-16 | End: 2024-02-17

## 2024-02-16 RX ADMIN — CHLORHEXIDINE GLUCONATE 1 APPLICATION(S): 213 SOLUTION TOPICAL at 05:39

## 2024-02-16 RX ADMIN — Medication 5 MILLIGRAM(S): at 05:39

## 2024-02-16 RX ADMIN — LACOSAMIDE 140 MILLIGRAM(S): 50 TABLET ORAL at 09:20

## 2024-02-16 RX ADMIN — SODIUM CHLORIDE 500 MILLILITER(S): 9 INJECTION, SOLUTION INTRAVENOUS at 21:27

## 2024-02-16 RX ADMIN — ALBUTEROL 2.5 MILLIGRAM(S): 90 AEROSOL, METERED ORAL at 03:24

## 2024-02-16 RX ADMIN — ALBUTEROL 2.5 MILLIGRAM(S): 90 AEROSOL, METERED ORAL at 08:33

## 2024-02-16 RX ADMIN — Medication 62.5 MILLIGRAM(S): at 05:37

## 2024-02-16 RX ADMIN — Medication 1 APPLICATION(S): at 13:15

## 2024-02-16 RX ADMIN — LEVETIRACETAM 600 MILLIGRAM(S): 250 TABLET, FILM COATED ORAL at 00:12

## 2024-02-16 RX ADMIN — APIXABAN 5 MILLIGRAM(S): 2.5 TABLET, FILM COATED ORAL at 13:16

## 2024-02-16 RX ADMIN — CEFEPIME 100 MILLIGRAM(S): 1 INJECTION, POWDER, FOR SOLUTION INTRAMUSCULAR; INTRAVENOUS at 16:39

## 2024-02-16 RX ADMIN — CEFEPIME 100 MILLIGRAM(S): 1 INJECTION, POWDER, FOR SOLUTION INTRAMUSCULAR; INTRAVENOUS at 05:38

## 2024-02-16 RX ADMIN — Medication 62.5 MILLIGRAM(S): at 13:16

## 2024-02-16 RX ADMIN — CEFEPIME 100 MILLIGRAM(S): 1 INJECTION, POWDER, FOR SOLUTION INTRAMUSCULAR; INTRAVENOUS at 00:18

## 2024-02-16 RX ADMIN — ALBUTEROL 2.5 MILLIGRAM(S): 90 AEROSOL, METERED ORAL at 20:13

## 2024-02-16 RX ADMIN — ALBUTEROL 2.5 MILLIGRAM(S): 90 AEROSOL, METERED ORAL at 15:16

## 2024-02-16 RX ADMIN — LEVETIRACETAM 600 MILLIGRAM(S): 250 TABLET, FILM COATED ORAL at 21:24

## 2024-02-16 RX ADMIN — Medication 62.5 MILLIGRAM(S): at 21:25

## 2024-02-16 RX ADMIN — LACOSAMIDE 140 MILLIGRAM(S): 50 TABLET ORAL at 21:24

## 2024-02-16 RX ADMIN — LEVETIRACETAM 600 MILLIGRAM(S): 250 TABLET, FILM COATED ORAL at 09:20

## 2024-02-16 NOTE — PROGRESS NOTE ADULT - NS ATTEND AMEND GEN_ALL_CORE FT
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 Now pending surgical PEG placement after unable to replace endoscopically w/ GI.    ASSESSMENT:  #PEG malfunction  #CVA  #Tracheostomy status  #Seizures  #H/o recurrent PE  #Afib  #HFmrEF    Plan:  - unable to place PEG w/ GI 2/12 d/t anatomy limitations and h/o bypass; IR and GI suggested surgical eval  - now s/p surgical PEG placement 2/15, gen sx mgmt appreciated  - patient has repeatedly ripped out prior NGTs; keep abd binder over new PEG  - AC resumed post PEG  - home AEDs for seizure  - start enteral feeding tonight if ok with surgery  - PRN IV antihypertensives convert to enteral via PEG  - cont 4L hydrotrach  - routine trach care and suctioning  - passymuir valve eval passed - cont SLP follow-up  - family updated

## 2024-02-16 NOTE — PROGRESS NOTE ADULT - PROBLEM SELECTOR PLAN 8
Positive history of recurrent PEs  AC currently on hold secondary to bleeding.  trend CBC  Monitor oxygen saturation.  SCDs for DVT prophylaxis. Positive history of recurrent PEs  AC restarted.   trend CBC  Monitor oxygen saturation.  SCDs for DVT prophylaxis.

## 2024-02-16 NOTE — PROGRESS NOTE ADULT - SUBJECTIVE AND OBJECTIVE BOX
Pt s- complaints  ICU Vital Signs Last 24 Hrs  T(C): 36.7 (16 Feb 2024 04:21), Max: 36.7 (15 Feb 2024 21:20)  T(F): 98 (16 Feb 2024 04:21), Max: 98.1 (15 Feb 2024 21:20)  HR: 81 (16 Feb 2024 04:21) (59 - 81)  BP: 118/79 (16 Feb 2024 04:21) (109/77 - 121/82)  BP(mean): 88 (15 Feb 2024 22:25) (86 - 93)  ABP: --  ABP(mean): --  RR: 18 (16 Feb 2024 04:21) (11 - 20)  SpO2: 100% (16 Feb 2024 04:21) (98% - 100%)    O2 Parameters below as of 16 Feb 2024 04:21  Patient On (Oxygen Delivery Method): hydro trach  O2 Flow (L/min): 3      Abd: soft nt nd wounds cdi  Gtube secured.                          10.1   5.93  )-----------( 237      ( 16 Feb 2024 07:30 )             31.2   02-16    140  |  106  |  6<L>  ----------------------------<  92  3.5   |  30  |  0.57    Ca    8.2<L>      16 Feb 2024 07:30  Phos  4.2     02-16  Mg     1.7     02-16    TPro  6.1  /  Alb  2.0<L>  /  TBili  0.2  /  DBili  x   /  AST  13  /  ALT  13  /  AlkPhos  56  02-16

## 2024-02-16 NOTE — PROGRESS NOTE ADULT - PROBLEM SELECTOR PLAN 5
History of CHF with ICD  Strict I&Os  Daily weights.  Continue metoprolol 5mg IVP every 12 hours with parameters.  Last ECHO 9/2018: Ejection fraction of 70%. No evidence for any hemodynamically significant valvular disease.  Monitor for fluid overload.

## 2024-02-16 NOTE — PROGRESS NOTE ADULT - PROBLEM SELECTOR PLAN 9
SCB for DVT prophylaxis  Continue to hold AC  Dr. Elmore to insert PEG tube today 2/15  Continue oxygen support via hydro-trache  Monitor saturation.  Continue using Passey [place] valve.  Continue antiepileptics.  Contact Isolation for possible MDRO  Dispo: return to [facility] after PEG placement. Restarted AC.   Dr. Elmore inserted PEG tube on 2/15  Continue oxygen support via hydro-trach.   Monitor saturation.  Continue using Passey [place] valve.  Continue antiepileptics.  Dispo: return to [facility] after PEG placement once PEG tube feeding is started and tolerated.   C/w Cefepime 2GM Q 8 for Pseudomonas in sputum.   Awaiting ID reccs (Dr. De La Torre consulted on 2/15).

## 2024-02-16 NOTE — ADVANCED PRACTICE NURSE CONSULT - ASSESSMENT
This is a 48yr old male patient admitted for Gastrostomy Malfunction, presenting with the following:  -There is a stage 4 Pressure Injury to the Coccyx (4cm x 2.5cm x 3cm) with bone, pink tissue, purulent drainage, white wound edges, strong odor, and undermining (up to 4cm at 360 degrees)  -There is a L. Upper Abdominal Quadrant pink stoma with scant drainage on the dressing

## 2024-02-16 NOTE — PROGRESS NOTE ADULT - ASSESSMENT
47yo M pt with PMHx CVA (3/30/2022 with residual aphasia and right sided residual weakness), seizures, HTN, HLD, CHF w/ ICD (initially rEF 30%->55-60% on 03/2021), MDD, Recurrent PE, Afib (on Eliquis), obesity s/p bariatric intervention presenting to ED for PEG tube malfunction admitted for further care. Per endorsement, PEG tube insertion attempted in ED without success. NG tube also attempted but due to bleeding the insertion was aborted and AC not restarted (home med). Patient was transferred to  for further care given that patient has a trach. Patient has copious amounts of secretion from trach requiring constant suctioning. Sputum cx sent. CXR noted. Afebrile. Concern for MDRO and therefore was placed on contact  isolation until cx comes back. GI consulted and examined patient at bedside. GI spoke with patient's proxy. Plan for OR on Monday for new PEG tube insertion.   Patient's seizure meds switched to IV form. BB IV. Monitor BP per unit protocol.   02/09: OR on Monday for PEG. Dispo is to return to [facility] once PEg placed,   2/12  GI had no window to place peg. IR agrees with GI.  Surgery called for consult.    02/15/2024: OR today for PEG placement with Dr. Elmore this afternoon. NPO. IVF. INR this AM 2.19. S/p Vitamin K 5 mg IVPB and 1 unit FFP.  Plan of care discussed with intensivist and surgical team.     02/16/2024; S/p EGD on 2/15. Abdominal binder in place. Hemodynamically stable. C/w Cefepime 2GM Q 8 for Pseudomonas sputum. Pending ID reccs. Sacral wound re-eval and reccs appreciated. Nutritional supplement and feeding to be initiated this evening. H&H stable. Will restart AC. On IVF. Discussed plan of care with intensivist.             47yo M pt with PMHx CVA (3/30/2022 with residual aphasia and right sided residual weakness), seizures, HTN, HLD, CHF w/ ICD (initially rEF 30%->55-60% on 03/2021), MDD, Recurrent PE, Afib (on Eliquis), obesity s/p bariatric intervention presenting to ED for PEG tube malfunction admitted for further care. Per endorsement, PEG tube insertion attempted in ED without success. NG tube also attempted but due to bleeding the insertion was aborted and AC not restarted (home med). Patient was transferred to  for further care given that patient has a trach. Patient has copious amounts of secretion from trach requiring constant suctioning. Sputum cx sent. CXR noted. Afebrile. Concern for MDRO and therefore was placed on contact  isolation until cx comes back. GI consulted and examined patient at bedside. GI spoke with patient's proxy. Plan for OR on Monday for new PEG tube insertion.   Patient's seizure meds switched to IV form. BB IV. Monitor BP per unit protocol.   02/09: OR on Monday for PEG. Dispo is to return to [facility] once PEg placed,   2/12  GI had no window to place peg. IR agrees with GI.  Surgery called for consult.    02/15/2024: OR today for PEG placement with Dr. Elmore this afternoon. NPO. IVF. INR this AM 2.19. S/p Vitamin K 5 mg IVPB and 1 unit FFP.  Plan of care discussed with intensivist and surgical team.     02/16/2024; S/p EGD on 2/15. Abdominal binder in place. Hemodynamically stable. C/w Cefepime 2GM Q 8 for Pseudomonas sputum. Pending ID reccs. Sacral wound re-eval and reccs appreciated. Nutritional supplement and feeding to be initiated this evening. H&H stable. Will restart AC. On IVF. Discussed plan of care with intensivist. Updated family at bedside.

## 2024-02-16 NOTE — PROGRESS NOTE ADULT - ASSESSMENT
s/p gastrostomy tube placement  please obtain gastrostomy tube study to ensure palcement  after study confirms placement may begin meds/feeding.

## 2024-02-16 NOTE — PROGRESS NOTE ADULT - PROBLEM SELECTOR PLAN 7
H&H low but stable  AC on hold  Trend CBC.    # elevated INR 2.19  S/p Vitamin K 5 mg IVPB  S/p 1 unit FFP H&H low but stable  AC on hold  Trend CBC.    # elevated INR 2.19 on 2/15  S/p Vitamin K 5 mg IVPB and 1 unit FFP on 2/15 prior to procedure.   S/p PEG tube insertion by Dr. Elmore on 2/15  No signs of overt bleeding   H&H and INR stable.

## 2024-02-16 NOTE — PROGRESS NOTE ADULT - PROBLEM SELECTOR PLAN 1
Patient pulled out Peg tube at rehab.  Unable to place new Peg in ED.  Dr Filemon SIMON was unable to fine window in endoscopy this morning. IR reviewed old CT Abdomen and agrees.  Surgery called for consult. Dr Elmore following  S/P NGT insertion on 2/8 aborted after bleeding  On IVF  AC placed on hold.   INR 2.19  S/p Vitamin K 5 mg IV and 1 unit FFP  Discussed with surgical team   Nasal feeding tube placed 2/10 which patient pulled out.  Dr Elmore will place g tube today 2/15 in PM  NPO Patient pulled out Peg tube at rehab.  Unable to place new Peg in ED.  S/P NGT insertion on 2/8 aborted after bleeding  On IVF  S/p Vitamin K 5 mg IV and 1 unit FFP on 2/15 prior to procedure yesterday.   Now s/p PEG tube insertion on 2/15 by Dr. Elmore.   AC restarted. Discussed with Dr. Elmore.   NPO and feeding to be started this evening.

## 2024-02-16 NOTE — PROGRESS NOTE ADULT - PROBLEM SELECTOR PLAN 2
Continue hydro-trach collar @ 4L     Monitor oxygen saturation.  Continue bronchodilators via nebulizer.  CXR shows elevated right hemidiaphragm. +AICD  Speech eval appreciated. Lee Mcleod attached to trach. Continue hydro-trach collar @ 3L   Monitor oxygen saturation.  Continue bronchodilators via nebulizer.  CXR shows elevated right hemidiaphragm. +AICD  Speech eval appreciated. Lee Mcleod attached to trach.

## 2024-02-16 NOTE — PROGRESS NOTE ADULT - SUBJECTIVE AND OBJECTIVE BOX
DANNIE SHIRLEY    SCU progress note    INTERVAL HPI/OVERNIGHT EVENTS: ***    DNR [ ]   DNI  [  ]    Covid - 19 PCR:     The 4Ms    What Matters Most: see GOC  Age appropriate Medications/Screen for High Risk Medication: Yes  Mentation: see CAM below  Mobility: defer to physical exam    The Confusion Assessment Method (CAM) Diagnostic Algorithm     1: Acute Onset or Fluctuating Course  - Is there evidence of an acute change in mental status from the patient’s baseline? Did the (abnormal) behavior  fluctuate during the day, that is, tend to come and go, or increase and decrease in severity?  [ ] YES [ ] NO     2: Inattention  - Did the patient have difficulty focusing attention, being easily distractible, or having difficulty keeping track of what was being said?  [ ] YES [ ] NO     3: Disorganized thinking  -Was the patient’s thinking disorganized or incoherent, such as rambling or irrelevant conversation, unclear or illogical flow of ideas, or unpredictable switching from subject to subject?  [ ] YES [ ] NO    4: Altered Level of consciousness?  [ ] YES [ ] NO    The diagnosis of delirium by CAM requires the presence of features 1 and 2 and either 3 or 4.    PRESSORS: [ ] YES [ ] NO  cefepime   IVPB 2000 milliGRAM(s) IV Intermittent every 8 hours    Cardiovascular:  Heart Failure  Acute   Acute on Chronic  Chronic       metoprolol tartrate Injectable 5 milliGRAM(s) IV Push every 12 hours    Pulmonary:  albuterol    0.083% 2.5 milliGRAM(s) Nebulizer every 6 hours    Hematalogic:    Other:  chlorhexidine 2% Cloths 1 Application(s) Topical <User Schedule>  Dakins Solution - 1/2 Strength 1 Application(s) Topical every 24 hours  dextrose 5% + lactated ringers. 1000 milliLiter(s) IV Continuous <Continuous>  lacosamide IVPB 200 milliGRAM(s) IV Intermittent <User Schedule>  levETIRAcetam  IVPB 2000 milliGRAM(s) IV Intermittent <User Schedule>  morphine  - Injectable 1 milliGRAM(s) IV Push every 6 hours PRN  valproate sodium  IVPB 1250 milliGRAM(s) IV Intermittent <User Schedule>    albuterol    0.083% 2.5 milliGRAM(s) Nebulizer every 6 hours  cefepime   IVPB 2000 milliGRAM(s) IV Intermittent every 8 hours  chlorhexidine 2% Cloths 1 Application(s) Topical <User Schedule>  Dakins Solution - 1/2 Strength 1 Application(s) Topical every 24 hours  dextrose 5% + lactated ringers. 1000 milliLiter(s) IV Continuous <Continuous>  lacosamide IVPB 200 milliGRAM(s) IV Intermittent <User Schedule>  levETIRAcetam  IVPB 2000 milliGRAM(s) IV Intermittent <User Schedule>  metoprolol tartrate Injectable 5 milliGRAM(s) IV Push every 12 hours  morphine  - Injectable 1 milliGRAM(s) IV Push every 6 hours PRN  valproate sodium  IVPB 1250 milliGRAM(s) IV Intermittent <User Schedule>    Drug Dosing Weight    Weight (kg): 81.7 (08 Feb 2024 10:00)    CENTRAL LINE: [ ] YES [ ] NO  LOCATION:   DATE INSERTED:  REMOVE: [ ] YES [ ] NO  EXPLAIN:    FAULKNER: [ ] YES [ ] NO    DATE INSERTED:  REMOVE:  [ ] YES [ ] NO  EXPLAIN:    PAST MEDICAL & SURGICAL HISTORY:  Heart failure, unspecified HF chronicity, unspecified heart failure type      ETOH abuse  h/o etoh abuse now moderate drinker      Artificial pacemaker                  02-15 @ 07:01  -  02-16 @ 07:00  --------------------------------------------------------  IN: 0 mL / OUT: 200 mL / NET: -200 mL            PHYSICAL EXAM:    GENERAL: NAD, well-groomed, well-developed  HEAD:  Atraumatic, Normocephalic  EYES: EOMI, PERRLA, conjunctiva and sclera clear  ENMT: No tonsillar erythema, exudates, or enlargement; Moist mucous membranes, Good dentition, No lesions  NECK: Supple, No JVD, Normal thyroid  NERVOUS SYSTEM:  Alert & Oriented X3, Good concentration; Motor Strength 5/5 B/L upper and lower extremities; DTRs 2+ intact and symmetric  CHEST/LUNG: Clear to percussion bilaterally; No rales, rhonchi, wheezing, or rubs  HEART: Regular rate and rhythm; No murmurs, rubs, or gallops  ABDOMEN: Soft, Nontender, Nondistended; Bowel sounds present  EXTREMITIES:  2+ Peripheral Pulses, No clubbing, cyanosis, or edema  LYMPH: No lymphadenopathy noted  SKIN: No rashes or lesions      LABS:  CBC Full  -  ( 16 Feb 2024 07:30 )  WBC Count : 5.93 K/uL  RBC Count : 3.14 M/uL  Hemoglobin : 10.1 g/dL  Hematocrit : 31.2 %  Platelet Count - Automated : 237 K/uL  Mean Cell Volume : 99.4 fl  Mean Cell Hemoglobin : 32.2 pg  Mean Cell Hemoglobin Concentration : 32.4 gm/dL  Auto Neutrophil # : x  Auto Lymphocyte # : x  Auto Monocyte # : x  Auto Eosinophil # : x  Auto Basophil # : x  Auto Neutrophil % : x  Auto Lymphocyte % : x  Auto Monocyte % : x  Auto Eosinophil % : x  Auto Basophil % : x    02-16    140  |  106  |  6<L>  ----------------------------<  92  3.5   |  30  |  0.57    Ca    8.2<L>      16 Feb 2024 07:30  Phos  4.2     02-16  Mg     1.7     02-16    TPro  6.1  /  Alb  2.0<L>  /  TBili  0.2  /  DBili  x   /  AST  13  /  ALT  13  /  AlkPhos  56  02-16    PT/INR - ( 15 Feb 2024 16:00 )   PT: 15.5 sec;   INR: 1.37 ratio         PTT - ( 15 Feb 2024 16:00 )  PTT:40.0 sec  Urinalysis Basic - ( 16 Feb 2024 07:30 )    Color: x / Appearance: x / SG: x / pH: x  Gluc: 92 mg/dL / Ketone: x  / Bili: x / Urobili: x   Blood: x / Protein: x / Nitrite: x   Leuk Esterase: x / RBC: x / WBC x   Sq Epi: x / Non Sq Epi: x / Bacteria: x            [  ]  DVT Prophylaxis  [  ]  Nutrition, Brand, Rate         Goal Rate        Abnormal Nutritional Status -  Malnutrition   Cachexia      Morbid Obesity BMI >/=40    RADIOLOGY & ADDITIONAL STUDIES:  ***    Goals of Care Discussion with Family/Proxy/Other   - see note from/family meeting set up for...     DANNIE SHIRLEY    SCU progress note    INTERVAL HPI/OVERNIGHT EVENTS: S/p PEG tube placement.     FULL CODE  Covid - 19 PCR: non-reactive.     The 4Ms    What Matters Most: see GOC  Age appropriate Medications/Screen for High Risk Medication: Yes  Mentation: see CAM below  Mobility: defer to physical exam    The Confusion Assessment Method (CAM) Diagnostic Algorithm     1: Acute Onset or Fluctuating Course  - Is there evidence of an acute change in mental status from the patient’s baseline? Did the (abnormal) behavior  fluctuate during the day, that is, tend to come and go, or increase and decrease in severity?  [ ] YES [ ] NO Unable to assess     2: Inattention  - Did the patient have difficulty focusing attention, being easily distractible, or having difficulty keeping track of what was being said?  [ ] YES [ ] NO Unable to assess     3: Disorganized thinking  -Was the patient’s thinking disorganized or incoherent, such as rambling or irrelevant conversation, unclear or illogical flow of ideas, or unpredictable switching from subject to subject?  [ ] YES [ ] NO Unable to assess    4: Altered Level of consciousness?  [ ] YES [ ] NO Unable to assess    The diagnosis of delirium by CAM requires the presence of features 1 and 2 and either 3 or 4.    PRESSORS: [ ] YES [x ] NO  cefepime   IVPB 2000 milliGRAM(s) IV Intermittent every 8 hours    Cardiovascular:  Heart Failure  Acute   Acute on Chronic  Chronic       metoprolol tartrate Injectable 5 milliGRAM(s) IV Push every 12 hours    Pulmonary:  albuterol    0.083% 2.5 milliGRAM(s) Nebulizer every 6 hours    Hematalogic:    Other:  chlorhexidine 2% Cloths 1 Application(s) Topical <User Schedule>  Dakins Solution - 1/2 Strength 1 Application(s) Topical every 24 hours  dextrose 5% + lactated ringers. 1000 milliLiter(s) IV Continuous <Continuous>  lacosamide IVPB 200 milliGRAM(s) IV Intermittent <User Schedule>  levETIRAcetam  IVPB 2000 milliGRAM(s) IV Intermittent <User Schedule>  morphine  - Injectable 1 milliGRAM(s) IV Push every 6 hours PRN  valproate sodium  IVPB 1250 milliGRAM(s) IV Intermittent <User Schedule>    albuterol    0.083% 2.5 milliGRAM(s) Nebulizer every 6 hours  cefepime   IVPB 2000 milliGRAM(s) IV Intermittent every 8 hours  chlorhexidine 2% Cloths 1 Application(s) Topical <User Schedule>  Dakins Solution - 1/2 Strength 1 Application(s) Topical every 24 hours  dextrose 5% + lactated ringers. 1000 milliLiter(s) IV Continuous <Continuous>  lacosamide IVPB 200 milliGRAM(s) IV Intermittent <User Schedule>  levETIRAcetam  IVPB 2000 milliGRAM(s) IV Intermittent <User Schedule>  metoprolol tartrate Injectable 5 milliGRAM(s) IV Push every 12 hours  morphine  - Injectable 1 milliGRAM(s) IV Push every 6 hours PRN  valproate sodium  IVPB 1250 milliGRAM(s) IV Intermittent <User Schedule>    Drug Dosing Weight    Weight (kg): 81.7 (08 Feb 2024 10:00)    CENTRAL LINE: [ ] YES [x ] NO  LOCATION:   DATE INSERTED:  REMOVE: [ ] YES [ ] NO  EXPLAIN:    FAULKNER: [ ] YES [ x] NO    DATE INSERTED:  REMOVE:  [ ] YES [ ] NO  EXPLAIN:    PAST MEDICAL & SURGICAL HISTORY:  Heart failure, unspecified HF chronicity, unspecified heart failure type      ETOH abuse  h/o etoh abuse now moderate drinker      Artificial pacemaker      02-15 @ 07:01  -  02-16 @ 07:00  --------------------------------------------------------  IN: 0 mL / OUT: 200 mL / NET: -200 mL    ROS; Unattainable. \    PHYSICAL EXAM:  GENERAL: NAD  HEAD:  Atraumatic, Normocephalic  EYES:  PERRLA, conjunctiva and sclera clear  ENMT: Moist mucous membranes  NECK: Trach  NERVOUS SYSTEM:  Alert to self. Right hemiparesis. Does not follow commands.   CHEST/LUNG: Course bilaterally; No  wheezing, or rubs. On 3 L Via hydro trach   HEART: Regular rate and rhythm; No murmurs, rubs, or gallops  ABDOMEN: Soft, Nontender, Nondistended; Bowel sounds present. New PEG in place.   EXTREMITIES:  2+ Peripheral Pulses, No clubbing, cyanosis, or edema  SKIN: Stage 4 sacral wound with odor and tunneling.       LABS:  CBC Full  -  ( 16 Feb 2024 07:30 )  WBC Count : 5.93 K/uL  RBC Count : 3.14 M/uL  Hemoglobin : 10.1 g/dL  Hematocrit : 31.2 %  Platelet Count - Automated : 237 K/uL  Mean Cell Volume : 99.4 fl  Mean Cell Hemoglobin : 32.2 pg  Mean Cell Hemoglobin Concentration : 32.4 gm/dL  Auto Neutrophil # : x  Auto Lymphocyte # : x  Auto Monocyte # : x  Auto Eosinophil # : x  Auto Basophil # : x  Auto Neutrophil % : x  Auto Lymphocyte % : x  Auto Monocyte % : x  Auto Eosinophil % : x  Auto Basophil % : x    02-16    140  |  106  |  6<L>  ----------------------------<  92  3.5   |  30  |  0.57    Ca    8.2<L>      16 Feb 2024 07:30  Phos  4.2     02-16  Mg     1.7     02-16    TPro  6.1  /  Alb  2.0<L>  /  TBili  0.2  /  DBili  x   /  AST  13  /  ALT  13  /  AlkPhos  56  02-16    PT/INR - ( 15 Feb 2024 16:00 )   PT: 15.5 sec;   INR: 1.37 ratio         PTT - ( 15 Feb 2024 16:00 )  PTT:40.0 sec  Urinalysis Basic - ( 16 Feb 2024 07:30 )    Color: x / Appearance: x / SG: x / pH: x  Gluc: 92 mg/dL / Ketone: x  / Bili: x / Urobili: x   Blood: x / Protein: x / Nitrite: x   Leuk Esterase: x / RBC: x / WBC x   Sq Epi: x / Non Sq Epi: x / Bacteria: x    [x  ]  DVT Prophylaxis: AC resumed  TF to be started after 24 hours from PEG tube insertion (Tonight).     Updated family at bedside. All questions answered.

## 2024-02-16 NOTE — PROGRESS NOTE ADULT - NS ATTEND AMEND GEN_ALL_CORE FT
S/p laparoscopic Gastrostomy tube placement. G-tube site clean and g-tube intact. BAbd soft, NT, ND. Ok to give meds through g-tube. No need for study as study done intraop. Can start trickle feeds with water, progress to TF after 12 hours and advance as tolerated per G- tube protocol

## 2024-02-16 NOTE — CHART NOTE - NSCHARTNOTEFT_GEN_A_CORE
EVENT: Notified by RN, pt with hypotensive episode, SBP in the 80s.     HPI:  47 yo M pt with PMHx CVA (3/30/2022 with residual aphasia and right sided residual weakness), seizures, HTN, HLD, CHF w/ ICD (initially rEF 30%->55-60% on 03/2021), MDD, Recurrent PE, Afib (on Eliquis), obesity s/p bariatric intervention presenting to ED for PEG tube malfunction admitted for further care. Per endorsement, PEG tube insertion attempted in ED without success. NG tube also attempted but due to bleeding the insertion was aborted and AC not restarted (home med). Patient was transferred to  for further care given that patient has a trach. Patient has copious amounts of secretion from trach requiring constant suctioning. Sputum cx sent. CXR noted. Afebrile. Concern for MDRO and therefore was placed on contact  isolation until cx comes back. GI consulted and examined patient at bedside. GI spoke with patient's proxy. Plan for OR on Monday for new PEG tube insertion.   Patient's seizure meds switched to IV form. BB IV. Monitor BP per unit protocol.     02/09: OR on Monday for PEG. Dispo is to return to [facility] once PEg placed,   2/12  GI had no window to place peg. IR agrees with GI.  Surgery called for consult.  02/15/2024: OR today for PEG placement with Dr. Elmore this afternoon. NPO. IVF. INR this AM 2.19. S/p Vitamin K 5 mg IVPB and 1 unit FFP.  Plan of care discussed with intensivist and surgical team.   02/16/2024; S/p EGD on 2/15. Abdominal binder in place. Hemodynamically stable. C/w Cefepime 2GM Q 8 for Pseudomonas sputum. Pending ID recs. Sacral wound re-eval and reccs appreciated. Nutritional supplement and feeding to be initiated this evening. H&H stable. Will restart AC. On IVF.     SUBJECTIVE:    OBJECTIVE:  Vital Signs Last 24 Hrs  T(C): 36.6 (16 Feb 2024 20:09), Max: 37 (16 Feb 2024 12:39)  T(F): 97.8 (16 Feb 2024 20:09), Max: 98.6 (16 Feb 2024 12:39)  HR: 59 (16 Feb 2024 20:57) (59 - 81)  BP: 85/48 (16 Feb 2024 20:27) (84/49 - 121/82)  BP(mean): 88 (15 Feb 2024 22:25) (88 - 93)  RR: 20 (16 Feb 2024 20:09) (12 - 20)  SpO2: 94% (16 Feb 2024 20:57) (92% - 100%)    Parameters below as of 16 Feb 2024 20:57  Patient On (Oxygen Delivery Method): Hydro Trach  O2 Flow (L/min): 3    PHYSICAL EXAM:  GENERAL: NAD  HEAD:  Atraumatic, Normocephalic  EYES:  PERRLA, conjunctiva and sclera clear  ENMT: Moist mucous membranes  NECK: +Hydrotrach  NERVOUS SYSTEM:  Alert to self. Right hemiparesis. Unable to follow commands.   CHEST/LUNG: coarse crackles bilaterally; No wheezing, or rubs. On 3 L Via hydro trach   HEART: Regular rate and rhythm; No murmurs, rubs, or gallops  ABDOMEN: Soft, Nontender, Nondistended; Bowel sounds present. New PEG in place.   EXTREMITIES:  2+ Peripheral Pulses, No clubbing, cyanosis, or edema  SKIN: Stage 4 sacral wound      LABS:                        10.1   5.93  )-----------( 237      ( 16 Feb 2024 07:30 )             31.2     02-16    140  |  106  |  6<L>  ----------------------------<  92  3.5   |  30  |  0.57    Ca    8.2<L>      16 Feb 2024 07:30  Phos  4.2     02-16  Mg     1.7     02-16    TPro  6.1  /  Alb  2.0<L>  /  TBili  0.2  /  DBili  x   /  AST  13  /  ALT  13  /  AlkPhos  56  02-16      EKG:   IMAGING:    LABS:                          10.2   6.85  )-----------( 369      ( 08 Feb 2024 08:55 )             32.2                                               02-08    141  |  105  |  16  ----------------------------<  118<H>  3.7   |  33<H>  |  0.55    Ca    8.7      08 Feb 2024 08:55    TPro  7.7  /  Alb  2.1<L>  /  TBili  0.3  /  DBili  x   /  AST  18  /  ALT  11  /  AlkPhos  75  02-08     Urinalysis Basic - ( 08 Feb 2024 08:55 )  Color: x / Appearance: x / SG: x / pH: x  Gluc: 118 mg/dL / Ketone: x  / Bili: x / Urobili: x   Blood: x / Protein: x / Nitrite: x   Leuk Esterase: x / RBC: x / WBC x   Sq Epi: x / Non Sq Epi: x / Bacteria: x   LIVER FUNCTIONS - ( 08 Feb 2024 08:55 )  Alb: 2.1 g/dL / Pro: 7.7 g/dL / ALK PHOS: 75 U/L / ALT: 11 U/L DA / AST: 18 U/L / GGT: x             ASSESSMENT/PROBLEM: Hypotension possibly due to volume depletion                                        PLAN:     - 500 ml LR bolus ordered to be administered over 60 mins given to CHF history  -C/w Metoprolol with hold parameters   -Continue current/supportive measures     FOLLOW UP / RESULT:     -Monitor effectiveness of above intervention

## 2024-02-16 NOTE — ADVANCED PRACTICE NURSE CONSULT - RECOMMEDATIONS
-Clean all wounds with normal saline and apply skin prep to the surrounding skin  -Apply a non-adherent dry dressing to the L. Upper Abdominal Quadrant stoma Daily PRN  -Please order 0.125% Dakins solution from the pharmacy  -Pack the Coccyx wound with 0.125% Dakins moistened gauze and cover with a Foam dressing Q 72hrs PRN  -Elevate/float the patients heels using heel protectors and reposition the patient Q 2hrs using wedges or pillows -Clean all wounds with normal saline and apply skin prep to the surrounding skin  -Apply a non-adherent dry dressing to the L. Upper Abdominal Quadrant stoma Daily PRN  -Please order 0.125% Dakins solution from the pharmacy  -Pack the Coccyx wound with 0.125% Dakins moistened gauze and cover with a Foam dressing Daily PRN  -Elevate/float the patients heels using heel protectors and reposition the patient Q 2hrs using wedges or pillows

## 2024-02-17 LAB
ALBUMIN SERPL ELPH-MCNC: 2 G/DL — LOW (ref 3.5–5)
ALP SERPL-CCNC: 60 U/L — SIGNIFICANT CHANGE UP (ref 40–120)
ALT FLD-CCNC: 7 U/L DA — LOW (ref 10–60)
ANION GAP SERPL CALC-SCNC: 5 MMOL/L — SIGNIFICANT CHANGE UP (ref 5–17)
APPEARANCE UR: CLEAR — SIGNIFICANT CHANGE UP
APTT BLD: 33.5 SEC — SIGNIFICANT CHANGE UP (ref 24.5–35.6)
AST SERPL-CCNC: 13 U/L — SIGNIFICANT CHANGE UP (ref 10–40)
BACTERIA # UR AUTO: ABNORMAL /HPF
BASOPHILS # BLD AUTO: 0.02 K/UL — SIGNIFICANT CHANGE UP (ref 0–0.2)
BASOPHILS NFR BLD AUTO: 0.2 % — SIGNIFICANT CHANGE UP (ref 0–2)
BILIRUB SERPL-MCNC: 0.3 MG/DL — SIGNIFICANT CHANGE UP (ref 0.2–1.2)
BILIRUB UR-MCNC: NEGATIVE — SIGNIFICANT CHANGE UP
BUN SERPL-MCNC: 7 MG/DL — SIGNIFICANT CHANGE UP (ref 7–18)
CALCIUM SERPL-MCNC: 8.2 MG/DL — LOW (ref 8.4–10.5)
CHLORIDE SERPL-SCNC: 107 MMOL/L — SIGNIFICANT CHANGE UP (ref 96–108)
CO2 SERPL-SCNC: 28 MMOL/L — SIGNIFICANT CHANGE UP (ref 22–31)
COLOR SPEC: YELLOW — SIGNIFICANT CHANGE UP
COMMENT - URINE: SIGNIFICANT CHANGE UP
CREAT SERPL-MCNC: 0.52 MG/DL — SIGNIFICANT CHANGE UP (ref 0.5–1.3)
DIFF PNL FLD: ABNORMAL
EGFR: 124 ML/MIN/1.73M2 — SIGNIFICANT CHANGE UP
EOSINOPHIL # BLD AUTO: 0.01 K/UL — SIGNIFICANT CHANGE UP (ref 0–0.5)
EOSINOPHIL NFR BLD AUTO: 0.1 % — SIGNIFICANT CHANGE UP (ref 0–6)
EPI CELLS # UR: PRESENT
GLUCOSE BLDC GLUCOMTR-MCNC: 76 MG/DL — SIGNIFICANT CHANGE UP (ref 70–99)
GLUCOSE BLDC GLUCOMTR-MCNC: 84 MG/DL — SIGNIFICANT CHANGE UP (ref 70–99)
GLUCOSE BLDC GLUCOMTR-MCNC: 88 MG/DL — SIGNIFICANT CHANGE UP (ref 70–99)
GLUCOSE BLDC GLUCOMTR-MCNC: 90 MG/DL — SIGNIFICANT CHANGE UP (ref 70–99)
GLUCOSE BLDC GLUCOMTR-MCNC: 93 MG/DL — SIGNIFICANT CHANGE UP (ref 70–99)
GLUCOSE SERPL-MCNC: 87 MG/DL — SIGNIFICANT CHANGE UP (ref 70–99)
GLUCOSE UR QL: NEGATIVE MG/DL — SIGNIFICANT CHANGE UP
HCT VFR BLD CALC: 26.1 % — LOW (ref 39–50)
HCT VFR BLD CALC: 30.7 % — LOW (ref 39–50)
HCT VFR BLD CALC: 31.8 % — LOW (ref 39–50)
HGB BLD-MCNC: 10.1 G/DL — LOW (ref 13–17)
HGB BLD-MCNC: 8.5 G/DL — LOW (ref 13–17)
HGB BLD-MCNC: 9.6 G/DL — LOW (ref 13–17)
IMM GRANULOCYTES NFR BLD AUTO: 0.4 % — SIGNIFICANT CHANGE UP (ref 0–0.9)
INR BLD: 1.43 RATIO — HIGH (ref 0.85–1.18)
KETONES UR-MCNC: 15 MG/DL
LACTATE SERPL-SCNC: 0.6 MMOL/L — LOW (ref 0.7–2)
LEUKOCYTE ESTERASE UR-ACNC: ABNORMAL
LYMPHOCYTES # BLD AUTO: 1.5 K/UL — SIGNIFICANT CHANGE UP (ref 1–3.3)
LYMPHOCYTES # BLD AUTO: 15.4 % — SIGNIFICANT CHANGE UP (ref 13–44)
MAGNESIUM SERPL-MCNC: 1.7 MG/DL — SIGNIFICANT CHANGE UP (ref 1.6–2.6)
MCHC RBC-ENTMCNC: 31.3 GM/DL — LOW (ref 32–36)
MCHC RBC-ENTMCNC: 31.4 PG — SIGNIFICANT CHANGE UP (ref 27–34)
MCHC RBC-ENTMCNC: 31.8 GM/DL — LOW (ref 32–36)
MCHC RBC-ENTMCNC: 31.8 PG — SIGNIFICANT CHANGE UP (ref 27–34)
MCHC RBC-ENTMCNC: 32.1 PG — SIGNIFICANT CHANGE UP (ref 27–34)
MCHC RBC-ENTMCNC: 32.6 GM/DL — SIGNIFICANT CHANGE UP (ref 32–36)
MCV RBC AUTO: 101.7 FL — HIGH (ref 80–100)
MCV RBC AUTO: 98.5 FL — SIGNIFICANT CHANGE UP (ref 80–100)
MCV RBC AUTO: 98.8 FL — SIGNIFICANT CHANGE UP (ref 80–100)
MONOCYTES # BLD AUTO: 0.45 K/UL — SIGNIFICANT CHANGE UP (ref 0–0.9)
MONOCYTES NFR BLD AUTO: 4.6 % — SIGNIFICANT CHANGE UP (ref 2–14)
NEUTROPHILS # BLD AUTO: 7.72 K/UL — HIGH (ref 1.8–7.4)
NEUTROPHILS NFR BLD AUTO: 79.3 % — HIGH (ref 43–77)
NITRITE UR-MCNC: NEGATIVE — SIGNIFICANT CHANGE UP
NRBC # BLD: 0 /100 WBCS — SIGNIFICANT CHANGE UP (ref 0–0)
PH UR: 6 — SIGNIFICANT CHANGE UP (ref 5–8)
PHOSPHATE SERPL-MCNC: 2.5 MG/DL — SIGNIFICANT CHANGE UP (ref 2.5–4.5)
PLATELET # BLD AUTO: 186 K/UL — SIGNIFICANT CHANGE UP (ref 150–400)
PLATELET # BLD AUTO: 197 K/UL — SIGNIFICANT CHANGE UP (ref 150–400)
PLATELET # BLD AUTO: 208 K/UL — SIGNIFICANT CHANGE UP (ref 150–400)
POTASSIUM SERPL-MCNC: 3.2 MMOL/L — LOW (ref 3.5–5.3)
POTASSIUM SERPL-SCNC: 3.2 MMOL/L — LOW (ref 3.5–5.3)
PROCALCITONIN SERPL-MCNC: 0.08 NG/ML — SIGNIFICANT CHANGE UP (ref 0.02–0.1)
PROT SERPL-MCNC: 5.9 G/DL — LOW (ref 6–8.3)
PROT UR-MCNC: NEGATIVE MG/DL — SIGNIFICANT CHANGE UP
PROTHROM AB SERPL-ACNC: 16.1 SEC — HIGH (ref 9.5–13)
RBC # BLD: 2.65 M/UL — LOW (ref 4.2–5.8)
RBC # BLD: 3.02 M/UL — LOW (ref 4.2–5.8)
RBC # BLD: 3.22 M/UL — LOW (ref 4.2–5.8)
RBC # FLD: 13.1 % — SIGNIFICANT CHANGE UP (ref 10.3–14.5)
RBC # FLD: 13.1 % — SIGNIFICANT CHANGE UP (ref 10.3–14.5)
RBC # FLD: 13.2 % — SIGNIFICANT CHANGE UP (ref 10.3–14.5)
RBC CASTS # UR COMP ASSIST: 3 /HPF — SIGNIFICANT CHANGE UP (ref 0–4)
SODIUM SERPL-SCNC: 140 MMOL/L — SIGNIFICANT CHANGE UP (ref 135–145)
SP GR SPEC: 1.02 — SIGNIFICANT CHANGE UP (ref 1–1.03)
UROBILINOGEN FLD QL: 0.2 MG/DL — SIGNIFICANT CHANGE UP (ref 0.2–1)
WBC # BLD: 9.46 K/UL — SIGNIFICANT CHANGE UP (ref 3.8–10.5)
WBC # BLD: 9.71 K/UL — SIGNIFICANT CHANGE UP (ref 3.8–10.5)
WBC # BLD: 9.74 K/UL — SIGNIFICANT CHANGE UP (ref 3.8–10.5)
WBC # FLD AUTO: 9.46 K/UL — SIGNIFICANT CHANGE UP (ref 3.8–10.5)
WBC # FLD AUTO: 9.71 K/UL — SIGNIFICANT CHANGE UP (ref 3.8–10.5)
WBC # FLD AUTO: 9.74 K/UL — SIGNIFICANT CHANGE UP (ref 3.8–10.5)
WBC UR QL: 4 /HPF — SIGNIFICANT CHANGE UP (ref 0–5)

## 2024-02-17 PROCEDURE — 71045 X-RAY EXAM CHEST 1 VIEW: CPT | Mod: 26

## 2024-02-17 PROCEDURE — 99233 SBSQ HOSP IP/OBS HIGH 50: CPT

## 2024-02-17 RX ORDER — VALPROIC ACID (AS SODIUM SALT) 250 MG/5ML
1250 SOLUTION, ORAL ORAL
Refills: 0 | Status: DISCONTINUED | OUTPATIENT
Start: 2024-02-17 | End: 2024-02-21

## 2024-02-17 RX ORDER — ACETAMINOPHEN 500 MG
815 TABLET ORAL ONCE
Refills: 0 | Status: COMPLETED | OUTPATIENT
Start: 2024-02-17 | End: 2024-02-17

## 2024-02-17 RX ORDER — ASCORBIC ACID 60 MG
1000 TABLET,CHEWABLE ORAL DAILY
Refills: 0 | Status: DISCONTINUED | OUTPATIENT
Start: 2024-02-17 | End: 2024-02-17

## 2024-02-17 RX ORDER — FOLIC ACID 0.8 MG
1 TABLET ORAL DAILY
Refills: 0 | Status: DISCONTINUED | OUTPATIENT
Start: 2024-02-17 | End: 2024-02-17

## 2024-02-17 RX ORDER — LACOSAMIDE 50 MG/1
200 TABLET ORAL EVERY 12 HOURS
Refills: 0 | Status: DISCONTINUED | OUTPATIENT
Start: 2024-02-17 | End: 2024-02-21

## 2024-02-17 RX ORDER — POTASSIUM CHLORIDE 20 MEQ
10 PACKET (EA) ORAL
Refills: 0 | Status: COMPLETED | OUTPATIENT
Start: 2024-02-17 | End: 2024-02-17

## 2024-02-17 RX ORDER — CEFEPIME 1 G/1
2000 INJECTION, POWDER, FOR SOLUTION INTRAMUSCULAR; INTRAVENOUS EVERY 8 HOURS
Refills: 0 | Status: DISCONTINUED | OUTPATIENT
Start: 2024-02-17 | End: 2024-02-20

## 2024-02-17 RX ORDER — SODIUM CHLORIDE 9 MG/ML
2500 INJECTION INTRAMUSCULAR; INTRAVENOUS; SUBCUTANEOUS ONCE
Refills: 0 | Status: COMPLETED | OUTPATIENT
Start: 2024-02-17 | End: 2024-02-17

## 2024-02-17 RX ORDER — ACETAMINOPHEN 500 MG
650 TABLET ORAL ONCE
Refills: 0 | Status: COMPLETED | OUTPATIENT
Start: 2024-02-17 | End: 2024-02-17

## 2024-02-17 RX ORDER — LEVETIRACETAM 250 MG/1
2000 TABLET, FILM COATED ORAL EVERY 12 HOURS
Refills: 0 | Status: DISCONTINUED | OUTPATIENT
Start: 2024-02-17 | End: 2024-02-21

## 2024-02-17 RX ADMIN — MORPHINE SULFATE 1 MILLIGRAM(S): 50 CAPSULE, EXTENDED RELEASE ORAL at 07:57

## 2024-02-17 RX ADMIN — LACOSAMIDE 140 MILLIGRAM(S): 50 TABLET ORAL at 09:39

## 2024-02-17 RX ADMIN — ALBUTEROL 2.5 MILLIGRAM(S): 90 AEROSOL, METERED ORAL at 21:40

## 2024-02-17 RX ADMIN — Medication 650 MILLIGRAM(S): at 23:25

## 2024-02-17 RX ADMIN — ALBUTEROL 2.5 MILLIGRAM(S): 90 AEROSOL, METERED ORAL at 15:37

## 2024-02-17 RX ADMIN — Medication 5 MILLIGRAM(S): at 05:36

## 2024-02-17 RX ADMIN — Medication 1 APPLICATION(S): at 14:00

## 2024-02-17 RX ADMIN — ALBUTEROL 2.5 MILLIGRAM(S): 90 AEROSOL, METERED ORAL at 03:07

## 2024-02-17 RX ADMIN — APIXABAN 5 MILLIGRAM(S): 2.5 TABLET, FILM COATED ORAL at 02:03

## 2024-02-17 RX ADMIN — LEVETIRACETAM 600 MILLIGRAM(S): 250 TABLET, FILM COATED ORAL at 09:39

## 2024-02-17 RX ADMIN — CHLORHEXIDINE GLUCONATE 1 APPLICATION(S): 213 SOLUTION TOPICAL at 05:37

## 2024-02-17 RX ADMIN — Medication 815 MILLIGRAM(S): at 05:40

## 2024-02-17 RX ADMIN — ALBUTEROL 2.5 MILLIGRAM(S): 90 AEROSOL, METERED ORAL at 09:18

## 2024-02-17 RX ADMIN — Medication 100 MILLIEQUIVALENT(S): at 12:47

## 2024-02-17 RX ADMIN — Medication 100 MILLIEQUIVALENT(S): at 13:59

## 2024-02-17 RX ADMIN — CEFEPIME 100 MILLIGRAM(S): 1 INJECTION, POWDER, FOR SOLUTION INTRAMUSCULAR; INTRAVENOUS at 21:29

## 2024-02-17 RX ADMIN — MORPHINE SULFATE 1 MILLIGRAM(S): 50 CAPSULE, EXTENDED RELEASE ORAL at 08:10

## 2024-02-17 RX ADMIN — Medication 62.5 MILLIGRAM(S): at 13:58

## 2024-02-17 RX ADMIN — Medication 815 MILLIGRAM(S): at 04:16

## 2024-02-17 RX ADMIN — Medication 650 MILLIGRAM(S): at 22:26

## 2024-02-17 RX ADMIN — LACOSAMIDE 200 MILLIGRAM(S): 50 TABLET ORAL at 18:46

## 2024-02-17 RX ADMIN — Medication 62.5 MILLIGRAM(S): at 05:35

## 2024-02-17 RX ADMIN — LEVETIRACETAM 2000 MILLIGRAM(S): 250 TABLET, FILM COATED ORAL at 18:07

## 2024-02-17 RX ADMIN — CEFEPIME 100 MILLIGRAM(S): 1 INJECTION, POWDER, FOR SOLUTION INTRAMUSCULAR; INTRAVENOUS at 14:35

## 2024-02-17 RX ADMIN — Medication 1250 MILLIGRAM(S): at 21:27

## 2024-02-17 RX ADMIN — SODIUM CHLORIDE 2500 MILLILITER(S): 9 INJECTION INTRAMUSCULAR; INTRAVENOUS; SUBCUTANEOUS at 10:43

## 2024-02-17 NOTE — CHART NOTE - NSCHARTNOTEFT_GEN_A_CORE
EVENT: Received telephone call from RN that pt has a rectal temp of 100.4    HPI: 49 yo M pt with PMHx CVA (3/30/2022 with residual aphasia and right sided residual weakness), seizures, HTN, HLD, CHF w/ ICD (initially rEF 30%->55-60% on 03/2021), MDD, Recurrent PE, Afib (on Eliquis), obesity s/p bariatric intervention presenting to ED for PEG tube malfunction admitted for further care. Per endorsement, PEG tube insertion attempted in ED without success. NG tube also attempted but due to bleeding the insertion was aborted and AC not restarted (home med). Patient was transferred to  for further care given that patient has a trach. Patient has copious amounts of secretion from trach requiring constant suctioning. Sputum cx sent. CXR noted. Afebrile. Concern for MDRO and therefore was placed on contact  isolation until cx comes back. GI consulted and examined patient at bedside. GI spoke with patient's proxy. Plan for OR on Monday for new PEG tube insertion.   Patient's seizure meds switched to IV form. BB IV. Monitor BP per unit protocol.     02/09: OR on Monday for PEG. Dispo is to return to [facility] once PEg placed,   2/12  GI had no window to place peg. IR agrees with GI.  Surgery called for consult.  02/15/2024: OR today for PEG placement with Dr. Elmore this afternoon. NPO. IVF. INR this AM 2.19. S/p Vitamin K 5 mg IVPB and 1 unit FFP.  Plan of care discussed with intensivist and surgical team.   02/16/2024; S/p EGD on 2/15. Abdominal binder in place. Hemodynamically stable. C/w Cefepime 2GM Q 8 for Pseudomonas sputum. Pending ID recs. Sacral wound re-eval and reccs appreciated. Nutritional supplement and feeding has been initiated.    SUBJECTIVE: n/a    OBJECTIVE:  Vital Signs Last 24 Hrs  T(C): 38 (17 Feb 2024 21:46), Max: 38.2 (17 Feb 2024 09:48)  T(F): 100.4 (17 Feb 2024 21:46), Max: 100.7 (17 Feb 2024 09:48)  HR: 67 (17 Feb 2024 21:46) (67 - 92)  BP: 116/74 (17 Feb 2024 21:46) (92/53 - 125/71)  BP(mean): 83 (17 Feb 2024 05:03) (83 - 83)  RR: 19 (17 Feb 2024 21:46) (19 - 20)  SpO2: 100% (17 Feb 2024 21:46) (95% - 100%)    Parameters below as of 17 Feb 2024 21:46  Patient On (Oxygen Delivery Method): hydrotrach  O2 Flow (L/min): 4      FOCUSED PHYSICAL EXAM:  Neuro: awake, alert, oriented x 3. No neuro deficit  Cardiovascular: Pulses +2 B/L in lower and upper extremities, HR regular, BP stable, No edema.  Respiratory: Respirations regular, unlabored, breath sounds clear B/L.   GI: Abdomen soft, non-tender, positive bowel sounds.  : no bladder distention noted. No complaints at this time.  Skin: Dry, intact, no bruising, no diaphoresis.    LABS:                        10.1   9.46  )-----------( 197      ( 17 Feb 2024 20:04 )             31.8     02-17    140  |  107  |  7   ----------------------------<  87  3.2<L>   |  28  |  0.52    Ca    8.2<L>      17 Feb 2024 07:45  Phos  2.5     02-17  Mg     1.7     02-17    TPro  5.9<L>  /  Alb  2.0<L>  /  TBili  0.3  /  DBili  x   /  AST  13  /  ALT  7<L>  /  AlkPhos  60  02-17      EKG:   IMAGING:    ASSESSMENT/PROBLEM: fever of 100.4 rectally      PLAN:  1. Tylenol 650mg, WI x 1 dose ordered  2. Cont Cefepime as ordered  3. Cont present care/treatment  4. Supportive care EVENT: Received telephone call from RN that pt has a rectal temp of 100.4    HPI: 49 yo M pt with PMHx CVA (3/30/2022 with residual aphasia and right sided residual weakness), seizures, HTN, HLD, CHF w/ ICD (initially rEF 30%->55-60% on 03/2021), MDD, Recurrent PE, Afib (on Eliquis), obesity s/p bariatric intervention presenting to ED for PEG tube malfunction admitted for further care. Per endorsement, PEG tube insertion attempted in ED without success. NG tube also attempted but due to bleeding the insertion was aborted and AC not restarted (home med). Patient was transferred to  for further care given that patient has a trach. Patient has copious amounts of secretion from trach requiring constant suctioning. Sputum cx sent. CXR   2/12  GI had no window to place peg. IR agrees with GI.  Surgery called for consult. On 2/15 pt is s/p PEG placement.  02/16/2024; S/p EGD on 2/15. Abdominal binder in place. Hemodynamically stable. C/w Cefepime 2GM Q 8 for Pseudomonas sputum. Pending ID recs. Sacral wound re-eval and reccs appreciated. Nutritional supplement and feeding has been initiated.    SUBJECTIVE: n/a    OBJECTIVE:  Vital Signs Last 24 Hrs  T(C): 38 (17 Feb 2024 21:46), Max: 38.2 (17 Feb 2024 09:48)  T(F): 100.4 (17 Feb 2024 21:46), Max: 100.7 (17 Feb 2024 09:48)  HR: 67 (17 Feb 2024 21:46) (67 - 92)  BP: 116/74 (17 Feb 2024 21:46) (92/53 - 125/71)  BP(mean): 83 (17 Feb 2024 05:03) (83 - 83)  RR: 19 (17 Feb 2024 21:46) (19 - 20)  SpO2: 100% (17 Feb 2024 21:46) (95% - 100%)    Parameters below as of 17 Feb 2024 21:46  Patient On (Oxygen Delivery Method): hydrotrach  O2 Flow (L/min): 4      FOCUSED PHYSICAL EXAM:  Neuro: awake, alert, oriented x 3. No neuro deficit  Cardiovascular: Pulses +2 B/L in lower and upper extremities, HR regular, BP stable, No edema.  Respiratory: Respirations regular, unlabored, breath sounds clear B/L.   GI: Abdomen soft, non-tender, positive bowel sounds.  : no bladder distention noted. No complaints at this time.  Skin: Dry, intact, no bruising, no diaphoresis.    LABS:                        10.1   9.46  )-----------( 197      ( 17 Feb 2024 20:04 )             31.8     02-17    140  |  107  |  7   ----------------------------<  87  3.2<L>   |  28  |  0.52    Ca    8.2<L>      17 Feb 2024 07:45  Phos  2.5     02-17  Mg     1.7     02-17    TPro  5.9<L>  /  Alb  2.0<L>  /  TBili  0.3  /  DBili  x   /  AST  13  /  ALT  7<L>  /  AlkPhos  60  02-17      EKG:   IMAGING:    ASSESSMENT/PROBLEM: fever of 100.4 rectally      PLAN:  1. Tylenol 650mg, SD x 1 dose ordered  2. Cont Cefepime as ordered  3. Cont present care/treatment  4. Supportive care

## 2024-02-17 NOTE — PROGRESS NOTE ADULT - NS ATTEND AMEND GEN_ALL_CORE FT
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 Now pending surgical PEG placement after unable to replace endoscopically w/ GI.    ASSESSMENT:  #PEG malfunction  #CVA  #Tracheostomy status  #Seizures  #H/o recurrent PE  #Afib  #HFmrEF    Plan:  - s/p surgical PEG placement 2/15, gen sx mgmt appreciated - GI and IR unable to place d/t anatomy restrictions  - patient has repeatedly ripped out prior NGTs; keep abd binder over new PEG  - AC resumed post PEG  - trend CBC  - s/p IVF bolus overnight suspect hypovolemia, question new sepsis  - resume cefepime  - home AEDs for seizure  - enteral feeding initiated  - PRN IV antihypertensives convert to enteral via PEG  - cont 4L hydrotrach  - routine trach care and suctioning  - passymuir valve eval passed - cont SLP follow-up  - family updated

## 2024-02-17 NOTE — PROGRESS NOTE ADULT - PROBLEM SELECTOR PLAN 1
Patient pulled out Peg tube at rehab.  Unable to place new Peg in ED.  Dr Filemon SIMON was unable to fine window in endoscopy this morning. IR reviewed old CT Abdomen and agrees.  Surgery called for consult. Dr Elmore following  S/P NGT insertion on 2/8 aborted after bleeding  On IVF  AC placed on hold.   INR 2.19  S/p Vitamin K 5 mg IV and 1 unit FFP  Discussed with surgical team   Nasal feeding tube placed 2/10 which patient pulled out.  Dr Elmore will place g tube today 2/15 in PM  NPO

## 2024-02-17 NOTE — PROGRESS NOTE ADULT - PROBLEM SELECTOR PLAN 8
Positive history of recurrent PEs  AC currently on hold secondary to bleeding.  trend CBC  Monitor oxygen saturation.  SCDs for DVT prophylaxis.

## 2024-02-17 NOTE — PROGRESS NOTE ADULT - SUBJECTIVE AND OBJECTIVE BOX
DANNIE SHIRLEY    SCU progress note    INTERVAL HPI/OVERNIGHT EVENTS: ***    DNR [ ]   DNI  [  ]    Covid - 19 PCR:     The 4Ms    What Matters Most: see GOC  Age appropriate Medications/Screen for High Risk Medication: Yes  Mentation: see CAM below  Mobility: defer to physical exam    The Confusion Assessment Method (CAM) Diagnostic Algorithm     1: Acute Onset or Fluctuating Course  - Is there evidence of an acute change in mental status from the patient’s baseline? Did the (abnormal) behavior  fluctuate during the day, that is, tend to come and go, or increase and decrease in severity?  [ ] YES [ ] NO     2: Inattention  - Did the patient have difficulty focusing attention, being easily distractible, or having difficulty keeping track of what was being said?  [ ] YES [ ] NO     3: Disorganized thinking  -Was the patient’s thinking disorganized or incoherent, such as rambling or irrelevant conversation, unclear or illogical flow of ideas, or unpredictable switching from subject to subject?  [ ] YES [ ] NO    4: Altered Level of consciousness?  [ ] YES [ ] NO    The diagnosis of delirium by CAM requires the presence of features 1 and 2 and either 3 or 4.    PRESSORS: [ ] YES [ ] NO    Cardiovascular:  Heart Failure  Acute   Acute on Chronic  Chronic       metoprolol tartrate Injectable 5 milliGRAM(s) IV Push every 12 hours    Pulmonary:  albuterol    0.083% 2.5 milliGRAM(s) Nebulizer every 6 hours    Hematalogic:  apixaban 5 milliGRAM(s) Oral two times a day    Other:  chlorhexidine 2% Cloths 1 Application(s) Topical <User Schedule>  Dakins Solution - 1/2 Strength 1 Application(s) Topical every 24 hours  dextrose 5% + lactated ringers. 1000 milliLiter(s) IV Continuous <Continuous>  lacosamide IVPB 200 milliGRAM(s) IV Intermittent <User Schedule>  levETIRAcetam  IVPB 2000 milliGRAM(s) IV Intermittent <User Schedule>  morphine  - Injectable 1 milliGRAM(s) IV Push every 6 hours PRN  valproate sodium  IVPB 1250 milliGRAM(s) IV Intermittent <User Schedule>    albuterol    0.083% 2.5 milliGRAM(s) Nebulizer every 6 hours  apixaban 5 milliGRAM(s) Oral two times a day  chlorhexidine 2% Cloths 1 Application(s) Topical <User Schedule>  Dakins Solution - 1/2 Strength 1 Application(s) Topical every 24 hours  dextrose 5% + lactated ringers. 1000 milliLiter(s) IV Continuous <Continuous>  lacosamide IVPB 200 milliGRAM(s) IV Intermittent <User Schedule>  levETIRAcetam  IVPB 2000 milliGRAM(s) IV Intermittent <User Schedule>  metoprolol tartrate Injectable 5 milliGRAM(s) IV Push every 12 hours  morphine  - Injectable 1 milliGRAM(s) IV Push every 6 hours PRN  valproate sodium  IVPB 1250 milliGRAM(s) IV Intermittent <User Schedule>    Drug Dosing Weight    Weight (kg): 81.7 (08 Feb 2024 10:00)    CENTRAL LINE: [ ] YES [ ] NO  LOCATION:   DATE INSERTED:  REMOVE: [ ] YES [ ] NO  EXPLAIN:    FAULKNER: [ ] YES [ ] NO    DATE INSERTED:  REMOVE:  [ ] YES [ ] NO  EXPLAIN:    PAST MEDICAL & SURGICAL HISTORY:  Heart failure, unspecified HF chronicity, unspecified heart failure type      ETOH abuse  h/o etoh abuse now moderate drinker      Artificial pacemaker                  02-16 @ 07:01  -  02-17 @ 07:00  --------------------------------------------------------  IN: 0 mL / OUT: 550 mL / NET: -550 mL            PHYSICAL EXAM:    GENERAL: NAD, well-groomed, well-developed  HEAD:  Atraumatic, Normocephalic  EYES: EOMI, PERRLA, conjunctiva and sclera clear  ENMT: No tonsillar erythema, exudates, or enlargement; Moist mucous membranes, Good dentition, No lesions  NECK: Supple, No JVD, Normal thyroid  NERVOUS SYSTEM:  Alert & Oriented X3, Good concentration; Motor Strength 5/5 B/L upper and lower extremities; DTRs 2+ intact and symmetric  CHEST/LUNG: Clear to percussion bilaterally; No rales, rhonchi, wheezing, or rubs  HEART: Regular rate and rhythm; No murmurs, rubs, or gallops  ABDOMEN: Soft, Nontender, Nondistended; Bowel sounds present  EXTREMITIES:  2+ Peripheral Pulses, No clubbing, cyanosis, or edema  LYMPH: No lymphadenopathy noted  SKIN: No rashes or lesions      LABS:  CBC Full  -  ( 17 Feb 2024 07:45 )  WBC Count : 9.71 K/uL  RBC Count : 3.02 M/uL  Hemoglobin : 9.6 g/dL  Hematocrit : 30.7 %  Platelet Count - Automated : 208 K/uL  Mean Cell Volume : 101.7 fl  Mean Cell Hemoglobin : 31.8 pg  Mean Cell Hemoglobin Concentration : 31.3 gm/dL  Auto Neutrophil # : x  Auto Lymphocyte # : x  Auto Monocyte # : x  Auto Eosinophil # : x  Auto Basophil # : x  Auto Neutrophil % : x  Auto Lymphocyte % : x  Auto Monocyte % : x  Auto Eosinophil % : x  Auto Basophil % : x    02-17    140  |  107  |  7   ----------------------------<  87  3.2<L>   |  28  |  0.52    Ca    8.2<L>      17 Feb 2024 07:45  Phos  2.5     02-17  Mg     1.7     02-17    TPro  5.9<L>  /  Alb  2.0<L>  /  TBili  0.3  /  DBili  x   /  AST  13  /  ALT  7<L>  /  AlkPhos  60  02-17    PT/INR - ( 15 Feb 2024 16:00 )   PT: 15.5 sec;   INR: 1.37 ratio         PTT - ( 15 Feb 2024 16:00 )  PTT:40.0 sec  Urinalysis Basic - ( 17 Feb 2024 07:45 )    Color: x / Appearance: x / SG: x / pH: x  Gluc: 87 mg/dL / Ketone: x  / Bili: x / Urobili: x   Blood: x / Protein: x / Nitrite: x   Leuk Esterase: x / RBC: x / WBC x   Sq Epi: x / Non Sq Epi: x / Bacteria: x            [  ]  DVT Prophylaxis  [  ]  Nutrition, Brand, Rate         Goal Rate        Abnormal Nutritional Status -  Malnutrition   Cachexia      Morbid Obesity BMI >/=40    RADIOLOGY & ADDITIONAL STUDIES:  ***    Goals of Care Discussion with Family/Proxy/Other   - see note from/family meeting set up for...

## 2024-02-17 NOTE — PROGRESS NOTE ADULT - PROBLEM SELECTOR PLAN 7
H&H low but stable  AC on hold  Trend CBC.    # elevated INR 2.19  S/p Vitamin K 5 mg IVPB  S/p 1 unit FFP

## 2024-02-17 NOTE — PROGRESS NOTE ADULT - PROBLEM SELECTOR PLAN 2
Continue hydro-trach collar @ 4L     Monitor oxygen saturation.  Continue bronchodilators via nebulizer.  CXR shows elevated right hemidiaphragm. +AICD  Speech eval appreciated. Lee Mcleod attached to trach.

## 2024-02-17 NOTE — PROGRESS NOTE ADULT - ASSESSMENT
47yo M pt with PMHx CVA (3/30/2022 with residual aphasia and right sided residual weakness), seizures, HTN, HLD, CHF w/ ICD (initially rEF 30%->55-60% on 03/2021), MDD, Recurrent PE, Afib (on Eliquis), obesity s/p bariatric intervention presenting to ED for PEG tube malfunction admitted for further care. Per endorsement, PEG tube insertion attempted in ED without success. NG tube also attempted but due to bleeding the insertion was aborted and AC not restarted (home med). Patient was transferred to  for further care given that patient has a trach. Patient has copious amounts of secretion from trach requiring constant suctioning. Sputum cx sent. CXR noted. Afebrile. Concern for MDRO and therefore was placed on contact  isolation until cx comes back. GI consulted and examined patient at bedside. GI spoke with patient's proxy. Plan for OR on Monday for new PEG tube insertion.   Patient's seizure meds switched to IV form. BB IV. Monitor BP per unit protocol.   02/09: OR on Monday for PEG. Dispo is to return to [facility] once PEg placed,   2/12  GI had no window to place peg. IR agrees with GI.  Surgery called for consult.  02/15/2024: OR today for PEG placement with Dr. Elmore this afternoon. NPO. IVF. INR this AM 2.19. S/p Vitamin K 5 mg IVPB and 1 unit FFP.  Plan of care discussed with intensivist and surgical team.   02/16/2024: Feeding to be started tonight. AC resumed. Monitor for bleeding   02/17/2024: Hypotensive last night. BP 84/49 HR 59. S/p 500 ml bolus. Cefepime discontinued 2/16. Discussed with Dr. De La Torre. ID believes it is likely colonization in sputum and since patient is afebrile without leukocytosis ABX should be discontinued. Discussed with intensivist. Thid AM low-grade fever which could be likely reactive from procedure (PEG placement on 2/15). H&H stable. Wound care re-eval and wound care orders modified. Afebrile this AM.

## 2024-02-17 NOTE — PROGRESS NOTE ADULT - PROBLEM SELECTOR PLAN 9
SCB for DVT prophylaxis  Continue to hold AC  Dr. Elmore to insert PEG tube today 2/15  Continue oxygen support via hydro-trache  Monitor saturation.  Continue using [place] valve.  Continue antiepileptics.  Contact Isolation for possible MDRO  Dispo: return to [facility] after PEG placement.

## 2024-02-18 DIAGNOSIS — I48.20 CHRONIC ATRIAL FIBRILLATION, UNSPECIFIED: ICD-10-CM

## 2024-02-18 DIAGNOSIS — A41.9 SEPSIS, UNSPECIFIED ORGANISM: ICD-10-CM

## 2024-02-18 LAB
ALBUMIN SERPL ELPH-MCNC: 2 G/DL — LOW (ref 3.5–5)
ALP SERPL-CCNC: 61 U/L — SIGNIFICANT CHANGE UP (ref 40–120)
ALT FLD-CCNC: <6 U/L DA — LOW (ref 10–60)
ANION GAP SERPL CALC-SCNC: 2 MMOL/L — LOW (ref 5–17)
AST SERPL-CCNC: 8 U/L — LOW (ref 10–40)
BILIRUB SERPL-MCNC: 0.3 MG/DL — SIGNIFICANT CHANGE UP (ref 0.2–1.2)
BUN SERPL-MCNC: 9 MG/DL — SIGNIFICANT CHANGE UP (ref 7–18)
CALCIUM SERPL-MCNC: 8.2 MG/DL — LOW (ref 8.4–10.5)
CHLORIDE SERPL-SCNC: 106 MMOL/L — SIGNIFICANT CHANGE UP (ref 96–108)
CO2 SERPL-SCNC: 32 MMOL/L — HIGH (ref 22–31)
CREAT SERPL-MCNC: 0.49 MG/DL — LOW (ref 0.5–1.3)
EGFR: 126 ML/MIN/1.73M2 — SIGNIFICANT CHANGE UP
GLUCOSE BLDC GLUCOMTR-MCNC: 113 MG/DL — HIGH (ref 70–99)
GLUCOSE BLDC GLUCOMTR-MCNC: 123 MG/DL — HIGH (ref 70–99)
GLUCOSE BLDC GLUCOMTR-MCNC: 87 MG/DL — SIGNIFICANT CHANGE UP (ref 70–99)
GLUCOSE BLDC GLUCOMTR-MCNC: 88 MG/DL — SIGNIFICANT CHANGE UP (ref 70–99)
GLUCOSE SERPL-MCNC: 86 MG/DL — SIGNIFICANT CHANGE UP (ref 70–99)
HCT VFR BLD CALC: 29.6 % — LOW (ref 39–50)
HGB BLD-MCNC: 9.5 G/DL — LOW (ref 13–17)
MAGNESIUM SERPL-MCNC: 1.9 MG/DL — SIGNIFICANT CHANGE UP (ref 1.6–2.6)
MCHC RBC-ENTMCNC: 32 PG — SIGNIFICANT CHANGE UP (ref 27–34)
MCHC RBC-ENTMCNC: 32.1 GM/DL — SIGNIFICANT CHANGE UP (ref 32–36)
MCV RBC AUTO: 99.7 FL — SIGNIFICANT CHANGE UP (ref 80–100)
NRBC # BLD: 0 /100 WBCS — SIGNIFICANT CHANGE UP (ref 0–0)
PHOSPHATE SERPL-MCNC: 2.1 MG/DL — LOW (ref 2.5–4.5)
PLATELET # BLD AUTO: 189 K/UL — SIGNIFICANT CHANGE UP (ref 150–400)
POTASSIUM SERPL-MCNC: 3.1 MMOL/L — LOW (ref 3.5–5.3)
POTASSIUM SERPL-SCNC: 3.1 MMOL/L — LOW (ref 3.5–5.3)
PROT SERPL-MCNC: 5.9 G/DL — LOW (ref 6–8.3)
RBC # BLD: 2.97 M/UL — LOW (ref 4.2–5.8)
RBC # FLD: 13 % — SIGNIFICANT CHANGE UP (ref 10.3–14.5)
SODIUM SERPL-SCNC: 140 MMOL/L — SIGNIFICANT CHANGE UP (ref 135–145)
WBC # BLD: 7.39 K/UL — SIGNIFICANT CHANGE UP (ref 3.8–10.5)
WBC # FLD AUTO: 7.39 K/UL — SIGNIFICANT CHANGE UP (ref 3.8–10.5)

## 2024-02-18 PROCEDURE — 99233 SBSQ HOSP IP/OBS HIGH 50: CPT

## 2024-02-18 RX ORDER — METOPROLOL TARTRATE 50 MG
12.5 TABLET ORAL EVERY 12 HOURS
Refills: 0 | Status: DISCONTINUED | OUTPATIENT
Start: 2024-02-18 | End: 2024-02-21

## 2024-02-18 RX ORDER — POTASSIUM PHOSPHATE, MONOBASIC POTASSIUM PHOSPHATE, DIBASIC 236; 224 MG/ML; MG/ML
15 INJECTION, SOLUTION INTRAVENOUS ONCE
Refills: 0 | Status: COMPLETED | OUTPATIENT
Start: 2024-02-18 | End: 2024-02-18

## 2024-02-18 RX ORDER — POTASSIUM CHLORIDE 20 MEQ
40 PACKET (EA) ORAL EVERY 4 HOURS
Refills: 0 | Status: COMPLETED | OUTPATIENT
Start: 2024-02-18 | End: 2024-02-18

## 2024-02-18 RX ORDER — PANTOPRAZOLE SODIUM 20 MG/1
40 TABLET, DELAYED RELEASE ORAL DAILY
Refills: 0 | Status: DISCONTINUED | OUTPATIENT
Start: 2024-02-18 | End: 2024-02-20

## 2024-02-18 RX ADMIN — LEVETIRACETAM 2000 MILLIGRAM(S): 250 TABLET, FILM COATED ORAL at 06:53

## 2024-02-18 RX ADMIN — Medication 1250 MILLIGRAM(S): at 07:21

## 2024-02-18 RX ADMIN — CEFEPIME 100 MILLIGRAM(S): 1 INJECTION, POWDER, FOR SOLUTION INTRAMUSCULAR; INTRAVENOUS at 13:57

## 2024-02-18 RX ADMIN — Medication 1 APPLICATION(S): at 13:54

## 2024-02-18 RX ADMIN — POTASSIUM PHOSPHATE, MONOBASIC POTASSIUM PHOSPHATE, DIBASIC 62.5 MILLIMOLE(S): 236; 224 INJECTION, SOLUTION INTRAVENOUS at 11:37

## 2024-02-18 RX ADMIN — MORPHINE SULFATE 1 MILLIGRAM(S): 50 CAPSULE, EXTENDED RELEASE ORAL at 12:06

## 2024-02-18 RX ADMIN — CEFEPIME 100 MILLIGRAM(S): 1 INJECTION, POWDER, FOR SOLUTION INTRAMUSCULAR; INTRAVENOUS at 06:52

## 2024-02-18 RX ADMIN — CEFEPIME 100 MILLIGRAM(S): 1 INJECTION, POWDER, FOR SOLUTION INTRAMUSCULAR; INTRAVENOUS at 21:18

## 2024-02-18 RX ADMIN — ALBUTEROL 2.5 MILLIGRAM(S): 90 AEROSOL, METERED ORAL at 20:45

## 2024-02-18 RX ADMIN — Medication 12.5 MILLIGRAM(S): at 18:15

## 2024-02-18 RX ADMIN — Medication 40 MILLIEQUIVALENT(S): at 13:52

## 2024-02-18 RX ADMIN — LACOSAMIDE 200 MILLIGRAM(S): 50 TABLET ORAL at 18:15

## 2024-02-18 RX ADMIN — LEVETIRACETAM 2000 MILLIGRAM(S): 250 TABLET, FILM COATED ORAL at 18:15

## 2024-02-18 RX ADMIN — MORPHINE SULFATE 1 MILLIGRAM(S): 50 CAPSULE, EXTENDED RELEASE ORAL at 11:36

## 2024-02-18 RX ADMIN — LACOSAMIDE 200 MILLIGRAM(S): 50 TABLET ORAL at 06:22

## 2024-02-18 RX ADMIN — Medication 1250 MILLIGRAM(S): at 13:51

## 2024-02-18 RX ADMIN — Medication 1250 MILLIGRAM(S): at 20:50

## 2024-02-18 RX ADMIN — CHLORHEXIDINE GLUCONATE 1 APPLICATION(S): 213 SOLUTION TOPICAL at 06:22

## 2024-02-18 RX ADMIN — Medication 40 MILLIEQUIVALENT(S): at 10:51

## 2024-02-18 RX ADMIN — ALBUTEROL 2.5 MILLIGRAM(S): 90 AEROSOL, METERED ORAL at 14:38

## 2024-02-18 RX ADMIN — PANTOPRAZOLE SODIUM 40 MILLIGRAM(S): 20 TABLET, DELAYED RELEASE ORAL at 21:17

## 2024-02-18 RX ADMIN — ALBUTEROL 2.5 MILLIGRAM(S): 90 AEROSOL, METERED ORAL at 09:31

## 2024-02-18 NOTE — PROGRESS NOTE ADULT - PROBLEM SELECTOR PLAN 7
2/2 CVA  Maintain safety precautions  Seizure precautions  Keep HOB elevated at all times.   Passive ROM to R sided extremities  Turn and reposition every 2 hrs.

## 2024-02-18 NOTE — PROGRESS NOTE ADULT - PROBLEM SELECTOR PLAN 3
Has ICD  Echo from 9/2018 findings EF 70% , mild concentric LV hypertrophy  Resume metoprolol with hold parameters.   CXR findings as above, pulm venous congestion, pleural effusions .   Tolerating O2 4L hydrotrache. Wean as tolerated.   No diuretic for now in setting of soft BP.  Gentle hydration if needs IV fluids.

## 2024-02-18 NOTE — PROGRESS NOTE ADULT - PROBLEM SELECTOR PLAN 6
h/o TBI   Continue Keppra, vimpat, and valproic acid.   Keppra level WNL. Valproic acid mild elevated at 114 (high normal 114).   Maintain seizure precautions.

## 2024-02-18 NOTE — PROGRESS NOTE ADULT - NS ATTEND AMEND GEN_ALL_CORE FT
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 Now pending surgical PEG placement after unable to replace endoscopically w/ GI.    ASSESSMENT:  #PEG malfunction  #CVA  #Tracheostomy status  #Seizures  #H/o recurrent PE  #Afib  #HFmrEF    Plan:  - s/p surgical PEG placement 2/15, gen sx mgmt appreciated - GI and IR unable to place d/t anatomy restrictions  - patient has repeatedly ripped out prior NGTs; keep abd binder over new PEG  - AC resumed post PEG  - trend CBC  - s/p IVF bolus overnight suspect hypovolemia, question new sepsis/tracheitis  - resume cefepime  - home AEDs for seizure  - enteral feeding initiated  - PRN IV antihypertensives convert to enteral via PEG  - cont 4L hydrotrach  - routine trach care and suctioning  - passymuir valve eval passed - cont SLP follow-up  - family updated

## 2024-02-18 NOTE — PROGRESS NOTE ADULT - SUBJECTIVE AND OBJECTIVE BOX
DANNIE SHIRLEY    SCU progress note    INTERVAL HPI/OVERNIGHT EVENTS: ***    DNR [ ]   DNI  [  ]    Covid - 19 PCR: SARS-CoV-2: NotDetec (2024 16:00)      The 4Ms    What Matters Most: see GOC  Age appropriate Medications/Screen for High Risk Medication: Yes  Mentation: see CAM below  Mobility: defer to physical exam    The Confusion Assessment Method (CAM) Diagnostic Algorithm     1: Acute Onset or Fluctuating Course  - Is there evidence of an acute change in mental status from the patient’s baseline? Did the (abnormal) behavior  fluctuate during the day, that is, tend to come and go, or increase and decrease in severity?  [ ] YES [ ] NO     2: Inattention  - Did the patient have difficulty focusing attention, being easily distractible, or having difficulty keeping track of what was being said?  [ ] YES [ ] NO     3: Disorganized thinking  -Was the patient’s thinking disorganized or incoherent, such as rambling or irrelevant conversation, unclear or illogical flow of ideas, or unpredictable switching from subject to subject?  [ ] YES [ ] NO    4: Altered Level of consciousness?  [ ] YES [ ] NO    The diagnosis of delirium by CAM requires the presence of features 1 and 2 and either 3 or 4.    PRESSORS: [ ] YES [ ] NO  cefepime   IVPB 2000 milliGRAM(s) IV Intermittent every 8 hours    Cardiovascular:  Heart Failure  Acute   Acute on Chronic  Chronic         Pulmonary:  albuterol    0.083% 2.5 milliGRAM(s) Nebulizer every 6 hours    Hematologic:    Other:  chlorhexidine 2% Cloths 1 Application(s) Topical <User Schedule>  Dakins Solution - 1/2 Strength 1 Application(s) Topical every 24 hours  lacosamide Solution 200 milliGRAM(s) Oral every 12 hours  levETIRAcetam  Solution 2000 milliGRAM(s) Oral every 12 hours  morphine  - Injectable 1 milliGRAM(s) IV Push every 6 hours PRN  valproic  acid Syrup 1250 milliGRAM(s) Oral <User Schedule>    albuterol    0.083% 2.5 milliGRAM(s) Nebulizer every 6 hours  cefepime   IVPB 2000 milliGRAM(s) IV Intermittent every 8 hours  chlorhexidine 2% Cloths 1 Application(s) Topical <User Schedule>  Dakins Solution - 1/2 Strength 1 Application(s) Topical every 24 hours  lacosamide Solution 200 milliGRAM(s) Oral every 12 hours  levETIRAcetam  Solution 2000 milliGRAM(s) Oral every 12 hours  morphine  - Injectable 1 milliGRAM(s) IV Push every 6 hours PRN  valproic  acid Syrup 1250 milliGRAM(s) Oral <User Schedule>    Drug Dosing Weight    Weight (kg): 81.7 (2024 10:00)    CENTRAL LINE: [ ] YES [ ] NO  LOCATION:   DATE INSERTED:  REMOVE: [ ] YES [ ] NO  EXPLAIN:    FAULKNER: [ ] YES [ ] NO    DATE INSERTED:  REMOVE:  [ ] YES [ ] NO  EXPLAIN:    PAST MEDICAL & SURGICAL HISTORY:  Heart failure, unspecified HF chronicity, unspecified heart failure type      ETOH abuse  h/o etoh abuse now moderate drinker      Artificial pacemaker                   @ 07:01  -   @ 07:00  --------------------------------------------------------  IN: 0 mL / OUT: 450 mL / NET: -450 mL            PHYSICAL EXAM:        LABS:  CBC Full  -  ( 2024 20:04 )  WBC Count : 9.46 K/uL  RBC Count : 3.22 M/uL  Hemoglobin : 10.1 g/dL  Hematocrit : 31.8 %  Platelet Count - Automated : 197 K/uL  Mean Cell Volume : 98.8 fl  Mean Cell Hemoglobin : 31.4 pg  Mean Cell Hemoglobin Concentration : 31.8 gm/dL  Auto Neutrophil # : x  Auto Lymphocyte # : x  Auto Monocyte # : x  Auto Eosinophil # : x  Auto Basophil # : x  Auto Neutrophil % : x  Auto Lymphocyte % : x  Auto Monocyte % : x  Auto Eosinophil % : x  Auto Basophil % : x        140  |  107  |  7   ----------------------------<  87  3.2<L>   |  28  |  0.52    Ca    8.2<L>      2024 07:45  Phos  2.5       Mg     1.7         TPro  5.9<L>  /  Alb  2.0<L>  /  TBili  0.3  /  DBili  x   /  AST  13  /  ALT  7<L>  /  AlkPhos  60      PT/INR - ( 2024 11:10 )   PT: 16.1 sec;   INR: 1.43 ratio         PTT - ( 2024 11:10 )  PTT:33.5 sec  Urinalysis Basic - ( 2024 12:39 )    Color: Yellow / Appearance: Clear / S.022 / pH: x  Gluc: x / Ketone: 15 mg/dL  / Bili: Negative / Urobili: 0.2 mg/dL   Blood: x / Protein: Negative mg/dL / Nitrite: Negative   Leuk Esterase: Trace / RBC: 3 /HPF / WBC 4 /HPF   Sq Epi: x / Non Sq Epi: x / Bacteria: Occasional /HPF            [  ]  DVT Prophylaxis  [  ]   Abnormal Nutritional Status -  Malnutrition   Cachexia      Morbid Obesity BMI >/=40  Diet, NPO with Tube Feed:   Tube Feeding Modality: Gastrostomy  Nepro with Carb Steady  Total Volume for 24 Hours (mL): 960  Continuous  Starting Tube Feed Rate mL per Hour: 10  Increase Tube Feed Rate by (mL): 10     Every 6 hours  Until Goal Tube Feed Rate (mL per Hour): 40  Tube Feed Duration (in Hours): 24  Tube Feed Start Time: 22:00  No Carb Prosource (1pkg = 15gms Protein)     Qty per Day:  TID (24 @ 17:42) [Active]          RADIOLOGY & ADDITIONAL STUDIES:  ***    Goals of Care Discussion with Family/Proxy/Other   - see note from/family meeting set up for...     DANNIE SHIRLEY    SCU progress note    INTERVAL HPI/OVERNIGHT EVENTS: ***No acute events overnight noted.   Pt seen and examined this morning. Pt awake/alert, shakes head to "pain, breathing problems, abdominal discomfort. " Tolerating O2 4L via hydrotrache,  tube feeding via PEG. Loose brown BM noted .     Full code.     Covid - 19 PCR: SARS-CoV-2: NotDetec (2024 16:00)      The 4Ms    What Matters Most: see GOC  Age appropriate Medications/Screen for High Risk Medication: Yes  Mentation: see CAM below  Mobility: defer to physical exam    The Confusion Assessment Method (CAM) Diagnostic Algorithm     1: Acute Onset or Fluctuating Course  - Is there evidence of an acute change in mental status from the patient’s baseline? Did the (abnormal) behavior  fluctuate during the day, that is, tend to come and go, or increase and decrease in severity?  [ ] YES [x ] NO     2: Inattention  - Did the patient have difficulty focusing attention, being easily distractible, or having difficulty keeping track of what was being said?  [ ] YES [ ] NO  (x) unable to assess     3: Disorganized thinking  -Was the patient’s thinking disorganized or incoherent, such as rambling or irrelevant conversation, unclear or illogical flow of ideas, or unpredictable switching from subject to subject?  [ ] YES [ ] NO  (x) unable to assess    4: Altered Level of consciousness?  [ ] YES [x ] NO    The diagnosis of delirium by CAM requires the presence of features 1 and 2 and either 3 or 4.    PRESSORS: [ ] YES [ x] NO  cefepime   IVPB 2000 milliGRAM(s) IV Intermittent every 8 hours    Cardiovascular:  Heart Failure  Acute   Acute on Chronic  Chronic         Pulmonary:  albuterol    0.083% 2.5 milliGRAM(s) Nebulizer every 6 hours    Hematologic:    Other:  chlorhexidine 2% Cloths 1 Application(s) Topical <User Schedule>  Dakins Solution - 1/2 Strength 1 Application(s) Topical every 24 hours  lacosamide Solution 200 milliGRAM(s) Oral every 12 hours  levETIRAcetam  Solution 2000 milliGRAM(s) Oral every 12 hours  morphine  - Injectable 1 milliGRAM(s) IV Push every 6 hours PRN  valproic  acid Syrup 1250 milliGRAM(s) Oral <User Schedule>    albuterol    0.083% 2.5 milliGRAM(s) Nebulizer every 6 hours  cefepime   IVPB 2000 milliGRAM(s) IV Intermittent every 8 hours  chlorhexidine 2% Cloths 1 Application(s) Topical <User Schedule>  Dakins Solution - 1/2 Strength 1 Application(s) Topical every 24 hours  lacosamide Solution 200 milliGRAM(s) Oral every 12 hours  levETIRAcetam  Solution 2000 milliGRAM(s) Oral every 12 hours  morphine  - Injectable 1 milliGRAM(s) IV Push every 6 hours PRN  valproic  acid Syrup 1250 milliGRAM(s) Oral <User Schedule>    Drug Dosing Weight    Weight (kg): 81.7 (2024 10:00)    CENTRAL LINE: [ ] YES [x ] NO  LOCATION:   DATE INSERTED:  REMOVE: [ ] YES [ ] NO  EXPLAIN:    FAULKNER: [ ] YES [ x] NO    DATE INSERTED:  REMOVE:  [ ] YES [ ] NO  EXPLAIN:    PAST MEDICAL & SURGICAL HISTORY:  Heart failure, unspecified HF chronicity, unspecified heart failure type      ETOH abuse  h/o etoh abuse now moderate drinker  Artificial pacemaker       @ 07:01  -   @ 07:00  --------------------------------------------------------  IN: 0 mL / OUT: 450 mL / NET: -450 mL    PHYSICAL EXAM:  GENERAL: NAD,  HEAD:  Atraumatic, Normocephalic  EYES: EOMI, PERRLA, conjunctiva and sclera clear  ENMT: Moist mucous membranes, No lesions  NECK: Supple, No JVD, Tracheostomy  NERVOUS SYSTEM:Awake/alert to self. Mouths few words. Does not follow commands. R hemiparesis .   CHEST/LUNG:Trach to hydrotrache.  Rhonchorous . Productive cough with copious whitish/green phlegm via trach.  HEART: Regular rate and rhythm; No murmurs, rubs, or gallops  ABDOMEN: Soft, Nontender, Nondistended; Bowel sounds present. PEG tube without drainage.   EXTREMITIES:  2+ Peripheral Pulses, No clubbing, cyanosis, or edema  LYMPH: No lymphadenopathy noted  SKIN: Stage 4 sacral ulcer .         LABS:                          9.5    7.39  )-----------( 189      ( 2024 06:40 )             29.6   -18    140  |  106  |  9   ----------------------------<  86  3.1<L>   |  32<H>  |  0.49<L>    Ca    8.2<L>      2024 06:40  Phos  2.1     -18  Mg     1.9         TPro  5.9<L>  /  Alb  2.0<L>  /  TBili  0.3  /  DBili  x   /  AST  8<L>  /  ALT  <6<L>  /  AlkPhos  61  -  ----------  CBC Full  -  ( 2024 20:04 )  WBC Count : 9.46 K/uL  RBC Count : 3.22 M/uL  Hemoglobin : 10.1 g/dL  Hematocrit : 31.8 %  Platelet Count - Automated : 197 K/uL  Mean Cell Volume : 98.8 fl  Mean Cell Hemoglobin : 31.4 pg  Mean Cell Hemoglobin Concentration : 31.8 gm/dL  Auto Neutrophil # : x  Auto Lymphocyte # : x  Auto Monocyte # : x  Auto Eosinophil # : x  Auto Basophil # : x  Auto Neutrophil % : x  Auto Lymphocyte % : x  Auto Monocyte % : x  Auto Eosinophil % : x  Auto Basophil % : x        140  |  107  |  7   ----------------------------<  87  3.2<L>   |  28  |  0.52    Ca    8.2<L>      2024 07:45  Phos  2.5     -17  Mg     1.7     -    TPro  5.9<L>  /  Alb  2.0<L>  /  TBili  0.3  /  DBili  x   /  AST  13  /  ALT  7<L>  /  AlkPhos  60  17    PT/INR - ( 2024 11:10 )   PT: 16.1 sec;   INR: 1.43 ratio         PTT - ( 2024 11:10 )  PTT:33.5 sec  Urinalysis Basic - ( 2024 12:39 )    Color: Yellow / Appearance: Clear / S.022 / pH: x  Gluc: x / Ketone: 15 mg/dL  / Bili: Negative / Urobili: 0.2 mg/dL   Blood: x / Protein: Negative mg/dL / Nitrite: Negative   Leuk Esterase: Trace / RBC: 3 /HPF / WBC 4 /HPF   Sq Epi: x / Non Sq Epi: x / Bacteria: Occasional /HPF            [  ]  DVT Prophylaxis  [  ]   Abnormal Nutritional Status -  Malnutrition   Cachexia       Diet, NPO with Tube Feed:   Tube Feeding Modality: Gastrostomy  Nepro with Carb Steady  Total Volume for 24 Hours (mL): 960  Continuous  Starting Tube Feed Rate mL per Hour: 10  Increase Tube Feed Rate by (mL): 10     Every 6 hours  Until Goal Tube Feed Rate (mL per Hour): 40  Tube Feed Duration (in Hours): 24  Tube Feed Start Time: 22:00  No Carb Prosource (1pkg = 15gms Protein)     Qty per Day:  TID (24 @ 17:42) [Active]          RADIOLOGY & ADDITIONAL STUDIES:  ***  < from: Xray Chest 1 View AP/PA (24 @ 12:16) >  INTERPRETATION:  XR CHEST dated 2024 12:16 PM    CLINICAL INFORMATION: Male, 48 years old.  Fever, hypotension.    PRIOR STUDIES: 2024    FINDINGS/  IMPRESSION: Tracheostomy tube again noted unchanged. Left-sided trilead   permanent pacemaker with the tip of the pacer wires unchanged in position   projecting over the region the right atrium, right ventricle with right   ventricular ICD and left ventricle. There is new cardiomegaly with mild   pulmonary venous congestion and new posterior layering of a pleural   effusions. No pneumothorax. No acute osseous pathology.    < end of copied text >  < from: Xray Chest 1 View- PORTABLE-Urgent (Xray Chest 1 View- PORTABLE-Urgent .) (24 @ 10:17) >  COMPARISON: 2024    IMPRESSION: Tracheostomy and pacer as before. No focal infiltrate. Some   elevation of the right hemidiaphragm. Heart is at the upper limits of   normal in its transthoracic diameter. Regional osseous structures   appropriate for age    < end of copied text >  < from: Xray Chest 1 View- PORTABLE-Urgent (Xray Chest 1 View- PORTABLE-Urgent .) (24 @ 21:50) >    INTERPRETATION:  AP chest radiographs    COMPARISON: 2024 chest x-ray.    CLINICAL INFORMATION: Displaced tracheostomy tube.Follow-up..    FINDINGS:  CATHETERS AND TUBES: Tracheostomy tube in place.    PULMONARY: The airway is midline.  There are no airspace consolidations or radiographic evidence of   pulmonary nodules..  No pleural effusion or pneumothorax.    HEART/VASCULAR: The heart size and mediastinum configuration are within   the limits of normal. Right atrium and biventricular cardiac wire leads   in place.    BONES: The visualized osseous thorax is intact.    IMPRESSION:  Tracheostomy tube in place.  No radiographic evidence of active chest disease..    < end of copied text >      Goals of Care Discussion with Family/Proxy/Other   - see note from

## 2024-02-18 NOTE — PROGRESS NOTE ADULT - PROBLEM SELECTOR PLAN 2
Continue O2  via hydrotrache .  Tolerating O2 4L hydrotrache. Wean as tolerated.   Monitor oxygenation   Continue duonebs q6h  CXR as above , findings mild pulm venous congestion, pleural effusions .   Speech eval endorses Passey [place] Valve . Hold for now in setting of copious tracheal secretions.  Suction as needed.   Keep HOB elevated at all times.

## 2024-02-18 NOTE — PROGRESS NOTE ADULT - ASSESSMENT
47yo M pt with PMHx CVA (3/30/2022 with residual aphasia and right sided residual weakness), seizures, HTN, HLD, CHF w/ ICD (initially rEF 30%->55-60% on 03/2021), MDD, Recurrent PE, Afib (on Eliquis), obesity s/p bariatric intervention presenting to ED for PEG tube malfunction admitted for further care. Per endorsement, PEG tube insertion attempted in ED without success. NG tube also attempted but due to bleeding the insertion was aborted and AC not restarted (home med). Patient was transferred to  for further care given that patient has a trach. Patient has copious amounts of secretion from trach requiring constant suctioning. Sputum cx sent. CXR noted. Afebrile. Concern for MDRO and therefore was placed on contact  isolation until cx comes back. GI consulted and examined patient at bedside. GI spoke with patient's proxy. Plan for OR on Monday for new PEG tube insertion.   Patient's seizure meds switched to IV form. BB IV. Monitor BP per unit protocol.   02/09: OR on Monday for PEG. Dispo is to return to [facility] once PEg placed,   2/12  GI had no window to place peg. IR agrees with GI.  Surgery called for consult.  02/15/2024: OR today for PEG placement with Dr. Elmore this afternoon. NPO. IVF. INR this AM 2.19. S/p Vitamin K 5 mg IVPB and 1 unit FFP.  Plan of care discussed with intensivist and surgical team.   02/16/2024: Feeding to be started tonight. AC resumed. Monitor for bleeding   02/17/2024: Hypotensive last night. BP 84/49 HR 59. S/p 500 ml bolus. Cefepime discontinued 2/16. Discussed with Dr. De La Torre. ID believes it is likely colonization in sputum and since patient is afebrile without leukocytosis ABX should be discontinued. Discussed with intensivist. Thid AM low-grade fever which could be likely reactive from procedure (PEG placement on 2/15). H&H stable. Wound care re-eval and wound care orders modified. Afebrile this AM.

## 2024-02-18 NOTE — PROGRESS NOTE ADULT - PROBLEM SELECTOR PLAN 9
Ventricular rate controlled.   Resume metoprolol via PEG with hold parameters.   AC on hold in setting of GIB

## 2024-02-18 NOTE — PROGRESS NOTE ADULT - PROBLEM SELECTOR PLAN 1
s/p hypotension   Febrile , Tmax 100.7F  Continue Cefepime for total of 5Days (started 2/15)  Lactate low  Procal WNL  Sputum growing Pseudomonas aeruginosa  BC from 2/17  processing   CXR  findings mild pulm venous congestion, pleural effusions.   ID following

## 2024-02-18 NOTE — CHART NOTE - NSCHARTNOTEFT_GEN_A_CORE
Received call from  pt's emergency contact/daughter Valorie  and updated on pt's clinical condition and plan of care, discharge planning likely early next week.  Abx thru  2/19.   All questions and concerns addressed.     Adamaris Vargas NP Medicine /SCU   [phone]/TEAMs

## 2024-02-18 NOTE — PROGRESS NOTE ADULT - PROBLEM SELECTOR PLAN 4
Pt p/w from rehab after pt pulled  PEG tube.   Unable to place PEG tube in ED  s/p NGT insertions 2/8 (which was aborted due to nosebleed) and 2/10 (which pt pulled out).    GI found  no window for PEG placement.   s/p PEG placement 2/15 per Surgery Pt p/w from rehab after pt pulled  PEG tube.   Unable to place PEG tube in ED  s/p NGT insertions 2/8 (which was aborted due to nosebleed) and 2/10 (which pt pulled out).    GI found  no window for PEG placement.   s/p PEG placement 2/15 per Surgery  Continue PEG tube feeding   Safety precautions

## 2024-02-18 NOTE — PROGRESS NOTE ADULT - PROBLEM SELECTOR PLAN 11
DVT ppx: SCDs. AC on hold  in setting of GIB.   GI ppx: PPT    Pt from [facility]  C/w Cefepime for total of 5 days (started 2/15).   F/u BC  AC on hold. Monitor H/H.    CM following

## 2024-02-19 LAB
ALBUMIN SERPL ELPH-MCNC: 1.9 G/DL — LOW (ref 3.5–5)
ALP SERPL-CCNC: 60 U/L — SIGNIFICANT CHANGE UP (ref 40–120)
ALT FLD-CCNC: 8 U/L DA — LOW (ref 10–60)
ANION GAP SERPL CALC-SCNC: 2 MMOL/L — LOW (ref 5–17)
AST SERPL-CCNC: 13 U/L — SIGNIFICANT CHANGE UP (ref 10–40)
BILIRUB SERPL-MCNC: 0.2 MG/DL — SIGNIFICANT CHANGE UP (ref 0.2–1.2)
BUN SERPL-MCNC: 8 MG/DL — SIGNIFICANT CHANGE UP (ref 7–18)
CALCIUM SERPL-MCNC: 8.1 MG/DL — LOW (ref 8.4–10.5)
CHLORIDE SERPL-SCNC: 105 MMOL/L — SIGNIFICANT CHANGE UP (ref 96–108)
CO2 SERPL-SCNC: 30 MMOL/L — SIGNIFICANT CHANGE UP (ref 22–31)
CREAT SERPL-MCNC: 0.45 MG/DL — LOW (ref 0.5–1.3)
EGFR: 129 ML/MIN/1.73M2 — SIGNIFICANT CHANGE UP
GLUCOSE BLDC GLUCOMTR-MCNC: 105 MG/DL — HIGH (ref 70–99)
GLUCOSE BLDC GLUCOMTR-MCNC: 109 MG/DL — HIGH (ref 70–99)
GLUCOSE BLDC GLUCOMTR-MCNC: 94 MG/DL — SIGNIFICANT CHANGE UP (ref 70–99)
GLUCOSE SERPL-MCNC: 131 MG/DL — HIGH (ref 70–99)
HCT VFR BLD CALC: 31.5 % — LOW (ref 39–50)
HGB BLD-MCNC: 10 G/DL — LOW (ref 13–17)
MAGNESIUM SERPL-MCNC: 2.1 MG/DL — SIGNIFICANT CHANGE UP (ref 1.6–2.6)
MCHC RBC-ENTMCNC: 31.7 GM/DL — LOW (ref 32–36)
MCHC RBC-ENTMCNC: 31.9 PG — SIGNIFICANT CHANGE UP (ref 27–34)
MCV RBC AUTO: 100.6 FL — HIGH (ref 80–100)
NRBC # BLD: 0 /100 WBCS — SIGNIFICANT CHANGE UP (ref 0–0)
PHOSPHATE SERPL-MCNC: 2.3 MG/DL — LOW (ref 2.5–4.5)
PLATELET # BLD AUTO: 141 K/UL — LOW (ref 150–400)
POTASSIUM SERPL-MCNC: 3.3 MMOL/L — LOW (ref 3.5–5.3)
POTASSIUM SERPL-SCNC: 3.3 MMOL/L — LOW (ref 3.5–5.3)
PROT SERPL-MCNC: 6.4 G/DL — SIGNIFICANT CHANGE UP (ref 6–8.3)
RBC # BLD: 3.13 M/UL — LOW (ref 4.2–5.8)
RBC # FLD: 13 % — SIGNIFICANT CHANGE UP (ref 10.3–14.5)
SODIUM SERPL-SCNC: 137 MMOL/L — SIGNIFICANT CHANGE UP (ref 135–145)
WBC # BLD: 5.48 K/UL — SIGNIFICANT CHANGE UP (ref 3.8–10.5)
WBC # FLD AUTO: 5.48 K/UL — SIGNIFICANT CHANGE UP (ref 3.8–10.5)

## 2024-02-19 RX ORDER — PANTOPRAZOLE SODIUM 20 MG/1
40 TABLET, DELAYED RELEASE ORAL
Qty: 0 | Refills: 0 | DISCHARGE
Start: 2024-02-19

## 2024-02-19 RX ORDER — POTASSIUM CHLORIDE 20 MEQ
40 PACKET (EA) ORAL ONCE
Refills: 0 | Status: COMPLETED | OUTPATIENT
Start: 2024-02-19 | End: 2024-02-19

## 2024-02-19 RX ORDER — APIXABAN 2.5 MG/1
5 TABLET, FILM COATED ORAL EVERY 12 HOURS
Refills: 0 | Status: DISCONTINUED | OUTPATIENT
Start: 2024-02-19 | End: 2024-02-20

## 2024-02-19 RX ORDER — SODIUM HYPOCHLORITE 0.125 %
1 SOLUTION, NON-ORAL MISCELLANEOUS
Qty: 0 | Refills: 0 | DISCHARGE
Start: 2024-02-19

## 2024-02-19 RX ORDER — SODIUM,POTASSIUM PHOSPHATES 278-250MG
1 POWDER IN PACKET (EA) ORAL ONCE
Refills: 0 | Status: COMPLETED | OUTPATIENT
Start: 2024-02-19 | End: 2024-02-19

## 2024-02-19 RX ADMIN — CEFEPIME 100 MILLIGRAM(S): 1 INJECTION, POWDER, FOR SOLUTION INTRAMUSCULAR; INTRAVENOUS at 05:17

## 2024-02-19 RX ADMIN — Medication 1 PACKET(S): at 10:32

## 2024-02-19 RX ADMIN — APIXABAN 5 MILLIGRAM(S): 2.5 TABLET, FILM COATED ORAL at 21:18

## 2024-02-19 RX ADMIN — CHLORHEXIDINE GLUCONATE 1 APPLICATION(S): 213 SOLUTION TOPICAL at 05:20

## 2024-02-19 RX ADMIN — Medication 1250 MILLIGRAM(S): at 12:27

## 2024-02-19 RX ADMIN — MORPHINE SULFATE 1 MILLIGRAM(S): 50 CAPSULE, EXTENDED RELEASE ORAL at 12:28

## 2024-02-19 RX ADMIN — Medication 12.5 MILLIGRAM(S): at 05:23

## 2024-02-19 RX ADMIN — CEFEPIME 100 MILLIGRAM(S): 1 INJECTION, POWDER, FOR SOLUTION INTRAMUSCULAR; INTRAVENOUS at 13:21

## 2024-02-19 RX ADMIN — CEFEPIME 100 MILLIGRAM(S): 1 INJECTION, POWDER, FOR SOLUTION INTRAMUSCULAR; INTRAVENOUS at 21:17

## 2024-02-19 RX ADMIN — ALBUTEROL 2.5 MILLIGRAM(S): 90 AEROSOL, METERED ORAL at 20:04

## 2024-02-19 RX ADMIN — LEVETIRACETAM 2000 MILLIGRAM(S): 250 TABLET, FILM COATED ORAL at 17:26

## 2024-02-19 RX ADMIN — Medication 1250 MILLIGRAM(S): at 05:21

## 2024-02-19 RX ADMIN — LACOSAMIDE 200 MILLIGRAM(S): 50 TABLET ORAL at 06:27

## 2024-02-19 RX ADMIN — LEVETIRACETAM 2000 MILLIGRAM(S): 250 TABLET, FILM COATED ORAL at 05:21

## 2024-02-19 RX ADMIN — Medication 40 MILLIEQUIVALENT(S): at 09:17

## 2024-02-19 RX ADMIN — ALBUTEROL 2.5 MILLIGRAM(S): 90 AEROSOL, METERED ORAL at 15:53

## 2024-02-19 RX ADMIN — Medication 1 APPLICATION(S): at 12:27

## 2024-02-19 RX ADMIN — Medication 1250 MILLIGRAM(S): at 21:18

## 2024-02-19 RX ADMIN — ALBUTEROL 2.5 MILLIGRAM(S): 90 AEROSOL, METERED ORAL at 02:45

## 2024-02-19 RX ADMIN — PANTOPRAZOLE SODIUM 40 MILLIGRAM(S): 20 TABLET, DELAYED RELEASE ORAL at 12:26

## 2024-02-19 RX ADMIN — MORPHINE SULFATE 1 MILLIGRAM(S): 50 CAPSULE, EXTENDED RELEASE ORAL at 12:42

## 2024-02-19 RX ADMIN — LACOSAMIDE 200 MILLIGRAM(S): 50 TABLET ORAL at 17:26

## 2024-02-19 RX ADMIN — ALBUTEROL 2.5 MILLIGRAM(S): 90 AEROSOL, METERED ORAL at 09:01

## 2024-02-19 NOTE — PROGRESS NOTE ADULT - PROBLEM SELECTOR PLAN 10
BPs soft.   Resume low dose  Metoprolol (for CHF) with hold parameters. Controlled   C/w  Metoprolol with holding parameters.

## 2024-02-19 NOTE — DISCHARGE NOTE PROVIDER - CARE PROVIDER_API CALL
Ramírez Rhonda  Surgery  [address], [address]  [place] [address]  Phone: [phone]  Fax: [phone]  Established Patient  Follow Up Time: 1 month   Rhonda Elmore  Surgery  [address], [address]  [place] [address]  Phone: [phone]  Fax: [phone]  Established Patient  Follow Up Time: 1 month    Cheyenne Salinas  Internal Medicine  [address] [place] [id number]  Phone: [phone]  Fax: [phone]  Established Patient  Follow Up Time: 1-3 days    Kushal Lujan  Pulmonary Disease  [address] [place] [id number]  Phone: [phone]  Fax: [phone]  Established Patient  Follow Up Time: 1 week

## 2024-02-19 NOTE — PROGRESS NOTE ADULT - SUBJECTIVE AND OBJECTIVE BOX
Time of Visit:  Patient seen and examined.     MEDICATIONS  (STANDING):  albuterol    0.083% 2.5 milliGRAM(s) Nebulizer every 6 hours  cefepime   IVPB 2000 milliGRAM(s) IV Intermittent every 8 hours  chlorhexidine 2% Cloths 1 Application(s) Topical <User Schedule>  Dakins Solution - 1/2 Strength 1 Application(s) Topical every 24 hours  lacosamide Solution 200 milliGRAM(s) Oral every 12 hours  levETIRAcetam  Solution 2000 milliGRAM(s) Oral every 12 hours  metoprolol tartrate 12.5 milliGRAM(s) Oral every 12 hours  pantoprazole  Injectable 40 milliGRAM(s) IV Push daily  valproic  acid Syrup 1250 milliGRAM(s) Oral <User Schedule>      MEDICATIONS  (PRN):  morphine  - Injectable 1 milliGRAM(s) IV Push every 6 hours PRN Severe Pain (7 - 10)       Medications up to date at time of exam.      PHYSICAL EXAMINATION:    Vital Signs Last 24 Hrs  T(C): 36.7 (19 Feb 2024 11:44), Max: 37.2 (19 Feb 2024 05:10)  T(F): 98 (19 Feb 2024 11:44), Max: 99 (19 Feb 2024 05:10)  HR: 65 (19 Feb 2024 11:44) (65 - 75)  BP: 103/70 (19 Feb 2024 11:44) (103/70 - 131/64)  BP(mean): 69 (18 Feb 2024 20:20) (69 - 69)  RR: 19 (19 Feb 2024 11:44) (18 - 20)  SpO2: 94% (19 Feb 2024 11:44) (94% - 98%)    Parameters below as of 19 Feb 2024 11:44  Patient On (Oxygen Delivery Method): Hydrotrach  O2 Flow (L/min): 3  General : Alert and follow simple command. No acute distress .       HEENT: Left skull deformity . No nasal tenderness.  Mucous membranes are moist.     NECK: Has non infected Trach .    LUNGS: Non labored. No wheezing. No use of accessory muscle.     HEART: S1 S2 irregular rate .    ABDOMEN: Soft, nontender, and nondistended. Active bowel sounds.     EXTREMITIES: Total care. Non ambulatory.     NEUROLOGIC: Awake, alert, oriented x 1. Right side weakness.     SKIN: Warm, dry, good turgor. Wound care to Sacral ulcer.      LABS:                        10.0   5.48  )-----------( 141      ( 19 Feb 2024 06:00 )             31.5     02-19    137  |  105  |  8   ----------------------------<  131<H>  3.3<L>   |  30  |  0.45<L>    Ca    8.1<L>      19 Feb 2024 06:00  Phos  2.3     02-19  Mg     2.1     02-19    TPro  6.4  /  Alb  1.9<L>  /  TBili  0.2  /  DBili  x   /  AST  13  /  ALT  8<L>  /  AlkPhos  60  02-19      Urinalysis Basic - ( 19 Feb 2024 06:00 )    Color: x / Appearance: x / SG: x / pH: x  Gluc: 131 mg/dL / Ketone: x  / Bili: x / Urobili: x   Blood: x / Protein: x / Nitrite: x   Leuk Esterase: x / RBC: x / WBC x   Sq Epi: x / Non Sq Epi: x / Bacteria: x                  Procalcitonin, Serum: 0.08 ng/mL (02-17-24 @ 07:45)      MICROBIOLOGY: (if applicable)    RADIOLOGY & ADDITIONAL STUDIES:  EKG:   CXR:  ECHO:    IMPRESSION: 48y Male PAST MEDICAL & SURGICAL HISTORY:  Heart failure, unspecified HF chronicity, unspecified heart failure type      ETOH abuse  h/o etoh abuse now moderate drinker      Artificial pacemaker       Impression: This is a 47 Y/O male from [facility] . Presenting to ED for PEG tube malfunction. For pulmonary evaluation due to Chronic Respiratory Failure , Has Trach . Has Chronic Pulmonary Embolism and off AC due to GI Bleed. Sputum cx growing moderate Pseudomonas aeruginosa . Peg placed on 02-15-24.     Suggestion:  O2 saturation 98% and continue O2 4L via hydrotrach O2 supplement.  Oral, trach care, suction.  Has passy luis alfredo valve   . Monitor Trach secretions , if having copious amount , can have glycopyrrolate via Peg twice daily.      Aspiration precautions with HOB elevation.   On Albuterol via nebulization Q 6 Hours.   On Cefepime 2 Gm IVPB Q 8 hours .    DVT / GI prophylactic .

## 2024-02-19 NOTE — PROGRESS NOTE ADULT - PROBLEM SELECTOR PLAN 2
Continue O2  via hydrotrache .  Tolerating O2 4L hydrotrache. Wean as tolerated.   Monitor oxygenation   Continue duonebs q6h  CXR as above , findings mild pulm venous congestion, pleural effusions .   Speech eval endorses Passey [place] Valve . Hold for now in setting of copious tracheal secretions.  Suction as needed.   Keep HOB elevated at all times. Continue O2  via hydro trach .  Tolerating O2 4L hydro trach. Wean as tolerated.   Monitor oxygenation   Continue duonebs q6h  CXR as above , findings mild pulm venous congestion, pleural effusions .    Passey [place] Valve . Hold for now in setting of copious tracheal secretions.  Suction as needed.   Keep HOB elevated at all times.  Continue Cefepime for aspiration PNA for total of 5Days (until 2/20/2024).

## 2024-02-19 NOTE — PROGRESS NOTE ADULT - PROBLEM SELECTOR PLAN 1
s/p hypotension   Febrile , Tmax 100.7F  Continue Cefepime for total of 5Days (started 2/15)  Lactate low  Procal WNL  Sputum growing Pseudomonas aeruginosa  BC from 2/17  processing   CXR  findings mild pulm venous congestion, pleural effusions.   ID following Now hemodynamically stable    Afebrile   Continue Cefepime for aspiration PNA for total of 5Days (until 2/20/2024).   Lactate low  Procal WNL  Sputum growing Pseudomonas aeruginosa (likely colonized).   BC from 2/17 NGTD  CXR  findings mild pulm venous congestion, pleural effusions.   ID following

## 2024-02-19 NOTE — PROGRESS NOTE ADULT - NS ATTEND AMEND GEN_ALL_CORE FT
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 Now pending surgical PEG placement after unable to replace endoscopically w/ GI.    ASSESSMENT:  #PEG malfunction  #CVA  #Tracheostomy status  #Seizures  #H/o recurrent PE  #Afib  #HFrEF    Plan:  - s/p surgical PEG placement 2/15, gen sx mgmt appreciated - GI and IR unable to place d/t anatomy restrictions  - patient has repeatedly ripped out prior NGTs; keep abd binder over new PEG  - AC resumed post PEG  - trend CBC  - s/p IVF bolus overnight suspect hypovolemia, question new sepsis/tracheitis  - Cont. cefepime  - home AEDs for seizure  - enteral feeding initiated  - PRN IV antihypertensives convert to enteral via PEG  - cont 4L hydrotrach  - routine trach care and suctioning  - passymuir valve eval passed - cont SLP follow-up  - Discharge planning back to facility

## 2024-02-19 NOTE — PROGRESS NOTE ADULT - PROBLEM SELECTOR PLAN 8
AC on hold in setting of GIB  Tolerating O2 4L via hydrotrache.   Monitor oxygenation AC resumed. Discussed with Dr. Elmore.   Tolerating O2 3L via hydro trach.   Monitor oxygenation

## 2024-02-19 NOTE — PROGRESS NOTE ADULT - PROBLEM SELECTOR PLAN 6
h/o TBI   Continue Keppra, vimpat, and valproic acid.   Keppra level WNL. Valproic acid mild elevated at 114 (high normal 114).   Maintain seizure precautions. h/o TBI   Continue Keppra, Vimpat, and valproic acid.   Maintain seizure precautions.

## 2024-02-19 NOTE — DISCHARGE NOTE PROVIDER - HOSPITAL COURSE
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 Per endorsement, PEG tube insertion attempted in ED without success. NG tube also attempted but due to bleeding the insertion was aborted and AC not restarted (home med). Patient was transferred to  for further care given that patient has a trach. Patient has copious amounts of secretion from trach requiring constant suctioning. Sputum cx sent. CXR noted. Afebrile. Concern for MDRO and therefore was placed on contact  isolation until cx comes back. GI consulted and examined patient at bedside. GI spoke with patient's proxy.   Patient's seizure meds switched to IV form.  On 2/12  GI had no window to place peg. IR agrees with GI.  Surgery called for consult and PEG placed on 2/15 by Dr. Elmore. Patient received 1 unit of FFP annd vitamin K to optimize INR prior to surgery. TF started on 2/16.   On 02/17/2024 Hypotensive overnight BP 84/49 HR 59. S/p 500 ml bolus. Cefepime discontinued on 2/16 per ID reccs. ID believes it is likely colonization in sputum and since patient is afebrile without leukocytosis ABX should be discontinued. Discussed with intensivist. This AM low-grade fever which could be likely reactive from procedure (PEG placement on 2/15). H&H stable. Wound care re-eval and wound care orders modified. CXR noted. H&H remains stable. Hemodynamically stable. Afebrile. Bladder scan 9cc. PEG site intact, no bleeding noted. Feeding tube @ 30 ml/hr and tolerating well. Family at bedside and updated. Cefepime was previously restarted for possible aspiration PNA with end date on 2/20/2024. On 3L and tolerating it well via hydro trach. AC resumed. Discussed plan of care with intensivist and Dr. Elmore.   Patient will require close supervision at the facility to prevent removal of PEG/Trach. Patient known for removing lines. Patient to follow up with his primary care provider.     Given patient's improved clinical status and current hemodynamic stability, discussed with family and intensivist and decision was made to discharge. Please note that this is a brief summary of hospital course. Please refer to daily progress notes and consult notes for full course and events.    IMCOMPLETE             47yo M pt with PMHx CVA (3/30/2022 with residual aphasia and right sided residual weakness), seizures, HTN, HLD, CHF w/ ICD (initially rEF 30%->55-60% on 03/2021), MDD, Recurrent PE, Afib (on Eliquis), obesity s/p bariatric intervention presenting to ED for PEG tube malfunction admitted for further care. Per endorsement, PEG tube insertion attempted in ED without success. NG tube also attempted but due to bleeding the insertion was aborted and AC not restarted (home med). Patient was transferred to  for further care given that patient has a trach. Patient has copious amounts of secretion from trach requiring constant suctioning. Sputum cx sent. CXR noted. Afebrile. Concern for MDRO and therefore was placed on contact  isolation until cx comes back. GI consulted and examined patient at bedside. GI spoke with patient's proxy.   Patient's seizure meds switched to IV form.  On 2/12  GI had no window to place peg. IR agrees with GI.  Surgery called for consult and PEG placed on 2/15 by Dr. Elmore. Patient received 1 unit of FFP annd vitamin K to optimize INR prior to surgery. TF started on 2/16.   On 02/17/2024 Hypotensive overnight BP 84/49 HR 59. S/p 500 ml bolus. Cefepime discontinued on 2/16 per ID reccs. ID believes it is likely colonization in sputum and since patient is afebrile without leukocytosis ABX should be discontinued. Discussed with intensivist. This AM low-grade fever which could be likely reactive from procedure (PEG placement on 2/15). H&H stable. Wound care re-eval and wound care orders modified. CXR noted. H&H remains stable. Hemodynamically stable. Afebrile. Bladder scan 9cc. PEG site intact, no bleeding noted. Feeding tube @ 30 ml/hr and tolerating well. Family at bedside and updated. Cefepime was previously restarted for possible aspiration PNA with end date on 2/20/2024. On 3L and tolerating it well via hydro trach. AC resumed. Discussed plan of care with intensivist and Dr. Elmore.   Patient to follow up with his primary care provider.     Given patient's improved clinical status and current hemodynamic stability, discussed with family and intensivist and decision was made to discharge. Please note that this is a brief summary of hospital course. Please refer to daily progress notes and consult notes for full course and events.

## 2024-02-19 NOTE — PROGRESS NOTE ADULT - PROBLEM SELECTOR PLAN 7
2/2 CVA  Maintain safety precautions  Seizure precautions  Keep HOB elevated at all times.   Passive ROM to R sided extremities  Turn and reposition every 2 hrs. 2/2 CVA vs sepsis  Maintain safety precautions  Seizure precautions  Passive ROM   Turn and reposition every 2 hrs.

## 2024-02-19 NOTE — PROGRESS NOTE ADULT - SUBJECTIVE AND OBJECTIVE BOX
DANNIE SHIRLEY    SCU progress note    INTERVAL HPI/OVERNIGHT EVENTS: ***    DNR [ ]   DNI  [  ]    Covid - 19 PCR:     The 4Ms    What Matters Most: see GOC  Age appropriate Medications/Screen for High Risk Medication: Yes  Mentation: see CAM below  Mobility: defer to physical exam    The Confusion Assessment Method (CAM) Diagnostic Algorithm     1: Acute Onset or Fluctuating Course  - Is there evidence of an acute change in mental status from the patient’s baseline? Did the (abnormal) behavior  fluctuate during the day, that is, tend to come and go, or increase and decrease in severity?  [ ] YES [ ] NO     2: Inattention  - Did the patient have difficulty focusing attention, being easily distractible, or having difficulty keeping track of what was being said?  [ ] YES [ ] NO     3: Disorganized thinking  -Was the patient’s thinking disorganized or incoherent, such as rambling or irrelevant conversation, unclear or illogical flow of ideas, or unpredictable switching from subject to subject?  [ ] YES [ ] NO    4: Altered Level of consciousness?  [ ] YES [ ] NO    The diagnosis of delirium by CAM requires the presence of features 1 and 2 and either 3 or 4.    PRESSORS: [ ] YES [ ] NO  cefepime   IVPB 2000 milliGRAM(s) IV Intermittent every 8 hours    Cardiovascular:  Heart Failure  Acute   Acute on Chronic  Chronic       metoprolol tartrate 12.5 milliGRAM(s) Oral every 12 hours    Pulmonary:  albuterol    0.083% 2.5 milliGRAM(s) Nebulizer every 6 hours    Hematalogic:    Other:  chlorhexidine 2% Cloths 1 Application(s) Topical <User Schedule>  Dakins Solution - 1/2 Strength 1 Application(s) Topical every 24 hours  lacosamide Solution 200 milliGRAM(s) Oral every 12 hours  levETIRAcetam  Solution 2000 milliGRAM(s) Oral every 12 hours  morphine  - Injectable 1 milliGRAM(s) IV Push every 6 hours PRN  pantoprazole  Injectable 40 milliGRAM(s) IV Push daily  potassium phosphate / sodium phosphate Powder (PHOS-NaK) 1 Packet(s) Oral once  valproic  acid Syrup 1250 milliGRAM(s) Oral <User Schedule>    albuterol    0.083% 2.5 milliGRAM(s) Nebulizer every 6 hours  cefepime   IVPB 2000 milliGRAM(s) IV Intermittent every 8 hours  chlorhexidine 2% Cloths 1 Application(s) Topical <User Schedule>  Dakins Solution - 1/2 Strength 1 Application(s) Topical every 24 hours  lacosamide Solution 200 milliGRAM(s) Oral every 12 hours  levETIRAcetam  Solution 2000 milliGRAM(s) Oral every 12 hours  metoprolol tartrate 12.5 milliGRAM(s) Oral every 12 hours  morphine  - Injectable 1 milliGRAM(s) IV Push every 6 hours PRN  pantoprazole  Injectable 40 milliGRAM(s) IV Push daily  potassium phosphate / sodium phosphate Powder (PHOS-NaK) 1 Packet(s) Oral once  valproic  acid Syrup 1250 milliGRAM(s) Oral <User Schedule>    Drug Dosing Weight    Weight (kg): 81.7 (08 Feb 2024 10:00)    CENTRAL LINE: [ ] YES [ ] NO  LOCATION:   DATE INSERTED:  REMOVE: [ ] YES [ ] NO  EXPLAIN:    FAULKNER: [ ] YES [ ] NO    DATE INSERTED:  REMOVE:  [ ] YES [ ] NO  EXPLAIN:    PAST MEDICAL & SURGICAL HISTORY:  Heart failure, unspecified HF chronicity, unspecified heart failure type      ETOH abuse  h/o etoh abuse now moderate drinker      Artificial pacemaker                        PHYSICAL EXAM:    GENERAL: NAD, well-groomed, well-developed  HEAD:  Atraumatic, Normocephalic  EYES: EOMI, PERRLA, conjunctiva and sclera clear  ENMT: No tonsillar erythema, exudates, or enlargement; Moist mucous membranes, Good dentition, No lesions  NECK: Supple, No JVD, Normal thyroid  NERVOUS SYSTEM:  Alert & Oriented X3, Good concentration; Motor Strength 5/5 B/L upper and lower extremities; DTRs 2+ intact and symmetric  CHEST/LUNG: Clear to percussion bilaterally; No rales, rhonchi, wheezing, or rubs  HEART: Regular rate and rhythm; No murmurs, rubs, or gallops  ABDOMEN: Soft, Nontender, Nondistended; Bowel sounds present  EXTREMITIES:  2+ Peripheral Pulses, No clubbing, cyanosis, or edema  LYMPH: No lymphadenopathy noted  SKIN: No rashes or lesions      LABS:  CBC Full  -  ( 19 Feb 2024 06:00 )  WBC Count : 5.48 K/uL  RBC Count : 3.13 M/uL  Hemoglobin : 10.0 g/dL  Hematocrit : 31.5 %  Platelet Count - Automated : 141 K/uL  Mean Cell Volume : 100.6 fl  Mean Cell Hemoglobin : 31.9 pg  Mean Cell Hemoglobin Concentration : 31.7 gm/dL  Auto Neutrophil # : x  Auto Lymphocyte # : x  Auto Monocyte # : x  Auto Eosinophil # : x  Auto Basophil # : x  Auto Neutrophil % : x  Auto Lymphocyte % : x  Auto Monocyte % : x  Auto Eosinophil % : x  Auto Basophil % : x    02-19    137  |  105  |  8   ----------------------------<  131<H>  3.3<L>   |  30  |  0.45<L>    Ca    8.1<L>      19 Feb 2024 06:00  Phos  2.3     02-19  Mg     2.1     02-19    TPro  6.4  /  Alb  1.9<L>  /  TBili  0.2  /  DBili  x   /  AST  13  /  ALT  8<L>  /  AlkPhos  60  02-19    PT/INR - ( 17 Feb 2024 11:10 )   PT: 16.1 sec;   INR: 1.43 ratio         PTT - ( 17 Feb 2024 11:10 )  PTT:33.5 sec  Urinalysis Basic - ( 19 Feb 2024 06:00 )    Color: x / Appearance: x / SG: x / pH: x  Gluc: 131 mg/dL / Ketone: x  / Bili: x / Urobili: x   Blood: x / Protein: x / Nitrite: x   Leuk Esterase: x / RBC: x / WBC x   Sq Epi: x / Non Sq Epi: x / Bacteria: x            [  ]  DVT Prophylaxis  [  ]  Nutrition, Brand, Rate         Goal Rate        Abnormal Nutritional Status -  Malnutrition   Cachexia      Morbid Obesity BMI >/=40    RADIOLOGY & ADDITIONAL STUDIES:  ***    Goals of Care Discussion with Family/Proxy/Other   - see note from/family meeting set up for...     DANNIE SHIRLEY    SCU progress note    INTERVAL HPI/OVERNIGHT EVENTS: No acute events overnight.     FULL CODE    Covid - 19 PCR: negative.     The 4Ms    What Matters Most: see GOC  Age appropriate Medications/Screen for High Risk Medication: Yes  Mentation: see CAM below  Mobility: defer to physical exam    The Confusion Assessment Method (CAM) Diagnostic Algorithm     1: Acute Onset or Fluctuating Course  - Is there evidence of an acute change in mental status from the patient’s baseline? Did the (abnormal) behavior  fluctuate during the day, that is, tend to come and go, or increase and decrease in severity?  [ ] YES [ ] NO Unable to assess     2: Inattention  - Did the patient have difficulty focusing attention, being easily distractible, or having difficulty keeping track of what was being said?  [ ] YES [ ] NO Unable to assess     3: Disorganized thinking  -Was the patient’s thinking disorganized or incoherent, such as rambling or irrelevant conversation, unclear or illogical flow of ideas, or unpredictable switching from subject to subject?  [ ] YES [ ] NO Unable to assess    4: Altered Level of consciousness?  [ ] YES [ ] NO Unable to assess    The diagnosis of delirium by CAM requires the presence of features 1 and 2 and either 3 or 4.    PRESSORS: [ ] YES [ x] NO  cefepime   IVPB 2000 milliGRAM(s) IV Intermittent every 8 hours    Cardiovascular:  Heart Failure  Acute   Acute on Chronic  Chronic       metoprolol tartrate 12.5 milliGRAM(s) Oral every 12 hours    Pulmonary:  albuterol    0.083% 2.5 milliGRAM(s) Nebulizer every 6 hours    Hematalogic:    Other:  chlorhexidine 2% Cloths 1 Application(s) Topical <User Schedule>  Dakins Solution - 1/2 Strength 1 Application(s) Topical every 24 hours  lacosamide Solution 200 milliGRAM(s) Oral every 12 hours  levETIRAcetam  Solution 2000 milliGRAM(s) Oral every 12 hours  morphine  - Injectable 1 milliGRAM(s) IV Push every 6 hours PRN  pantoprazole  Injectable 40 milliGRAM(s) IV Push daily  potassium phosphate / sodium phosphate Powder (PHOS-NaK) 1 Packet(s) Oral once  valproic  acid Syrup 1250 milliGRAM(s) Oral <User Schedule>    albuterol    0.083% 2.5 milliGRAM(s) Nebulizer every 6 hours  cefepime   IVPB 2000 milliGRAM(s) IV Intermittent every 8 hours  chlorhexidine 2% Cloths 1 Application(s) Topical <User Schedule>  Dakins Solution - 1/2 Strength 1 Application(s) Topical every 24 hours  lacosamide Solution 200 milliGRAM(s) Oral every 12 hours  levETIRAcetam  Solution 2000 milliGRAM(s) Oral every 12 hours  metoprolol tartrate 12.5 milliGRAM(s) Oral every 12 hours  morphine  - Injectable 1 milliGRAM(s) IV Push every 6 hours PRN  pantoprazole  Injectable 40 milliGRAM(s) IV Push daily  potassium phosphate / sodium phosphate Powder (PHOS-NaK) 1 Packet(s) Oral once  valproic  acid Syrup 1250 milliGRAM(s) Oral <User Schedule>    Drug Dosing Weight    Weight (kg): 81.7 (08 Feb 2024 10:00)    CENTRAL LINE: [ ] YES [ x] NO  LOCATION:   DATE INSERTED:  REMOVE: [ ] YES [ ] NO  EXPLAIN:    FAULKNER: [ ] YES [x ] NO    DATE INSERTED:  REMOVE:  [ ] YES [ ] NO  EXPLAIN:    PAST MEDICAL & SURGICAL HISTORY:  Heart failure, unspecified HF chronicity, unspecified heart failure type      ETOH abuse  h/o etoh abuse now moderate drinker      Artificial pacemaker    ROS: Unattainable.     PHYSICAL EXAM:    GENERAL: NAD  HEAD:  Left side scalp indentation 2/2 hx of crani   EYES: Pupils reactive,  conjunctiva and sclera clear  ENMT:  Moist mucous membranes  NECK: trach   NERVOUS SYSTEM:  Alert, poor concentration; non- verbal, Right side hemiparesis. Generalized weakness. Follows simple commands.   CHEST/LUNG: Diminished RLL.  No rales, rhonchi, wheezing, or rubs On 3L via hydro-trach   HEART: Regular rate and rhythm; No murmurs, rubs, or gallops  ABDOMEN: Soft, Nontender, Nondistended; Bowel sounds present. PEG in place. No signs of bleeding.   EXTREMITIES:  2+ Peripheral Pulses, No clubbing, cyanosis, or edema  LYMPH: No cervical  lymphadenopathy noted  SKIN: Stage 4 to sacral area.       LABS:  CBC Full  -  ( 19 Feb 2024 06:00 )  WBC Count : 5.48 K/uL  RBC Count : 3.13 M/uL  Hemoglobin : 10.0 g/dL  Hematocrit : 31.5 %  Platelet Count - Automated : 141 K/uL  Mean Cell Volume : 100.6 fl  Mean Cell Hemoglobin : 31.9 pg  Mean Cell Hemoglobin Concentration : 31.7 gm/dL  Auto Neutrophil # : x  Auto Lymphocyte # : x  Auto Monocyte # : x  Auto Eosinophil # : x  Auto Basophil # : x  Auto Neutrophil % : x  Auto Lymphocyte % : x  Auto Monocyte % : x  Auto Eosinophil % : x  Auto Basophil % : x    02-19    137  |  105  |  8   ----------------------------<  131<H>  3.3<L>   |  30  |  0.45<L>    Ca    8.1<L>      19 Feb 2024 06:00  Phos  2.3     02-19  Mg     2.1     02-19    TPro  6.4  /  Alb  1.9<L>  /  TBili  0.2  /  DBili  x   /  AST  13  /  ALT  8<L>  /  AlkPhos  60  02-19    PT/INR - ( 17 Feb 2024 11:10 )   PT: 16.1 sec;   INR: 1.43 ratio         PTT - ( 17 Feb 2024 11:10 )  PTT:33.5 sec  Urinalysis Basic - ( 19 Feb 2024 06:00 )    Color: x / Appearance: x / SG: x / pH: x  Gluc: 131 mg/dL / Ketone: x  / Bili: x / Urobili: x   Blood: x / Protein: x / Nitrite: x   Leuk Esterase: x / RBC: x / WBC x   Sq Epi: x / Non Sq Epi: x / Bacteria: x    [x  ]  DVT Prophylaxis: SCDs, AC restarted.   [x  ]  Nutrition: TF     RADIOLOGY & ADDITIONAL STUDIES: < from: Xray Chest 1 View AP/PA (02.17.24 @ 12:16) >    ACC: [id number] EXAM:  XR CHEST AP OR PA 1V   ORDERED BY: MARJORIE COHEN     PROCEDURE DATE:  02/17/2024          INTERPRETATION:  XR CHEST dated 2/17/2024 12:16 PM    CLINICAL INFORMATION: Male, 48 years old.  Fever, hypotension.    PRIOR STUDIES: 2/8/2024    FINDINGS/  IMPRESSION: Tracheostomy tube again noted unchanged. Left-sided trilead   permanent pacemaker with the tip of the pacer wires unchanged in position   projecting over the region the right atrium, right ventricle with right   ventricular ICD and left ventricle. There is new cardiomegaly with mild   pulmonary venous congestion and new posterior layering of a pleural   effusions. No pneumothorax. No acute osseous pathology.    --- End of Report ---      ASHLEY BERUMEN MD; Attending Radiologist  This document has been electronically signed. Feb 17 2024  1:30PM    < end of copied text >

## 2024-02-19 NOTE — DISCHARGE NOTE PROVIDER - PROVIDER TOKENS
PROVIDER:[TOKEN:[[id number]],FOLLOWUP:[1 month],ESTABLISHEDPATIENT:[T]] PROVIDER:[TOKEN:[[id number]],FOLLOWUP:[1 month],ESTABLISHEDPATIENT:[T]],PROVIDER:[TOKEN:[[id number]],FOLLOWUP:[1-3 days],ESTABLISHEDPATIENT:[T]],PROVIDER:[TOKEN:[[id number]],FOLLOWUP:[1 week],ESTABLISHEDPATIENT:[T]]

## 2024-02-19 NOTE — DISCHARGE NOTE PROVIDER - NSDCCPCAREPLAN_GEN_ALL_CORE_FT
PRINCIPAL DISCHARGE DIAGNOSIS  Diagnosis: PEG tube malfunction  Assessment and Plan of Treatment: PEG removed by patient at the facility. Due to previous abdominal surgeries Dr. Elmore from surgery performed PEG insertion. PEG site is intact. No bleeding. Continue with daily dressing change to maintain site clean. Feeding tube restarted and patient tolerating it well. Monitor for bleeding as patient anticoagulation was restarted. Also maintain aspiration precautions at all times. Close monitoring as patient has history of removing his PEG and trach.      SECONDARY DISCHARGE DIAGNOSES  Diagnosis: CVA (cerebrovascular accident)  Assessment and Plan of Treatment: You have hx of stroke with right sided weakness and requires assistance with repositioning. You should be repositioned every 2 hours.    Diagnosis: Tracheostomy present  Assessment and Plan of Treatment: Provide tracheostomy care daily and suction frequently. Maintain head of the bed elevated to prevent aspiration. Close monitoring as patient has history of removing his trach. Maintain on oxygen as tolerated and titrate as tolerated.    Diagnosis: Pulmonary embolism  Assessment and Plan of Treatment: You have hx of Deep vein thrombosis and recurrent pulmonary embolism. Continue your anticoagulation medicaiton as prescribed and follow up with your Primary Care Provider. Monitor for signs of bleeding.    Diagnosis: HTN (hypertension)  Assessment and Plan of Treatment: Continue taking your blood pressure medication and monitor your blood pressure every 8 hours.    Diagnosis: Seizures  Assessment and Plan of Treatment: Conitnue to take your medications for seizures as prescribed and on time and follow up with your neurologist as needed. Seizure precautions should be maintained at all times.

## 2024-02-19 NOTE — DISCHARGE NOTE PROVIDER - CARE PROVIDERS DIRECT ADDRESSES
[email or web address],DirectAddress_Unknown

## 2024-02-19 NOTE — PROGRESS NOTE ADULT - ASSESSMENT
47yo M pt with PMHx CVA (3/30/2022 with residual aphasia and right sided residual weakness), seizures, HTN, HLD, CHF w/ ICD (initially rEF 30%->55-60% on 03/2021), MDD, Recurrent PE, Afib (on Eliquis), obesity s/p bariatric intervention presenting to ED for PEG tube malfunction admitted for further care. Per endorsement, PEG tube insertion attempted in ED without success. NG tube also attempted but due to bleeding the insertion was aborted and AC not restarted (home med). Patient was transferred to  for further care given that patient has a trach. Patient has copious amounts of secretion from trach requiring constant suctioning. Sputum cx sent. CXR noted. Afebrile. Concern for MDRO and therefore was placed on contact  isolation until cx comes back. GI consulted and examined patient at bedside. GI spoke with patient's proxy. Plan for OR on Monday for new PEG tube insertion.   Patient's seizure meds switched to IV form. BB IV. Monitor BP per unit protocol.   02/09: OR on Monday for PEG. Dispo is to return to [facility] once PEg placed,   2/12  GI had no window to place peg. IR agrees with GI.  Surgery called for consult.  02/15/2024: OR today for PEG placement with Dr. Elmore this afternoon. NPO. IVF. INR this AM 2.19. S/p Vitamin K 5 mg IVPB and 1 unit FFP.  Plan of care discussed with intensivist and surgical team.   02/16/2024: Feeding to be started tonight. AC resumed. Monitor for bleeding   02/17/2024: Hypotensive last night. BP 84/49 HR 59. S/p 500 ml bolus. Cefepime discontinued 2/16. Discussed with Dr. De La Torre. ID believes it is likely colonization in sputum and since patient is afebrile without leukocytosis ABX should be discontinued. Discussed with intensivist. Thid AM low-grade fever which could be likely reactive from procedure (PEG placement on 2/15). H&H stable. Wound care re-eval and wound care orders modified. Afebrile this AM.   02/19: H&H stable. Hemodynamically stable. Afebrile. Bladder scan 9cc. PEG site intact, no bleeding noted. Feeding tube @ 30 ml/hr and tolerating well. Family at bedside and updated. On Cefepime for aspiration PNA until 2/20/2024. On 3L and tolerating it well.      47yo M pt with PMHx CVA (3/30/2022 with residual aphasia and right sided residual weakness), seizures, HTN, HLD, CHF w/ ICD (initially rEF 30%->55-60% on 03/2021), MDD, Recurrent PE, Afib (on Eliquis), obesity s/p bariatric intervention presenting to ED for PEG tube malfunction admitted for further care. Per endorsement, PEG tube insertion attempted in ED without success. NG tube also attempted but due to bleeding the insertion was aborted and AC not restarted (home med). Patient was transferred to  for further care given that patient has a trach. Patient has copious amounts of secretion from trach requiring constant suctioning. Sputum cx sent. CXR noted. Afebrile. Concern for MDRO and therefore was placed on contact  isolation until cx comes back. GI consulted and examined patient at bedside. GI spoke with patient's proxy. Plan for OR on Monday for new PEG tube insertion.   Patient's seizure meds switched to IV form. BB IV. Monitor BP per unit protocol.   02/09: OR on Monday for PEG. Dispo is to return to [facility] once PEg placed,   2/12  GI had no window to place peg. IR agrees with GI.  Surgery called for consult.  02/15/2024: OR today for PEG placement with Dr. Elmore this afternoon. NPO. IVF. INR this AM 2.19. S/p Vitamin K 5 mg IVPB and 1 unit FFP.  Plan of care discussed with intensivist and surgical team.   02/16/2024: Feeding to be started tonight. AC resumed. Monitor for bleeding   02/17/2024: Hypotensive last night. BP 84/49 HR 59. S/p 500 ml bolus. Cefepime discontinued 2/16. Discussed with Dr. De La Torre. ID believes it is likely colonization in sputum and since patient is afebrile without leukocytosis ABX should be discontinued. Discussed with intensivist. Thid AM low-grade fever which could be likely reactive from procedure (PEG placement on 2/15). H&H stable. Wound care re-eval and wound care orders modified. Afebrile this AM.   02/19: H&H stable. Hemodynamically stable. Afebrile. Bladder scan 9cc. PEG site intact, no bleeding noted. Feeding tube @ 30 ml/hr and tolerating well. Family at bedside and updated. On Cefepime for aspiration PNA until 2/20/2024. On 3L and tolerating it well. AC to be resumed. discussed plan of care with intensivist and Dr. Elmore.

## 2024-02-19 NOTE — PROGRESS NOTE ADULT - PROBLEM SELECTOR PLAN 3
Has ICD  Echo from 9/2018 findings EF 70% , mild concentric LV hypertrophy  Resume metoprolol with hold parameters.   CXR findings as above, pulm venous congestion, pleural effusions .   Tolerating O2 4L hydrotrache. Wean as tolerated.   No diuretic for now in setting of soft BP.  Gentle hydration if needs IV fluids. Has ICD  Echo from 9/2018 findings EF 70% , mild concentric LV hypertrophy  Resume metoprolol with hold parameters.   CXR findings as above, pulm venous congestion, pleural effusions .   Tolerating O2 4L hydro trach. Wean as tolerated.   No diuretic for now in setting of soft BP.   Gentle hydration if needs IV fluids.

## 2024-02-19 NOTE — PROGRESS NOTE ADULT - PROBLEM SELECTOR PLAN 9
Ventricular rate controlled.   Resume metoprolol via PEG with hold parameters.   AC on hold in setting of GIB Ventricular rate controlled.   Metoprolol via PEG with hold parameters.   AC resumed.   Monitor for bleeding

## 2024-02-19 NOTE — PROGRESS NOTE ADULT - PROBLEM SELECTOR PLAN 4
Pt p/w from rehab after pt pulled  PEG tube.   Unable to place PEG tube in ED  s/p NGT insertions 2/8 (which was aborted due to nosebleed) and 2/10 (which pt pulled out).    GI found  no window for PEG placement.   s/p PEG placement 2/15 per Surgery  Continue PEG tube feeding   Safety precautions Pt p/w from rehab after pt pulled  PEG tube.   Unable to place PEG tube in ED  s/p NGT insertions 2/8 (which was aborted due to nosebleed) and 2/10 (which pt pulled out).    GI found  no window for PEG placement.   s/p PEG placement 2/15 per Surgery  Continue PEG tube feeding   Aspiration precautions

## 2024-02-19 NOTE — PROGRESS NOTE ADULT - NS ATTEND AMEND GEN_ALL_CORE FT
Impression: This is a 49 Y/O male from [facility] . Presenting to ED for PEG tube malfunction. For pulmonary evaluation due to Chronic Respiratory Failure , Has Trach . Has Chronic Pulmonary Embolism and off AC due to GI Bleed. Sputum cx growing moderate Pseudomonas aeruginosa . Peg placed on 02-15-24.     Suggestion:  O2 saturation 98% and continue O2 4L via hydrotrach O2 supplement.  Oral, trach care, suction.  Has passy luis alfredo valve   . Monitor Trach secretions , if having copious amount , can have glycopyrrolate via Peg twice daily.      Aspiration precautions with HOB elevation.   On Albuterol via nebulization Q 6 Hours.   On Cefepime 2 Gm IVPB Q 8 hours .    DVT / GI prophylactic .

## 2024-02-19 NOTE — DISCHARGE NOTE PROVIDER - NSDCMRMEDTOKEN_GEN_ALL_CORE_FT
acetaminophen 325 mg oral tablet: 2 tab(s) by gastrostomy tube every 6 hours as needed for  pain  acetaminophen 325 mg oral tablet: 2 tab(s) orally every 6 hours as needed for  temp 100F and above (see [facility] med list)  acetaminophen 325 mg oral tablet: 2 tab(s) by gastrostomy tube once a day at 9 AM 1 hour prior to dressing change  albuterol 2.5 mg/3 mL (0.083%) inhalation solution: 3 milliliter(s) by nebulizer every 6 hours  Artificial Tears ophthalmic solution: 1 drop(s) in each eye 2 times a day as needed for  dry eyes  atorvastatin 80 mg oral tablet: 1 tab(s) by gastrostomy tube once a day (at bedtime)  Eliquis 5 mg oral tablet: 1 tab(s) by gastrostomy tube every 12 hours  folic acid 1 mg oral tablet: 1 tab(s) by gastrostomy tube once a day  Kameron 7 gram-7 gram-1.5 gram oral powder packet: 1 packet via gastrostomy tube once a day  levETIRAcetam 100 mg/mL oral solution: 20 milliliter(s) by gastrostomy tube every 12 hours  lisinopril 2.5 mg oral tablet: 1 tab(s) by gastrostomy tube once a day  Melatonin 5 mg oral capsule: 1 cap(s) by gastrostomy tube once a day (at bedtime)  Metamucil 3.4 g/3.7 g oral powder for reconstitution: 1 packet(s) by gastrostomy tube once a day  metoprolol tartrate 25 mg oral tablet: 1 tab(s) by gastrostomy tube 2 times a day Hold for BP less than 100 or pulse less than 60  Multiple Vitamins oral tablet: 1 tab(s) by gastrostomy tube once a day  oxyCODONE 5 mg oral tablet: 1 tab(s) orally every 6 hours as needed for  pain (see [facility] med list)  pantoprazole 40 mg intravenous injection: 40 milligram(s) intravenous once a day  ProSource No Carb oral liquid: 30 milliliters by gastrostomy tube 3 times a day  Santyl 250 units/g topical ointment: Apply topically to affected area once a day and as needed to stage IV sacral ulcer  Silvadene 1% topical cream: Apply topically to affected area once a day to G-tube site after cleaning  sodium hypochlorite 0.25% topical solution: 1 Apply topically to affected area every 24 hours  Transderm-Scop 1 mg/72 hr transdermal film, extended release: 1 patch transdermally every 3 days  valproic acid 250 mg/5 mL oral liquid: 25 milliliter(s) by gastrostomy tube every 8 hours  Vimpat 200 mg oral tablet: 1 tab(s) by gastrostomy tube every 12 hours  Vitamin C 1000 mg oral tablet: 1 tab(s) by gastrostomy tube once a day   acetaminophen 325 mg oral tablet: 2 tab(s) by gastrostomy tube every 6 hours as needed for  pain  acetaminophen 325 mg oral tablet: 2 tab(s) orally every 6 hours as needed for  temp 100F and above (see NH med list)  acetaminophen 325 mg oral tablet: 2 tab(s) by gastrostomy tube once a day at 9 AM 1 hour prior to dressing change  albuterol 2.5 mg/3 mL (0.083%) inhalation solution: 3 milliliter(s) by nebulizer every 6 hours  Artificial Tears ophthalmic solution: 1 drop(s) in each eye 2 times a day as needed for  dry eyes  atorvastatin 80 mg oral tablet: 1 tab(s) by gastrostomy tube once a day (at bedtime)  Eliquis 5 mg oral tablet: 1 tab(s) by gastrostomy tube every 12 hours  folic acid 1 mg oral tablet: 1 tab(s) by gastrostomy tube once a day  Kameron 7 gram-7 gram-1.5 gram oral powder packet: 1 packet via gastrostomy tube once a day  levETIRAcetam 100 mg/mL oral solution: 20 milliliter(s) by gastrostomy tube every 12 hours  lisinopril 2.5 mg oral tablet: 1 tab(s) by gastrostomy tube once a day  Melatonin 5 mg oral capsule: 1 cap(s) by gastrostomy tube once a day (at bedtime)  Metamucil 3.4 g/3.7 g oral powder for reconstitution: 1 packet(s) by gastrostomy tube once a day  metoprolol tartrate 25 mg oral tablet: 1 tab(s) by gastrostomy tube 2 times a day Hold for BP less than 100 or pulse less than 60  Multiple Vitamins oral tablet: 1 tab(s) by gastrostomy tube once a day  oxyCODONE 5 mg oral tablet: 1 tab(s) orally every 6 hours as needed for  pain (see [facility] med list)  pantoprazole 40 mg oral granule, delayed release: 1 by gastrostomy tube 2 times a day  ProSource No Carb oral liquid: 30 milliliters by gastrostomy tube 3 times a day  Santyl 250 units/g topical ointment: Apply topically to affected area once a day and as needed to stage IV sacral ulcer  Silvadene 1% topical cream: Apply topically to affected area once a day to G-tube site after cleaning  sodium hypochlorite 0.25% topical solution: 1 Apply topically to affected area every 24 hours  Transderm-Scop 1 mg/72 hr transdermal film, extended release: 1 patch transdermally every 3 days  valproic acid 250 mg/5 mL oral liquid: 25 milliliter(s) by gastrostomy tube every 8 hours  Vimpat 200 mg oral tablet: 1 tab(s) by gastrostomy tube every 12 hours  Vitamin C 1000 mg oral tablet: 1 tab(s) by gastrostomy tube once a day

## 2024-02-19 NOTE — PROGRESS NOTE ADULT - PROBLEM SELECTOR PLAN 11
DVT ppx: SCDs. AC on hold  in setting of GIB.   GI ppx: PPT    Pt from [facility]  C/w Cefepime for total of 5 days (started 2/15).   F/u BC  AC on hold. Monitor H/H.    CM following DVT ppx: SCDs. AC restarted.   GI ppx: PPI    Pt from [facility]  C/w Cefepime for total of 5 days until 02/20/2024.   Bcx NGTD  CM following

## 2024-02-19 NOTE — PROGRESS NOTE ADULT - PROBLEM SELECTOR PLAN 5
Hgb low , stable  s/p Vitamin K and 1 unit FFP for INR 2.19 prior to PEG placement.  AC on hold  C/w PPI   Monitor CBC and transfuse for Hgb <8.0 or symptomatic H&H , stable  s/p Vitamin K and 1 unit FFP for INR 2.19 prior to PEG placement.  AC restarted   C/w PPI   Monitor CBC and transfuse for Hgb <7.0 or symptomatic

## 2024-02-20 LAB
ALBUMIN SERPL ELPH-MCNC: 1.8 G/DL — LOW (ref 3.5–5)
ALP SERPL-CCNC: 52 U/L — SIGNIFICANT CHANGE UP (ref 40–120)
ALT FLD-CCNC: 9 U/L DA — LOW (ref 10–60)
ANION GAP SERPL CALC-SCNC: 2 MMOL/L — LOW (ref 5–17)
AST SERPL-CCNC: 11 U/L — SIGNIFICANT CHANGE UP (ref 10–40)
BILIRUB SERPL-MCNC: 0.3 MG/DL — SIGNIFICANT CHANGE UP (ref 0.2–1.2)
BUN SERPL-MCNC: 8 MG/DL — SIGNIFICANT CHANGE UP (ref 7–18)
CALCIUM SERPL-MCNC: 8.9 MG/DL — SIGNIFICANT CHANGE UP (ref 8.4–10.5)
CHLORIDE SERPL-SCNC: 103 MMOL/L — SIGNIFICANT CHANGE UP (ref 96–108)
CO2 SERPL-SCNC: 32 MMOL/L — HIGH (ref 22–31)
CREAT SERPL-MCNC: 0.43 MG/DL — LOW (ref 0.5–1.3)
EGFR: 131 ML/MIN/1.73M2 — SIGNIFICANT CHANGE UP
GLUCOSE BLDC GLUCOMTR-MCNC: 104 MG/DL — HIGH (ref 70–99)
GLUCOSE BLDC GLUCOMTR-MCNC: 117 MG/DL — HIGH (ref 70–99)
GLUCOSE BLDC GLUCOMTR-MCNC: 99 MG/DL — SIGNIFICANT CHANGE UP (ref 70–99)
GLUCOSE SERPL-MCNC: 106 MG/DL — HIGH (ref 70–99)
HCT VFR BLD CALC: 26.9 % — LOW (ref 39–50)
HGB BLD-MCNC: 8.7 G/DL — LOW (ref 13–17)
MAGNESIUM SERPL-MCNC: 1.9 MG/DL — SIGNIFICANT CHANGE UP (ref 1.6–2.6)
MCHC RBC-ENTMCNC: 31.9 PG — SIGNIFICANT CHANGE UP (ref 27–34)
MCHC RBC-ENTMCNC: 32.3 GM/DL — SIGNIFICANT CHANGE UP (ref 32–36)
MCV RBC AUTO: 98.5 FL — SIGNIFICANT CHANGE UP (ref 80–100)
NRBC # BLD: 0 /100 WBCS — SIGNIFICANT CHANGE UP (ref 0–0)
PHOSPHATE SERPL-MCNC: 2.5 MG/DL — SIGNIFICANT CHANGE UP (ref 2.5–4.5)
PLATELET # BLD AUTO: 171 K/UL — SIGNIFICANT CHANGE UP (ref 150–400)
POTASSIUM SERPL-MCNC: 3.8 MMOL/L — SIGNIFICANT CHANGE UP (ref 3.5–5.3)
POTASSIUM SERPL-SCNC: 3.8 MMOL/L — SIGNIFICANT CHANGE UP (ref 3.5–5.3)
PROT SERPL-MCNC: 5.9 G/DL — LOW (ref 6–8.3)
RBC # BLD: 2.73 M/UL — LOW (ref 4.2–5.8)
RBC # FLD: 13 % — SIGNIFICANT CHANGE UP (ref 10.3–14.5)
SODIUM SERPL-SCNC: 137 MMOL/L — SIGNIFICANT CHANGE UP (ref 135–145)
WBC # BLD: 3.95 K/UL — SIGNIFICANT CHANGE UP (ref 3.8–10.5)
WBC # FLD AUTO: 3.95 K/UL — SIGNIFICANT CHANGE UP (ref 3.8–10.5)

## 2024-02-20 RX ORDER — MORPHINE SULFATE 50 MG/1
0.5 CAPSULE, EXTENDED RELEASE ORAL ONCE
Refills: 0 | Status: DISCONTINUED | OUTPATIENT
Start: 2024-02-20 | End: 2024-02-20

## 2024-02-20 RX ORDER — ROBINUL 0.2 MG/ML
0.2 INJECTION INTRAMUSCULAR; INTRAVENOUS EVERY 6 HOURS
Refills: 0 | Status: DISCONTINUED | OUTPATIENT
Start: 2024-02-20 | End: 2024-02-20

## 2024-02-20 RX ORDER — CEFEPIME 1 G/1
2000 INJECTION, POWDER, FOR SOLUTION INTRAMUSCULAR; INTRAVENOUS EVERY 8 HOURS
Refills: 0 | Status: COMPLETED | OUTPATIENT
Start: 2024-02-20 | End: 2024-02-20

## 2024-02-20 RX ORDER — ROBINUL 0.2 MG/ML
0.2 INJECTION INTRAMUSCULAR; INTRAVENOUS EVERY 12 HOURS
Refills: 0 | Status: DISCONTINUED | OUTPATIENT
Start: 2024-02-20 | End: 2024-02-21

## 2024-02-20 RX ORDER — APIXABAN 2.5 MG/1
5 TABLET, FILM COATED ORAL EVERY 12 HOURS
Refills: 0 | Status: DISCONTINUED | OUTPATIENT
Start: 2024-02-20 | End: 2024-02-21

## 2024-02-20 RX ORDER — PANTOPRAZOLE SODIUM 20 MG/1
40 TABLET, DELAYED RELEASE ORAL
Refills: 0 | Status: DISCONTINUED | OUTPATIENT
Start: 2024-02-20 | End: 2024-02-21

## 2024-02-20 RX ADMIN — ALBUTEROL 2.5 MILLIGRAM(S): 90 AEROSOL, METERED ORAL at 08:02

## 2024-02-20 RX ADMIN — LEVETIRACETAM 2000 MILLIGRAM(S): 250 TABLET, FILM COATED ORAL at 05:26

## 2024-02-20 RX ADMIN — ALBUTEROL 2.5 MILLIGRAM(S): 90 AEROSOL, METERED ORAL at 02:43

## 2024-02-20 RX ADMIN — Medication 1250 MILLIGRAM(S): at 05:25

## 2024-02-20 RX ADMIN — Medication 1250 MILLIGRAM(S): at 14:47

## 2024-02-20 RX ADMIN — Medication 1 APPLICATION(S): at 15:00

## 2024-02-20 RX ADMIN — Medication 1250 MILLIGRAM(S): at 21:29

## 2024-02-20 RX ADMIN — LEVETIRACETAM 2000 MILLIGRAM(S): 250 TABLET, FILM COATED ORAL at 18:35

## 2024-02-20 RX ADMIN — MORPHINE SULFATE 1 MILLIGRAM(S): 50 CAPSULE, EXTENDED RELEASE ORAL at 05:50

## 2024-02-20 RX ADMIN — Medication 10 MILLIGRAM(S): at 11:53

## 2024-02-20 RX ADMIN — MORPHINE SULFATE 1 MILLIGRAM(S): 50 CAPSULE, EXTENDED RELEASE ORAL at 05:21

## 2024-02-20 RX ADMIN — ALBUTEROL 2.5 MILLIGRAM(S): 90 AEROSOL, METERED ORAL at 14:20

## 2024-02-20 RX ADMIN — LACOSAMIDE 200 MILLIGRAM(S): 50 TABLET ORAL at 18:34

## 2024-02-20 RX ADMIN — MORPHINE SULFATE 1 MILLIGRAM(S): 50 CAPSULE, EXTENDED RELEASE ORAL at 21:56

## 2024-02-20 RX ADMIN — ALBUTEROL 2.5 MILLIGRAM(S): 90 AEROSOL, METERED ORAL at 20:19

## 2024-02-20 RX ADMIN — MORPHINE SULFATE 0.5 MILLIGRAM(S): 50 CAPSULE, EXTENDED RELEASE ORAL at 19:40

## 2024-02-20 RX ADMIN — PANTOPRAZOLE SODIUM 40 MILLIGRAM(S): 20 TABLET, DELAYED RELEASE ORAL at 18:36

## 2024-02-20 RX ADMIN — LACOSAMIDE 200 MILLIGRAM(S): 50 TABLET ORAL at 07:01

## 2024-02-20 RX ADMIN — Medication 12.5 MILLIGRAM(S): at 18:58

## 2024-02-20 RX ADMIN — MORPHINE SULFATE 1 MILLIGRAM(S): 50 CAPSULE, EXTENDED RELEASE ORAL at 21:32

## 2024-02-20 RX ADMIN — Medication 12.5 MILLIGRAM(S): at 05:27

## 2024-02-20 RX ADMIN — CEFEPIME 100 MILLIGRAM(S): 1 INJECTION, POWDER, FOR SOLUTION INTRAMUSCULAR; INTRAVENOUS at 05:25

## 2024-02-20 RX ADMIN — CEFEPIME 100 MILLIGRAM(S): 1 INJECTION, POWDER, FOR SOLUTION INTRAMUSCULAR; INTRAVENOUS at 22:38

## 2024-02-20 RX ADMIN — MORPHINE SULFATE 0.5 MILLIGRAM(S): 50 CAPSULE, EXTENDED RELEASE ORAL at 18:43

## 2024-02-20 RX ADMIN — ROBINUL 0.2 MILLIGRAM(S): 0.2 INJECTION INTRAMUSCULAR; INTRAVENOUS at 18:44

## 2024-02-20 RX ADMIN — PANTOPRAZOLE SODIUM 40 MILLIGRAM(S): 20 TABLET, DELAYED RELEASE ORAL at 10:12

## 2024-02-20 RX ADMIN — CHLORHEXIDINE GLUCONATE 1 APPLICATION(S): 213 SOLUTION TOPICAL at 05:25

## 2024-02-20 RX ADMIN — CEFEPIME 100 MILLIGRAM(S): 1 INJECTION, POWDER, FOR SOLUTION INTRAMUSCULAR; INTRAVENOUS at 14:47

## 2024-02-20 NOTE — PROGRESS NOTE ADULT - ASSESSMENT
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 Per endorsement, PEG tube insertion attempted in ED without success. NG tube also attempted but due to bleeding the insertion was aborted and AC not restarted (home med). Patient was transferred to  for further care given that patient has a trach. Patient has copious amounts of secretion from trach requiring constant suctioning. Sputum cx sent. CXR noted. Afebrile. Concern for MDRO and therefore was placed on contact  isolation until cx comes back. GI consulted and examined patient at bedside. GI spoke with patient's proxy. Plan for OR on Monday for new PEG tube insertion.   Patient's seizure meds switched to IV form. BB IV. Monitor BP per unit protocol.   02/09: OR on Monday for PEG. Dispo is to return to [facility] once PEg placed,   2/12  GI had no window to place peg. IR agrees with GI.  Surgery called for consult.  02/15/2024: OR today for PEG placement with Dr. Elmore this afternoon. NPO. IVF. INR this AM 2.19. S/p Vitamin K 5 mg IVPB and 1 unit FFP.  Plan of care discussed with intensivist and surgical team.   02/16/2024: Feeding to be started tonight. AC resumed. Monitor for bleeding   02/17/2024: Hypotensive last night. BP 84/49 HR 59. S/p 500 ml bolus. Cefepime discontinued 2/16. Discussed with Dr. De La Torre. ID believes it is likely colonization in sputum and since patient is afebrile without leukocytosis ABX should be discontinued. Discussed with intensivist. Thid AM low-grade fever which could be likely reactive from procedure (PEG placement on 2/15). H&H stable. Wound care re-eval and wound care orders modified. Afebrile this AM.   02/19: H&H stable. Hemodynamically stable. Afebrile. Bladder scan 9cc. PEG site intact, no bleeding noted. Feeding tube @ 30 ml/hr and tolerating well. Family at bedside and updated. On Cefepime for aspiration PNA until 2/20/2024. On 3L and tolerating it well. AC to be resumed. discussed plan of care with intensivist and Dr. Elmore.  02/20/2024:       47yo M pt with PMHx CVA (3/30/2022 with residual aphasia and right sided residual weakness), seizures, HTN, HLD, CHF w/ ICD (initially rEF 30%->55-60% on 03/2021), MDD, Recurrent PE, Afib (on Eliquis), obesity s/p bariatric intervention presenting to ED for PEG tube malfunction admitted for further care. Per endorsement, PEG tube insertion attempted in ED without success. NG tube also attempted but due to bleeding the insertion was aborted and AC not restarted (home med). Patient was transferred to  for further care given that patient has a trach. Patient has copious amounts of secretion from trach requiring constant suctioning. Sputum cx sent. CXR noted. Afebrile. Concern for MDRO and therefore was placed on contact  isolation until cx comes back. GI consulted and examined patient at bedside. GI spoke with patient's proxy. Plan for OR on Monday for new PEG tube insertion.   Patient's seizure meds switched to IV form. BB IV. Monitor BP per unit protocol.   02/09: OR on Monday for PEG. Dispo is to return to [facility] once PEg placed,   2/12  GI had no window to place peg. IR agrees with GI.  Surgery called for consult.  02/15/2024: OR today for PEG placement with Dr. Elmore this afternoon. NPO. IVF. INR this AM 2.19. S/p Vitamin K 5 mg IVPB and 1 unit FFP.  Plan of care discussed with intensivist and surgical team.   02/16/2024: Feeding to be started tonight. AC resumed. Monitor for bleeding   02/17/2024: Hypotensive last night. BP 84/49 HR 59. S/p 500 ml bolus. Cefepime discontinued 2/16. Discussed with Dr. De La Torre. ID believes it is likely colonization in sputum and since patient is afebrile without leukocytosis ABX should be discontinued. Discussed with intensivist. Thid AM low-grade fever which could be likely reactive from procedure (PEG placement on 2/15). H&H stable. Wound care re-eval and wound care orders modified. Afebrile this AM.   02/19: H&H stable. Hemodynamically stable. Afebrile. Bladder scan 9cc. PEG site intact, no bleeding noted. Feeding tube @ 30 ml/hr and tolerating well. Family at bedside and updated. On Cefepime for aspiration PNA until 2/20/2024. On 3L and tolerating it well. AC to be resumed. discussed plan of care with intensivist and Dr. Elmore.  02/20/2024:  CM following on placement. Awaiting acceptance and  Auth. Cefepime until today. Drop in H&H but no signs of bleeding. PPI BID. Hemodynamically stable. Discussed with intensivist and will restart AC. BM today and no signs of  bleeding.

## 2024-02-20 NOTE — PROGRESS NOTE ADULT - SUBJECTIVE AND OBJECTIVE BOX
DANNIE SHIRLEY    SCU progress note    INTERVAL HPI/OVERNIGHT EVENTS: ***    DNR [ ]   DNI  [  ]    Covid - 19 PCR:     The 4Ms    What Matters Most: see GOC  Age appropriate Medications/Screen for High Risk Medication: Yes  Mentation: see CAM below  Mobility: defer to physical exam    The Confusion Assessment Method (CAM) Diagnostic Algorithm     1: Acute Onset or Fluctuating Course  - Is there evidence of an acute change in mental status from the patient’s baseline? Did the (abnormal) behavior  fluctuate during the day, that is, tend to come and go, or increase and decrease in severity?  [ ] YES [ ] NO     2: Inattention  - Did the patient have difficulty focusing attention, being easily distractible, or having difficulty keeping track of what was being said?  [ ] YES [ ] NO     3: Disorganized thinking  -Was the patient’s thinking disorganized or incoherent, such as rambling or irrelevant conversation, unclear or illogical flow of ideas, or unpredictable switching from subject to subject?  [ ] YES [ ] NO    4: Altered Level of consciousness?  [ ] YES [ ] NO    The diagnosis of delirium by CAM requires the presence of features 1 and 2 and either 3 or 4.    PRESSORS: [ ] YES [ ] NO  cefepime   IVPB 2000 milliGRAM(s) IV Intermittent every 8 hours    Cardiovascular:  Heart Failure  Acute   Acute on Chronic  Chronic       metoprolol tartrate 12.5 milliGRAM(s) Oral every 12 hours    Pulmonary:  albuterol    0.083% 2.5 milliGRAM(s) Nebulizer every 6 hours    Hematalogic:  apixaban 5 milliGRAM(s) Enteral Tube every 12 hours    Other:  chlorhexidine 2% Cloths 1 Application(s) Topical <User Schedule>  Dakins Solution - 1/2 Strength 1 Application(s) Topical every 24 hours  lacosamide Solution 200 milliGRAM(s) Oral every 12 hours  levETIRAcetam  Solution 2000 milliGRAM(s) Oral every 12 hours  morphine  - Injectable 1 milliGRAM(s) IV Push every 6 hours PRN  pantoprazole  Injectable 40 milliGRAM(s) IV Push daily  valproic  acid Syrup 1250 milliGRAM(s) Oral <User Schedule>    albuterol    0.083% 2.5 milliGRAM(s) Nebulizer every 6 hours  apixaban 5 milliGRAM(s) Enteral Tube every 12 hours  cefepime   IVPB 2000 milliGRAM(s) IV Intermittent every 8 hours  chlorhexidine 2% Cloths 1 Application(s) Topical <User Schedule>  Dakins Solution - 1/2 Strength 1 Application(s) Topical every 24 hours  lacosamide Solution 200 milliGRAM(s) Oral every 12 hours  levETIRAcetam  Solution 2000 milliGRAM(s) Oral every 12 hours  metoprolol tartrate 12.5 milliGRAM(s) Oral every 12 hours  morphine  - Injectable 1 milliGRAM(s) IV Push every 6 hours PRN  pantoprazole  Injectable 40 milliGRAM(s) IV Push daily  valproic  acid Syrup 1250 milliGRAM(s) Oral <User Schedule>    Drug Dosing Weight    Weight (kg): 81.7 (08 Feb 2024 10:00)    CENTRAL LINE: [ ] YES [ ] NO  LOCATION:   DATE INSERTED:  REMOVE: [ ] YES [ ] NO  EXPLAIN:    FAULKNER: [ ] YES [ ] NO    DATE INSERTED:  REMOVE:  [ ] YES [ ] NO  EXPLAIN:    PAST MEDICAL & SURGICAL HISTORY:  Heart failure, unspecified HF chronicity, unspecified heart failure type      ETOH abuse  h/o etoh abuse now moderate drinker      Artificial pacemaker                  02-19 @ 07:01  -  02-20 @ 07:00  --------------------------------------------------------  IN: 0 mL / OUT: 400 mL / NET: -400 mL            PHYSICAL EXAM:    GENERAL: NAD, well-groomed, well-developed  HEAD:  Atraumatic, Normocephalic  EYES: EOMI, PERRLA, conjunctiva and sclera clear  ENMT: No tonsillar erythema, exudates, or enlargement; Moist mucous membranes, Good dentition, No lesions  NECK: Supple, No JVD, Normal thyroid  NERVOUS SYSTEM:  Alert & Oriented X3, Good concentration; Motor Strength 5/5 B/L upper and lower extremities; DTRs 2+ intact and symmetric  CHEST/LUNG: Clear to percussion bilaterally; No rales, rhonchi, wheezing, or rubs  HEART: Regular rate and rhythm; No murmurs, rubs, or gallops  ABDOMEN: Soft, Nontender, Nondistended; Bowel sounds present  EXTREMITIES:  2+ Peripheral Pulses, No clubbing, cyanosis, or edema  LYMPH: No lymphadenopathy noted  SKIN: No rashes or lesions      LABS:  CBC Full  -  ( 19 Feb 2024 06:00 )  WBC Count : 5.48 K/uL  RBC Count : 3.13 M/uL  Hemoglobin : 10.0 g/dL  Hematocrit : 31.5 %  Platelet Count - Automated : 141 K/uL  Mean Cell Volume : 100.6 fl  Mean Cell Hemoglobin : 31.9 pg  Mean Cell Hemoglobin Concentration : 31.7 gm/dL  Auto Neutrophil # : x  Auto Lymphocyte # : x  Auto Monocyte # : x  Auto Eosinophil # : x  Auto Basophil # : x  Auto Neutrophil % : x  Auto Lymphocyte % : x  Auto Monocyte % : x  Auto Eosinophil % : x  Auto Basophil % : x    02-19    137  |  105  |  8   ----------------------------<  131<H>  3.3<L>   |  30  |  0.45<L>    Ca    8.1<L>      19 Feb 2024 06:00  Phos  2.3     02-19  Mg     2.1     02-19    TPro  6.4  /  Alb  1.9<L>  /  TBili  0.2  /  DBili  x   /  AST  13  /  ALT  8<L>  /  AlkPhos  60  02-19      Urinalysis Basic - ( 19 Feb 2024 06:00 )    Color: x / Appearance: x / SG: x / pH: x  Gluc: 131 mg/dL / Ketone: x  / Bili: x / Urobili: x   Blood: x / Protein: x / Nitrite: x   Leuk Esterase: x / RBC: x / WBC x   Sq Epi: x / Non Sq Epi: x / Bacteria: x            [  ]  DVT Prophylaxis  [  ]  Nutrition, Brand, Rate         Goal Rate        Abnormal Nutritional Status -  Malnutrition   Cachexia      Morbid Obesity BMI >/=40    RADIOLOGY & ADDITIONAL STUDIES:  ***    Goals of Care Discussion with Family/Proxy/Other   - see note from/family meeting set up for...     DANNIE SHIRLEY    SCU progress note    INTERVAL HPI/OVERNIGHT EVENTS: No acute events overnight.     FULL CODE    Covid - 19 PCR: negative.    The 4Ms    What Matters Most: see GOC  Age appropriate Medications/Screen for High Risk Medication: Yes  Mentation: see CAM below  Mobility: defer to physical exam    The Confusion Assessment Method (CAM) Diagnostic Algorithm     1: Acute Onset or Fluctuating Course  - Is there evidence of an acute change in mental status from the patient’s baseline? Did the (abnormal) behavior  fluctuate during the day, that is, tend to come and go, or increase and decrease in severity?  [ ] YES [x ] NO     2: Inattention  - Did the patient have difficulty focusing attention, being easily distractible, or having difficulty keeping track of what was being said?  [x ] YES [ ] NO     3: Disorganized thinking  -Was the patient’s thinking disorganized or incoherent, such as rambling or irrelevant conversation, unclear or illogical flow of ideas, or unpredictable switching from subject to subject?  [ ] YES [ ] NO Unable to assess    4: Altered Level of consciousness?  [ ] YES [ ] NO Unable to assess    The diagnosis of delirium by CAM requires the presence of features 1 and 2 and either 3 or 4.    PRESSORS: [ ] YES [x ] NO  cefepime   IVPB 2000 milliGRAM(s) IV Intermittent every 8 hours    Cardiovascular:  Heart Failure  Acute   Acute on Chronic  Chronic       metoprolol tartrate 12.5 milliGRAM(s) Oral every 12 hours    Pulmonary:  albuterol    0.083% 2.5 milliGRAM(s) Nebulizer every 6 hours    Hematalogic:  apixaban 5 milliGRAM(s) Enteral Tube every 12 hours    Other:  chlorhexidine 2% Cloths 1 Application(s) Topical <User Schedule>  Dakins Solution - 1/2 Strength 1 Application(s) Topical every 24 hours  lacosamide Solution 200 milliGRAM(s) Oral every 12 hours  levETIRAcetam  Solution 2000 milliGRAM(s) Oral every 12 hours  morphine  - Injectable 1 milliGRAM(s) IV Push every 6 hours PRN  pantoprazole  Injectable 40 milliGRAM(s) IV Push daily  valproic  acid Syrup 1250 milliGRAM(s) Oral <User Schedule>    albuterol    0.083% 2.5 milliGRAM(s) Nebulizer every 6 hours  apixaban 5 milliGRAM(s) Enteral Tube every 12 hours  cefepime   IVPB 2000 milliGRAM(s) IV Intermittent every 8 hours  chlorhexidine 2% Cloths 1 Application(s) Topical <User Schedule>  Dakins Solution - 1/2 Strength 1 Application(s) Topical every 24 hours  lacosamide Solution 200 milliGRAM(s) Oral every 12 hours  levETIRAcetam  Solution 2000 milliGRAM(s) Oral every 12 hours  metoprolol tartrate 12.5 milliGRAM(s) Oral every 12 hours  morphine  - Injectable 1 milliGRAM(s) IV Push every 6 hours PRN  pantoprazole  Injectable 40 milliGRAM(s) IV Push daily  valproic  acid Syrup 1250 milliGRAM(s) Oral <User Schedule>    Drug Dosing Weight    Weight (kg): 81.7 (08 Feb 2024 10:00)    CENTRAL LINE: [ ] YES [x ] NO  LOCATION:   DATE INSERTED:  REMOVE: [ ] YES [ ] NO  EXPLAIN:    FAULKNER: [x ] YES [ ] NO    DATE INSERTED:  REMOVE:  [ ] YES [ ] NO  EXPLAIN:    PAST MEDICAL & SURGICAL HISTORY:  Heart failure, unspecified HF chronicity, unspecified heart failure type      ETOH abuse  h/o etoh abuse now moderate drinker      Artificial pacemaker      02-19 @ 07:01  -  02-20 @ 07:00  --------------------------------------------------------  IN: 0 mL / OUT: 400 mL / NET: -400 mL      PHYSICAL EXAM:  GENERAL: NAD, well-groomed, well-developed  HEAD:  Atraumatic, Normocephalic  EYES: EOMI, PERRLA, conjunctiva and sclera clear  ENMT: No tonsillar erythema, exudates, or enlargement; Moist mucous membranes, Good dentition, No lesions  NECK: Supple, No JVD, Normal thyroid  NERVOUS SYSTEM:  Eyes opened. Nods. follows simple commands. MOves LUE.   CHEST/LUNG: Diminished at the bases bilaterally; No rales, rhonchi, wheezing, or rubs. On 3L hydro trach   HEART: Regular rate and rhythm; No murmurs, rubs, or gallops  ABDOMEN: Soft, Nontender, Nondistended; Bowel sounds present. PEG in place. No bleeding.   EXTREMITIES:  2+ Peripheral Pulses, No clubbing, cyanosis, 2+ pitting edema lower exts..   SKIN: Stage 4 coccyx       LABS:  CBC Full  -  ( 19 Feb 2024 06:00 )  WBC Count : 5.48 K/uL  RBC Count : 3.13 M/uL  Hemoglobin : 10.0 g/dL  Hematocrit : 31.5 %  Platelet Count - Automated : 141 K/uL  Mean Cell Volume : 100.6 fl  Mean Cell Hemoglobin : 31.9 pg  Mean Cell Hemoglobin Concentration : 31.7 gm/dL  Auto Neutrophil # : x  Auto Lymphocyte # : x  Auto Monocyte # : x  Auto Eosinophil # : x  Auto Basophil # : x  Auto Neutrophil % : x  Auto Lymphocyte % : x  Auto Monocyte % : x  Auto Eosinophil % : x  Auto Basophil % : x    02-19    137  |  105  |  8   ----------------------------<  131<H>  3.3<L>   |  30  |  0.45<L>    Ca    8.1<L>      19 Feb 2024 06:00  Phos  2.3     02-19  Mg     2.1     02-19    TPro  6.4  /  Alb  1.9<L>  /  TBili  0.2  /  DBili  x   /  AST  13  /  ALT  8<L>  /  AlkPhos  60  02-19      Urinalysis Basic - ( 19 Feb 2024 06:00 )    Color: x / Appearance: x / SG: x / pH: x  Gluc: 131 mg/dL / Ketone: x  / Bili: x / Urobili: x   Blood: x / Protein: x / Nitrite: x   Leuk Esterase: x / RBC: x / WBC x   Sq Epi: x / Non Sq Epi: x / Bacteria: x    < from: Xray Chest 1 View AP/PA (02.17.24 @ 12:16) >    ACC: [id number] EXAM:  XR CHEST AP OR PA 1V   ORDERED BY: MARJORIE COHEN     PROCEDURE DATE:  02/17/2024          INTERPRETATION:  XR CHEST dated 2/17/2024 12:16 PM    CLINICAL INFORMATION: Male, 48 years old.  Fever, hypotension.    PRIOR STUDIES: 2/8/2024    FINDINGS/  IMPRESSION: Tracheostomy tube again noted unchanged. Left-sided trilead   permanent pacemaker with the tip of the pacer wires unchanged in position   projecting over the region the right atrium, right ventricle with right   ventricular ICD and left ventricle. There is new cardiomegaly with mild   pulmonary venous congestion and new posterior layering of a pleural   effusions. No pneumothorax. No acute osseous pathology.    --- End of Report ---      ASHLEY BERUMEN MD; Attending Radiologist  This document has been electronically signed. Feb 17 2024  1:30PM    < end of copied text >

## 2024-02-20 NOTE — PROGRESS NOTE ADULT - SUBJECTIVE AND OBJECTIVE BOX
Time of Visit:  Patient seen and examined. pat  is lying  in bed comfortable + copious secretions from trach    MEDICATIONS  (STANDING):  albuterol    0.083% 2.5 milliGRAM(s) Nebulizer every 6 hours  cefepime   IVPB 2000 milliGRAM(s) IV Intermittent every 8 hours  chlorhexidine 2% Cloths 1 Application(s) Topical <User Schedule>  Dakins Solution - 1/2 Strength 1 Application(s) Topical every 24 hours  lacosamide Solution 200 milliGRAM(s) Oral every 12 hours  levETIRAcetam  Solution 2000 milliGRAM(s) Oral every 12 hours  metoprolol tartrate 12.5 milliGRAM(s) Oral every 12 hours  pantoprazole   Suspension 40 milliGRAM(s) Oral two times a day  valproic  acid Syrup 1250 milliGRAM(s) Oral <User Schedule>      MEDICATIONS  (PRN):  morphine  - Injectable 1 milliGRAM(s) IV Push every 6 hours PRN Severe Pain (7 - 10)       Medications up to date at time of exam.      PHYSICAL EXAMINATION:  Patient has no new complaints.  GENERAL: The patient is a well-developed, well-nourished, in no apparent distress.     Vital Signs Last 24 Hrs  T(C): 37 (20 Feb 2024 13:08), Max: 37 (20 Feb 2024 13:08)  T(F): 98.6 (20 Feb 2024 13:08), Max: 98.6 (20 Feb 2024 13:08)  HR: 81 (20 Feb 2024 13:08) (63 - 81)  BP: 108/65 (20 Feb 2024 13:08) (101/67 - 117/85)  BP(mean): 76 (19 Feb 2024 20:41) (71 - 76)  RR: 16 (20 Feb 2024 13:08) (16 - 18)  SpO2: 98% (20 Feb 2024 13:08) (98% - 100%)    Parameters below as of 20 Feb 2024 13:08  Patient On (Oxygen Delivery Method): hydro trach       (if applicable)    Chest Tube (if applicable)    HEENT: Head is normocephalic and atraumatic. Extraocular muscles are intact. Mucous membranes are moist.     NECK: Supple, no palpable adenopathy. + trach 4 L ( hydrotrach )     LUNGS: Clear to auscultation, no wheezing, rales, or rhonchi.    HEART: Regular rate and rhythm without murmur.    ABDOMEN: Soft, nontender, and nondistended.  No hepatosplenomegaly is noted.+ PEG     EXTREMITIES: Without any cyanosis, clubbing, rash, lesions or edema.    NEUROLOGIC: Awake, alert,     SKIN: Warm, dry, good turgor.      LABS:                        8.7    3.95  )-----------( 171      ( 20 Feb 2024 07:40 )             26.9     02-20    137  |  103  |  8   ----------------------------<  106<H>  3.8   |  32<H>  |  0.43<L>    Ca    8.9      20 Feb 2024 07:40  Phos  2.5     02-20  Mg     1.9     02-20    TPro  5.9<L>  /  Alb  1.8<L>  /  TBili  0.3  /  DBili  x   /  AST  11  /  ALT  9<L>  /  AlkPhos  52  02-20      Urinalysis Basic - ( 20 Feb 2024 07:40 )    Color: x / Appearance: x / SG: x / pH: x  Gluc: 106 mg/dL / Ketone: x  / Bili: x / Urobili: x   Blood: x / Protein: x / Nitrite: x   Leuk Esterase: x / RBC: x / WBC x   Sq Epi: x / Non Sq Epi: x / Bacteria: x      MICROBIOLOGY: (if applicable)    RADIOLOGY & ADDITIONAL STUDIES:  EKG:   CXR:  ECHO:    IMPRESSION: 48y Male PAST MEDICAL & SURGICAL HISTORY:  Heart failure, unspecified HF chronicity, unspecified heart failure type      ETOH abuse  h/o etoh abuse now moderate drinker      Artificial pacemaker       p/w         Impression: This is a 48 yr old man  from [facility] . Presenting to ED for PEG tube malfunction. For pulmonary evaluation due to Chronic Respiratory Failure , Has Trach . Has Chronic Pulmonary Embolism and off AC due to GI Bleed. Sputum cx growing moderate Pseudomonas aeruginosa . Peg placed on 02-15-24.     Suggestion:  - O2 saturation 98% and continue O2 4L via hydrotrach O2 supplement.  - Oral, trach care, suction.  - Has passy luis alfredo valve   . Monitor Trach secretions , if having copious amount , can have glycopyrrolate via Peg twice daily.      - Aspiration precautions with HOB elevation.   - On Albuterol via nebulization Q 6 Hours.   - On Cefepime 2 Gm IVPB Q 8 hours .    - DVT / GI prophylactic .    spoke with Suzie NP covering

## 2024-02-20 NOTE — PROGRESS NOTE ADULT - PROBLEM SELECTOR PLAN 2
Continue O2  via hydro trach .  Tolerating O2 4L hydro trach. Wean as tolerated.   Monitor oxygenation   Continue duonebs q6h  CXR as above , findings mild pulm venous congestion, pleural effusions .    Passey Shani Valve . Hold for now in setting of copious tracheal secretions.  Suction as needed.   Keep HOB elevated at all times.  Continue Cefepime for aspiration PNA for total of 5Days (until 2/20/2024).

## 2024-02-20 NOTE — PROGRESS NOTE ADULT - PROBLEM SELECTOR PLAN 7
2/2 CVA vs sepsis  Maintain safety precautions  Seizure precautions  Passive ROM   Turn and reposition every 2 hrs.

## 2024-02-20 NOTE — PHARMACOTHERAPY INTERVENTION NOTE - COMMENTS
Patient is from [facility] and their medical record was used to update the outpatient medication review.
Patient is currently on cefepime for treatment of aspiration pneumonia.    Suggest to add 5-day stop date (ending today) for cefepime order.
Weight check

## 2024-02-20 NOTE — PROGRESS NOTE ADULT - PROBLEM SELECTOR PLAN 9
Ventricular rate controlled.   Metoprolol via PEG with hold parameters.   AC resumed.   Monitor for bleeding

## 2024-02-20 NOTE — PROGRESS NOTE ADULT - PROBLEM SELECTOR PLAN 3
Has ICD  Echo from 9/2018 findings EF 70% , mild concentric LV hypertrophy  Resume metoprolol with hold parameters.   CXR findings as above, pulm venous congestion, pleural effusions .   Tolerating O2 4L hydro trach. Wean as tolerated.   No diuretic for now in setting of soft BP.   Gentle hydration if needs IV fluids.

## 2024-02-20 NOTE — PROGRESS NOTE ADULT - PROBLEM SELECTOR PLAN 1
Now hemodynamically stable    Afebrile   Continue Cefepime for aspiration PNA for total of 5Days (until 2/20/2024).   Lactate low  Procal WNL  Sputum growing Pseudomonas aeruginosa (likely colonized).   BC from 2/17 NGTD  CXR  findings mild pulm venous congestion, pleural effusions.   ID following Now hemodynamically stable    Afebrile   Continue Cefepime for aspiration PNA for total of 5Days (until 2/20/2024).   Lactate low  Procal WNL  Sputum growing Pseudomonas aeruginosa (likely colonized).   Blood cultures from 2/17 negative.   CXR  findings mild pulm venous congestion, pleural effusions.

## 2024-02-20 NOTE — PROGRESS NOTE ADULT - PROBLEM SELECTOR PLAN 5
H&H , stable  s/p Vitamin K and 1 unit FFP for INR 2.19 prior to PEG placement.  AC restarted   C/w PPI   Monitor CBC and transfuse for Hgb <7.0 or symptomatic H&H , stable  s/p Vitamin K and 1 unit FFP for INR 2.19 prior to PEG placement.  AC restarted   C/w PPI   Monitor CBC and transfuse for Hgb <7.0 or symptomatic  BM today and no signs of bleeding H&H , stable  s/p Vitamin K and 1 unit FFP for INR 2.19 prior to PEG placement.  Anticoagulation restarted.   C/w PPI   Monitor CBC and transfuse for Hgb <7.0 or symptomatic  Bowel movement today and no signs of bleeding

## 2024-02-20 NOTE — PROGRESS NOTE ADULT - PROBLEM SELECTOR PLAN 11
DVT ppx: SCDs. AC restarted.   GI ppx: PPI    [facility]  C/w Cefepime for total of 5 days until 02/20/2024.   Bcx NGTD  CM following DVT ppx: SCDs. AC restarted.   GI ppx: PPI    Pt from [facility]  C/w Cefepime for total of 5 days until 02/20/2024.   Bcx NGTD  CM following  Awaiting acceptance and Auth. DVT ppx: SCDs. AC restarted.   GI ppx: PPI    Patient  from [facility]  Cefepime for total of 5 days until 02/20/2024.   Blood cultures from 2/17 negative.   CM following  Awaiting acceptance and Auth.

## 2024-02-20 NOTE — PROGRESS NOTE ADULT - SUBJECTIVE AND OBJECTIVE BOX
INTERVAL HPI/OVERNIGHT EVENTS:  Pt resting comfortably. No acute complaints. Tolerating tube feeds. Denies abd pain, denies nausea, vomiting.     MEDICATIONS  (STANDING):  albuterol    0.083% 2.5 milliGRAM(s) Nebulizer every 6 hours  apixaban 5 milliGRAM(s) Enteral Tube every 12 hours  cefepime   IVPB 2000 milliGRAM(s) IV Intermittent every 8 hours  chlorhexidine 2% Cloths 1 Application(s) Topical <User Schedule>  Dakins Solution - 1/2 Strength 1 Application(s) Topical every 24 hours  lacosamide Solution 200 milliGRAM(s) Oral every 12 hours  levETIRAcetam  Solution 2000 milliGRAM(s) Oral every 12 hours  metoprolol tartrate 12.5 milliGRAM(s) Oral every 12 hours  pantoprazole  Injectable 40 milliGRAM(s) IV Push daily  valproic  acid Syrup 1250 milliGRAM(s) Oral <User Schedule>    MEDICATIONS  (PRN):  morphine  - Injectable 1 milliGRAM(s) IV Push every 6 hours PRN Severe Pain (7 - 10)      Vital Signs Last 24 Hrs  T(C): 36.7 (20 Feb 2024 04:48), Max: 36.8 (19 Feb 2024 20:41)  T(F): 98 (20 Feb 2024 04:48), Max: 98.2 (19 Feb 2024 20:41)  HR: 66 (20 Feb 2024 04:48) (63 - 74)  BP: 117/85 (20 Feb 2024 04:48) (101/67 - 117/85)  BP(mean): 76 (19 Feb 2024 20:41) (71 - 76)  RR: 17 (20 Feb 2024 04:48) (17 - 19)  SpO2: 100% (20 Feb 2024 04:48) (94% - 100%)    Parameters below as of 20 Feb 2024 04:48  Patient On (Oxygen Delivery Method): hydrotrach  O2 Flow (L/min): 3      Physical:  General: NAD  Resp: trach in place. Unlabored breathing.   Abdomen: Soft nondistended, nontender to palpation diffusely. Dressings clean, dry, intact. PEG in place, tube feeds running. Nonerythematous, no active bleeding or purulent drainage. Nontender around site. No voluntary guarding or rebound.     I&O's Detail    19 Feb 2024 07:01  -  20 Feb 2024 07:00  --------------------------------------------------------  IN:  Total IN: 0 mL    OUT:    Incontinent per Condom Catheter (mL): 400 mL  Total OUT: 400 mL    Total NET: -400 mL          LABS:                        10.0   5.48  )-----------( 141      ( 19 Feb 2024 06:00 )             31.5             02-19    137  |  105  |  8   ----------------------------<  131<H>  3.3<L>   |  30  |  0.45<L>    Ca    8.1<L>      19 Feb 2024 06:00  Phos  2.3     02-19  Mg     2.1     02-19    TPro  6.4  /  Alb  1.9<L>  /  TBili  0.2  /  DBili  x   /  AST  13  /  ALT  8<L>  /  AlkPhos  60  02-19      48y.o. Male

## 2024-02-20 NOTE — PROGRESS NOTE ADULT - ASSESSMENT
49 y/o male s/p G tube placement, POD#4  VSS    Pt tolerating tube feeds. G tube in place, site clean. No signs of infection. Abd benign, nontender.     Plan   - continue tube feeds as tolerated  - PEG tube care, keep insertion site dry  - remainder of care as per primary team   - re consult Rhonda Watters  SURGERY  [address] [place] [address]  Phone: [phone]  Fax: [phone]

## 2024-02-20 NOTE — PROGRESS NOTE ADULT - NS ATTEND AMEND GEN_ALL_CORE FT
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 Now pending surgical PEG placement after unable to replace endoscopically w/ GI.    ASSESSMENT:  #PEG malfunction  #CVA  #Tracheostomy status  #Seizures  #H/o recurrent PE  #Afib  #HFrEF    Plan:  - s/p surgical PEG placement 2/15, gen sx mgmt appreciated  - patient has repeatedly ripped out prior NGTs; keep abd binder over new PEG  - AC held due to slight drop in hgb   - Monitor for signs of bleeding   - trend CBC  - Cont. cefepime  - home AEDs for seizure  - enteral feeding initiated  - cont 4L hydrotrach  - routine trach care and suctioning  - passymuir valve eval passed - cont SLP follow-up  - Discharge planning back to facility

## 2024-02-21 VITALS
DIASTOLIC BLOOD PRESSURE: 88 MMHG | SYSTOLIC BLOOD PRESSURE: 129 MMHG | RESPIRATION RATE: 16 BRPM | TEMPERATURE: 98 F | HEART RATE: 63 BPM | OXYGEN SATURATION: 96 %

## 2024-02-21 LAB
GLUCOSE BLDC GLUCOMTR-MCNC: 74 MG/DL — SIGNIFICANT CHANGE UP (ref 70–99)
GLUCOSE BLDC GLUCOMTR-MCNC: 98 MG/DL — SIGNIFICANT CHANGE UP (ref 70–99)
GLUCOSE BLDC GLUCOMTR-MCNC: 99 MG/DL — SIGNIFICANT CHANGE UP (ref 70–99)

## 2024-02-21 PROCEDURE — P9047: CPT

## 2024-02-21 PROCEDURE — 85027 COMPLETE CBC AUTOMATED: CPT

## 2024-02-21 PROCEDURE — 81001 URINALYSIS AUTO W/SCOPE: CPT

## 2024-02-21 PROCEDURE — 82962 GLUCOSE BLOOD TEST: CPT

## 2024-02-21 PROCEDURE — 86900 BLOOD TYPING SEROLOGIC ABO: CPT

## 2024-02-21 PROCEDURE — 36430 TRANSFUSION BLD/BLD COMPNT: CPT

## 2024-02-21 PROCEDURE — 87077 CULTURE AEROBIC IDENTIFY: CPT

## 2024-02-21 PROCEDURE — 71045 X-RAY EXAM CHEST 1 VIEW: CPT

## 2024-02-21 PROCEDURE — 93005 ELECTROCARDIOGRAM TRACING: CPT

## 2024-02-21 PROCEDURE — 80164 ASSAY DIPROPYLACETIC ACD TOT: CPT

## 2024-02-21 PROCEDURE — 99283 EMERGENCY DEPT VISIT LOW MDM: CPT

## 2024-02-21 PROCEDURE — 86901 BLOOD TYPING SEROLOGIC RH(D): CPT

## 2024-02-21 PROCEDURE — 87186 SC STD MICRODIL/AGAR DIL: CPT

## 2024-02-21 PROCEDURE — 83735 ASSAY OF MAGNESIUM: CPT

## 2024-02-21 PROCEDURE — 87640 STAPH A DNA AMP PROBE: CPT

## 2024-02-21 PROCEDURE — 84100 ASSAY OF PHOSPHORUS: CPT

## 2024-02-21 PROCEDURE — P9059: CPT

## 2024-02-21 PROCEDURE — 85730 THROMBOPLASTIN TIME PARTIAL: CPT

## 2024-02-21 PROCEDURE — 94640 AIRWAY INHALATION TREATMENT: CPT

## 2024-02-21 PROCEDURE — 85610 PROTHROMBIN TIME: CPT

## 2024-02-21 PROCEDURE — C9254: CPT

## 2024-02-21 PROCEDURE — 87040 BLOOD CULTURE FOR BACTERIA: CPT

## 2024-02-21 PROCEDURE — 94799 UNLISTED PULMONARY SVC/PX: CPT

## 2024-02-21 PROCEDURE — 80053 COMPREHEN METABOLIC PANEL: CPT

## 2024-02-21 PROCEDURE — 83605 ASSAY OF LACTIC ACID: CPT

## 2024-02-21 PROCEDURE — 84145 PROCALCITONIN (PCT): CPT

## 2024-02-21 PROCEDURE — 96374 THER/PROPH/DIAG INJ IV PUSH: CPT

## 2024-02-21 PROCEDURE — 96375 TX/PRO/DX INJ NEW DRUG ADDON: CPT

## 2024-02-21 PROCEDURE — C1769: CPT

## 2024-02-21 PROCEDURE — 80048 BASIC METABOLIC PNL TOTAL CA: CPT

## 2024-02-21 PROCEDURE — 85025 COMPLETE CBC W/AUTO DIFF WBC: CPT

## 2024-02-21 PROCEDURE — 94760 N-INVAS EAR/PLS OXIMETRY 1: CPT

## 2024-02-21 PROCEDURE — 36415 COLL VENOUS BLD VENIPUNCTURE: CPT

## 2024-02-21 PROCEDURE — 99285 EMERGENCY DEPT VISIT HI MDM: CPT

## 2024-02-21 PROCEDURE — 87641 MR-STAPH DNA AMP PROBE: CPT

## 2024-02-21 PROCEDURE — 92597 ORAL SPEECH DEVICE EVAL: CPT

## 2024-02-21 PROCEDURE — 87070 CULTURE OTHR SPECIMN AEROBIC: CPT

## 2024-02-21 PROCEDURE — L8699: CPT

## 2024-02-21 PROCEDURE — 87081 CULTURE SCREEN ONLY: CPT

## 2024-02-21 PROCEDURE — 88305 TISSUE EXAM BY PATHOLOGIST: CPT

## 2024-02-21 PROCEDURE — 88312 SPECIAL STAINS GROUP 1: CPT

## 2024-02-21 PROCEDURE — 80177 DRUG SCRN QUAN LEVETIRACETAM: CPT

## 2024-02-21 PROCEDURE — 76000 FLUOROSCOPY <1 HR PHYS/QHP: CPT

## 2024-02-21 PROCEDURE — 84132 ASSAY OF SERUM POTASSIUM: CPT

## 2024-02-21 PROCEDURE — 86850 RBC ANTIBODY SCREEN: CPT

## 2024-02-21 PROCEDURE — 0225U NFCT DS DNA&RNA 21 SARSCOV2: CPT

## 2024-02-21 RX ORDER — PANTOPRAZOLE SODIUM 20 MG/1
1 TABLET, DELAYED RELEASE ORAL
Qty: 0 | Refills: 0 | DISCHARGE
Start: 2024-02-21

## 2024-02-21 RX ADMIN — LACOSAMIDE 200 MILLIGRAM(S): 50 TABLET ORAL at 06:54

## 2024-02-21 RX ADMIN — ALBUTEROL 2.5 MILLIGRAM(S): 90 AEROSOL, METERED ORAL at 08:06

## 2024-02-21 RX ADMIN — Medication 1250 MILLIGRAM(S): at 12:35

## 2024-02-21 RX ADMIN — CHLORHEXIDINE GLUCONATE 1 APPLICATION(S): 213 SOLUTION TOPICAL at 05:08

## 2024-02-21 RX ADMIN — PANTOPRAZOLE SODIUM 40 MILLIGRAM(S): 20 TABLET, DELAYED RELEASE ORAL at 06:54

## 2024-02-21 RX ADMIN — Medication 1 APPLICATION(S): at 12:35

## 2024-02-21 RX ADMIN — PANTOPRAZOLE SODIUM 40 MILLIGRAM(S): 20 TABLET, DELAYED RELEASE ORAL at 17:40

## 2024-02-21 RX ADMIN — ROBINUL 0.2 MILLIGRAM(S): 0.2 INJECTION INTRAMUSCULAR; INTRAVENOUS at 05:08

## 2024-02-21 RX ADMIN — MORPHINE SULFATE 1 MILLIGRAM(S): 50 CAPSULE, EXTENDED RELEASE ORAL at 13:35

## 2024-02-21 RX ADMIN — APIXABAN 5 MILLIGRAM(S): 2.5 TABLET, FILM COATED ORAL at 17:40

## 2024-02-21 RX ADMIN — APIXABAN 5 MILLIGRAM(S): 2.5 TABLET, FILM COATED ORAL at 05:07

## 2024-02-21 RX ADMIN — MORPHINE SULFATE 1 MILLIGRAM(S): 50 CAPSULE, EXTENDED RELEASE ORAL at 12:34

## 2024-02-21 RX ADMIN — LACOSAMIDE 200 MILLIGRAM(S): 50 TABLET ORAL at 17:39

## 2024-02-21 RX ADMIN — LEVETIRACETAM 2000 MILLIGRAM(S): 250 TABLET, FILM COATED ORAL at 05:07

## 2024-02-21 RX ADMIN — ROBINUL 0.2 MILLIGRAM(S): 0.2 INJECTION INTRAMUSCULAR; INTRAVENOUS at 17:39

## 2024-02-21 RX ADMIN — LEVETIRACETAM 2000 MILLIGRAM(S): 250 TABLET, FILM COATED ORAL at 17:40

## 2024-02-21 RX ADMIN — Medication 1250 MILLIGRAM(S): at 05:08

## 2024-02-21 RX ADMIN — Medication 12.5 MILLIGRAM(S): at 05:07

## 2024-02-21 NOTE — PROGRESS NOTE ADULT - PROBLEM SELECTOR PLAN 11
DVT ppx: SCDs. AC restarted.   GI ppx: PPI    Pt from [facility]  C/w Cefepime for total of 5 days until 02/20/2024.   Bcx NGTD  CM following  Awaiting acceptance and Auth. DVT ppx: SCDs. AC restarted.   GI ppx: PPI    Pt from [facility]  C/w Cefepime for total of 5 days until 02/20/2024.   Bcx NGTD  CM following  Awaiting acceptance and Auth.  Patient calm and not attempting to remove lines and tubes.

## 2024-02-21 NOTE — PROGRESS NOTE ADULT - PROBLEM SELECTOR PROBLEM 7
Anemia, chronic disease
Tracheostomy present
Anemia, chronic disease
Anemia, chronic disease
Tracheostomy present
Tracheostomy present
Anemia, chronic disease
Metabolic encephalopathy
Tracheostomy present
Metabolic encephalopathy
Anemia, chronic disease
Anemia, chronic disease
Metabolic encephalopathy
Metabolic encephalopathy

## 2024-02-21 NOTE — PROGRESS NOTE ADULT - PROBLEM SELECTOR PLAN 5
H&H , stable  s/p Vitamin K and 1 unit FFP for INR 2.19 prior to PEG placement.  AC restarted   C/w PPI   Monitor CBC and transfuse for Hgb <7.0 or symptomatic  BM today and no signs of bleeding

## 2024-02-21 NOTE — PROGRESS NOTE ADULT - REASON FOR ADMISSION
malfunction peg tube

## 2024-02-21 NOTE — PROGRESS NOTE ADULT - NS ATTEND AMEND GEN_ALL_CORE FT
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 Now pending surgical PEG placement after unable to replace endoscopically w/ GI.    ASSESSMENT:  #PEG malfunction  #CVA  #Tracheostomy status  #Seizures  #H/o recurrent PE  #Afib  #HFrEF    Plan:  - s/p surgical PEG placement 2/15, gen sx mgmt appreciated  - No evidence of bleeding  - Monitor for signs of bleeding   - trend CBC  - Cont. cefepime  - home AEDs for seizure  - enteral feeding initiated  - cont 4L hydrotrach  - routine trach care and suctioning  - passymuir valve eval passed - cont SLP follow-up  - Discharge planning back to facility

## 2024-02-21 NOTE — PROGRESS NOTE ADULT - PROBLEM SELECTOR PROBLEM 1
PEG tube malfunction
Sepsis
PEG tube malfunction
Sepsis
PEG tube malfunction
Sepsis
PEG tube malfunction
Sepsis
PEG tube malfunction

## 2024-02-21 NOTE — PROGRESS NOTE ADULT - PROBLEM SELECTOR PROBLEM 4
Metabolic encephalopathy
Metabolic encephalopathy
PEG tube malfunction
PEG tube malfunction
HTN (hypertension)
Metabolic encephalopathy
PEG tube malfunction
Metabolic encephalopathy
HTN (hypertension)
Metabolic encephalopathy
HTN (hypertension)
PEG tube malfunction
Metabolic encephalopathy
HTN (hypertension)

## 2024-02-21 NOTE — PROGRESS NOTE ADULT - PROVIDER SPECIALTY LIST ADULT
Critical Care
Pulmonology
Surgery
Critical Care
Pulmonology
Pulmonology
Surgery
Critical Care
Pulmonology
Surgery
Surgery
Critical Care

## 2024-02-21 NOTE — DISCHARGE NOTE NURSING/CASE MANAGEMENT/SOCIAL WORK - PATIENT PORTAL LINK FT
You can access the FollowMyHealth Patient Portal offered by [facility] by registering at the following website: [email or web address]. By joining [facility]’s FollowMyHealth portal, you will also be able to view your health information using other applications (apps) compatible with our system.

## 2024-02-21 NOTE — PROGRESS NOTE ADULT - NS ATTEND OPT1 GEN_ALL_CORE

## 2024-02-21 NOTE — PROGRESS NOTE ADULT - PROBLEM SELECTOR PROBLEM 3
CHF (congestive heart failure)
Seizures
CHF (congestive heart failure)
CHF (congestive heart failure)
Seizures
Seizures
CHF (congestive heart failure)

## 2024-02-21 NOTE — DISCHARGE NOTE NURSING/CASE MANAGEMENT/SOCIAL WORK - NSDCVIVACCINE_GEN_ALL_CORE_FT
influenza, injectable, quadrivalent, preservative free; 05-Sep-2018 14:29; Ros Felipe (RN); [employer]; [id number]; IntraMuscular; Deltoid Right.; 0.5 milliLiter(s); VIS (VIS Published: 07-Aug-2015, VIS Presented: 05-Sep-2018);

## 2024-02-21 NOTE — PROGRESS NOTE ADULT - PROBLEM SELECTOR PROBLEM 8
Pulmonary embolism
Pulmonary embolism
Prophylactic measure
Pulmonary embolism
Pulmonary embolism
Prophylactic measure
Pulmonary embolism

## 2024-02-21 NOTE — PROGRESS NOTE ADULT - NUTRITIONAL ASSESSMENT
This patient has been assessed with a concern for Malnutrition and has been determined to have a diagnosis/diagnoses of Mild protein-calorie malnutrition.    This patient is being managed with:   Diet NPO with Tube Feed-  Tube Feeding Modality: Gastrostomy  Nepro with Carb Steady  Total Volume for 24 Hours (mL): 960  Continuous  Starting Tube Feed Rate {mL per Hour}: 10  Increase Tube Feed Rate by (mL): 10     Every 6 hours  Until Goal Tube Feed Rate (mL per Hour): 40  Tube Feed Duration (in Hours): 24  Tube Feed Start Time: 22:00  No Carb Prosource (1pkg = 15gms Protein)     Qty per Day:  TID  Entered: Feb 16 2024  5:42PM  
This patient has been assessed with a concern for Malnutrition and has been determined to have a diagnosis/diagnoses of Mild protein-calorie malnutrition.    This patient is being managed with:   Diet NPO with Tube Feed-  Tube Feeding Modality: Gastrostomy  Nepro with Carb Steady  Total Volume for 24 Hours (mL): 960  Continuous  Starting Tube Feed Rate {mL per Hour}: 10  Increase Tube Feed Rate by (mL): 10     Every 6 hours  Until Goal Tube Feed Rate (mL per Hour): 40  Tube Feed Duration (in Hours): 24  Tube Feed Start Time: 22:00  No Carb Prosource (1pkg = 15gms Protein)     Qty per Day:  TID  Entered: Feb 16 2024  5:42PM  
This patient has been assessed with a concern for Malnutrition and has been determined to have a diagnosis/diagnoses of Mild protein-calorie malnutrition.    This patient is being managed with:   Diet NPO-  Entered: Feb 8 2024 12:23PM  
This patient has been assessed with a concern for Malnutrition and has been determined to have a diagnosis/diagnoses of Mild protein-calorie malnutrition.    This patient is being managed with:   Diet NPO with Tube Feed-  Tube Feeding Modality: Gastrostomy  Nepro with Carb Steady  Total Volume for 24 Hours (mL): 960  Continuous  Starting Tube Feed Rate {mL per Hour}: 10  Increase Tube Feed Rate by (mL): 10     Every 6 hours  Until Goal Tube Feed Rate (mL per Hour): 40  Tube Feed Duration (in Hours): 24  Tube Feed Start Time: 22:00  No Carb Prosource (1pkg = 15gms Protein)     Qty per Day:  TID  Entered: Feb 16 2024  5:42PM  
This patient has been assessed with a concern for Malnutrition and has been determined to have a diagnosis/diagnoses of Mild protein-calorie malnutrition.    This patient is being managed with:   Diet NPO with Tube Feed-  Tube Feeding Modality: Gastrostomy  Nepro with Carb Steady  Total Volume for 24 Hours (mL): 960  Continuous  Starting Tube Feed Rate {mL per Hour}: 10  Increase Tube Feed Rate by (mL): 10     Every 6 hours  Until Goal Tube Feed Rate (mL per Hour): 40  Tube Feed Duration (in Hours): 24  Tube Feed Start Time: 22:00  No Carb Prosource (1pkg = 15gms Protein)     Qty per Day:  TID  Entered: Feb 16 2024  5:42PM  
This patient has been assessed with a concern for Malnutrition and has been determined to have a diagnosis/diagnoses of Mild protein-calorie malnutrition.    This patient is being managed with:   Diet NPO-  Entered: Feb 8 2024 12:23PM  
This patient has been assessed with a concern for Malnutrition and has been determined to have a diagnosis/diagnoses of Mild protein-calorie malnutrition.    This patient is being managed with:   Diet NPO with Tube Feed-  Tube Feeding Modality: Gastrostomy  Nepro with Carb Steady  Total Volume for 24 Hours (mL): 960  Continuous  Starting Tube Feed Rate {mL per Hour}: 10  Increase Tube Feed Rate by (mL): 10     Every 6 hours  Until Goal Tube Feed Rate (mL per Hour): 40  Tube Feed Duration (in Hours): 24  Tube Feed Start Time: 22:00  No Carb Prosource (1pkg = 15gms Protein)     Qty per Day:  TID  Entered: Feb 16 2024  5:42PM  
This patient has been assessed with a concern for Malnutrition and has been determined to have a diagnosis/diagnoses of Mild protein-calorie malnutrition.    This patient is being managed with:   Diet NPO-  Entered: Feb 8 2024 12:23PM  
This patient has been assessed with a concern for Malnutrition and has been determined to have a diagnosis/diagnoses of Mild protein-calorie malnutrition.    This patient is being managed with:   Diet NPO with Tube Feed-  Tube Feeding Modality: Gastrostomy  Nepro with Carb Steady  Total Volume for 24 Hours (mL): 960  Continuous  Starting Tube Feed Rate {mL per Hour}: 10  Increase Tube Feed Rate by (mL): 10     Every 6 hours  Until Goal Tube Feed Rate (mL per Hour): 40  Tube Feed Duration (in Hours): 24  Tube Feed Start Time: 22:00  No Carb Prosource (1pkg = 15gms Protein)     Qty per Day:  TID  Entered: Feb 16 2024  5:42PM  
This patient has been assessed with a concern for Malnutrition and has been determined to have a diagnosis/diagnoses of Mild protein-calorie malnutrition.    This patient is being managed with:   Diet NPO-  Entered: Feb 8 2024 12:23PM  

## 2024-02-21 NOTE — CHART NOTE - NSCHARTNOTEFT_GEN_A_CORE
Patient has been calm and following commands and for the past 2 weeks has not been attempting to remove trach nor PEG.

## 2024-02-21 NOTE — PROGRESS NOTE ADULT - SUBJECTIVE AND OBJECTIVE BOX
Time of Visit:  Patient seen and examined.     MEDICATIONS  (STANDING):  albuterol    0.083% 2.5 milliGRAM(s) Nebulizer every 6 hours  apixaban 5 milliGRAM(s) Enteral Tube every 12 hours  chlorhexidine 2% Cloths 1 Application(s) Topical <User Schedule>  Dakins Solution - 1/2 Strength 1 Application(s) Topical every 24 hours  glycopyrrolate Injectable 0.2 milliGRAM(s) IV Push every 12 hours  lacosamide Solution 200 milliGRAM(s) Oral every 12 hours  levETIRAcetam  Solution 2000 milliGRAM(s) Oral every 12 hours  metoprolol tartrate 12.5 milliGRAM(s) Oral every 12 hours  pantoprazole   Suspension 40 milliGRAM(s) Oral two times a day  valproic  acid Syrup 1250 milliGRAM(s) Oral <User Schedule>      MEDICATIONS  (PRN):  morphine  - Injectable 1 milliGRAM(s) IV Push every 6 hours PRN Severe Pain (7 - 10)       Medications up to date at time of exam.      PHYSICAL EXAMINATION:  Patient has no new complaints.  GENERAL: The patient is a well-developed, well-nourished, in no apparent distress.     Vital Signs Last 24 Hrs  T(C): 36.5 (21 Feb 2024 13:13), Max: 36.9 (21 Feb 2024 04:38)  T(F): 97.7 (21 Feb 2024 13:13), Max: 98.4 (21 Feb 2024 04:38)  HR: 62 (21 Feb 2024 13:13) (58 - 65)  BP: 104/70 (21 Feb 2024 13:13) (104/70 - 117/80)  BP(mean): --  RR: 16 (21 Feb 2024 13:13) (16 - 17)  SpO2: 100% (21 Feb 2024 13:13) (97% - 100%)    Parameters below as of 21 Feb 2024 13:13  Patient On (Oxygen Delivery Method): hydro trach  O2 Flow (L/min): 3     (if applicable)    Chest Tube (if applicable)    HEENT: Head is normocephalic and atraumatic. Extraocular muscles are intact. Mucous membranes are moist.     NECK: Supple, no palpable adenopathy.    LUNGS: Clear to auscultation, no wheezing, rales, or rhonchi.    HEART: Regular rate and rhythm without murmur.    ABDOMEN: Soft, nontender, and nondistended.  No hepatosplenomegaly is noted.    : No painful voiding, no pelvic pain    EXTREMITIES: Without any cyanosis, clubbing, rash, lesions or edema.    NEUROLOGIC: Awake, alert, oriented, grossly intact    SKIN: Warm, dry, good turgor.      LABS:                        8.7    3.95  )-----------( 171      ( 20 Feb 2024 07:40 )             26.9     02-20    137  |  103  |  8   ----------------------------<  106<H>  3.8   |  32<H>  |  0.43<L>    Ca    8.9      20 Feb 2024 07:40  Phos  2.5     02-20  Mg     1.9     02-20    TPro  5.9<L>  /  Alb  1.8<L>  /  TBili  0.3  /  DBili  x   /  AST  11  /  ALT  9<L>  /  AlkPhos  52  02-20      Urinalysis Basic - ( 20 Feb 2024 07:40 )    Color: x / Appearance: x / SG: x / pH: x  Gluc: 106 mg/dL / Ketone: x  / Bili: x / Urobili: x   Blood: x / Protein: x / Nitrite: x   Leuk Esterase: x / RBC: x / WBC x   Sq Epi: x / Non Sq Epi: x / Bacteria: x                      MICROBIOLOGY: (if applicable)    RADIOLOGY & ADDITIONAL STUDIES:  EKG:   CXR:  ECHO:    IMPRESSION: 48y Male PAST MEDICAL & SURGICAL HISTORY:  Heart failure, unspecified HF chronicity, unspecified heart failure type      ETOH abuse  h/o etoh abuse now moderate drinker      Artificial pacemaker       p/w           RECOMMENDATIONS:

## 2024-02-21 NOTE — CHART NOTE - NSCHARTNOTESELECT_GEN_ALL_CORE
Clinical update/Event Note
Event Note
Family Update/Event Note
Pt pulled out NGT/Event Note
Event Note
Family update/Event Note
Late entry/Event Note
NGT placement/Event Note
Nutrition Services

## 2024-02-21 NOTE — PROGRESS NOTE ADULT - PROBLEM SELECTOR PLAN 1
Now hemodynamically stable    Afebrile   Continue Cefepime for aspiration PNA for total of 5Days (until 2/20/2024).   Lactate low  Procal WNL  Sputum growing Pseudomonas aeruginosa (likely colonized).   BC from 2/17 NGTD  CXR  findings mild pulm venous congestion, pleural effusions.   ID following

## 2024-02-21 NOTE — PROGRESS NOTE ADULT - PROBLEM SELECTOR PROBLEM 5
CHF (congestive heart failure)
Pulmonary embolism
Pulmonary embolism
Anemia, chronic disease
CHF (congestive heart failure)
Anemia, chronic disease
Anemia, chronic disease
CHF (congestive heart failure)
Anemia, chronic disease
CHF (congestive heart failure)
Pulmonary embolism
CHF (congestive heart failure)
Pulmonary embolism
CHF (congestive heart failure)

## 2024-02-21 NOTE — PROGRESS NOTE ADULT - PROBLEM SELECTOR PROBLEM 9
Advance care planning
Chronic atrial fibrillation
Advance care planning
Chronic atrial fibrillation

## 2024-02-21 NOTE — PROGRESS NOTE ADULT - PROBLEM SELECTOR PLAN 4
Pt p/w from rehab after pt pulled  PEG tube.   Unable to place PEG tube in ED  s/p NGT insertions 2/8 (which was aborted due to nosebleed) and 2/10 (which pt pulled out).    GI found  no window for PEG placement.   s/p PEG placement 2/15 per Surgery  Continue PEG tube feeding   Aspiration precautions

## 2024-02-21 NOTE — PROGRESS NOTE ADULT - NS ATTEND OPT1A GEN_ALL_CORE
Exam/Medical decision making
History/Exam/Medical decision making
History/Exam/Medical decision making
Medical decision making

## 2024-02-21 NOTE — PROGRESS NOTE ADULT - PROBLEM SELECTOR PROBLEM 6
HTN (hypertension)
CHF (congestive heart failure)
CHF (congestive heart failure)
HTN (hypertension)
CHF (congestive heart failure)
HTN (hypertension)
Seizures
Seizures
HTN (hypertension)
CHF (congestive heart failure)
Seizures
Seizures

## 2024-02-21 NOTE — PROGRESS NOTE ADULT - ASSESSMENT
47yo M pt with PMHx CVA (3/30/2022 with residual aphasia and right sided residual weakness), seizures, HTN, HLD, CHF w/ ICD (initially rEF 30%->55-60% on 03/2021), MDD, Recurrent PE, Afib (on Eliquis), obesity s/p bariatric intervention presenting to ED for PEG tube malfunction admitted for further care. Per endorsement, PEG tube insertion attempted in ED without success. NG tube also attempted but due to bleeding the insertion was aborted and AC not restarted (home med). Patient was transferred to  for further care given that patient has a trach. Patient has copious amounts of secretion from trach requiring constant suctioning. Sputum cx sent. CXR noted. Afebrile. Concern for MDRO and therefore was placed on contact  isolation until cx comes back. GI consulted and examined patient at bedside. GI spoke with patient's proxy. Plan for OR on Monday for new PEG tube insertion.   Patient's seizure meds switched to IV form. BB IV. Monitor BP per unit protocol.   02/09: OR on Monday for PEG. Dispo is to return to [facility] once PEg placed,   2/12  GI had no window to place peg. IR agrees with GI.  Surgery called for consult.  02/15/2024: OR today for PEG placement with Dr. Elmore this afternoon. NPO. IVF. INR this AM 2.19. S/p Vitamin K 5 mg IVPB and 1 unit FFP.  Plan of care discussed with intensivist and surgical team.   02/16/2024: Feeding to be started tonight. AC resumed. Monitor for bleeding   02/17/2024: Hypotensive last night. BP 84/49 HR 59. S/p 500 ml bolus. Cefepime discontinued 2/16. Discussed with Dr. De La Torre. ID believes it is likely colonization in sputum and since patient is afebrile without leukocytosis ABX should be discontinued. Discussed with intensivist. Thid AM low-grade fever which could be likely reactive from procedure (PEG placement on 2/15). H&H stable. Wound care re-eval and wound care orders modified. Afebrile this AM.   02/19: H&H stable. Hemodynamically stable. Afebrile. Bladder scan 9cc. PEG site intact, no bleeding noted. Feeding tube @ 30 ml/hr and tolerating well. Family at bedside and updated. On Cefepime for aspiration PNA until 2/20/2024. On 3L and tolerating it well. AC to be resumed. discussed plan of care with intensivist and Dr. Elmore.  02/20/2024:  CM following on placement. Awaiting acceptance and  Auth. Cefepime until today. Drop in H&H but no signs of bleeding. PPI BID. Hemodynamically stable. Discussed with intensivist and will restart AC. BM today and no signs of  bleeding.   02/21/2024 :  CM following on placement. Awaiting acceptance and  Auth. s/p Cefepime.  Hemodynamically stable. Afebrile. Optimized for discharge.

## 2024-02-21 NOTE — PROGRESS NOTE ADULT - SUBJECTIVE AND OBJECTIVE BOX
DANNIE SHIRLEY    SCU progress note    INTERVAL HPI/OVERNIGHT EVENTS: No events overnight     Full code    Covid - 19 PCR: Negative     The 4Ms    What Matters Most: see GOC  Age appropriate Medications/Screen for High Risk Medication: Yes  Mentation: see CAM below  Mobility: defer to physical exam    The Confusion Assessment Method (CAM) Diagnostic Algorithm     1: Acute Onset or Fluctuating Course  - Is there evidence of an acute change in mental status from the patient’s baseline? Did the (abnormal) behavior  fluctuate during the day, that is, tend to come and go, or increase and decrease in severity?  [ ] YES [ x] NO     2: Inattention  - Did the patient have difficulty focusing attention, being easily distractible, or having difficulty keeping track of what was being said?  [x ] YES [ ] NO     3: Disorganized thinking  -Was the patient’s thinking disorganized or incoherent, such as rambling or irrelevant conversation, unclear or illogical flow of ideas, or unpredictable switching from subject to subject?  [ ] YES [ ] NO Unable to assess    4: Altered Level of consciousness?  [ ] YES [ ] NO Unable to assess    The diagnosis of delirium by CAM requires the presence of features 1 and 2 and either 3 or 4.    PRESSORS: [ ] YES [x ] NO    Cardiovascular:  Heart Failure  Acute   Acute on Chronic  Chronic       metoprolol tartrate 12.5 milliGRAM(s) Oral every 12 hours    Pulmonary:  albuterol    0.083% 2.5 milliGRAM(s) Nebulizer every 6 hours    Hematalogic:  apixaban 5 milliGRAM(s) Enteral Tube every 12 hours    Other:  chlorhexidine 2% Cloths 1 Application(s) Topical <User Schedule>  Dakins Solution - 1/2 Strength 1 Application(s) Topical every 24 hours  glycopyrrolate Injectable 0.2 milliGRAM(s) IV Push every 12 hours  lacosamide Solution 200 milliGRAM(s) Oral every 12 hours  levETIRAcetam  Solution 2000 milliGRAM(s) Oral every 12 hours  morphine  - Injectable 1 milliGRAM(s) IV Push every 6 hours PRN  pantoprazole   Suspension 40 milliGRAM(s) Oral two times a day  valproic  acid Syrup 1250 milliGRAM(s) Oral <User Schedule>    albuterol    0.083% 2.5 milliGRAM(s) Nebulizer every 6 hours  apixaban 5 milliGRAM(s) Enteral Tube every 12 hours  chlorhexidine 2% Cloths 1 Application(s) Topical <User Schedule>  Dakins Solution - 1/2 Strength 1 Application(s) Topical every 24 hours  glycopyrrolate Injectable 0.2 milliGRAM(s) IV Push every 12 hours  lacosamide Solution 200 milliGRAM(s) Oral every 12 hours  levETIRAcetam  Solution 2000 milliGRAM(s) Oral every 12 hours  metoprolol tartrate 12.5 milliGRAM(s) Oral every 12 hours  morphine  - Injectable 1 milliGRAM(s) IV Push every 6 hours PRN  pantoprazole   Suspension 40 milliGRAM(s) Oral two times a day  valproic  acid Syrup 1250 milliGRAM(s) Oral <User Schedule>    Drug Dosing Weight    Weight (kg): 81.7 (08 Feb 2024 10:00)    CENTRAL LINE: [ ] YES [x ] NO  LOCATION:   DATE INSERTED:  REMOVE: [ ] YES [ ] NO  EXPLAIN:    FAULKNER: [ ] YES [x ] NO    DATE INSERTED:  REMOVE:  [ ] YES [ ] NO  EXPLAIN:    PAST MEDICAL & SURGICAL HISTORY:  Heart failure, unspecified HF chronicity, unspecified heart failure type      ETOH abuse  h/o etoh abuse now moderate drinker      Artificial pacemaker      02-20 @ 07:01  -  02-21 @ 07:00  --------------------------------------------------------  IN: 0 mL / OUT: 1400 mL / NET: -1400 mL    ROS: Unattainable.   PHYSICAL EXAM:    GENERAL: NAD  HEAD:  Indentation to left side of scalp 2/2 hx of crani.   EYES: EOMI, PERRLA, conjunctiva and sclera clear  ENMT: No tonsillar erythema, exudates, or enlargement; Moist mucous membranes, Good dentition, No lesions  NECK: Supple, No JVD, Normal thyroid  NERVOUS SYSTEM:    CHEST/LUNG:   HEART:   ABDOMEN: PEG in place.   EXTREMITIES:   SKIN: Stage 4 pressure ulcer.       LABS:  CBC Full  -  ( 20 Feb 2024 07:40 )  WBC Count : 3.95 K/uL  RBC Count : 2.73 M/uL  Hemoglobin : 8.7 g/dL  Hematocrit : 26.9 %  Platelet Count - Automated : 171 K/uL  Mean Cell Volume : 98.5 fl  Mean Cell Hemoglobin : 31.9 pg  Mean Cell Hemoglobin Concentration : 32.3 gm/dL  Auto Neutrophil # : x  Auto Lymphocyte # : x  Auto Monocyte # : x  Auto Eosinophil # : x  Auto Basophil # : x  Auto Neutrophil % : x  Auto Lymphocyte % : x  Auto Monocyte % : x  Auto Eosinophil % : x  Auto Basophil % : x    02-20    137  |  103  |  8   ----------------------------<  106<H>  3.8   |  32<H>  |  0.43<L>    Ca    8.9      20 Feb 2024 07:40  Phos  2.5     02-20  Mg     1.9     02-20    TPro  5.9<L>  /  Alb  1.8<L>  /  TBili  0.3  /  DBili  x   /  AST  11  /  ALT  9<L>  /  AlkPhos  52  02-20      Urinalysis Basic - ( 20 Feb 2024 07:40 )    Color: x / Appearance: x / SG: x / pH: x  Gluc: 106 mg/dL / Ketone: x  / Bili: x / Urobili: x   Blood: x / Protein: x / Nitrite: x   Leuk Esterase: x / RBC: x / WBC x   Sq Epi: x / Non Sq Epi: x / Bacteria: x      [x  ]  DVT Prophylaxis: On Apixaban   [  ]  Nutrition, Brand, Rate         Goal Rate        Abnormal Nutritional Status -  Malnutrition   Cachexia      Morbid Obesity BMI >/=40    RADIOLOGY & ADDITIONAL STUDIES:    < from: Xray Chest 1 View AP/PA (02.17.24 @ 12:16) >  ACC: [id number] EXAM:  XR CHEST AP OR PA 1V   ORDERED BY: MARJORIE COHEN     PROCEDURE DATE:  02/17/2024          INTERPRETATION:  XR CHEST dated 2/17/2024 12:16 PM    CLINICAL INFORMATION: Male, 48 years old.  Fever, hypotension.    PRIOR STUDIES: 2/8/2024    FINDINGS/  IMPRESSION: Tracheostomy tube again noted unchanged. Left-sided trilead   permanent pacemaker with the tip of the pacer wires unchanged in position   projecting over the region the right atrium, right ventricle with right   ventricular ICD and left ventricle. There is new cardiomegaly with mild   pulmonary venous congestion and new posterior layering of a pleural   effusions. No pneumothorax. No acute osseous pathology.    --- End of Report ---            ASHLEY BERUMEN MD; Attending Radiologist  This document has been electronically signed. Feb 17 2024  1:30PM    < end of copied text >           DANNIE SHIRLEY    SCU progress note    INTERVAL HPI/OVERNIGHT EVENTS: No events overnight     Full code    Covid - 19 PCR: Negative     The 4Ms    What Matters Most: see GOC  Age appropriate Medications/Screen for High Risk Medication: Yes  Mentation: see CAM below  Mobility: defer to physical exam    The Confusion Assessment Method (CAM) Diagnostic Algorithm     1: Acute Onset or Fluctuating Course  - Is there evidence of an acute change in mental status from the patient’s baseline? Did the (abnormal) behavior  fluctuate during the day, that is, tend to come and go, or increase and decrease in severity?  [ ] YES [ x] NO     2: Inattention  - Did the patient have difficulty focusing attention, being easily distractible, or having difficulty keeping track of what was being said?  [x ] YES [ ] NO     3: Disorganized thinking  -Was the patient’s thinking disorganized or incoherent, such as rambling or irrelevant conversation, unclear or illogical flow of ideas, or unpredictable switching from subject to subject?  [ ] YES [ ] NO Unable to assess    4: Altered Level of consciousness?  [ ] YES [ ] NO Unable to assess    The diagnosis of delirium by CAM requires the presence of features 1 and 2 and either 3 or 4.    PRESSORS: [ ] YES [x ] NO    Cardiovascular:  Heart Failure  Acute   Acute on Chronic  Chronic       metoprolol tartrate 12.5 milliGRAM(s) Oral every 12 hours    Pulmonary:  albuterol    0.083% 2.5 milliGRAM(s) Nebulizer every 6 hours    Hematalogic:  apixaban 5 milliGRAM(s) Enteral Tube every 12 hours    Other:  chlorhexidine 2% Cloths 1 Application(s) Topical <User Schedule>  Dakins Solution - 1/2 Strength 1 Application(s) Topical every 24 hours  glycopyrrolate Injectable 0.2 milliGRAM(s) IV Push every 12 hours  lacosamide Solution 200 milliGRAM(s) Oral every 12 hours  levETIRAcetam  Solution 2000 milliGRAM(s) Oral every 12 hours  morphine  - Injectable 1 milliGRAM(s) IV Push every 6 hours PRN  pantoprazole   Suspension 40 milliGRAM(s) Oral two times a day  valproic  acid Syrup 1250 milliGRAM(s) Oral <User Schedule>    albuterol    0.083% 2.5 milliGRAM(s) Nebulizer every 6 hours  apixaban 5 milliGRAM(s) Enteral Tube every 12 hours  chlorhexidine 2% Cloths 1 Application(s) Topical <User Schedule>  Dakins Solution - 1/2 Strength 1 Application(s) Topical every 24 hours  glycopyrrolate Injectable 0.2 milliGRAM(s) IV Push every 12 hours  lacosamide Solution 200 milliGRAM(s) Oral every 12 hours  levETIRAcetam  Solution 2000 milliGRAM(s) Oral every 12 hours  metoprolol tartrate 12.5 milliGRAM(s) Oral every 12 hours  morphine  - Injectable 1 milliGRAM(s) IV Push every 6 hours PRN  pantoprazole   Suspension 40 milliGRAM(s) Oral two times a day  valproic  acid Syrup 1250 milliGRAM(s) Oral <User Schedule>    Drug Dosing Weight    Weight (kg): 81.7 (08 Feb 2024 10:00)    CENTRAL LINE: [ ] YES [x ] NO  LOCATION:   DATE INSERTED:  REMOVE: [ ] YES [ ] NO  EXPLAIN:    FAULKNER: [ ] YES [x ] NO    DATE INSERTED:  REMOVE:  [ ] YES [ ] NO  EXPLAIN:    PAST MEDICAL & SURGICAL HISTORY:  Heart failure, unspecified HF chronicity, unspecified heart failure type      ETOH abuse  h/o etoh abuse now moderate drinker      Artificial pacemaker      02-20 @ 07:01  -  02-21 @ 07:00  --------------------------------------------------------  IN: 0 mL / OUT: 1400 mL / NET: -1400 mL    ROS: Unattainable.   PHYSICAL EXAM:    GENERAL: NAD  HEAD:  Indentation to left side of scalp 2/2 hx of crani.   EYES:  PERRLA, conjunctiva and sclera clear  ENMT: No tonsillar erythema, exudates, or enlargement; Moist mucous membranes, Good dentition, No lesions  NECK: Trach  NERVOUS SYSTEM:  Opens eyes spontaneously. Follows simple commands.   CHEST/LUNG: Expiratory rhonchi bilaterally. No wheezing, no rubbing. 3L hydro trach.   HEART: Rhythm and rate regular. No gallop, no murmur appreciate.   ABDOMEN: PEG in place. Abdomen soft, non-tender, non-distended. + BM. Incisions covered with dry dressing.   EXTREMITIES: Palpable pulses. Warm to touch. No cyanosis. +2 edema to ankles.   SKIN: Stage 4 pressure ulcer.       LABS:  CBC Full  -  ( 20 Feb 2024 07:40 )  WBC Count : 3.95 K/uL  RBC Count : 2.73 M/uL  Hemoglobin : 8.7 g/dL  Hematocrit : 26.9 %  Platelet Count - Automated : 171 K/uL  Mean Cell Volume : 98.5 fl  Mean Cell Hemoglobin : 31.9 pg  Mean Cell Hemoglobin Concentration : 32.3 gm/dL  Auto Neutrophil # : x  Auto Lymphocyte # : x  Auto Monocyte # : x  Auto Eosinophil # : x  Auto Basophil # : x  Auto Neutrophil % : x  Auto Lymphocyte % : x  Auto Monocyte % : x  Auto Eosinophil % : x  Auto Basophil % : x    02-20    137  |  103  |  8   ----------------------------<  106<H>  3.8   |  32<H>  |  0.43<L>    Ca    8.9      20 Feb 2024 07:40  Phos  2.5     02-20  Mg     1.9     02-20    TPro  5.9<L>  /  Alb  1.8<L>  /  TBili  0.3  /  DBili  x   /  AST  11  /  ALT  9<L>  /  AlkPhos  52  02-20      Urinalysis Basic - ( 20 Feb 2024 07:40 )    Color: x / Appearance: x / SG: x / pH: x  Gluc: 106 mg/dL / Ketone: x  / Bili: x / Urobili: x   Blood: x / Protein: x / Nitrite: x   Leuk Esterase: x / RBC: x / WBC x   Sq Epi: x / Non Sq Epi: x / Bacteria: x      [x  ]  DVT Prophylaxis: On Apixaban   [x  ]  Nutrition: On TF    RADIOLOGY & ADDITIONAL STUDIES:    < from: Xray Chest 1 View AP/PA (02.17.24 @ 12:16) >  ACC: [id number] EXAM:  XR CHEST AP OR PA 1V   ORDERED BY: MARJORIE COHEN     PROCEDURE DATE:  02/17/2024          INTERPRETATION:  XR CHEST dated 2/17/2024 12:16 PM    CLINICAL INFORMATION: Male, 48 years old.  Fever, hypotension.    PRIOR STUDIES: 2/8/2024    FINDINGS/  IMPRESSION: Tracheostomy tube again noted unchanged. Left-sided trilead   permanent pacemaker with the tip of the pacer wires unchanged in position   projecting over the region the right atrium, right ventricle with right   ventricular ICD and left ventricle. There is new cardiomegaly with mild   pulmonary venous congestion and new posterior layering of a pleural   effusions. No pneumothorax. No acute osseous pathology.    --- End of Report ---      ASHLEY BERUMEN MD; Attending Radiologist  This document has been electronically signed. Feb 17 2024  1:30PM    < end of copied text >

## 2024-02-22 LAB
CULTURE RESULTS: SIGNIFICANT CHANGE UP
CULTURE RESULTS: SIGNIFICANT CHANGE UP
SPECIMEN SOURCE: SIGNIFICANT CHANGE UP
SPECIMEN SOURCE: SIGNIFICANT CHANGE UP

## 2024-04-24 NOTE — PATIENT PROFILE ADULT - ...
headaches    All other systems reviewed and are negative.    Objective:    /80 (Site: Left Upper Arm, Position: Sitting, Cuff Size: Large Adult)   Pulse 80   Temp 97.6 °F (36.4 °C) (Temporal)   Resp 20   Ht 1.854 m (6' 1\")   Wt (!) 152.5 kg (336 lb 3.2 oz)   SpO2 94%   BMI 44.36 kg/m²     General Appearance:  Alert, cooperative, no distress, appears stated age   Head:  Normocephalic, without obvious abnormality, atraumatic   Eyes:  PERRL, conjunctiva/corneas clear, EOM's intact   Ears:  Normal TM's and external ear canals, both ears   Nose: Nares normal, septum midline, mucosa normal, no drainage or sinus tenderness   Throat: Lips, mucosa, and tongue normal; teeth and gums normal   Neck: Supple, symmetrical, trachea midline, no adenopathy, thyroid: not enlarged, symmetric, no tenderness/mass/nodules, no carotid bruit or JVD   Back:   Symmetric, no curvature, ROM normal, no CVA tenderness   Lungs:   Clear to auscultation bilaterally, respirations unlabored   Chest Wall:  No tenderness or deformity   Heart:  Regular rate and rhythm, S1, S2 normal, no murmur, rub or gallop   Abdomen:   Soft, non-tender, bowel sounds active all four quadrants,  no masses, no organomegaly   Genitalia:  deferred   Rectal:  deferred   Extremities: Extremities normal, atraumatic, no cyanosis or edema   Pulses: 2+ and symmetric   Skin: Flaking area in external ears   Lymph nodes: Cervical, supraclavicular, and axillary nodes normal   Neurologic: Normal   Diabetic foot exam:   Left Foot:   Visual Exam: normal   Pulse DP: 2+ (normal)   Filament test: normal sensation     Right Foot:   Visual Exam: normal   Pulse DP: 2+ (normal)   Filament test: normal sensation     LABS   Hgb a1c  TESTS      Assessment/Plan:    1. Type 2 diabetes mellitus with diabetic peripheral angiopathy without gangrene, without long-term current use of insulin ([facility])  Goal hgb A1c <7; well controlled  -  DIABETES FOOT EXAM  - Comprehensive Metabolic Panel;  08-Feb-2024 15:19:59

## 2024-08-18 NOTE — PATIENT PROFILE ADULT. - SURGICAL SITE INCISION
Call to mother, identity verified, results/recommendations relayed, she verbalized understanding well, no further questions.     no

## 2024-12-29 RX ORDER — CEFTRIAXONE SODIUM 1 G/1
1 INJECTION, POWDER, FOR SOLUTION INTRAMUSCULAR; INTRAVENOUS
Refills: 0 | DISCHARGE
Start: 2024-12-29 | End: 2025-01-04

## 2024-12-29 RX ORDER — AZITHROMYCIN MONOHYDRATE 200 MG/5ML
500 POWDER, FOR SUSPENSION ORAL
Refills: 0 | DISCHARGE
Start: 2024-12-29 | End: 2025-01-02

## 2024-12-31 ENCOUNTER — INPATIENT (INPATIENT)
Facility: HOSPITAL | Age: 49
LOS: 12 days | Discharge: NURSING, REHAB OR LONG-TERM CARE | DRG: 480 | End: 2025-01-13
Attending: INTERNAL MEDICINE | Admitting: INTERNAL MEDICINE
Payer: MEDICAID

## 2024-12-31 VITALS
RESPIRATION RATE: 20 BRPM | TEMPERATURE: 97 F | WEIGHT: 199.96 LBS | HEART RATE: 71 BPM | DIASTOLIC BLOOD PRESSURE: 73 MMHG | SYSTOLIC BLOOD PRESSURE: 108 MMHG | OXYGEN SATURATION: 98 %

## 2024-12-31 DIAGNOSIS — S72.009A FRACTURE OF UNSPECIFIED PART OF NECK OF UNSPECIFIED FEMUR, INITIAL ENCOUNTER FOR CLOSED FRACTURE: ICD-10-CM

## 2024-12-31 DIAGNOSIS — Z95.0 PRESENCE OF CARDIAC PACEMAKER: Chronic | ICD-10-CM

## 2024-12-31 LAB
ALBUMIN SERPL ELPH-MCNC: 2 G/DL — LOW (ref 3.5–5)
ALP SERPL-CCNC: 59 U/L — SIGNIFICANT CHANGE UP (ref 40–120)
ALT FLD-CCNC: 17 U/L DA — SIGNIFICANT CHANGE UP (ref 10–60)
ANION GAP SERPL CALC-SCNC: 4 MMOL/L — LOW (ref 5–17)
APTT BLD: 39.2 SEC — HIGH (ref 24.5–35.6)
AST SERPL-CCNC: 37 U/L — SIGNIFICANT CHANGE UP (ref 10–40)
BASOPHILS # BLD AUTO: 0.01 K/UL — SIGNIFICANT CHANGE UP (ref 0–0.2)
BASOPHILS NFR BLD AUTO: 0.1 % — SIGNIFICANT CHANGE UP (ref 0–2)
BILIRUB SERPL-MCNC: 0.4 MG/DL — SIGNIFICANT CHANGE UP (ref 0.2–1.2)
BLD GP AB SCN SERPL QL: SIGNIFICANT CHANGE UP
BUN SERPL-MCNC: 31 MG/DL — HIGH (ref 7–18)
CALCIUM SERPL-MCNC: 8.6 MG/DL — SIGNIFICANT CHANGE UP (ref 8.4–10.5)
CHLORIDE SERPL-SCNC: 107 MMOL/L — SIGNIFICANT CHANGE UP (ref 96–108)
CO2 SERPL-SCNC: 32 MMOL/L — HIGH (ref 22–31)
CREAT SERPL-MCNC: 0.66 MG/DL — SIGNIFICANT CHANGE UP (ref 0.5–1.3)
EGFR: 114 ML/MIN/1.73M2 — SIGNIFICANT CHANGE UP
EOSINOPHIL # BLD AUTO: 0.22 K/UL — SIGNIFICANT CHANGE UP (ref 0–0.5)
EOSINOPHIL NFR BLD AUTO: 3.2 % — SIGNIFICANT CHANGE UP (ref 0–6)
GLUCOSE SERPL-MCNC: 97 MG/DL — SIGNIFICANT CHANGE UP (ref 70–99)
HCT VFR BLD CALC: 28 % — LOW (ref 39–50)
HGB BLD-MCNC: 9.5 G/DL — LOW (ref 13–17)
IMM GRANULOCYTES NFR BLD AUTO: 0.3 % — SIGNIFICANT CHANGE UP (ref 0–0.9)
INR BLD: 1.3 RATIO — HIGH (ref 0.85–1.16)
LYMPHOCYTES # BLD AUTO: 1.56 K/UL — SIGNIFICANT CHANGE UP (ref 1–3.3)
LYMPHOCYTES # BLD AUTO: 22.8 % — SIGNIFICANT CHANGE UP (ref 13–44)
MCHC RBC-ENTMCNC: 33.9 G/DL — SIGNIFICANT CHANGE UP (ref 32–36)
MCHC RBC-ENTMCNC: 34.1 PG — HIGH (ref 27–34)
MCV RBC AUTO: 100.4 FL — HIGH (ref 80–100)
MONOCYTES # BLD AUTO: 0.86 K/UL — SIGNIFICANT CHANGE UP (ref 0–0.9)
MONOCYTES NFR BLD AUTO: 12.6 % — SIGNIFICANT CHANGE UP (ref 2–14)
NEUTROPHILS # BLD AUTO: 4.16 K/UL — SIGNIFICANT CHANGE UP (ref 1.8–7.4)
NEUTROPHILS NFR BLD AUTO: 61 % — SIGNIFICANT CHANGE UP (ref 43–77)
NRBC # BLD: 0 /100 WBCS — SIGNIFICANT CHANGE UP (ref 0–0)
PLATELET # BLD AUTO: 168 K/UL — SIGNIFICANT CHANGE UP (ref 150–400)
POTASSIUM SERPL-MCNC: 4.6 MMOL/L — SIGNIFICANT CHANGE UP (ref 3.5–5.3)
POTASSIUM SERPL-SCNC: 4.6 MMOL/L — SIGNIFICANT CHANGE UP (ref 3.5–5.3)
PROT SERPL-MCNC: 6.9 G/DL — SIGNIFICANT CHANGE UP (ref 6–8.3)
PROTHROM AB SERPL-ACNC: 15 SEC — HIGH (ref 9.9–13.4)
RBC # BLD: 2.79 M/UL — LOW (ref 4.2–5.8)
RBC # FLD: 14.6 % — HIGH (ref 10.3–14.5)
SODIUM SERPL-SCNC: 143 MMOL/L — SIGNIFICANT CHANGE UP (ref 135–145)
WBC # BLD: 6.83 K/UL — SIGNIFICANT CHANGE UP (ref 3.8–10.5)
WBC # FLD AUTO: 6.83 K/UL — SIGNIFICANT CHANGE UP (ref 3.8–10.5)

## 2024-12-31 PROCEDURE — 70450 CT HEAD/BRAIN W/O DYE: CPT | Mod: 26

## 2024-12-31 PROCEDURE — 99285 EMERGENCY DEPT VISIT HI MDM: CPT

## 2024-12-31 PROCEDURE — 73552 X-RAY EXAM OF FEMUR 2/>: CPT | Mod: 26,RT

## 2024-12-31 PROCEDURE — 99291 CRITICAL CARE FIRST HOUR: CPT | Mod: GC

## 2024-12-31 PROCEDURE — 72170 X-RAY EXAM OF PELVIS: CPT | Mod: 26

## 2024-12-31 PROCEDURE — 72192 CT PELVIS W/O DYE: CPT | Mod: 26

## 2024-12-31 RX ORDER — LEVETIRACETAM 100 MG/ML
1000 SOLUTION ORAL ONCE
Refills: 0 | Status: COMPLETED | OUTPATIENT
Start: 2024-12-31 | End: 2024-12-31

## 2024-12-31 RX ORDER — ACETAMINOPHEN 80 MG/.8ML
1000 SOLUTION/ DROPS ORAL ONCE
Refills: 0 | Status: DISCONTINUED | OUTPATIENT
Start: 2024-12-31 | End: 2025-01-01

## 2024-12-31 NOTE — ED ADULT NURSE NOTE - NSFALLHARMRISKINTERV_ED_ALL_ED

## 2024-12-31 NOTE — ED ADULT NURSE NOTE - OBJECTIVE STATEMENT
pt arrived to ED with hip pain as per triage note. pt non-verbal  with tracheostomy in place upon arrival. pt is awake and alert, no distress noted.

## 2024-12-31 NOTE — ED PROVIDER NOTE - CLINICAL SUMMARY MEDICAL DECISION MAKING FREE TEXT BOX
49-year male presenting with right hip fracture from his nursing home.  X-ray showing right femur fracture.  Will admit.

## 2024-12-31 NOTE — ED ADULT NURSE REASSESSMENT NOTE - NS ED NURSE REASSESS COMMENT FT1
Pt received alert and responsive to verbal stimuli, with oxygen attached to trac collar in place, no respiratory or cardiac distress noted. Labs done, placed on cardiac monitor, safety precaution maintained ED observation continues.

## 2024-12-31 NOTE — ED PROVIDER NOTE - OBJECTIVE STATEMENT
49M, pmh of CVA with right-sided deficit and aphasia, seizure, hypertension, hyperlipidemia, CHF ICD, major depressive disorder, recurrent PEs on Eliquis, trach and PEG dependent, presenting with right hip fracture. patient had reported fall on 12/28 and since then has had right hip pain. xray done at facility showing right femur fracture.

## 2024-12-31 NOTE — ED PROVIDER NOTE - PHYSICAL EXAMINATION
General: well appearing male, no acute distress   HEENT: normocephalic, atraumatic   Respiratory: normal work of breathing  Cardiac: 2+ right DP  MSK: RLE shortened, externally rotated   Skin: warm, dry   Neuro: A&Ox3  Psych: appropriate affect

## 2025-01-01 ENCOUNTER — RESULT REVIEW (OUTPATIENT)
Age: 50
End: 2025-01-01

## 2025-01-01 ENCOUNTER — INPATIENT (INPATIENT)
Facility: HOSPITAL | Age: 50
LOS: 5 days | DRG: 207 | End: 2025-08-20
Attending: STUDENT IN AN ORGANIZED HEALTH CARE EDUCATION/TRAINING PROGRAM | Admitting: STUDENT IN AN ORGANIZED HEALTH CARE EDUCATION/TRAINING PROGRAM
Payer: MEDICAID

## 2025-01-01 VITALS — OXYGEN SATURATION: 98 % | TEMPERATURE: 98 F | RESPIRATION RATE: 21 BRPM | HEART RATE: 91 BPM

## 2025-01-01 VITALS
DIASTOLIC BLOOD PRESSURE: 87 MMHG | SYSTOLIC BLOOD PRESSURE: 113 MMHG | RESPIRATION RATE: 19 BRPM | HEART RATE: 50 BPM | OXYGEN SATURATION: 94 %

## 2025-01-01 DIAGNOSIS — R56.9 UNSPECIFIED CONVULSIONS: ICD-10-CM

## 2025-01-01 DIAGNOSIS — I26.99 OTHER PULMONARY EMBOLISM WITHOUT ACUTE COR PULMONALE: ICD-10-CM

## 2025-01-01 DIAGNOSIS — I50.32 CHRONIC DIASTOLIC (CONGESTIVE) HEART FAILURE: ICD-10-CM

## 2025-01-01 DIAGNOSIS — I48.91 UNSPECIFIED ATRIAL FIBRILLATION: ICD-10-CM

## 2025-01-01 DIAGNOSIS — S72.91XA UNSPECIFIED FRACTURE OF RIGHT FEMUR, INITIAL ENCOUNTER FOR CLOSED FRACTURE: ICD-10-CM

## 2025-01-01 DIAGNOSIS — Z93.0 TRACHEOSTOMY STATUS: ICD-10-CM

## 2025-01-01 DIAGNOSIS — J18.9 PNEUMONIA, UNSPECIFIED ORGANISM: ICD-10-CM

## 2025-01-01 DIAGNOSIS — I46.9 CARDIAC ARREST, CAUSE UNSPECIFIED: ICD-10-CM

## 2025-01-01 DIAGNOSIS — E78.5 HYPERLIPIDEMIA, UNSPECIFIED: ICD-10-CM

## 2025-01-01 DIAGNOSIS — I10 ESSENTIAL (PRIMARY) HYPERTENSION: ICD-10-CM

## 2025-01-01 DIAGNOSIS — I63.9 CEREBRAL INFARCTION, UNSPECIFIED: ICD-10-CM

## 2025-01-01 DIAGNOSIS — Z86.73 PERSONAL HISTORY OF TRANSIENT ISCHEMIC ATTACK (TIA), AND CEREBRAL INFARCTION WITHOUT RESIDUAL DEFICITS: ICD-10-CM

## 2025-01-01 DIAGNOSIS — Z29.9 ENCOUNTER FOR PROPHYLACTIC MEASURES, UNSPECIFIED: ICD-10-CM

## 2025-01-01 DIAGNOSIS — Z95.0 PRESENCE OF CARDIAC PACEMAKER: Chronic | ICD-10-CM

## 2025-01-01 PROBLEM — Z00.00 ENCOUNTER FOR PREVENTIVE HEALTH EXAMINATION: Status: ACTIVE | Noted: 2025-01-01

## 2025-01-01 LAB
-  AZTREONAM: SIGNIFICANT CHANGE UP
-  CEFEPIME: SIGNIFICANT CHANGE UP
-  CEFTAZIDIME: SIGNIFICANT CHANGE UP
-  CIPROFLOXACIN: SIGNIFICANT CHANGE UP
-  CLINDAMYCIN: SIGNIFICANT CHANGE UP
-  ERYTHROMYCIN: SIGNIFICANT CHANGE UP
-  GENTAMICIN: SIGNIFICANT CHANGE UP
-  IMIPENEM: SIGNIFICANT CHANGE UP
-  LEVOFLOXACIN: SIGNIFICANT CHANGE UP
-  MEROPENEM: SIGNIFICANT CHANGE UP
-  OXACILLIN: SIGNIFICANT CHANGE UP
-  PENICILLIN: SIGNIFICANT CHANGE UP
-  PIPERACILLIN/TAZOBACTAM: SIGNIFICANT CHANGE UP
-  RIFAMPIN: SIGNIFICANT CHANGE UP
-  TETRACYCLINE: SIGNIFICANT CHANGE UP
-  TRIMETHOPRIM/SULFAMETHOXAZOLE: SIGNIFICANT CHANGE UP
-  VANCOMYCIN: SIGNIFICANT CHANGE UP
A1C WITH ESTIMATED AVERAGE GLUCOSE RESULT: 5.9 % — HIGH (ref 4–5.6)
A1C WITH ESTIMATED AVERAGE GLUCOSE RESULT: 6 % — HIGH (ref 4–5.6)
A1C WITH ESTIMATED AVERAGE GLUCOSE RESULT: 6.1 % — HIGH (ref 4–5.6)
ABO RH CONFIRMATION: SIGNIFICANT CHANGE UP
ALBUMIN SERPL ELPH-MCNC: 1.5 G/DL — LOW (ref 3.5–5)
ALBUMIN SERPL ELPH-MCNC: 1.6 G/DL — LOW (ref 3.5–5)
ALBUMIN SERPL ELPH-MCNC: 1.7 G/DL — LOW (ref 3.5–5)
ALBUMIN SERPL ELPH-MCNC: 1.7 G/DL — LOW (ref 3.5–5)
ALBUMIN SERPL ELPH-MCNC: 1.8 G/DL — LOW (ref 3.5–5)
ALBUMIN SERPL ELPH-MCNC: 1.9 G/DL — LOW (ref 3.5–5)
ALBUMIN SERPL ELPH-MCNC: 1.9 G/DL — LOW (ref 3.5–5)
ALBUMIN SERPL ELPH-MCNC: 2.2 G/DL — LOW (ref 3.5–5)
ALBUMIN SERPL ELPH-MCNC: 2.3 G/DL — LOW (ref 3.5–5)
ALP SERPL-CCNC: 104 U/L — SIGNIFICANT CHANGE UP (ref 40–120)
ALP SERPL-CCNC: 109 U/L — SIGNIFICANT CHANGE UP (ref 40–120)
ALP SERPL-CCNC: 151 U/L — HIGH (ref 40–120)
ALP SERPL-CCNC: 172 U/L — HIGH (ref 40–120)
ALP SERPL-CCNC: 189 U/L — HIGH (ref 40–120)
ALP SERPL-CCNC: 53 U/L — SIGNIFICANT CHANGE UP (ref 40–120)
ALP SERPL-CCNC: 73 U/L — SIGNIFICANT CHANGE UP (ref 40–120)
ALP SERPL-CCNC: 75 U/L — SIGNIFICANT CHANGE UP (ref 40–120)
ALP SERPL-CCNC: 76 U/L — SIGNIFICANT CHANGE UP (ref 40–120)
ALP SERPL-CCNC: 83 U/L — SIGNIFICANT CHANGE UP (ref 40–120)
ALP SERPL-CCNC: 85 U/L — SIGNIFICANT CHANGE UP (ref 40–120)
ALP SERPL-CCNC: 87 U/L — SIGNIFICANT CHANGE UP (ref 40–120)
ALP SERPL-CCNC: 93 U/L — SIGNIFICANT CHANGE UP (ref 40–120)
ALT FLD-CCNC: 14 U/L DA — SIGNIFICANT CHANGE UP (ref 10–60)
ALT FLD-CCNC: 37 U/L DA — SIGNIFICANT CHANGE UP (ref 10–60)
ALT FLD-CCNC: 37 U/L DA — SIGNIFICANT CHANGE UP (ref 10–60)
ALT FLD-CCNC: 41 U/L DA — SIGNIFICANT CHANGE UP (ref 10–60)
ALT FLD-CCNC: 49 U/L DA — SIGNIFICANT CHANGE UP (ref 10–60)
ALT FLD-CCNC: 49 U/L DA — SIGNIFICANT CHANGE UP (ref 10–60)
ALT FLD-CCNC: 54 U/L DA — SIGNIFICANT CHANGE UP (ref 10–60)
ALT FLD-CCNC: 55 U/L DA — SIGNIFICANT CHANGE UP (ref 10–60)
ALT FLD-CCNC: 59 U/L DA — SIGNIFICANT CHANGE UP (ref 10–60)
ALT FLD-CCNC: 60 U/L DA — SIGNIFICANT CHANGE UP (ref 10–60)
ALT FLD-CCNC: 61 U/L DA — HIGH (ref 10–60)
ALT FLD-CCNC: 61 U/L DA — HIGH (ref 10–60)
ALT FLD-CCNC: 72 U/L DA — HIGH (ref 10–60)
AMPHET UR-MCNC: NEGATIVE — SIGNIFICANT CHANGE UP
AMYLASE P1 CFR SERPL: 23 U/L — LOW (ref 25–115)
AMYLASE P1 CFR SERPL: 24 U/L — LOW (ref 25–115)
AMYLASE P1 CFR SERPL: 24 U/L — LOW (ref 25–115)
AMYLASE P1 CFR SERPL: 30 U/L — SIGNIFICANT CHANGE UP (ref 25–115)
ANION GAP SERPL CALC-SCNC: 1 MMOL/L — LOW (ref 5–17)
ANION GAP SERPL CALC-SCNC: 12 MMOL/L — SIGNIFICANT CHANGE UP (ref 5–17)
ANION GAP SERPL CALC-SCNC: 2 MMOL/L — LOW (ref 5–17)
ANION GAP SERPL CALC-SCNC: 2 MMOL/L — LOW (ref 5–17)
ANION GAP SERPL CALC-SCNC: 3 MMOL/L — LOW (ref 5–17)
ANION GAP SERPL CALC-SCNC: 4 MMOL/L — LOW (ref 5–17)
ANION GAP SERPL CALC-SCNC: 5 MMOL/L — SIGNIFICANT CHANGE UP (ref 5–17)
ANION GAP SERPL CALC-SCNC: 6 MMOL/L — SIGNIFICANT CHANGE UP (ref 5–17)
ANION GAP SERPL CALC-SCNC: 6 MMOL/L — SIGNIFICANT CHANGE UP (ref 5–17)
ANION GAP SERPL CALC-SCNC: 8 MMOL/L — SIGNIFICANT CHANGE UP (ref 5–17)
ANION GAP SERPL CALC-SCNC: 8 MMOL/L — SIGNIFICANT CHANGE UP (ref 5–17)
ANISOCYTOSIS BLD QL: SLIGHT — SIGNIFICANT CHANGE UP
APPEARANCE UR: ABNORMAL
APPEARANCE UR: CLEAR — SIGNIFICANT CHANGE UP
APTT BLD: 194.4 SEC — CRITICAL HIGH (ref 24.5–35.6)
APTT BLD: 31.3 SEC — SIGNIFICANT CHANGE UP (ref 26.1–36.8)
APTT BLD: 33.3 SEC — SIGNIFICANT CHANGE UP (ref 26.1–36.8)
APTT BLD: 34.1 SEC — SIGNIFICANT CHANGE UP (ref 26.1–36.8)
APTT BLD: 36.6 SEC — SIGNIFICANT CHANGE UP (ref 26.1–36.8)
APTT BLD: 37 SEC — HIGH (ref 24.5–35.6)
AST SERPL-CCNC: 100 U/L — HIGH (ref 10–40)
AST SERPL-CCNC: 100 U/L — HIGH (ref 10–40)
AST SERPL-CCNC: 123 U/L — HIGH (ref 10–40)
AST SERPL-CCNC: 190 U/L — HIGH (ref 10–40)
AST SERPL-CCNC: 20 U/L — SIGNIFICANT CHANGE UP (ref 10–40)
AST SERPL-CCNC: 73 U/L — HIGH (ref 10–40)
AST SERPL-CCNC: 73 U/L — HIGH (ref 10–40)
AST SERPL-CCNC: 81 U/L — HIGH (ref 10–40)
AST SERPL-CCNC: 82 U/L — HIGH (ref 10–40)
AST SERPL-CCNC: 84 U/L — HIGH (ref 10–40)
AST SERPL-CCNC: 86 U/L — HIGH (ref 10–40)
AST SERPL-CCNC: 92 U/L — HIGH (ref 10–40)
AST SERPL-CCNC: 97 U/L — HIGH (ref 10–40)
BACTERIA # UR AUTO: ABNORMAL /HPF
BARBITURATES UR SCN-MCNC: NEGATIVE — SIGNIFICANT CHANGE UP
BASE EXCESS BLDA CALC-SCNC: -8.1 MMOL/L — LOW (ref -2–3)
BASE EXCESS BLDA CALC-SCNC: 3.9 MMOL/L — HIGH (ref -2–3)
BASE EXCESS BLDA CALC-SCNC: 5.1 MMOL/L — HIGH (ref -2–3)
BASE EXCESS BLDA CALC-SCNC: 5.2 MMOL/L — HIGH (ref -2–3)
BASE EXCESS BLDA CALC-SCNC: 5.6 MMOL/L — HIGH (ref -2–3)
BASE EXCESS BLDA CALC-SCNC: 6.9 MMOL/L — HIGH (ref -2–3)
BASE EXCESS BLDV CALC-SCNC: -0.1 MMOL/L — SIGNIFICANT CHANGE UP
BASOPHILS # BLD AUTO: 0 K/UL — SIGNIFICANT CHANGE UP (ref 0–0.2)
BASOPHILS # BLD AUTO: 0.01 K/UL — SIGNIFICANT CHANGE UP (ref 0–0.2)
BASOPHILS # BLD AUTO: 0.04 K/UL — SIGNIFICANT CHANGE UP (ref 0–0.2)
BASOPHILS # BLD AUTO: 0.05 K/UL — SIGNIFICANT CHANGE UP (ref 0–0.2)
BASOPHILS # BLD AUTO: 0.05 K/UL — SIGNIFICANT CHANGE UP (ref 0–0.2)
BASOPHILS NFR BLD AUTO: 0 % — SIGNIFICANT CHANGE UP (ref 0–2)
BASOPHILS NFR BLD AUTO: 0.1 % — SIGNIFICANT CHANGE UP (ref 0–2)
BASOPHILS NFR BLD AUTO: 0.4 % — SIGNIFICANT CHANGE UP (ref 0–2)
BASOPHILS NFR BLD AUTO: 0.5 % — SIGNIFICANT CHANGE UP (ref 0–2)
BASOPHILS NFR BLD AUTO: 0.5 % — SIGNIFICANT CHANGE UP (ref 0–2)
BENZODIAZ UR-MCNC: POSITIVE
BILIRUB DIRECT SERPL-MCNC: 0.2 MG/DL — SIGNIFICANT CHANGE UP (ref 0–0.3)
BILIRUB DIRECT SERPL-MCNC: 0.3 MG/DL — SIGNIFICANT CHANGE UP (ref 0–0.3)
BILIRUB DIRECT SERPL-MCNC: 0.6 MG/DL — HIGH (ref 0–0.3)
BILIRUB INDIRECT FLD-MCNC: 0.3 MG/DL — SIGNIFICANT CHANGE UP (ref 0.2–1)
BILIRUB INDIRECT FLD-MCNC: 0.4 MG/DL — SIGNIFICANT CHANGE UP (ref 0.2–1)
BILIRUB SERPL-MCNC: 0.3 MG/DL — SIGNIFICANT CHANGE UP (ref 0.2–1.2)
BILIRUB SERPL-MCNC: 0.3 MG/DL — SIGNIFICANT CHANGE UP (ref 0.2–1.2)
BILIRUB SERPL-MCNC: 0.4 MG/DL — SIGNIFICANT CHANGE UP (ref 0.2–1.2)
BILIRUB SERPL-MCNC: 0.5 MG/DL — SIGNIFICANT CHANGE UP (ref 0.2–1.2)
BILIRUB SERPL-MCNC: 0.5 MG/DL — SIGNIFICANT CHANGE UP (ref 0.2–1.2)
BILIRUB SERPL-MCNC: 0.6 MG/DL — SIGNIFICANT CHANGE UP (ref 0.2–1.2)
BILIRUB SERPL-MCNC: 0.7 MG/DL — SIGNIFICANT CHANGE UP (ref 0.2–1.2)
BILIRUB SERPL-MCNC: 0.9 MG/DL — SIGNIFICANT CHANGE UP (ref 0.2–1.2)
BILIRUB UR-MCNC: ABNORMAL
BILIRUB UR-MCNC: ABNORMAL
BILIRUB UR-MCNC: NEGATIVE — SIGNIFICANT CHANGE UP
BILIRUB UR-MCNC: NEGATIVE — SIGNIFICANT CHANGE UP
BLD GP AB SCN SERPL QL: SIGNIFICANT CHANGE UP
BLOOD GAS COMMENTS ARTERIAL: SIGNIFICANT CHANGE UP
BUN SERPL-MCNC: 16 MG/DL — SIGNIFICANT CHANGE UP (ref 7–18)
BUN SERPL-MCNC: 17 MG/DL — SIGNIFICANT CHANGE UP (ref 7–18)
BUN SERPL-MCNC: 18 MG/DL — SIGNIFICANT CHANGE UP (ref 7–18)
BUN SERPL-MCNC: 18 MG/DL — SIGNIFICANT CHANGE UP (ref 7–18)
BUN SERPL-MCNC: 19 MG/DL — HIGH (ref 7–18)
BUN SERPL-MCNC: 19 MG/DL — HIGH (ref 7–18)
BUN SERPL-MCNC: 21 MG/DL — HIGH (ref 7–18)
BUN SERPL-MCNC: 23 MG/DL — HIGH (ref 7–18)
BUN SERPL-MCNC: 24 MG/DL — HIGH (ref 7–18)
BURR CELLS BLD QL SMEAR: SLIGHT — SIGNIFICANT CHANGE UP
CALCIUM SERPL-MCNC: 7.5 MG/DL — LOW (ref 8.4–10.5)
CALCIUM SERPL-MCNC: 7.8 MG/DL — LOW (ref 8.4–10.5)
CALCIUM SERPL-MCNC: 7.9 MG/DL — LOW (ref 8.4–10.5)
CALCIUM SERPL-MCNC: 7.9 MG/DL — LOW (ref 8.4–10.5)
CALCIUM SERPL-MCNC: 8.1 MG/DL — LOW (ref 8.4–10.5)
CALCIUM SERPL-MCNC: 8.2 MG/DL — LOW (ref 8.4–10.5)
CALCIUM SERPL-MCNC: 8.3 MG/DL — LOW (ref 8.4–10.5)
CALCIUM SERPL-MCNC: 8.4 MG/DL — SIGNIFICANT CHANGE UP (ref 8.4–10.5)
CALCIUM SERPL-MCNC: 8.5 MG/DL — SIGNIFICANT CHANGE UP (ref 8.4–10.5)
CALCIUM SERPL-MCNC: 8.6 MG/DL — SIGNIFICANT CHANGE UP (ref 8.4–10.5)
CHLORIDE SERPL-SCNC: 105 MMOL/L — SIGNIFICANT CHANGE UP (ref 96–108)
CHLORIDE SERPL-SCNC: 105 MMOL/L — SIGNIFICANT CHANGE UP (ref 96–108)
CHLORIDE SERPL-SCNC: 106 MMOL/L — SIGNIFICANT CHANGE UP (ref 96–108)
CHLORIDE SERPL-SCNC: 107 MMOL/L — SIGNIFICANT CHANGE UP (ref 96–108)
CHLORIDE SERPL-SCNC: 107 MMOL/L — SIGNIFICANT CHANGE UP (ref 96–108)
CHLORIDE SERPL-SCNC: 108 MMOL/L — SIGNIFICANT CHANGE UP (ref 96–108)
CHLORIDE SERPL-SCNC: 109 MMOL/L — HIGH (ref 96–108)
CHLORIDE SERPL-SCNC: 111 MMOL/L — HIGH (ref 96–108)
CHLORIDE SERPL-SCNC: 111 MMOL/L — HIGH (ref 96–108)
CK MB BLD-MCNC: 0.8 % — SIGNIFICANT CHANGE UP (ref 0–3.5)
CK MB BLD-MCNC: 1.8 % — SIGNIFICANT CHANGE UP (ref 0–3.5)
CK MB BLD-MCNC: 2.1 % — SIGNIFICANT CHANGE UP (ref 0–3.5)
CK MB CFR SERPL CALC: 1.7 NG/ML — SIGNIFICANT CHANGE UP (ref 0–3.6)
CK MB CFR SERPL CALC: 13.2 NG/ML — HIGH (ref 0–3.6)
CK MB CFR SERPL CALC: 6.7 NG/ML — HIGH (ref 0–3.6)
CK SERPL-CCNC: 1399 U/L — HIGH (ref 35–232)
CK SERPL-CCNC: 205 U/L — SIGNIFICANT CHANGE UP (ref 35–232)
CK SERPL-CCNC: 326 U/L — HIGH (ref 35–232)
CK SERPL-CCNC: 721 U/L — HIGH (ref 35–232)
CK SERPL-CCNC: 859 U/L — HIGH (ref 35–232)
CO2 SERPL-SCNC: 24 MMOL/L — SIGNIFICANT CHANGE UP (ref 22–31)
CO2 SERPL-SCNC: 25 MMOL/L — SIGNIFICANT CHANGE UP (ref 22–31)
CO2 SERPL-SCNC: 27 MMOL/L — SIGNIFICANT CHANGE UP (ref 22–31)
CO2 SERPL-SCNC: 27 MMOL/L — SIGNIFICANT CHANGE UP (ref 22–31)
CO2 SERPL-SCNC: 28 MMOL/L — SIGNIFICANT CHANGE UP (ref 22–31)
CO2 SERPL-SCNC: 29 MMOL/L — SIGNIFICANT CHANGE UP (ref 22–31)
CO2 SERPL-SCNC: 29 MMOL/L — SIGNIFICANT CHANGE UP (ref 22–31)
CO2 SERPL-SCNC: 30 MMOL/L — SIGNIFICANT CHANGE UP (ref 22–31)
CO2 SERPL-SCNC: 31 MMOL/L — SIGNIFICANT CHANGE UP (ref 22–31)
CO2 SERPL-SCNC: 31 MMOL/L — SIGNIFICANT CHANGE UP (ref 22–31)
CO2 SERPL-SCNC: 33 MMOL/L — HIGH (ref 22–31)
COCAINE METAB.OTHER UR-MCNC: NEGATIVE — SIGNIFICANT CHANGE UP
COLOR SPEC: ABNORMAL
COLOR SPEC: SIGNIFICANT CHANGE UP
COLOR SPEC: SIGNIFICANT CHANGE UP
COLOR SPEC: YELLOW — SIGNIFICANT CHANGE UP
CREAT ?TM UR-MCNC: 24 MG/DL — SIGNIFICANT CHANGE UP
CREAT SERPL-MCNC: 0.61 MG/DL — SIGNIFICANT CHANGE UP (ref 0.5–1.3)
CREAT SERPL-MCNC: 0.66 MG/DL — SIGNIFICANT CHANGE UP (ref 0.5–1.3)
CREAT SERPL-MCNC: 0.69 MG/DL — SIGNIFICANT CHANGE UP (ref 0.5–1.3)
CREAT SERPL-MCNC: 0.69 MG/DL — SIGNIFICANT CHANGE UP (ref 0.5–1.3)
CREAT SERPL-MCNC: 0.7 MG/DL — SIGNIFICANT CHANGE UP (ref 0.5–1.3)
CREAT SERPL-MCNC: 0.78 MG/DL — SIGNIFICANT CHANGE UP (ref 0.5–1.3)
CREAT SERPL-MCNC: 0.78 MG/DL — SIGNIFICANT CHANGE UP (ref 0.5–1.3)
CREAT SERPL-MCNC: 0.79 MG/DL — SIGNIFICANT CHANGE UP (ref 0.5–1.3)
CREAT SERPL-MCNC: 0.83 MG/DL — SIGNIFICANT CHANGE UP (ref 0.5–1.3)
CREAT SERPL-MCNC: 0.88 MG/DL — SIGNIFICANT CHANGE UP (ref 0.5–1.3)
CREAT SERPL-MCNC: 0.97 MG/DL — SIGNIFICANT CHANGE UP (ref 0.5–1.3)
CREAT SERPL-MCNC: 1.05 MG/DL — SIGNIFICANT CHANGE UP (ref 0.5–1.3)
CREAT SERPL-MCNC: 1.06 MG/DL — SIGNIFICANT CHANGE UP (ref 0.5–1.3)
CREAT SERPL-MCNC: 1.07 MG/DL — SIGNIFICANT CHANGE UP (ref 0.5–1.3)
CREAT SERPL-MCNC: 1.34 MG/DL — HIGH (ref 0.5–1.3)
CULTURE RESULTS: ABNORMAL
CULTURE RESULTS: ABNORMAL
CULTURE RESULTS: SIGNIFICANT CHANGE UP
DIFF PNL FLD: ABNORMAL
DIFF PNL FLD: NEGATIVE — SIGNIFICANT CHANGE UP
EGFR: 105 ML/MIN/1.73M2 — SIGNIFICANT CHANGE UP
EGFR: 105 ML/MIN/1.73M2 — SIGNIFICANT CHANGE UP
EGFR: 107 ML/MIN/1.73M2 — SIGNIFICANT CHANGE UP
EGFR: 107 ML/MIN/1.73M2 — SIGNIFICANT CHANGE UP
EGFR: 108 ML/MIN/1.73M2 — SIGNIFICANT CHANGE UP
EGFR: 108 ML/MIN/1.73M2 — SIGNIFICANT CHANGE UP
EGFR: 109 ML/MIN/1.73M2 — SIGNIFICANT CHANGE UP
EGFR: 112 ML/MIN/1.73M2 — SIGNIFICANT CHANGE UP
EGFR: 112 ML/MIN/1.73M2 — SIGNIFICANT CHANGE UP
EGFR: 113 ML/MIN/1.73M2 — SIGNIFICANT CHANGE UP
EGFR: 114 ML/MIN/1.73M2 — SIGNIFICANT CHANGE UP
EGFR: 117 ML/MIN/1.73M2 — SIGNIFICANT CHANGE UP
EGFR: 117 ML/MIN/1.73M2 — SIGNIFICANT CHANGE UP
EGFR: 65 ML/MIN/1.73M2 — SIGNIFICANT CHANGE UP
EGFR: 65 ML/MIN/1.73M2 — SIGNIFICANT CHANGE UP
EGFR: 85 ML/MIN/1.73M2 — SIGNIFICANT CHANGE UP
EGFR: 85 ML/MIN/1.73M2 — SIGNIFICANT CHANGE UP
EGFR: 86 ML/MIN/1.73M2 — SIGNIFICANT CHANGE UP
EGFR: 95 ML/MIN/1.73M2 — SIGNIFICANT CHANGE UP
EGFR: 95 ML/MIN/1.73M2 — SIGNIFICANT CHANGE UP
EOSINOPHIL # BLD AUTO: 0 K/UL — SIGNIFICANT CHANGE UP (ref 0–0.5)
EOSINOPHIL NFR BLD AUTO: 0 % — SIGNIFICANT CHANGE UP (ref 0–6)
EPI CELLS # UR: PRESENT
ESTIMATED AVERAGE GLUCOSE: 123 MG/DL — HIGH (ref 68–114)
ESTIMATED AVERAGE GLUCOSE: 126 MG/DL — HIGH (ref 68–114)
ESTIMATED AVERAGE GLUCOSE: 128 MG/DL — HIGH (ref 68–114)
FLUAV AG NPH QL: SIGNIFICANT CHANGE UP
FLUBV AG NPH QL: SIGNIFICANT CHANGE UP
GAS PNL BLDA: SIGNIFICANT CHANGE UP
GAS PNL BLDV: 139 MMOL/L — SIGNIFICANT CHANGE UP (ref 136–145)
GAS PNL BLDV: SIGNIFICANT CHANGE UP
GLUCOSE BLDC GLUCOMTR-MCNC: 102 MG/DL — HIGH (ref 70–99)
GLUCOSE BLDC GLUCOMTR-MCNC: 104 MG/DL — HIGH (ref 70–99)
GLUCOSE BLDC GLUCOMTR-MCNC: 107 MG/DL — HIGH (ref 70–99)
GLUCOSE BLDC GLUCOMTR-MCNC: 115 MG/DL — HIGH (ref 70–99)
GLUCOSE BLDC GLUCOMTR-MCNC: 122 MG/DL — HIGH (ref 70–99)
GLUCOSE BLDC GLUCOMTR-MCNC: 126 MG/DL — HIGH (ref 70–99)
GLUCOSE BLDC GLUCOMTR-MCNC: 127 MG/DL — HIGH (ref 70–99)
GLUCOSE BLDC GLUCOMTR-MCNC: 137 MG/DL — HIGH (ref 70–99)
GLUCOSE BLDC GLUCOMTR-MCNC: 148 MG/DL — HIGH (ref 70–99)
GLUCOSE BLDC GLUCOMTR-MCNC: 151 MG/DL — HIGH (ref 70–99)
GLUCOSE BLDC GLUCOMTR-MCNC: 162 MG/DL — HIGH (ref 70–99)
GLUCOSE BLDC GLUCOMTR-MCNC: 84 MG/DL — SIGNIFICANT CHANGE UP (ref 70–99)
GLUCOSE BLDV-MCNC: 128 MG/DL — HIGH (ref 70–99)
GLUCOSE SERPL-MCNC: 113 MG/DL — HIGH (ref 70–99)
GLUCOSE SERPL-MCNC: 124 MG/DL — HIGH (ref 70–99)
GLUCOSE SERPL-MCNC: 128 MG/DL — HIGH (ref 70–99)
GLUCOSE SERPL-MCNC: 131 MG/DL — HIGH (ref 70–99)
GLUCOSE SERPL-MCNC: 132 MG/DL — HIGH (ref 70–99)
GLUCOSE SERPL-MCNC: 135 MG/DL — HIGH (ref 70–99)
GLUCOSE SERPL-MCNC: 139 MG/DL — HIGH (ref 70–99)
GLUCOSE SERPL-MCNC: 151 MG/DL — HIGH (ref 70–99)
GLUCOSE SERPL-MCNC: 152 MG/DL — HIGH (ref 70–99)
GLUCOSE SERPL-MCNC: 154 MG/DL — HIGH (ref 70–99)
GLUCOSE SERPL-MCNC: 156 MG/DL — HIGH (ref 70–99)
GLUCOSE SERPL-MCNC: 159 MG/DL — HIGH (ref 70–99)
GLUCOSE SERPL-MCNC: 182 MG/DL — HIGH (ref 70–99)
GLUCOSE SERPL-MCNC: 182 MG/DL — HIGH (ref 70–99)
GLUCOSE SERPL-MCNC: 96 MG/DL — SIGNIFICANT CHANGE UP (ref 70–99)
GLUCOSE UR QL: NEGATIVE MG/DL — SIGNIFICANT CHANGE UP
GRAM STN FLD: ABNORMAL
HCO3 BLDA-SCNC: 19 MMOL/L — LOW (ref 21–28)
HCO3 BLDA-SCNC: 29 MMOL/L — HIGH (ref 21–28)
HCO3 BLDA-SCNC: 30 MMOL/L — HIGH (ref 21–28)
HCO3 BLDV-SCNC: 27 MMOL/L — SIGNIFICANT CHANGE UP (ref 22–29)
HCT VFR BLD CALC: 24.6 % — LOW (ref 39–50)
HCT VFR BLD CALC: 25.9 % — LOW (ref 39–50)
HCT VFR BLD CALC: 26.8 % — LOW (ref 39–50)
HCT VFR BLD CALC: 27.7 % — LOW (ref 39–50)
HCT VFR BLD CALC: 28.3 % — LOW (ref 39–50)
HCT VFR BLD CALC: 29.3 % — LOW (ref 39–50)
HCT VFR BLD CALC: 31 % — LOW (ref 39–50)
HCT VFR BLD CALC: 31.5 % — LOW (ref 39–50)
HCT VFR BLD CALC: 31.7 % — LOW (ref 39–50)
HCT VFR BLD CALC: 35.1 % — LOW (ref 39–50)
HCT VFR BLD CALC: 35.6 % — LOW (ref 39–50)
HCT VFR BLD CALC: 36.9 % — LOW (ref 39–50)
HGB BLD-MCNC: 10.6 G/DL — LOW (ref 13–17)
HGB BLD-MCNC: 10.6 G/DL — LOW (ref 13–17)
HGB BLD-MCNC: 10.9 G/DL — LOW (ref 13–17)
HGB BLD-MCNC: 11.5 G/DL — LOW (ref 13–17)
HGB BLD-MCNC: 11.5 G/DL — LOW (ref 13–17)
HGB BLD-MCNC: 11.8 G/DL — LOW (ref 13–17)
HGB BLD-MCNC: 8.3 G/DL — LOW (ref 13–17)
HGB BLD-MCNC: 9 G/DL — LOW (ref 13–17)
HGB BLD-MCNC: 9.2 G/DL — LOW (ref 13–17)
HGB BLD-MCNC: 9.6 G/DL — LOW (ref 13–17)
HGB BLD-MCNC: 9.8 G/DL — LOW (ref 13–17)
HGB BLD-MCNC: 9.9 G/DL — LOW (ref 13–17)
HIV 1 & 2 AB SERPL IA.RAPID: SIGNIFICANT CHANGE UP
HOROWITZ INDEX BLDA+IHG-RTO: 100 — SIGNIFICANT CHANGE UP
HOROWITZ INDEX BLDA+IHG-RTO: 40 — SIGNIFICANT CHANGE UP
HOROWITZ INDEX BLDA+IHG-RTO: 60 — SIGNIFICANT CHANGE UP
HOROWITZ INDEX BLDA+IHG-RTO: 90 — SIGNIFICANT CHANGE UP
HOROWITZ INDEX BLDV+IHG-RTO: 80 — SIGNIFICANT CHANGE UP
HYPOCHROMIA BLD QL: SLIGHT — SIGNIFICANT CHANGE UP
HYPOSEGMENTATION: PRESENT — SIGNIFICANT CHANGE UP
IMM GRANULOCYTES NFR BLD AUTO: 0.4 % — SIGNIFICANT CHANGE UP (ref 0–0.9)
IMM GRANULOCYTES NFR BLD AUTO: 0.5 % — SIGNIFICANT CHANGE UP (ref 0–0.9)
IMM GRANULOCYTES NFR BLD AUTO: 0.6 % — SIGNIFICANT CHANGE UP (ref 0–0.9)
IMM GRANULOCYTES NFR BLD AUTO: 2.6 % — HIGH (ref 0–0.9)
INR BLD: 1.37 RATIO — HIGH (ref 0.85–1.16)
INR BLD: 1.38 RATIO — HIGH (ref 0.85–1.16)
INR BLD: 1.39 RATIO — HIGH (ref 0.85–1.16)
INR BLD: 1.48 RATIO — HIGH (ref 0.85–1.16)
KETONES UR QL: ABNORMAL MG/DL
LACTATE BLDV-MCNC: 4.9 MMOL/L — CRITICAL HIGH (ref 0.5–2)
LACTATE SERPL-SCNC: 1.6 MMOL/L — SIGNIFICANT CHANGE UP (ref 0.7–2)
LACTATE SERPL-SCNC: 12.1 MMOL/L — CRITICAL HIGH (ref 0.7–2)
LACTATE SERPL-SCNC: 2.5 MMOL/L — HIGH (ref 0.7–2)
LACTATE SERPL-SCNC: 2.7 MMOL/L — HIGH (ref 0.7–2)
LACTATE SERPL-SCNC: 3 MMOL/L — HIGH (ref 0.7–2)
LACTATE SERPL-SCNC: 4.1 MMOL/L — CRITICAL HIGH (ref 0.7–2)
LACTATE SERPL-SCNC: 4.2 MMOL/L — CRITICAL HIGH (ref 0.7–2)
LACTATE SERPL-SCNC: 4.2 MMOL/L — CRITICAL HIGH (ref 0.7–2)
LACTATE SERPL-SCNC: 4.4 MMOL/L — CRITICAL HIGH (ref 0.7–2)
LACTATE SERPL-SCNC: 4.9 MMOL/L — CRITICAL HIGH (ref 0.7–2)
LACTATE SERPL-SCNC: 7 MMOL/L — CRITICAL HIGH (ref 0.7–2)
LACTATE SERPL-SCNC: 7.3 MMOL/L — CRITICAL HIGH (ref 0.7–2)
LEUKOCYTE ESTERASE UR-ACNC: ABNORMAL
LEUKOCYTE ESTERASE UR-ACNC: ABNORMAL
LEUKOCYTE ESTERASE UR-ACNC: NEGATIVE — SIGNIFICANT CHANGE UP
LEUKOCYTE ESTERASE UR-ACNC: NEGATIVE — SIGNIFICANT CHANGE UP
LG PLATELETS BLD QL AUTO: SLIGHT — SIGNIFICANT CHANGE UP
LG PLATELETS BLD QL AUTO: SLIGHT — SIGNIFICANT CHANGE UP
LIDOCAIN IGE QN: 23 U/L — SIGNIFICANT CHANGE UP (ref 13–75)
LIDOCAIN IGE QN: 34 U/L — SIGNIFICANT CHANGE UP (ref 13–75)
LYMPHOCYTES # BLD AUTO: 0.55 K/UL — LOW (ref 1–3.3)
LYMPHOCYTES # BLD AUTO: 0.73 K/UL — LOW (ref 1–3.3)
LYMPHOCYTES # BLD AUTO: 0.94 K/UL — LOW (ref 1–3.3)
LYMPHOCYTES # BLD AUTO: 1.05 K/UL — SIGNIFICANT CHANGE UP (ref 1–3.3)
LYMPHOCYTES # BLD AUTO: 1.08 K/UL — SIGNIFICANT CHANGE UP (ref 1–3.3)
LYMPHOCYTES # BLD AUTO: 1.28 K/UL — SIGNIFICANT CHANGE UP (ref 1–3.3)
LYMPHOCYTES # BLD AUTO: 1.42 K/UL — SIGNIFICANT CHANGE UP (ref 1–3.3)
LYMPHOCYTES # BLD AUTO: 1.56 K/UL — SIGNIFICANT CHANGE UP (ref 1–3.3)
LYMPHOCYTES # BLD AUTO: 10.3 % — LOW (ref 13–44)
LYMPHOCYTES # BLD AUTO: 13 % — SIGNIFICANT CHANGE UP (ref 13–44)
LYMPHOCYTES # BLD AUTO: 13 % — SIGNIFICANT CHANGE UP (ref 13–44)
LYMPHOCYTES # BLD AUTO: 14 % — SIGNIFICANT CHANGE UP (ref 13–44)
LYMPHOCYTES # BLD AUTO: 4 % — LOW (ref 13–44)
LYMPHOCYTES # BLD AUTO: 6.12 K/UL — HIGH (ref 1–3.3)
LYMPHOCYTES # BLD AUTO: 65 % — HIGH (ref 13–44)
LYMPHOCYTES # BLD AUTO: 8 % — LOW (ref 13–44)
LYMPHOCYTES # BLD AUTO: 8.6 % — LOW (ref 13–44)
LYMPHOCYTES # BLD AUTO: 9 % — LOW (ref 13–44)
MACROCYTES BLD QL: SLIGHT — SIGNIFICANT CHANGE UP
MAGNESIUM SERPL-MCNC: 1.3 MG/DL — LOW (ref 1.6–2.6)
MAGNESIUM SERPL-MCNC: 1.8 MG/DL — SIGNIFICANT CHANGE UP (ref 1.6–2.6)
MAGNESIUM SERPL-MCNC: 1.9 MG/DL — SIGNIFICANT CHANGE UP (ref 1.6–2.6)
MAGNESIUM SERPL-MCNC: 2 MG/DL — SIGNIFICANT CHANGE UP (ref 1.6–2.6)
MAGNESIUM SERPL-MCNC: 2 MG/DL — SIGNIFICANT CHANGE UP (ref 1.6–2.6)
MAGNESIUM SERPL-MCNC: 2.1 MG/DL — SIGNIFICANT CHANGE UP (ref 1.6–2.6)
MAGNESIUM SERPL-MCNC: 2.1 MG/DL — SIGNIFICANT CHANGE UP (ref 1.6–2.6)
MAGNESIUM SERPL-MCNC: 2.4 MG/DL — SIGNIFICANT CHANGE UP (ref 1.6–2.6)
MANUAL SMEAR VERIFICATION: SIGNIFICANT CHANGE UP
MCHC RBC-ENTMCNC: 31.2 G/DL — LOW (ref 32–36)
MCHC RBC-ENTMCNC: 32.8 G/DL — SIGNIFICANT CHANGE UP (ref 32–36)
MCHC RBC-ENTMCNC: 33.1 G/DL — SIGNIFICANT CHANGE UP (ref 32–36)
MCHC RBC-ENTMCNC: 33.2 PG — SIGNIFICANT CHANGE UP (ref 27–34)
MCHC RBC-ENTMCNC: 33.2 PG — SIGNIFICANT CHANGE UP (ref 27–34)
MCHC RBC-ENTMCNC: 33.3 PG — SIGNIFICANT CHANGE UP (ref 27–34)
MCHC RBC-ENTMCNC: 33.4 PG — SIGNIFICANT CHANGE UP (ref 27–34)
MCHC RBC-ENTMCNC: 33.5 PG — SIGNIFICANT CHANGE UP (ref 27–34)
MCHC RBC-ENTMCNC: 33.6 PG — SIGNIFICANT CHANGE UP (ref 27–34)
MCHC RBC-ENTMCNC: 33.7 G/DL — SIGNIFICANT CHANGE UP (ref 32–36)
MCHC RBC-ENTMCNC: 33.7 G/DL — SIGNIFICANT CHANGE UP (ref 32–36)
MCHC RBC-ENTMCNC: 33.7 PG — SIGNIFICANT CHANGE UP (ref 27–34)
MCHC RBC-ENTMCNC: 33.8 G/DL — SIGNIFICANT CHANGE UP (ref 32–36)
MCHC RBC-ENTMCNC: 33.9 PG — SIGNIFICANT CHANGE UP (ref 27–34)
MCHC RBC-ENTMCNC: 34.1 PG — HIGH (ref 27–34)
MCHC RBC-ENTMCNC: 34.2 G/DL — SIGNIFICANT CHANGE UP (ref 32–36)
MCHC RBC-ENTMCNC: 34.2 PG — HIGH (ref 27–34)
MCHC RBC-ENTMCNC: 34.3 G/DL — SIGNIFICANT CHANGE UP (ref 32–36)
MCHC RBC-ENTMCNC: 34.4 G/DL — SIGNIFICANT CHANGE UP (ref 32–36)
MCHC RBC-ENTMCNC: 34.6 G/DL — SIGNIFICANT CHANGE UP (ref 32–36)
MCHC RBC-ENTMCNC: 34.7 G/DL — SIGNIFICANT CHANGE UP (ref 32–36)
MCHC RBC-ENTMCNC: 34.7 G/DL — SIGNIFICANT CHANGE UP (ref 32–36)
MCV RBC AUTO: 101.4 FL — HIGH (ref 80–100)
MCV RBC AUTO: 102.9 FL — HIGH (ref 80–100)
MCV RBC AUTO: 107.3 FL — HIGH (ref 80–100)
MCV RBC AUTO: 96.9 FL — SIGNIFICANT CHANGE UP (ref 80–100)
MCV RBC AUTO: 97.1 FL — SIGNIFICANT CHANGE UP (ref 80–100)
MCV RBC AUTO: 97.2 FL — SIGNIFICANT CHANGE UP (ref 80–100)
MCV RBC AUTO: 98.1 FL — SIGNIFICANT CHANGE UP (ref 80–100)
MCV RBC AUTO: 98.3 FL — SIGNIFICANT CHANGE UP (ref 80–100)
MCV RBC AUTO: 98.4 FL — SIGNIFICANT CHANGE UP (ref 80–100)
MCV RBC AUTO: 98.5 FL — SIGNIFICANT CHANGE UP (ref 80–100)
MCV RBC AUTO: 98.8 FL — SIGNIFICANT CHANGE UP (ref 80–100)
MCV RBC AUTO: 99.1 FL — SIGNIFICANT CHANGE UP (ref 80–100)
METAMYELOCYTES # FLD: 1 % — HIGH (ref 0–0)
METAMYELOCYTES # FLD: 4 % — HIGH (ref 0–0)
METAMYELOCYTES NFR BLD: 1 % — HIGH (ref 0–0)
METAMYELOCYTES NFR BLD: 4 % — HIGH (ref 0–0)
METHADONE UR-MCNC: NEGATIVE — SIGNIFICANT CHANGE UP
METHOD TYPE: SIGNIFICANT CHANGE UP
MONOCYTES # BLD AUTO: 0.12 K/UL — SIGNIFICANT CHANGE UP (ref 0–0.9)
MONOCYTES # BLD AUTO: 0.28 K/UL — SIGNIFICANT CHANGE UP (ref 0–0.9)
MONOCYTES # BLD AUTO: 0.39 K/UL — SIGNIFICANT CHANGE UP (ref 0–0.9)
MONOCYTES # BLD AUTO: 0.46 K/UL — SIGNIFICANT CHANGE UP (ref 0–0.9)
MONOCYTES # BLD AUTO: 0.48 K/UL — SIGNIFICANT CHANGE UP (ref 0–0.9)
MONOCYTES # BLD AUTO: 0.63 K/UL — SIGNIFICANT CHANGE UP (ref 0–0.9)
MONOCYTES # BLD AUTO: 0.98 K/UL — HIGH (ref 0–0.9)
MONOCYTES # BLD AUTO: 1.27 K/UL — HIGH (ref 0–0.9)
MONOCYTES # BLD AUTO: 1.65 K/UL — HIGH (ref 0–0.9)
MONOCYTES NFR BLD AUTO: 1 % — LOW (ref 2–14)
MONOCYTES NFR BLD AUTO: 10 % — SIGNIFICANT CHANGE UP (ref 2–14)
MONOCYTES NFR BLD AUTO: 12 % — SIGNIFICANT CHANGE UP (ref 2–14)
MONOCYTES NFR BLD AUTO: 12.5 % — SIGNIFICANT CHANGE UP (ref 2–14)
MONOCYTES NFR BLD AUTO: 3 % — SIGNIFICANT CHANGE UP (ref 2–14)
MONOCYTES NFR BLD AUTO: 3.6 % — SIGNIFICANT CHANGE UP (ref 2–14)
MONOCYTES NFR BLD AUTO: 4 % — SIGNIFICANT CHANGE UP (ref 2–14)
MONOCYTES NFR BLD AUTO: 4.5 % — SIGNIFICANT CHANGE UP (ref 2–14)
MONOCYTES NFR BLD AUTO: 6.9 % — SIGNIFICANT CHANGE UP (ref 2–14)
MRSA PCR RESULT.: SIGNIFICANT CHANGE UP
MRSA PCR RESULT.: SIGNIFICANT CHANGE UP
MSSA DNA SPEC QL NAA+PROBE: SIGNIFICANT CHANGE UP
MYELOCYTES NFR BLD: 16 % — HIGH (ref 0–0)
MYELOCYTES NFR BLD: 4 % — HIGH (ref 0–0)
MYELOCYTES NFR BLD: 4 % — HIGH (ref 0–0)
NEUTROPHILS # BLD AUTO: 10.44 K/UL — HIGH (ref 1.8–7.4)
NEUTROPHILS # BLD AUTO: 10.47 K/UL — HIGH (ref 1.8–7.4)
NEUTROPHILS # BLD AUTO: 2.92 K/UL — SIGNIFICANT CHANGE UP (ref 1.8–7.4)
NEUTROPHILS # BLD AUTO: 7.07 K/UL — SIGNIFICANT CHANGE UP (ref 1.8–7.4)
NEUTROPHILS # BLD AUTO: 7.56 K/UL — HIGH (ref 1.8–7.4)
NEUTROPHILS # BLD AUTO: 7.7 K/UL — HIGH (ref 1.8–7.4)
NEUTROPHILS # BLD AUTO: 8.21 K/UL — HIGH (ref 1.8–7.4)
NEUTROPHILS # BLD AUTO: 8.41 K/UL — HIGH (ref 1.8–7.4)
NEUTROPHILS # BLD AUTO: 9.5 K/UL — HIGH (ref 1.8–7.4)
NEUTROPHILS NFR BLD AUTO: 30 % — LOW (ref 43–77)
NEUTROPHILS NFR BLD AUTO: 67 % — SIGNIFICANT CHANGE UP (ref 43–77)
NEUTROPHILS NFR BLD AUTO: 70 % — SIGNIFICANT CHANGE UP (ref 43–77)
NEUTROPHILS NFR BLD AUTO: 74.1 % — SIGNIFICANT CHANGE UP (ref 43–77)
NEUTROPHILS NFR BLD AUTO: 76 % — SIGNIFICANT CHANGE UP (ref 43–77)
NEUTROPHILS NFR BLD AUTO: 80.9 % — HIGH (ref 43–77)
NEUTROPHILS NFR BLD AUTO: 84.2 % — HIGH (ref 43–77)
NEUTROPHILS NFR BLD AUTO: 86.8 % — HIGH (ref 43–77)
NEUTROPHILS NFR BLD AUTO: 87 % — HIGH (ref 43–77)
NEUTS BAND # BLD: 1 % — SIGNIFICANT CHANGE UP (ref 0–8)
NEUTS BAND # BLD: 2 % — SIGNIFICANT CHANGE UP (ref 0–8)
NEUTS BAND # BLD: 3 % — SIGNIFICANT CHANGE UP (ref 0–8)
NEUTS BAND NFR BLD: 1 % — SIGNIFICANT CHANGE UP (ref 0–8)
NEUTS BAND NFR BLD: 2 % — SIGNIFICANT CHANGE UP (ref 0–8)
NEUTS BAND NFR BLD: 3 % — SIGNIFICANT CHANGE UP (ref 0–8)
NITRITE UR-MCNC: NEGATIVE — SIGNIFICANT CHANGE UP
NRBC # BLD: 0 /100 WBCS — SIGNIFICANT CHANGE UP (ref 0–0)
NRBC BLD AUTO-RTO: 0 /100 WBCS — SIGNIFICANT CHANGE UP (ref 0–0)
NRBC BLD-RTO: 0 /100 WBCS — SIGNIFICANT CHANGE UP (ref 0–0)
NSE FLD-MCNC: 325.4 NG/ML — HIGH (ref 0–17.6)
NSE FLD-MCNC: 365.3 NG/ML — HIGH (ref 0–17.6)
OPIATES UR-MCNC: NEGATIVE — SIGNIFICANT CHANGE UP
ORGANISM # SPEC MICROSCOPIC CNT: ABNORMAL
OSMOLALITY UR: 479 MOS/KG — SIGNIFICANT CHANGE UP (ref 50–1200)
OVALOCYTES BLD QL SMEAR: SIGNIFICANT CHANGE UP
OVALOCYTES BLD QL SMEAR: SLIGHT — SIGNIFICANT CHANGE UP
OVALOCYTES BLD QL SMEAR: SLIGHT — SIGNIFICANT CHANGE UP
PCO2 BLDA: 37 MMHG — SIGNIFICANT CHANGE UP (ref 35–48)
PCO2 BLDA: 39 MMHG — SIGNIFICANT CHANGE UP (ref 35–48)
PCO2 BLDA: 41 MMHG — SIGNIFICANT CHANGE UP (ref 35–48)
PCO2 BLDA: 43 MMHG — SIGNIFICANT CHANGE UP (ref 35–48)
PCO2 BLDA: 47 MMHG — SIGNIFICANT CHANGE UP (ref 35–48)
PCO2 BLDA: 51 MMHG — HIGH (ref 35–48)
PCO2 BLDV: 52 MMHG — SIGNIFICANT CHANGE UP (ref 42–55)
PCP SPEC-MCNC: SIGNIFICANT CHANGE UP
PCP UR-MCNC: NEGATIVE — SIGNIFICANT CHANGE UP
PH BLDA: 7.25 — LOW (ref 7.35–7.45)
PH BLDA: 7.38 — SIGNIFICANT CHANGE UP (ref 7.35–7.45)
PH BLDA: 7.42 — SIGNIFICANT CHANGE UP (ref 7.35–7.45)
PH BLDA: 7.47 — HIGH (ref 7.35–7.45)
PH BLDA: 7.48 — HIGH (ref 7.35–7.45)
PH BLDA: 7.52 — HIGH (ref 7.35–7.45)
PH BLDV: 7.32 — SIGNIFICANT CHANGE UP (ref 7.32–7.43)
PH UR: 5 — SIGNIFICANT CHANGE UP (ref 5–8)
PH UR: 5.5 — SIGNIFICANT CHANGE UP (ref 5–8)
PH UR: 6 — SIGNIFICANT CHANGE UP (ref 5–8)
PH UR: 7.5 — SIGNIFICANT CHANGE UP (ref 5–8)
PHOSPHATE SERPL-MCNC: 2.4 MG/DL — LOW (ref 2.5–4.5)
PHOSPHATE SERPL-MCNC: 3 MG/DL — SIGNIFICANT CHANGE UP (ref 2.5–4.5)
PHOSPHATE SERPL-MCNC: 3 MG/DL — SIGNIFICANT CHANGE UP (ref 2.5–4.5)
PHOSPHATE SERPL-MCNC: 3.1 MG/DL — SIGNIFICANT CHANGE UP (ref 2.5–4.5)
PHOSPHATE SERPL-MCNC: 3.2 MG/DL — SIGNIFICANT CHANGE UP (ref 2.5–4.5)
PHOSPHATE SERPL-MCNC: 3.2 MG/DL — SIGNIFICANT CHANGE UP (ref 2.5–4.5)
PHOSPHATE SERPL-MCNC: 3.4 MG/DL — SIGNIFICANT CHANGE UP (ref 2.5–4.5)
PHOSPHATE SERPL-MCNC: 3.6 MG/DL — SIGNIFICANT CHANGE UP (ref 2.5–4.5)
PHOSPHATE SERPL-MCNC: 3.6 MG/DL — SIGNIFICANT CHANGE UP (ref 2.5–4.5)
PHOSPHATE SERPL-MCNC: 3.7 MG/DL — SIGNIFICANT CHANGE UP (ref 2.5–4.5)
PHOSPHATE SERPL-MCNC: 4.3 MG/DL — SIGNIFICANT CHANGE UP (ref 2.5–4.5)
PHOSPHATE SERPL-MCNC: 4.4 MG/DL — SIGNIFICANT CHANGE UP (ref 2.5–4.5)
PHOSPHATE SERPL-MCNC: 4.8 MG/DL — HIGH (ref 2.5–4.5)
PLAT MORPH BLD: NORMAL — SIGNIFICANT CHANGE UP
PLATELET # BLD AUTO: 102 K/UL — LOW (ref 150–400)
PLATELET # BLD AUTO: 117 K/UL — LOW (ref 150–400)
PLATELET # BLD AUTO: 167 K/UL — SIGNIFICANT CHANGE UP (ref 150–400)
PLATELET # BLD AUTO: 168 K/UL — SIGNIFICANT CHANGE UP (ref 150–400)
PLATELET # BLD AUTO: 174 K/UL — SIGNIFICANT CHANGE UP (ref 150–400)
PLATELET # BLD AUTO: 192 K/UL — SIGNIFICANT CHANGE UP (ref 150–400)
PLATELET # BLD AUTO: 79 K/UL — LOW (ref 150–400)
PLATELET # BLD AUTO: 86 K/UL — LOW (ref 150–400)
PLATELET # BLD AUTO: 87 K/UL — LOW (ref 150–400)
PLATELET # BLD AUTO: 89 K/UL — LOW (ref 150–400)
PLATELET # BLD AUTO: 90 K/UL — LOW (ref 150–400)
PLATELET # BLD AUTO: 97 K/UL — LOW (ref 150–400)
PLATELET COUNT - ESTIMATE: ABNORMAL
PLATELET COUNT - ESTIMATE: NORMAL — SIGNIFICANT CHANGE UP
PO2 BLDA: 126 MMHG — HIGH (ref 83–108)
PO2 BLDA: 140 MMHG — HIGH (ref 83–108)
PO2 BLDA: 164 MMHG — HIGH (ref 83–108)
PO2 BLDA: 275 MMHG — HIGH (ref 83–108)
PO2 BLDA: 69 MMHG — LOW (ref 83–108)
PO2 BLDA: 92 MMHG — SIGNIFICANT CHANGE UP (ref 83–108)
PO2 BLDV: 29 MMHG — SIGNIFICANT CHANGE UP
POIKILOCYTOSIS BLD QL AUTO: SLIGHT — SIGNIFICANT CHANGE UP
POTASSIUM BLDV-SCNC: 3.8 MMOL/L — SIGNIFICANT CHANGE UP (ref 3.5–5.1)
POTASSIUM SERPL-MCNC: 3 MMOL/L — LOW (ref 3.5–5.3)
POTASSIUM SERPL-MCNC: 3.1 MMOL/L — LOW (ref 3.5–5.3)
POTASSIUM SERPL-MCNC: 3.1 MMOL/L — LOW (ref 3.5–5.3)
POTASSIUM SERPL-MCNC: 3.3 MMOL/L — LOW (ref 3.5–5.3)
POTASSIUM SERPL-MCNC: 3.3 MMOL/L — LOW (ref 3.5–5.3)
POTASSIUM SERPL-MCNC: 3.4 MMOL/L — LOW (ref 3.5–5.3)
POTASSIUM SERPL-MCNC: 3.5 MMOL/L — SIGNIFICANT CHANGE UP (ref 3.5–5.3)
POTASSIUM SERPL-MCNC: 3.6 MMOL/L — SIGNIFICANT CHANGE UP (ref 3.5–5.3)
POTASSIUM SERPL-MCNC: 3.7 MMOL/L — SIGNIFICANT CHANGE UP (ref 3.5–5.3)
POTASSIUM SERPL-MCNC: 3.7 MMOL/L — SIGNIFICANT CHANGE UP (ref 3.5–5.3)
POTASSIUM SERPL-MCNC: 3.9 MMOL/L — SIGNIFICANT CHANGE UP (ref 3.5–5.3)
POTASSIUM SERPL-MCNC: 3.9 MMOL/L — SIGNIFICANT CHANGE UP (ref 3.5–5.3)
POTASSIUM SERPL-MCNC: 4 MMOL/L — SIGNIFICANT CHANGE UP (ref 3.5–5.3)
POTASSIUM SERPL-MCNC: 4 MMOL/L — SIGNIFICANT CHANGE UP (ref 3.5–5.3)
POTASSIUM SERPL-MCNC: 4.3 MMOL/L — SIGNIFICANT CHANGE UP (ref 3.5–5.3)
POTASSIUM SERPL-SCNC: 3 MMOL/L — LOW (ref 3.5–5.3)
POTASSIUM SERPL-SCNC: 3.1 MMOL/L — LOW (ref 3.5–5.3)
POTASSIUM SERPL-SCNC: 3.1 MMOL/L — LOW (ref 3.5–5.3)
POTASSIUM SERPL-SCNC: 3.3 MMOL/L — LOW (ref 3.5–5.3)
POTASSIUM SERPL-SCNC: 3.3 MMOL/L — LOW (ref 3.5–5.3)
POTASSIUM SERPL-SCNC: 3.4 MMOL/L — LOW (ref 3.5–5.3)
POTASSIUM SERPL-SCNC: 3.5 MMOL/L — SIGNIFICANT CHANGE UP (ref 3.5–5.3)
POTASSIUM SERPL-SCNC: 3.6 MMOL/L — SIGNIFICANT CHANGE UP (ref 3.5–5.3)
POTASSIUM SERPL-SCNC: 3.7 MMOL/L — SIGNIFICANT CHANGE UP (ref 3.5–5.3)
POTASSIUM SERPL-SCNC: 3.7 MMOL/L — SIGNIFICANT CHANGE UP (ref 3.5–5.3)
POTASSIUM SERPL-SCNC: 3.9 MMOL/L — SIGNIFICANT CHANGE UP (ref 3.5–5.3)
POTASSIUM SERPL-SCNC: 3.9 MMOL/L — SIGNIFICANT CHANGE UP (ref 3.5–5.3)
POTASSIUM SERPL-SCNC: 4 MMOL/L — SIGNIFICANT CHANGE UP (ref 3.5–5.3)
POTASSIUM SERPL-SCNC: 4 MMOL/L — SIGNIFICANT CHANGE UP (ref 3.5–5.3)
POTASSIUM SERPL-SCNC: 4.3 MMOL/L — SIGNIFICANT CHANGE UP (ref 3.5–5.3)
PROT ?TM UR-MCNC: 16 MG/DL — HIGH (ref 0–12)
PROT SERPL-MCNC: 5.4 G/DL — LOW (ref 6–8.3)
PROT SERPL-MCNC: 5.4 G/DL — LOW (ref 6–8.3)
PROT SERPL-MCNC: 5.5 G/DL — LOW (ref 6–8.3)
PROT SERPL-MCNC: 5.5 G/DL — LOW (ref 6–8.3)
PROT SERPL-MCNC: 5.7 G/DL — LOW (ref 6–8.3)
PROT SERPL-MCNC: 5.7 G/DL — LOW (ref 6–8.3)
PROT SERPL-MCNC: 5.8 G/DL — LOW (ref 6–8.3)
PROT SERPL-MCNC: 5.9 G/DL — LOW (ref 6–8.3)
PROT SERPL-MCNC: 6.1 G/DL — SIGNIFICANT CHANGE UP (ref 6–8.3)
PROT SERPL-MCNC: 6.2 G/DL — SIGNIFICANT CHANGE UP (ref 6–8.3)
PROT SERPL-MCNC: 6.2 G/DL — SIGNIFICANT CHANGE UP (ref 6–8.3)
PROT SERPL-MCNC: 6.3 G/DL — SIGNIFICANT CHANGE UP (ref 6–8.3)
PROT SERPL-MCNC: 6.4 G/DL — SIGNIFICANT CHANGE UP (ref 6–8.3)
PROT UR-MCNC: 100 MG/DL
PROT UR-MCNC: 100 MG/DL
PROT UR-MCNC: 30 MG/DL
PROT UR-MCNC: 30 MG/DL
PROT/CREAT UR-RTO: 0.7 RATIO — HIGH (ref 0–0.2)
PROTHROM AB SERPL-ACNC: 15.8 SEC — HIGH (ref 9.9–13.4)
PROTHROM AB SERPL-ACNC: 16 SEC — HIGH (ref 9.9–13.4)
PROTHROM AB SERPL-ACNC: 16.1 SEC — HIGH (ref 9.9–13.4)
PROTHROM AB SERPL-ACNC: 17.1 SEC — HIGH (ref 9.9–13.4)
RBC # BLD: 2.49 M/UL — LOW (ref 4.2–5.8)
RBC # BLD: 2.63 M/UL — LOW (ref 4.2–5.8)
RBC # BLD: 2.76 M/UL — LOW (ref 4.2–5.8)
RBC # BLD: 2.85 M/UL — LOW (ref 4.2–5.8)
RBC # BLD: 2.92 M/UL — LOW (ref 4.2–5.8)
RBC # BLD: 2.98 M/UL — LOW (ref 4.2–5.8)
RBC # BLD: 3.16 M/UL — LOW (ref 4.2–5.8)
RBC # BLD: 3.18 M/UL — LOW (ref 4.2–5.8)
RBC # BLD: 3.22 M/UL — LOW (ref 4.2–5.8)
RBC # BLD: 3.44 M/UL — LOW (ref 4.2–5.8)
RBC # BLD: 3.46 M/UL — LOW (ref 4.2–5.8)
RBC # BLD: 3.46 M/UL — LOW (ref 4.2–5.8)
RBC # FLD: 14.1 % — SIGNIFICANT CHANGE UP (ref 10.3–14.5)
RBC # FLD: 14.3 % — SIGNIFICANT CHANGE UP (ref 10.3–14.5)
RBC # FLD: 14.5 % — SIGNIFICANT CHANGE UP (ref 10.3–14.5)
RBC # FLD: 14.6 % — HIGH (ref 10.3–14.5)
RBC # FLD: 14.6 % — HIGH (ref 10.3–14.5)
RBC # FLD: 15 % — HIGH (ref 10.3–14.5)
RBC # FLD: 15 % — HIGH (ref 10.3–14.5)
RBC # FLD: 15.1 % — HIGH (ref 10.3–14.5)
RBC # FLD: 15.2 % — HIGH (ref 10.3–14.5)
RBC # FLD: 15.2 % — HIGH (ref 10.3–14.5)
RBC # FLD: 15.3 % — HIGH (ref 10.3–14.5)
RBC # FLD: 15.4 % — HIGH (ref 10.3–14.5)
RBC BLD AUTO: ABNORMAL
RBC BLD AUTO: NORMAL — SIGNIFICANT CHANGE UP
RBC BLD AUTO: NORMAL — SIGNIFICANT CHANGE UP
RBC CASTS # UR COMP ASSIST: 25 /HPF — HIGH (ref 0–4)
RBC CASTS # UR COMP ASSIST: 5 /HPF — HIGH (ref 0–4)
RBC CASTS # UR COMP ASSIST: 5 /HPF — HIGH (ref 0–4)
RBC CASTS # UR COMP ASSIST: ABNORMAL /HPF
RSV RNA NPH QL NAA+NON-PROBE: SIGNIFICANT CHANGE UP
S AUREUS DNA NOSE QL NAA+PROBE: DETECTED
S AUREUS DNA NOSE QL NAA+PROBE: SIGNIFICANT CHANGE UP
SAO2 % BLDA: 100 % — SIGNIFICANT CHANGE UP
SAO2 % BLDA: 95 % — SIGNIFICANT CHANGE UP
SAO2 % BLDA: 99 % — SIGNIFICANT CHANGE UP
SAO2 % BLDV: 41 % — SIGNIFICANT CHANGE UP
SARS-COV-2 RNA SPEC QL NAA+PROBE: SIGNIFICANT CHANGE UP
SODIUM SERPL-SCNC: 141 MMOL/L — SIGNIFICANT CHANGE UP (ref 135–145)
SODIUM SERPL-SCNC: 142 MMOL/L — SIGNIFICANT CHANGE UP (ref 135–145)
SODIUM SERPL-SCNC: 143 MMOL/L — SIGNIFICANT CHANGE UP (ref 135–145)
SODIUM SERPL-SCNC: 143 MMOL/L — SIGNIFICANT CHANGE UP (ref 135–145)
SODIUM UR-SCNC: 147 MMOL/L — SIGNIFICANT CHANGE UP
SOURCE RESPIRATORY: SIGNIFICANT CHANGE UP
SOURCE RESPIRATORY: SIGNIFICANT CHANGE UP
SP GR SPEC: 1.02 — SIGNIFICANT CHANGE UP (ref 1–1.03)
SP GR SPEC: 1.03 — HIGH (ref 1–1.03)
SP GR SPEC: 1.03 — SIGNIFICANT CHANGE UP (ref 1–1.03)
SP GR SPEC: 1.04 — HIGH (ref 1–1.03)
SPECIMEN SOURCE: SIGNIFICANT CHANGE UP
THC UR QL: NEGATIVE — SIGNIFICANT CHANGE UP
TROPONIN I, HIGH SENSITIVITY RESULT: 1633.5 NG/L — HIGH
TROPONIN I, HIGH SENSITIVITY RESULT: 6090.2 NG/L — HIGH
TROPONIN I, HIGH SENSITIVITY RESULT: 65.3 NG/L — SIGNIFICANT CHANGE UP
TROPONIN I, HIGH SENSITIVITY RESULT: 7459.4 NG/L — HIGH
TROPONIN I, HIGH SENSITIVITY RESULT: 8799.9 NG/L — HIGH
TSH SERPL-MCNC: 3.66 UU/ML — SIGNIFICANT CHANGE UP (ref 0.34–4.82)
UROBILINOGEN FLD QL: 0.2 MG/DL — SIGNIFICANT CHANGE UP (ref 0.2–1)
UROBILINOGEN FLD QL: 1 MG/DL — SIGNIFICANT CHANGE UP (ref 0.2–1)
UUN UR-MCNC: 1104 MG/DL — SIGNIFICANT CHANGE UP
VARIANT LYMPHS # BLD: 1 % — SIGNIFICANT CHANGE UP (ref 0–6)
VARIANT LYMPHS NFR BLD MANUAL: 1 % — SIGNIFICANT CHANGE UP (ref 0–6)
WBC # BLD: 10.15 K/UL — SIGNIFICANT CHANGE UP (ref 3.8–10.5)
WBC # BLD: 10.2 K/UL — SIGNIFICANT CHANGE UP (ref 3.8–10.5)
WBC # BLD: 10.94 K/UL — HIGH (ref 3.8–10.5)
WBC # BLD: 12 K/UL — HIGH (ref 3.8–10.5)
WBC # BLD: 12.02 K/UL — HIGH (ref 3.8–10.5)
WBC # BLD: 13.78 K/UL — HIGH (ref 3.8–10.5)
WBC # BLD: 5.93 K/UL — SIGNIFICANT CHANGE UP (ref 3.8–10.5)
WBC # BLD: 7.14 K/UL — SIGNIFICANT CHANGE UP (ref 3.8–10.5)
WBC # BLD: 8.69 K/UL — SIGNIFICANT CHANGE UP (ref 3.8–10.5)
WBC # BLD: 9.15 K/UL — SIGNIFICANT CHANGE UP (ref 3.8–10.5)
WBC # BLD: 9.42 K/UL — SIGNIFICANT CHANGE UP (ref 3.8–10.5)
WBC # BLD: 9.82 K/UL — SIGNIFICANT CHANGE UP (ref 3.8–10.5)
WBC # FLD AUTO: 10.15 K/UL — SIGNIFICANT CHANGE UP (ref 3.8–10.5)
WBC # FLD AUTO: 10.2 K/UL — SIGNIFICANT CHANGE UP (ref 3.8–10.5)
WBC # FLD AUTO: 10.94 K/UL — HIGH (ref 3.8–10.5)
WBC # FLD AUTO: 12 K/UL — HIGH (ref 3.8–10.5)
WBC # FLD AUTO: 12.02 K/UL — HIGH (ref 3.8–10.5)
WBC # FLD AUTO: 13.78 K/UL — HIGH (ref 3.8–10.5)
WBC # FLD AUTO: 5.93 K/UL — SIGNIFICANT CHANGE UP (ref 3.8–10.5)
WBC # FLD AUTO: 7.14 K/UL — SIGNIFICANT CHANGE UP (ref 3.8–10.5)
WBC # FLD AUTO: 8.69 K/UL — SIGNIFICANT CHANGE UP (ref 3.8–10.5)
WBC # FLD AUTO: 9.15 K/UL — SIGNIFICANT CHANGE UP (ref 3.8–10.5)
WBC # FLD AUTO: 9.42 K/UL — SIGNIFICANT CHANGE UP (ref 3.8–10.5)
WBC # FLD AUTO: 9.82 K/UL — SIGNIFICANT CHANGE UP (ref 3.8–10.5)
WBC UR QL: 0 /HPF — SIGNIFICANT CHANGE UP (ref 0–5)
WBC UR QL: 0 /HPF — SIGNIFICANT CHANGE UP (ref 0–5)
WBC UR QL: 3 /HPF — SIGNIFICANT CHANGE UP (ref 0–5)
WBC UR QL: ABNORMAL /HPF (ref 0–5)

## 2025-01-01 PROCEDURE — 95957 EEG DIGITAL ANALYSIS: CPT

## 2025-01-01 PROCEDURE — 99291 CRITICAL CARE FIRST HOUR: CPT | Mod: GC

## 2025-01-01 PROCEDURE — 82553 CREATINE MB FRACTION: CPT

## 2025-01-01 PROCEDURE — 87641 MR-STAPH DNA AMP PROBE: CPT

## 2025-01-01 PROCEDURE — 85730 THROMBOPLASTIN TIME PARTIAL: CPT

## 2025-01-01 PROCEDURE — 87186 SC STD MICRODIL/AGAR DIL: CPT

## 2025-01-01 PROCEDURE — 71045 X-RAY EXAM CHEST 1 VIEW: CPT | Mod: 26

## 2025-01-01 PROCEDURE — 82570 ASSAY OF URINE CREATININE: CPT

## 2025-01-01 PROCEDURE — 85610 PROTHROMBIN TIME: CPT

## 2025-01-01 PROCEDURE — 85027 COMPLETE CBC AUTOMATED: CPT

## 2025-01-01 PROCEDURE — 83036 HEMOGLOBIN GLYCOSYLATED A1C: CPT

## 2025-01-01 PROCEDURE — 82962 GLUCOSE BLOOD TEST: CPT

## 2025-01-01 PROCEDURE — C8929: CPT

## 2025-01-01 PROCEDURE — 86905 BLOOD TYPING RBC ANTIGENS: CPT

## 2025-01-01 PROCEDURE — 80053 COMPREHEN METABOLIC PANEL: CPT

## 2025-01-01 PROCEDURE — 94003 VENT MGMT INPAT SUBQ DAY: CPT

## 2025-01-01 PROCEDURE — 95816 EEG AWAKE AND DROWSY: CPT

## 2025-01-01 PROCEDURE — 84540 ASSAY OF URINE/UREA-N: CPT

## 2025-01-01 PROCEDURE — 95716 VEEG EA 12-26HR CONT MNTR: CPT

## 2025-01-01 PROCEDURE — 84132 ASSAY OF SERUM POTASSIUM: CPT

## 2025-01-01 PROCEDURE — 99255 IP/OBS CONSLTJ NEW/EST HI 80: CPT

## 2025-01-01 PROCEDURE — 36430 TRANSFUSION BLD/BLD COMPNT: CPT

## 2025-01-01 PROCEDURE — 83605 ASSAY OF LACTIC ACID: CPT

## 2025-01-01 PROCEDURE — 84295 ASSAY OF SERUM SODIUM: CPT

## 2025-01-01 PROCEDURE — 83935 ASSAY OF URINE OSMOLALITY: CPT

## 2025-01-01 PROCEDURE — 82947 ASSAY GLUCOSE BLOOD QUANT: CPT

## 2025-01-01 PROCEDURE — 99233 SBSQ HOSP IP/OBS HIGH 50: CPT

## 2025-01-01 PROCEDURE — 74176 CT ABD & PELVIS W/O CONTRAST: CPT

## 2025-01-01 PROCEDURE — 87150 DNA/RNA AMPLIFIED PROBE: CPT

## 2025-01-01 PROCEDURE — 84100 ASSAY OF PHOSPHORUS: CPT

## 2025-01-01 PROCEDURE — 71250 CT THORAX DX C-: CPT | Mod: 26

## 2025-01-01 PROCEDURE — 83735 ASSAY OF MAGNESIUM: CPT

## 2025-01-01 PROCEDURE — 94002 VENT MGMT INPAT INIT DAY: CPT

## 2025-01-01 PROCEDURE — 82550 ASSAY OF CK (CPK): CPT

## 2025-01-01 PROCEDURE — 86850 RBC ANTIBODY SCREEN: CPT

## 2025-01-01 PROCEDURE — 83520 IMMUNOASSAY QUANT NOS NONAB: CPT

## 2025-01-01 PROCEDURE — 94640 AIRWAY INHALATION TREATMENT: CPT

## 2025-01-01 PROCEDURE — 86703 HIV-1/HIV-2 1 RESULT ANTBDY: CPT

## 2025-01-01 PROCEDURE — 83690 ASSAY OF LIPASE: CPT

## 2025-01-01 PROCEDURE — 84300 ASSAY OF URINE SODIUM: CPT

## 2025-01-01 PROCEDURE — 36415 COLL VENOUS BLD VENIPUNCTURE: CPT

## 2025-01-01 PROCEDURE — 95710 EEG W/O VID EA 12-26HR CONT: CPT

## 2025-01-01 PROCEDURE — 86901 BLOOD TYPING SEROLOGIC RH(D): CPT

## 2025-01-01 PROCEDURE — 87070 CULTURE OTHR SPECIMN AEROBIC: CPT

## 2025-01-01 PROCEDURE — 86900 BLOOD TYPING SEROLOGIC ABO: CPT

## 2025-01-01 PROCEDURE — 87077 CULTURE AEROBIC IDENTIFY: CPT

## 2025-01-01 PROCEDURE — 81001 URINALYSIS AUTO W/SCOPE: CPT

## 2025-01-01 PROCEDURE — 99232 SBSQ HOSP IP/OBS MODERATE 35: CPT

## 2025-01-01 PROCEDURE — 80307 DRUG TEST PRSMV CHEM ANLYZR: CPT

## 2025-01-01 PROCEDURE — 93005 ELECTROCARDIOGRAM TRACING: CPT

## 2025-01-01 PROCEDURE — 93306 TTE W/DOPPLER COMPLETE: CPT | Mod: 26

## 2025-01-01 PROCEDURE — 85025 COMPLETE CBC W/AUTO DIFF WBC: CPT

## 2025-01-01 PROCEDURE — 74176 CT ABD & PELVIS W/O CONTRAST: CPT | Mod: 26

## 2025-01-01 PROCEDURE — 84484 ASSAY OF TROPONIN QUANT: CPT

## 2025-01-01 PROCEDURE — 84156 ASSAY OF PROTEIN URINE: CPT

## 2025-01-01 PROCEDURE — 93010 ELECTROCARDIOGRAM REPORT: CPT

## 2025-01-01 PROCEDURE — 87040 BLOOD CULTURE FOR BACTERIA: CPT

## 2025-01-01 PROCEDURE — 70450 CT HEAD/BRAIN W/O DYE: CPT | Mod: 26

## 2025-01-01 PROCEDURE — 71045 X-RAY EXAM CHEST 1 VIEW: CPT

## 2025-01-01 PROCEDURE — 99291 CRITICAL CARE FIRST HOUR: CPT

## 2025-01-01 PROCEDURE — 99291 CRITICAL CARE FIRST HOUR: CPT | Mod: 25

## 2025-01-01 PROCEDURE — 82803 BLOOD GASES ANY COMBINATION: CPT

## 2025-01-01 PROCEDURE — 87637 SARSCOV2&INF A&B&RSV AMP PRB: CPT

## 2025-01-01 PROCEDURE — P9040: CPT

## 2025-01-01 PROCEDURE — 82150 ASSAY OF AMYLASE: CPT

## 2025-01-01 PROCEDURE — C9254: CPT

## 2025-01-01 PROCEDURE — 82248 BILIRUBIN DIRECT: CPT

## 2025-01-01 PROCEDURE — 80048 BASIC METABOLIC PNL TOTAL CA: CPT

## 2025-01-01 PROCEDURE — 87640 STAPH A DNA AMP PROBE: CPT

## 2025-01-01 PROCEDURE — 95816 EEG AWAKE AND DROWSY: CPT | Mod: 26

## 2025-01-01 PROCEDURE — 96375 TX/PRO/DX INJ NEW DRUG ADDON: CPT

## 2025-01-01 PROCEDURE — 96365 THER/PROPH/DIAG IV INF INIT: CPT

## 2025-01-01 PROCEDURE — 71250 CT THORAX DX C-: CPT

## 2025-01-01 PROCEDURE — 70450 CT HEAD/BRAIN W/O DYE: CPT

## 2025-01-01 PROCEDURE — C1889: CPT

## 2025-01-01 PROCEDURE — 95813 EEG EXTND MNTR 61-119 MIN: CPT | Mod: 26

## 2025-01-01 PROCEDURE — 87205 SMEAR GRAM STAIN: CPT

## 2025-01-01 PROCEDURE — 95720 EEG PHY/QHP EA INCR W/VEEG: CPT

## 2025-01-01 PROCEDURE — 31500 INSERT EMERGENCY AIRWAY: CPT

## 2025-01-01 PROCEDURE — 94760 N-INVAS EAR/PLS OXIMETRY 1: CPT

## 2025-01-01 PROCEDURE — 92950 HEART/LUNG RESUSCITATION CPR: CPT

## 2025-01-01 PROCEDURE — 70450 CT HEAD/BRAIN W/O DYE: CPT | Mod: 26,76

## 2025-01-01 PROCEDURE — 86923 COMPATIBILITY TEST ELECTRIC: CPT

## 2025-01-01 PROCEDURE — 84443 ASSAY THYROID STIM HORMONE: CPT

## 2025-01-01 PROCEDURE — 99285 EMERGENCY DEPT VISIT HI MDM: CPT

## 2025-01-01 DEVICE — STAPLER COVIDIEN TRI-STAPLE CURVED 45MM GRAY RELOAD: Type: IMPLANTABLE DEVICE | Status: FUNCTIONAL

## 2025-01-01 DEVICE — CLIP APPLIER COVIDIEN SURGICLIP 13" LARGE: Type: IMPLANTABLE DEVICE | Status: FUNCTIONAL

## 2025-01-01 DEVICE — IMPLANTABLE DEVICE: Type: IMPLANTABLE DEVICE | Status: FUNCTIONAL

## 2025-01-01 DEVICE — STAPLER COVIDIEN ENDO GIA 80-3.8MM BLUE: Type: IMPLANTABLE DEVICE | Status: FUNCTIONAL

## 2025-01-01 DEVICE — STAPLER COVIDIEN TRI-STAPLE 60MM PURPLE RELOAD: Type: IMPLANTABLE DEVICE | Status: FUNCTIONAL

## 2025-01-01 RX ORDER — BISACODYL 5 MG
10 TABLET, DELAYED RELEASE (ENTERIC COATED) ORAL DAILY
Refills: 0 | Status: DISCONTINUED | OUTPATIENT
Start: 2025-01-01 | End: 2025-01-04

## 2025-01-01 RX ORDER — HEPARIN SODIUM 1000 [USP'U]/ML
7500 INJECTION, SOLUTION INTRAVENOUS; SUBCUTANEOUS EVERY 6 HOURS
Refills: 0 | Status: DISCONTINUED | OUTPATIENT
Start: 2025-01-01 | End: 2025-01-02

## 2025-01-01 RX ORDER — SODIUM CHLORIDE 9 G/1000ML
500 INJECTION, SOLUTION INTRAVENOUS ONCE
Refills: 0 | Status: COMPLETED | OUTPATIENT
Start: 2025-01-01 | End: 2025-01-01

## 2025-01-01 RX ORDER — ACETAMINOPHEN 500 MG/5ML
1000 LIQUID (ML) ORAL ONCE
Refills: 0 | Status: COMPLETED | OUTPATIENT
Start: 2025-01-01 | End: 2025-01-01

## 2025-01-01 RX ORDER — LACOSAMIDE 150 MG/1
200 TABLET, FILM COATED ORAL
Refills: 0 | Status: DISCONTINUED | OUTPATIENT
Start: 2025-01-01 | End: 2025-01-01

## 2025-01-01 RX ORDER — ACETAMINOPHEN 80 MG/.8ML
1000 SOLUTION/ DROPS ORAL ONCE
Refills: 0 | Status: COMPLETED | OUTPATIENT
Start: 2025-01-01 | End: 2025-01-01

## 2025-01-01 RX ORDER — LEVETIRACETAM 10 MG/ML
1500 INJECTION, SOLUTION INTRAVENOUS
Refills: 0 | Status: DISCONTINUED | OUTPATIENT
Start: 2025-01-01 | End: 2025-01-01

## 2025-01-01 RX ORDER — LACOSAMIDE 100 MG/1
200 TABLET, FILM COATED ORAL
Refills: 0 | Status: DISCONTINUED | OUTPATIENT
Start: 2025-01-01 | End: 2025-01-01

## 2025-01-01 RX ORDER — CEFTRIAXONE SODIUM 1 G/1
1000 INJECTION, POWDER, FOR SOLUTION INTRAMUSCULAR; INTRAVENOUS EVERY 24 HOURS
Refills: 0 | Status: DISCONTINUED | OUTPATIENT
Start: 2025-01-01 | End: 2025-01-01

## 2025-01-01 RX ORDER — LEVETIRACETAM 100 MG/ML
2000 SOLUTION ORAL
Refills: 0 | Status: DISCONTINUED | OUTPATIENT
Start: 2025-01-01 | End: 2025-01-02

## 2025-01-01 RX ORDER — MUPIROCIN CALCIUM 20 MG/G
1 CREAM TOPICAL EVERY 8 HOURS
Refills: 0 | Status: DISCONTINUED | OUTPATIENT
Start: 2025-01-01 | End: 2025-01-01

## 2025-01-01 RX ORDER — FOLIC ACID 1 MG/1
1 TABLET ORAL DAILY
Refills: 0 | Status: DISCONTINUED | OUTPATIENT
Start: 2025-01-01 | End: 2025-01-01

## 2025-01-01 RX ORDER — NALOXONE HCL 0.4 MG/ML
0.4 VIAL (ML) INJECTION ONCE
Refills: 0 | Status: DISCONTINUED | OUTPATIENT
Start: 2025-01-01 | End: 2025-01-13

## 2025-01-01 RX ORDER — DIAZEPAM 5 MG/1
5 TABLET ORAL ONCE
Refills: 0 | Status: DISCONTINUED | OUTPATIENT
Start: 2025-01-01 | End: 2025-01-01

## 2025-01-01 RX ORDER — HYDROMORPHONE/SOD CHLOR,ISO/PF 2 MG/10 ML
1 SYRINGE (ML) INJECTION ONCE
Refills: 0 | Status: DISCONTINUED | OUTPATIENT
Start: 2025-01-01 | End: 2025-01-01

## 2025-01-01 RX ORDER — HEPARIN SODIUM 1000 [USP'U]/ML
3500 INJECTION, SOLUTION INTRAVENOUS; SUBCUTANEOUS EVERY 6 HOURS
Refills: 0 | Status: DISCONTINUED | OUTPATIENT
Start: 2025-01-01 | End: 2025-01-02

## 2025-01-01 RX ORDER — NOREPINEPHRINE BITARTRATE 8 MG
0.05 SOLUTION INTRAVENOUS
Qty: 8 | Refills: 0 | Status: DISCONTINUED | OUTPATIENT
Start: 2025-01-01 | End: 2025-01-01

## 2025-01-01 RX ORDER — CLONAZEPAM 0.5 MG/1
1 TABLET ORAL EVERY 8 HOURS
Refills: 0 | Status: DISCONTINUED | OUTPATIENT
Start: 2025-01-01 | End: 2025-01-01

## 2025-01-01 RX ORDER — MORPHINE SULFATE 15 MG
2 TABLET, EXTENDED RELEASE ORAL EVERY 4 HOURS
Refills: 0 | Status: DISCONTINUED | OUTPATIENT
Start: 2025-01-01 | End: 2025-01-01

## 2025-01-01 RX ORDER — LANOLIN/MINERAL OIL/PETROLATUM
1 OINTMENT (GRAM) OPHTHALMIC (EYE)
Refills: 0 | Status: DISCONTINUED | OUTPATIENT
Start: 2025-01-01 | End: 2025-01-01

## 2025-01-01 RX ORDER — HYDROMORPHONE/SOD CHLOR,ISO/PF 2 MG/10 ML
2 SYRINGE (ML) INJECTION
Refills: 0 | Status: DISCONTINUED | OUTPATIENT
Start: 2025-01-01 | End: 2025-01-01

## 2025-01-01 RX ORDER — VITAMIN A 10000 UNIT
1 TABLET ORAL DAILY
Refills: 0 | Status: DISCONTINUED | OUTPATIENT
Start: 2025-01-01 | End: 2025-01-13

## 2025-01-01 RX ORDER — PIPERACILLIN-TAZO-DEXTROSE,ISO 3.375G/5
3.38 IV SOLUTION, PIGGYBACK PREMIX FROZEN(ML) INTRAVENOUS ONCE
Refills: 0 | Status: COMPLETED | OUTPATIENT
Start: 2025-01-01 | End: 2025-01-01

## 2025-01-01 RX ORDER — GLYCOPYRROLATE 0.2 MG/ML
0.4 INJECTION INTRAMUSCULAR; INTRAVENOUS EVERY 6 HOURS
Refills: 0 | Status: DISCONTINUED | OUTPATIENT
Start: 2025-01-01 | End: 2025-01-01

## 2025-01-01 RX ORDER — MIDAZOLAM IN 0.9 % SOD.CHLORID 1 MG/ML
4 PLASTIC BAG, INJECTION (ML) INTRAVENOUS ONCE
Refills: 0 | Status: DISCONTINUED | OUTPATIENT
Start: 2025-01-01 | End: 2025-01-01

## 2025-01-01 RX ORDER — ACETAMINOPHEN 80 MG/.8ML
1000 SOLUTION/ DROPS ORAL EVERY 8 HOURS
Refills: 0 | Status: DISCONTINUED | OUTPATIENT
Start: 2025-01-01 | End: 2025-01-01

## 2025-01-01 RX ORDER — SENNOSIDES 8.6 MG/1
2 TABLET, FILM COATED ORAL AT BEDTIME
Refills: 0 | Status: DISCONTINUED | OUTPATIENT
Start: 2025-01-01 | End: 2025-01-01

## 2025-01-01 RX ORDER — ESCITALOPRAM OXALATE 20 MG/1
10 TABLET ORAL DAILY
Refills: 0 | Status: DISCONTINUED | OUTPATIENT
Start: 2025-01-01 | End: 2025-01-01

## 2025-01-01 RX ORDER — PIPERACILLIN-TAZO-DEXTROSE,ISO 3.375G/5
3.38 IV SOLUTION, PIGGYBACK PREMIX FROZEN(ML) INTRAVENOUS ONCE
Refills: 0 | Status: DISCONTINUED | OUTPATIENT
Start: 2025-01-01 | End: 2025-01-01

## 2025-01-01 RX ORDER — DEXTROMETHORPHAN HBR, GUAIFENESIN 200 MG/10ML
100 LIQUID ORAL EVERY 6 HOURS
Refills: 0 | Status: DISCONTINUED | OUTPATIENT
Start: 2025-01-01 | End: 2025-01-01

## 2025-01-01 RX ORDER — POLYSORBATE 80 100 MG/10ML
1 SOLUTION/ DROPS OPHTHALMIC DAILY
Refills: 0 | Status: DISCONTINUED | OUTPATIENT
Start: 2025-01-01 | End: 2025-01-01

## 2025-01-01 RX ORDER — ONDANSETRON 4 MG/1
4 TABLET ORAL EVERY 8 HOURS
Refills: 0 | Status: DISCONTINUED | OUTPATIENT
Start: 2025-01-01 | End: 2025-01-13

## 2025-01-01 RX ORDER — ATORVASTATIN CALCIUM 40 MG/1
80 TABLET, FILM COATED ORAL AT BEDTIME
Refills: 0 | Status: DISCONTINUED | OUTPATIENT
Start: 2025-01-01 | End: 2025-01-13

## 2025-01-01 RX ORDER — IPRATROPIUM BROMIDE AND ALBUTEROL SULFATE .5; 2.5 MG/3ML; MG/3ML
3 SOLUTION RESPIRATORY (INHALATION) ONCE
Refills: 0 | Status: COMPLETED | OUTPATIENT
Start: 2025-01-01 | End: 2025-01-01

## 2025-01-01 RX ORDER — ATORVASTATIN CALCIUM 80 MG/1
80 TABLET, FILM COATED ORAL AT BEDTIME
Refills: 0 | Status: DISCONTINUED | OUTPATIENT
Start: 2025-01-01 | End: 2025-01-01

## 2025-01-01 RX ORDER — B1/B2/B3/B5/B6/B12/VIT C/FOLIC 500-0.5 MG
1 TABLET ORAL DAILY
Refills: 0 | Status: DISCONTINUED | OUTPATIENT
Start: 2025-01-01 | End: 2025-01-01

## 2025-01-01 RX ORDER — LACOSAMIDE 150 MG/1
150 TABLET, FILM COATED ORAL
Refills: 0 | Status: DISCONTINUED | OUTPATIENT
Start: 2025-01-01 | End: 2025-01-01

## 2025-01-01 RX ORDER — FENTANYL CITRATE-0.9 % NACL/PF 100MCG/2ML
25 SYRINGE (ML) INTRAVENOUS EVERY 6 HOURS
Refills: 0 | Status: DISCONTINUED | OUTPATIENT
Start: 2025-01-01 | End: 2025-01-01

## 2025-01-01 RX ORDER — GLYCOPYRROLATE 0.2 MG/ML
0.4 INJECTION INTRAMUSCULAR; INTRAVENOUS ONCE
Refills: 0 | Status: COMPLETED | OUTPATIENT
Start: 2025-01-01 | End: 2025-01-01

## 2025-01-01 RX ORDER — HYDROMORPHONE/SOD CHLOR,ISO/PF 2 MG/10 ML
0.5 SYRINGE (ML) INJECTION ONCE
Refills: 0 | Status: DISCONTINUED | OUTPATIENT
Start: 2025-01-01 | End: 2025-01-01

## 2025-01-01 RX ORDER — SODIUM CHLORIDE 9 G/1000ML
1000 INJECTION, SOLUTION INTRAVENOUS ONCE
Refills: 0 | Status: COMPLETED | OUTPATIENT
Start: 2025-01-01 | End: 2025-01-01

## 2025-01-01 RX ORDER — LORAZEPAM 4 MG/ML
4 VIAL (ML) INJECTION ONCE
Refills: 0 | Status: DISCONTINUED | OUTPATIENT
Start: 2025-01-01 | End: 2025-01-01

## 2025-01-01 RX ORDER — DIAZEPAM 5 MG/1
10 TABLET ORAL ONCE
Refills: 0 | Status: DISCONTINUED | OUTPATIENT
Start: 2025-01-01 | End: 2025-01-01

## 2025-01-01 RX ORDER — GINKGO BILOBA 40 MG
5 CAPSULE ORAL AT BEDTIME
Refills: 0 | Status: DISCONTINUED | OUTPATIENT
Start: 2025-01-01 | End: 2025-01-13

## 2025-01-01 RX ORDER — PIPERACILLIN-TAZO-DEXTROSE,ISO 3.375G/5
3.38 IV SOLUTION, PIGGYBACK PREMIX FROZEN(ML) INTRAVENOUS EVERY 8 HOURS
Refills: 0 | Status: DISCONTINUED | OUTPATIENT
Start: 2025-01-01 | End: 2025-01-01

## 2025-01-01 RX ORDER — LISINOPRIL 30 MG/1
2.5 TABLET ORAL DAILY
Refills: 0 | Status: DISCONTINUED | OUTPATIENT
Start: 2025-01-01 | End: 2025-01-03

## 2025-01-01 RX ORDER — SODIUM CHLORIDE 9 MG/ML
1000 INJECTION, SOLUTION INTRAVENOUS
Refills: 0 | Status: DISCONTINUED | OUTPATIENT
Start: 2025-01-01 | End: 2025-01-03

## 2025-01-01 RX ORDER — CLONAZEPAM 0.5 MG/1
0.5 TABLET ORAL EVERY 8 HOURS
Refills: 0 | Status: DISCONTINUED | OUTPATIENT
Start: 2025-01-01 | End: 2025-01-01

## 2025-01-01 RX ORDER — HEPARIN SODIUM 1000 [USP'U]/ML
7500 INJECTION, SOLUTION INTRAVENOUS; SUBCUTANEOUS ONCE
Refills: 0 | Status: COMPLETED | OUTPATIENT
Start: 2025-01-01 | End: 2025-01-01

## 2025-01-01 RX ORDER — VANCOMYCIN HCL IN 5 % DEXTROSE 1.5G/250ML
1000 PLASTIC BAG, INJECTION (ML) INTRAVENOUS EVERY 12 HOURS
Refills: 0 | Status: DISCONTINUED | OUTPATIENT
Start: 2025-01-01 | End: 2025-01-01

## 2025-01-01 RX ORDER — AZITHROMYCIN MONOHYDRATE 200 MG/5ML
500 POWDER, FOR SUSPENSION ORAL EVERY 24 HOURS
Refills: 0 | Status: COMPLETED | OUTPATIENT
Start: 2025-01-01 | End: 2025-01-02

## 2025-01-01 RX ORDER — POTASSIUM PHOSPHATE, MONOBASIC POTASSIUM PHOSPHATE, DIBASIC INJECTION, 236; 224 MG/ML; MG/ML
15 SOLUTION, CONCENTRATE INTRAVENOUS ONCE
Refills: 0 | Status: COMPLETED | OUTPATIENT
Start: 2025-01-01 | End: 2025-01-01

## 2025-01-01 RX ORDER — VANCOMYCIN HCL IN 5 % DEXTROSE 1.5G/250ML
1000 PLASTIC BAG, INJECTION (ML) INTRAVENOUS ONCE
Refills: 0 | Status: COMPLETED | OUTPATIENT
Start: 2025-01-01 | End: 2025-01-01

## 2025-01-01 RX ORDER — POLYETHYLENE GLYCOL 3350 17 G/DOSE
17 POWDER (GRAM) ORAL DAILY
Refills: 0 | Status: DISCONTINUED | OUTPATIENT
Start: 2025-01-01 | End: 2025-01-01

## 2025-01-01 RX ORDER — FUROSEMIDE 10 MG/ML
40 INJECTION INTRAMUSCULAR; INTRAVENOUS ONCE
Refills: 0 | Status: COMPLETED | OUTPATIENT
Start: 2025-01-01 | End: 2025-01-01

## 2025-01-01 RX ORDER — DIAZEPAM 5 MG/1
5 TABLET ORAL
Refills: 0 | Status: DISCONTINUED | OUTPATIENT
Start: 2025-01-01 | End: 2025-01-01

## 2025-01-01 RX ORDER — ESCITALOPRAM OXALATE 20 MG/1
1 TABLET ORAL
Refills: 0 | DISCHARGE

## 2025-01-01 RX ORDER — IPRATROPIUM BROMIDE AND ALBUTEROL SULFATE .5; 2.5 MG/3ML; MG/3ML
3 SOLUTION RESPIRATORY (INHALATION) EVERY 6 HOURS
Refills: 0 | Status: DISCONTINUED | OUTPATIENT
Start: 2025-01-01 | End: 2025-01-13

## 2025-01-01 RX ORDER — IPRATROPIUM BROMIDE AND ALBUTEROL SULFATE .5; 2.5 MG/3ML; MG/3ML
3 SOLUTION RESPIRATORY (INHALATION)
Refills: 0 | DISCHARGE

## 2025-01-01 RX ORDER — SENNOSIDES 8.6 MG/1
10 TABLET, FILM COATED ORAL AT BEDTIME
Refills: 0 | Status: DISCONTINUED | OUTPATIENT
Start: 2025-01-01 | End: 2025-01-13

## 2025-01-01 RX ORDER — VANCOMYCIN HCL IN 5 % DEXTROSE 1.5G/250ML
PLASTIC BAG, INJECTION (ML) INTRAVENOUS
Refills: 0 | Status: DISCONTINUED | OUTPATIENT
Start: 2025-01-01 | End: 2025-01-01

## 2025-01-01 RX ORDER — CYCLOBENZAPRINE HCL 10 MG
5 TABLET ORAL EVERY 8 HOURS
Refills: 0 | Status: DISCONTINUED | OUTPATIENT
Start: 2025-01-01 | End: 2025-01-03

## 2025-01-01 RX ORDER — ACETAMINOPHEN 80 MG/.8ML
650 SOLUTION/ DROPS ORAL EVERY 6 HOURS
Refills: 0 | Status: DISCONTINUED | OUTPATIENT
Start: 2025-01-01 | End: 2025-01-01

## 2025-01-01 RX ORDER — SCOPOLAMINE 1.5 MG/1
1 PATCH, EXTENDED RELEASE TRANSDERMAL
Refills: 0 | Status: DISCONTINUED | OUTPATIENT
Start: 2025-01-01 | End: 2025-01-01

## 2025-01-01 RX ORDER — MIDAZOLAM IN 0.9 % SOD.CHLORID 1 MG/ML
0.12 PLASTIC BAG, INJECTION (ML) INTRAVENOUS
Qty: 100 | Refills: 0 | Status: DISCONTINUED | OUTPATIENT
Start: 2025-01-01 | End: 2025-01-01

## 2025-01-01 RX ORDER — CLONAZEPAM 0.5 MG/1
2 TABLET ORAL EVERY 8 HOURS
Refills: 0 | Status: DISCONTINUED | OUTPATIENT
Start: 2025-01-01 | End: 2025-01-01

## 2025-01-01 RX ORDER — CEFTRIAXONE SODIUM 1 G/1
1000 INJECTION, POWDER, FOR SOLUTION INTRAMUSCULAR; INTRAVENOUS EVERY 24 HOURS
Refills: 0 | Status: COMPLETED | OUTPATIENT
Start: 2025-01-01 | End: 2025-01-03

## 2025-01-01 RX ORDER — ASCORBIC ACID 1000 MG
500 TABLET ORAL DAILY
Refills: 0 | Status: DISCONTINUED | OUTPATIENT
Start: 2025-01-01 | End: 2025-01-13

## 2025-01-01 RX ORDER — MAGNESIUM SULFATE 500 MG/ML
2 SYRINGE (ML) INJECTION ONCE
Refills: 0 | Status: COMPLETED | OUTPATIENT
Start: 2025-01-01 | End: 2025-01-01

## 2025-01-01 RX ORDER — BISACODYL 5 MG
5 TABLET, DELAYED RELEASE (ENTERIC COATED) ORAL DAILY
Refills: 0 | Status: DISCONTINUED | OUTPATIENT
Start: 2025-01-01 | End: 2025-01-01

## 2025-01-01 RX ORDER — SODIUM CHLORIDE 9 G/1000ML
1000 INJECTION, SOLUTION INTRAVENOUS
Refills: 0 | Status: COMPLETED | OUTPATIENT
Start: 2025-01-01 | End: 2025-01-01

## 2025-01-01 RX ORDER — B COMPLEX, C NO.20/FOLIC ACID 1 MG
1 CAPSULE ORAL DAILY
Refills: 0 | Status: DISCONTINUED | OUTPATIENT
Start: 2025-01-01 | End: 2025-01-13

## 2025-01-01 RX ORDER — VALPROIC ACID 250 MG/5ML
1250 SOLUTION ORAL THREE TIMES A DAY
Refills: 0 | Status: DISCONTINUED | OUTPATIENT
Start: 2025-01-01 | End: 2025-01-02

## 2025-01-01 RX ORDER — DEXTROMETHORPHAN HYDROBROMIDE AND QUINIDINE SULFATE 20; 10 MG/1; MG/1
1 CAPSULE, GELATIN COATED ORAL
Refills: 0 | DISCHARGE

## 2025-01-01 RX ORDER — POLYSORBATE 80 100 MG/10ML
1 SOLUTION/ DROPS OPHTHALMIC DAILY
Refills: 0 | Status: DISCONTINUED | OUTPATIENT
Start: 2025-01-01 | End: 2025-01-13

## 2025-01-01 RX ORDER — SODIUM CHLORIDE 9 MG/ML
1000 INJECTION, SOLUTION INTRAMUSCULAR; INTRAVENOUS; SUBCUTANEOUS
Refills: 0 | Status: DISCONTINUED | OUTPATIENT
Start: 2025-01-01 | End: 2025-01-03

## 2025-01-01 RX ORDER — PANTOPRAZOLE 40 MG/1
40 TABLET, DELAYED RELEASE ORAL DAILY
Refills: 0 | Status: DISCONTINUED | OUTPATIENT
Start: 2025-01-01 | End: 2025-01-02

## 2025-01-01 RX ORDER — HEPARIN SODIUM 1000 [USP'U]/ML
INJECTION, SOLUTION INTRAVENOUS; SUBCUTANEOUS
Qty: 25000 | Refills: 0 | Status: DISCONTINUED | OUTPATIENT
Start: 2025-01-01 | End: 2025-01-02

## 2025-01-01 RX ORDER — METOPROLOL TARTRATE 50 MG
25 TABLET ORAL
Refills: 0 | Status: DISCONTINUED | OUTPATIENT
Start: 2025-01-01 | End: 2025-01-03

## 2025-01-01 RX ORDER — HYDROMORPHONE HCL 4 MG
0.5 TABLET ORAL EVERY 4 HOURS
Refills: 0 | Status: DISCONTINUED | OUTPATIENT
Start: 2025-01-01 | End: 2025-01-05

## 2025-01-01 RX ORDER — LACOSAMIDE 100 MG/1
200 TABLET, FILM COATED ORAL
Refills: 0 | Status: DISCONTINUED | OUTPATIENT
Start: 2025-01-01 | End: 2025-01-02

## 2025-01-01 RX ORDER — HEPARIN SODIUM 1000 [USP'U]/ML
30000 INJECTION INTRAVENOUS; SUBCUTANEOUS ONCE
Refills: 0 | Status: COMPLETED | OUTPATIENT
Start: 2025-01-01 | End: 2025-01-01

## 2025-01-01 RX ORDER — MIDAZOLAM IN 0.9 % SOD.CHLORID 1 MG/ML
0.02 PLASTIC BAG, INJECTION (ML) INTRAVENOUS
Qty: 100 | Refills: 0 | Status: DISCONTINUED | OUTPATIENT
Start: 2025-01-01 | End: 2025-01-01

## 2025-01-01 RX ORDER — POLYETHYLENE GLYCOL 3350 17 G/DOSE
17 POWDER (GRAM) ORAL DAILY
Refills: 0 | Status: DISCONTINUED | OUTPATIENT
Start: 2025-01-01 | End: 2025-01-13

## 2025-01-01 RX ORDER — LEVETIRACETAM 10 MG/ML
2000 INJECTION, SOLUTION INTRAVENOUS
Refills: 0 | Status: DISCONTINUED | OUTPATIENT
Start: 2025-01-01 | End: 2025-01-01

## 2025-01-01 RX ORDER — MORPHINE SULFATE 15 MG
4 TABLET, EXTENDED RELEASE ORAL EVERY 4 HOURS
Refills: 0 | Status: DISCONTINUED | OUTPATIENT
Start: 2025-01-01 | End: 2025-01-01

## 2025-01-01 RX ORDER — SODIUM CHLORIDE 9 G/1000ML
100 INJECTION, SOLUTION INTRAVENOUS ONCE
Refills: 0 | Status: COMPLETED | OUTPATIENT
Start: 2025-01-01 | End: 2025-01-01

## 2025-01-01 RX ORDER — HYDROMORPHONE HCL 4 MG
0.2 TABLET ORAL EVERY 4 HOURS
Refills: 0 | Status: DISCONTINUED | OUTPATIENT
Start: 2025-01-01 | End: 2025-01-08

## 2025-01-01 RX ORDER — ACETAMINOPHEN 80 MG/.8ML
1000 SOLUTION/ DROPS ORAL EVERY 8 HOURS
Refills: 0 | Status: DISCONTINUED | OUTPATIENT
Start: 2025-01-01 | End: 2025-01-02

## 2025-01-01 RX ORDER — SODIUM CHLORIDE 9 G/1000ML
1000 INJECTION, SOLUTION INTRAVENOUS
Refills: 0 | Status: DISCONTINUED | OUTPATIENT
Start: 2025-01-01 | End: 2025-01-01

## 2025-01-01 RX ORDER — HYDROMORPHONE/SOD CHLOR,ISO/PF 2 MG/10 ML
1 SYRINGE (ML) INJECTION
Qty: 30 | Refills: 0 | Status: DISCONTINUED | OUTPATIENT
Start: 2025-01-01 | End: 2025-01-01

## 2025-01-01 RX ADMIN — Medication 15 MILLILITER(S): at 06:04

## 2025-01-01 RX ADMIN — Medication 25 GRAM(S): at 21:23

## 2025-01-01 RX ADMIN — SODIUM CHLORIDE 1000 MILLILITER(S): 9 INJECTION, SOLUTION INTRAVENOUS at 02:37

## 2025-01-01 RX ADMIN — Medication 25 GRAM(S): at 05:14

## 2025-01-01 RX ADMIN — Medication 1000 MILLIGRAM(S): at 05:04

## 2025-01-01 RX ADMIN — MUPIROCIN CALCIUM 1 APPLICATION(S): 20 CREAM TOPICAL at 22:07

## 2025-01-01 RX ADMIN — CLONAZEPAM 1 MILLIGRAM(S): 0.5 TABLET ORAL at 05:19

## 2025-01-01 RX ADMIN — LEVETIRACETAM 2000 MILLIGRAM(S): 10 INJECTION, SOLUTION INTRAVENOUS at 17:32

## 2025-01-01 RX ADMIN — ESCITALOPRAM OXALATE 10 MILLIGRAM(S): 20 TABLET ORAL at 12:12

## 2025-01-01 RX ADMIN — MUPIROCIN CALCIUM 1 APPLICATION(S): 20 CREAM TOPICAL at 05:13

## 2025-01-01 RX ADMIN — Medication 25 GRAM(S): at 21:22

## 2025-01-01 RX ADMIN — Medication 1000 MILLIGRAM(S): at 13:59

## 2025-01-01 RX ADMIN — Medication 100 MILLIEQUIVALENT(S): at 10:26

## 2025-01-01 RX ADMIN — Medication 40 MILLIGRAM(S): at 11:50

## 2025-01-01 RX ADMIN — MUPIROCIN CALCIUM 1 APPLICATION(S): 20 CREAM TOPICAL at 13:50

## 2025-01-01 RX ADMIN — MUPIROCIN CALCIUM 1 APPLICATION(S): 20 CREAM TOPICAL at 05:38

## 2025-01-01 RX ADMIN — LACOSAMIDE 200 MILLIGRAM(S): 150 TABLET, FILM COATED ORAL at 18:45

## 2025-01-01 RX ADMIN — Medication 3.38 GRAM(S): at 06:15

## 2025-01-01 RX ADMIN — Medication 1 MILLIGRAM(S): at 15:13

## 2025-01-01 RX ADMIN — Medication 2 MILLIGRAM(S): at 01:18

## 2025-01-01 RX ADMIN — Medication 25 GRAM(S): at 13:58

## 2025-01-01 RX ADMIN — CLONAZEPAM 1 MILLIGRAM(S): 0.5 TABLET ORAL at 21:59

## 2025-01-01 RX ADMIN — ACETAMINOPHEN 400 MILLIGRAM(S): 80 SOLUTION/ DROPS ORAL at 01:43

## 2025-01-01 RX ADMIN — LEVETIRACETAM 2000 MILLIGRAM(S): 100 SOLUTION ORAL at 05:11

## 2025-01-01 RX ADMIN — VALPROIC ACID 1250 MILLIGRAM(S): 250 SOLUTION ORAL at 14:10

## 2025-01-01 RX ADMIN — Medication 25 MILLIGRAM(S): at 05:10

## 2025-01-01 RX ADMIN — LEVETIRACETAM 2000 MILLIGRAM(S): 10 INJECTION, SOLUTION INTRAVENOUS at 18:44

## 2025-01-01 RX ADMIN — DIAZEPAM 10 MILLIGRAM(S): 5 TABLET ORAL at 11:39

## 2025-01-01 RX ADMIN — Medication 1 DROP(S): at 05:27

## 2025-01-01 RX ADMIN — Medication 25 GRAM(S): at 05:24

## 2025-01-01 RX ADMIN — Medication 500 MILLIGRAM(S): at 13:50

## 2025-01-01 RX ADMIN — Medication 40 MILLIEQUIVALENT(S): at 05:29

## 2025-01-01 RX ADMIN — MUPIROCIN CALCIUM 1 APPLICATION(S): 20 CREAM TOPICAL at 13:11

## 2025-01-01 RX ADMIN — LACOSAMIDE 200 MILLIGRAM(S): 150 TABLET, FILM COATED ORAL at 17:55

## 2025-01-01 RX ADMIN — Medication 25 GRAM(S): at 22:00

## 2025-01-01 RX ADMIN — Medication 40 MILLIEQUIVALENT(S): at 22:14

## 2025-01-01 RX ADMIN — ATORVASTATIN CALCIUM 80 MILLIGRAM(S): 80 TABLET, FILM COATED ORAL at 21:23

## 2025-01-01 RX ADMIN — Medication 15 MILLILITER(S): at 17:54

## 2025-01-01 RX ADMIN — Medication 1000 MILLILITER(S): at 11:49

## 2025-01-01 RX ADMIN — Medication 4 MILLIGRAM(S): at 13:16

## 2025-01-01 RX ADMIN — Medication 1000 MILLILITER(S): at 06:51

## 2025-01-01 RX ADMIN — ACETAMINOPHEN 1000 MILLIGRAM(S): 80 SOLUTION/ DROPS ORAL at 02:02

## 2025-01-01 RX ADMIN — Medication 1000 MILLIGRAM(S): at 05:14

## 2025-01-01 RX ADMIN — Medication 1 APPLICATION(S): at 05:14

## 2025-01-01 RX ADMIN — LACOSAMIDE 130 MILLIGRAM(S): 150 TABLET, FILM COATED ORAL at 01:43

## 2025-01-01 RX ADMIN — LEVETIRACETAM 2000 MILLIGRAM(S): 10 INJECTION, SOLUTION INTRAVENOUS at 18:07

## 2025-01-01 RX ADMIN — Medication 100 MILLIEQUIVALENT(S): at 06:33

## 2025-01-01 RX ADMIN — Medication 1 MILLIGRAM(S): at 11:23

## 2025-01-01 RX ADMIN — Medication 25 GRAM(S): at 13:51

## 2025-01-01 RX ADMIN — MUPIROCIN CALCIUM 1 APPLICATION(S): 20 CREAM TOPICAL at 21:58

## 2025-01-01 RX ADMIN — Medication 1 TABLET(S): at 13:50

## 2025-01-01 RX ADMIN — Medication 1000 MILLIGRAM(S): at 05:24

## 2025-01-01 RX ADMIN — FOLIC ACID 1 MILLIGRAM(S): 1 TABLET ORAL at 13:13

## 2025-01-01 RX ADMIN — Medication 25 GRAM(S): at 18:34

## 2025-01-01 RX ADMIN — ATORVASTATIN CALCIUM 80 MILLIGRAM(S): 80 TABLET, FILM COATED ORAL at 21:59

## 2025-01-01 RX ADMIN — IPRATROPIUM BROMIDE AND ALBUTEROL SULFATE 3 MILLILITER(S): .5; 2.5 SOLUTION RESPIRATORY (INHALATION) at 15:13

## 2025-01-01 RX ADMIN — DIAZEPAM 10 MILLIGRAM(S): 5 TABLET ORAL at 10:55

## 2025-01-01 RX ADMIN — NOREPINEPHRINE BITARTRATE 9.53 MICROGRAM(S)/KG/MIN: 8 SOLUTION at 18:18

## 2025-01-01 RX ADMIN — LISINOPRIL 2.5 MILLIGRAM(S): 30 TABLET ORAL at 05:20

## 2025-01-01 RX ADMIN — Medication 2 MILLIGRAM(S): at 13:10

## 2025-01-01 RX ADMIN — Medication 250 MILLIGRAM(S): at 05:46

## 2025-01-01 RX ADMIN — ATORVASTATIN CALCIUM 80 MILLIGRAM(S): 80 TABLET, FILM COATED ORAL at 21:58

## 2025-01-01 RX ADMIN — CLONAZEPAM 1 MILLIGRAM(S): 0.5 TABLET ORAL at 21:23

## 2025-01-01 RX ADMIN — VALPROIC ACID 1250 MILLIGRAM(S): 250 SOLUTION ORAL at 05:10

## 2025-01-01 RX ADMIN — LACOSAMIDE 200 MILLIGRAM(S): 150 TABLET, FILM COATED ORAL at 17:32

## 2025-01-01 RX ADMIN — FOLIC ACID 1 MILLIGRAM(S): 1 TABLET ORAL at 11:08

## 2025-01-01 RX ADMIN — LEVETIRACETAM 2000 MILLIGRAM(S): 10 INJECTION, SOLUTION INTRAVENOUS at 05:04

## 2025-01-01 RX ADMIN — Medication 2.03 MG/KG/HR: at 02:00

## 2025-01-01 RX ADMIN — Medication 400 MILLIGRAM(S): at 13:14

## 2025-01-01 RX ADMIN — Medication 400 MILLIGRAM(S): at 00:48

## 2025-01-01 RX ADMIN — Medication 10.2 MG/KG/HR: at 06:07

## 2025-01-01 RX ADMIN — HEPARIN SODIUM 0 UNIT(S)/HR: 1000 INJECTION, SOLUTION INTRAVENOUS; SUBCUTANEOUS at 17:11

## 2025-01-01 RX ADMIN — Medication 2.03 MG/KG/HR: at 00:48

## 2025-01-01 RX ADMIN — SODIUM CHLORIDE 90 MILLILITER(S): 9 INJECTION, SOLUTION INTRAMUSCULAR; INTRAVENOUS; SUBCUTANEOUS at 17:57

## 2025-01-01 RX ADMIN — MUPIROCIN CALCIUM 1 APPLICATION(S): 20 CREAM TOPICAL at 18:18

## 2025-01-01 RX ADMIN — Medication 17 GRAM(S): at 13:51

## 2025-01-01 RX ADMIN — FOLIC ACID 1 MILLIGRAM(S): 1 TABLET ORAL at 13:51

## 2025-01-01 RX ADMIN — LEVETIRACETAM 400 MILLIGRAM(S): 10 INJECTION, SOLUTION INTRAVENOUS at 01:41

## 2025-01-01 RX ADMIN — Medication 1000 MILLIGRAM(S): at 21:36

## 2025-01-01 RX ADMIN — Medication 1000 MILLIGRAM(S): at 13:45

## 2025-01-01 RX ADMIN — POTASSIUM PHOSPHATE, MONOBASIC POTASSIUM PHOSPHATE, DIBASIC INJECTION, 62.5 MILLIMOLE(S): 236; 224 SOLUTION, CONCENTRATE INTRAVENOUS at 05:14

## 2025-01-01 RX ADMIN — Medication 12.2 MG/KG/HR: at 11:22

## 2025-01-01 RX ADMIN — Medication 2.03 MG/KG/HR: at 13:11

## 2025-01-01 RX ADMIN — GLYCOPYRROLATE 0.4 MILLIGRAM(S): 0.2 INJECTION INTRAMUSCULAR; INTRAVENOUS at 08:59

## 2025-01-01 RX ADMIN — ESCITALOPRAM OXALATE 10 MILLIGRAM(S): 20 TABLET ORAL at 13:13

## 2025-01-01 RX ADMIN — LEVETIRACETAM 2000 MILLIGRAM(S): 10 INJECTION, SOLUTION INTRAVENOUS at 17:55

## 2025-01-01 RX ADMIN — ESCITALOPRAM OXALATE 10 MILLIGRAM(S): 20 TABLET ORAL at 13:11

## 2025-01-01 RX ADMIN — Medication 15 MILLILITER(S): at 05:05

## 2025-01-01 RX ADMIN — Medication 25 GRAM(S): at 13:42

## 2025-01-01 RX ADMIN — DIAZEPAM 5 MILLIGRAM(S): 5 TABLET ORAL at 11:49

## 2025-01-01 RX ADMIN — AZITHROMYCIN MONOHYDRATE 255 MILLIGRAM(S): 200 POWDER, FOR SUSPENSION ORAL at 01:43

## 2025-01-01 RX ADMIN — LEVETIRACETAM 2000 MILLIGRAM(S): 10 INJECTION, SOLUTION INTRAVENOUS at 05:14

## 2025-01-01 RX ADMIN — Medication 5 MILLIGRAM(S): at 22:49

## 2025-01-01 RX ADMIN — SODIUM CHLORIDE 1000 MILLILITER(S): 9 INJECTION, SOLUTION INTRAVENOUS at 07:19

## 2025-01-01 RX ADMIN — Medication 15 MILLILITER(S): at 18:19

## 2025-01-01 RX ADMIN — Medication 40 MILLIGRAM(S): at 13:13

## 2025-01-01 RX ADMIN — VALPROIC ACID 1250 MILLIGRAM(S): 250 SOLUTION ORAL at 22:50

## 2025-01-01 RX ADMIN — Medication 1 MILLIGRAM(S): at 14:43

## 2025-01-01 RX ADMIN — ESCITALOPRAM OXALATE 10 MILLIGRAM(S): 20 TABLET ORAL at 13:51

## 2025-01-01 RX ADMIN — Medication 57.5 MILLIGRAM(S): at 11:38

## 2025-01-01 RX ADMIN — Medication 15 MILLILITER(S): at 18:07

## 2025-01-01 RX ADMIN — HEPARIN SODIUM 1700 UNIT(S)/HR: 1000 INJECTION, SOLUTION INTRAVENOUS; SUBCUTANEOUS at 09:54

## 2025-01-01 RX ADMIN — ACETAMINOPHEN 1000 MILLIGRAM(S): 80 SOLUTION/ DROPS ORAL at 17:06

## 2025-01-01 RX ADMIN — LEVETIRACETAM 400 MILLIGRAM(S): 10 INJECTION, SOLUTION INTRAVENOUS at 09:36

## 2025-01-01 RX ADMIN — ATORVASTATIN CALCIUM 80 MILLIGRAM(S): 40 TABLET, FILM COATED ORAL at 22:49

## 2025-01-01 RX ADMIN — LEVETIRACETAM 2000 MILLIGRAM(S): 10 INJECTION, SOLUTION INTRAVENOUS at 05:13

## 2025-01-01 RX ADMIN — Medication 1000 MILLIGRAM(S): at 15:03

## 2025-01-01 RX ADMIN — Medication 25 GRAM(S): at 15:14

## 2025-01-01 RX ADMIN — FOLIC ACID 1 MILLIGRAM(S): 1 TABLET ORAL at 11:50

## 2025-01-01 RX ADMIN — Medication 1 DROP(S): at 05:22

## 2025-01-01 RX ADMIN — LACOSAMIDE 130 MILLIGRAM(S): 150 TABLET, FILM COATED ORAL at 09:36

## 2025-01-01 RX ADMIN — MUPIROCIN CALCIUM 1 APPLICATION(S): 20 CREAM TOPICAL at 21:24

## 2025-01-01 RX ADMIN — Medication 15 MILLILITER(S): at 09:55

## 2025-01-01 RX ADMIN — SODIUM CHLORIDE 100 MILLILITER(S): 9 INJECTION, SOLUTION INTRAVENOUS at 06:39

## 2025-01-01 RX ADMIN — Medication 250 MILLIGRAM(S): at 06:05

## 2025-01-01 RX ADMIN — Medication 57.5 MILLIGRAM(S): at 18:35

## 2025-01-01 RX ADMIN — HEPARIN SODIUM 7500 UNIT(S): 1000 INJECTION, SOLUTION INTRAVENOUS; SUBCUTANEOUS at 09:51

## 2025-01-01 RX ADMIN — LEVETIRACETAM 400 MILLIGRAM(S): 10 INJECTION, SOLUTION INTRAVENOUS at 18:33

## 2025-01-01 RX ADMIN — CLONAZEPAM 2 MILLIGRAM(S): 0.5 TABLET ORAL at 01:13

## 2025-01-01 RX ADMIN — Medication 15 MILLILITER(S): at 05:13

## 2025-01-01 RX ADMIN — Medication 400 MILLIGRAM(S): at 13:29

## 2025-01-01 RX ADMIN — ACETAMINOPHEN 400 MILLIGRAM(S): 80 SOLUTION/ DROPS ORAL at 16:06

## 2025-01-01 RX ADMIN — Medication 1 DROP(S): at 17:54

## 2025-01-01 RX ADMIN — Medication 2.03 MG/KG/HR: at 16:39

## 2025-01-01 RX ADMIN — Medication 15 MILLILITER(S): at 05:24

## 2025-01-01 RX ADMIN — Medication 25 GRAM(S): at 21:37

## 2025-01-01 RX ADMIN — Medication 25 GRAM(S): at 01:14

## 2025-01-01 RX ADMIN — HEPARIN SODIUM 1400 UNIT(S)/HR: 1000 INJECTION, SOLUTION INTRAVENOUS; SUBCUTANEOUS at 18:13

## 2025-01-01 RX ADMIN — Medication 100 MILLIEQUIVALENT(S): at 09:55

## 2025-01-01 RX ADMIN — LACOSAMIDE 200 MILLIGRAM(S): 150 TABLET, FILM COATED ORAL at 05:24

## 2025-01-01 RX ADMIN — Medication 25 MICROGRAM(S): at 10:25

## 2025-01-01 RX ADMIN — Medication 200 GRAM(S): at 05:44

## 2025-01-01 RX ADMIN — Medication 1 DROP(S): at 05:13

## 2025-01-01 RX ADMIN — DIAZEPAM 5 MILLIGRAM(S): 5 TABLET ORAL at 11:10

## 2025-01-01 RX ADMIN — Medication 25 GRAM(S): at 18:30

## 2025-01-01 RX ADMIN — Medication 2.03 MG/KG/HR: at 23:51

## 2025-01-01 RX ADMIN — Medication 1 DROP(S): at 06:08

## 2025-01-01 RX ADMIN — FUROSEMIDE 40 MILLIGRAM(S): 10 INJECTION INTRAMUSCULAR; INTRAVENOUS at 10:43

## 2025-01-01 RX ADMIN — Medication 1 DROP(S): at 17:31

## 2025-01-01 RX ADMIN — CLONAZEPAM 1 MILLIGRAM(S): 0.5 TABLET ORAL at 13:11

## 2025-01-01 RX ADMIN — IPRATROPIUM BROMIDE AND ALBUTEROL SULFATE 3 MILLILITER(S): .5; 2.5 SOLUTION RESPIRATORY (INHALATION) at 08:39

## 2025-01-01 RX ADMIN — CLONAZEPAM 2 MILLIGRAM(S): 0.5 TABLET ORAL at 09:37

## 2025-01-01 RX ADMIN — Medication 15.3 MG/KG/HR: at 14:42

## 2025-01-01 RX ADMIN — Medication 15 MILLILITER(S): at 17:32

## 2025-01-01 RX ADMIN — Medication 1 DROP(S): at 18:41

## 2025-01-01 RX ADMIN — Medication 25 GRAM(S): at 21:58

## 2025-01-01 RX ADMIN — Medication 25 GRAM(S): at 05:19

## 2025-01-01 RX ADMIN — FOLIC ACID 1 MILLIGRAM(S): 1 TABLET ORAL at 13:59

## 2025-01-01 RX ADMIN — Medication 1 APPLICATION(S): at 05:05

## 2025-01-01 RX ADMIN — Medication 100 MILLIEQUIVALENT(S): at 11:49

## 2025-01-01 RX ADMIN — HEPARIN SODIUM 30000 UNIT(S): 1000 INJECTION INTRAVENOUS; SUBCUTANEOUS at 12:40

## 2025-01-01 RX ADMIN — Medication 1 MILLIGRAM(S): at 13:50

## 2025-01-01 RX ADMIN — Medication 1 MG/HR: at 14:48

## 2025-01-01 RX ADMIN — Medication 25 MICROGRAM(S): at 11:29

## 2025-01-01 RX ADMIN — MUPIROCIN CALCIUM 1 APPLICATION(S): 20 CREAM TOPICAL at 05:27

## 2025-01-01 RX ADMIN — SODIUM CHLORIDE 1000 MILLILITER(S): 9 INJECTION, SOLUTION INTRAVENOUS at 02:02

## 2025-01-01 RX ADMIN — LACOSAMIDE 200 MILLIGRAM(S): 150 TABLET, FILM COATED ORAL at 18:07

## 2025-01-01 RX ADMIN — MUPIROCIN CALCIUM 1 APPLICATION(S): 20 CREAM TOPICAL at 06:17

## 2025-01-01 RX ADMIN — Medication 10.2 MG/KG/HR: at 20:44

## 2025-01-01 RX ADMIN — Medication 25 GRAM(S): at 05:13

## 2025-01-01 RX ADMIN — Medication 1000 MILLIGRAM(S): at 05:20

## 2025-01-01 RX ADMIN — LACOSAMIDE 200 MILLIGRAM(S): 100 TABLET, FILM COATED ORAL at 18:37

## 2025-01-01 RX ADMIN — Medication 40 MILLIGRAM(S): at 13:59

## 2025-01-01 RX ADMIN — Medication 15 MILLILITER(S): at 05:19

## 2025-01-01 RX ADMIN — Medication 57.5 MILLIGRAM(S): at 01:13

## 2025-01-01 RX ADMIN — HEPARIN SODIUM 1400 UNIT(S)/HR: 1000 INJECTION, SOLUTION INTRAVENOUS; SUBCUTANEOUS at 19:55

## 2025-01-01 RX ADMIN — FOLIC ACID 1 MILLIGRAM(S): 1 TABLET ORAL at 13:11

## 2025-01-01 RX ADMIN — Medication 40 MILLIGRAM(S): at 11:08

## 2025-01-01 RX ADMIN — CEFTRIAXONE SODIUM 100 MILLIGRAM(S): 1 INJECTION, POWDER, FOR SOLUTION INTRAMUSCULAR; INTRAVENOUS at 01:20

## 2025-01-01 RX ADMIN — GLYCOPYRROLATE 0.4 MILLIGRAM(S): 0.2 INJECTION INTRAMUSCULAR; INTRAVENOUS at 11:23

## 2025-01-01 RX ADMIN — ESCITALOPRAM OXALATE 10 MILLIGRAM(S): 20 TABLET ORAL at 11:13

## 2025-01-01 RX ADMIN — IPRATROPIUM BROMIDE AND ALBUTEROL SULFATE 3 MILLILITER(S): .5; 2.5 SOLUTION RESPIRATORY (INHALATION) at 10:16

## 2025-01-01 RX ADMIN — Medication 2 MILLIGRAM(S): at 14:10

## 2025-01-01 RX ADMIN — Medication 1000 MILLIGRAM(S): at 13:26

## 2025-01-01 RX ADMIN — LACOSAMIDE 130 MILLIGRAM(S): 150 TABLET, FILM COATED ORAL at 18:34

## 2025-01-01 RX ADMIN — Medication 15 MILLILITER(S): at 18:41

## 2025-01-01 RX ADMIN — Medication 57.5 MILLIGRAM(S): at 06:04

## 2025-01-01 RX ADMIN — Medication 40 MILLIEQUIVALENT(S): at 11:50

## 2025-01-01 RX ADMIN — Medication 2.03 MG/KG/HR: at 00:18

## 2025-01-01 RX ADMIN — LEVETIRACETAM 2000 MILLIGRAM(S): 10 INJECTION, SOLUTION INTRAVENOUS at 05:19

## 2025-01-01 RX ADMIN — Medication 1 MG/HR: at 14:18

## 2025-01-01 RX ADMIN — Medication 1 DROP(S): at 05:14

## 2025-01-01 RX ADMIN — POLYSORBATE 80 1 DROP(S): 100 SOLUTION/ DROPS OPHTHALMIC at 13:50

## 2025-01-01 RX ADMIN — Medication 1 APPLICATION(S): at 06:06

## 2025-01-01 RX ADMIN — IPRATROPIUM BROMIDE AND ALBUTEROL SULFATE 3 MILLILITER(S): .5; 2.5 SOLUTION RESPIRATORY (INHALATION) at 03:03

## 2025-01-01 RX ADMIN — Medication 1000 MILLIGRAM(S): at 21:58

## 2025-01-01 RX ADMIN — ATORVASTATIN CALCIUM 80 MILLIGRAM(S): 80 TABLET, FILM COATED ORAL at 21:36

## 2025-01-01 RX ADMIN — LEVETIRACETAM 2000 MILLIGRAM(S): 100 SOLUTION ORAL at 18:39

## 2025-01-01 RX ADMIN — Medication 25 GRAM(S): at 13:10

## 2025-01-01 RX ADMIN — LACOSAMIDE 200 MILLIGRAM(S): 150 TABLET, FILM COATED ORAL at 05:05

## 2025-01-01 RX ADMIN — Medication 0.5 MILLIGRAM(S): at 14:15

## 2025-01-01 RX ADMIN — LEVETIRACETAM 2000 MILLIGRAM(S): 10 INJECTION, SOLUTION INTRAVENOUS at 05:39

## 2025-01-01 RX ADMIN — MUPIROCIN CALCIUM 1 APPLICATION(S): 20 CREAM TOPICAL at 21:32

## 2025-01-01 RX ADMIN — LEVETIRACETAM 2000 MILLIGRAM(S): 10 INJECTION, SOLUTION INTRAVENOUS at 18:41

## 2025-01-01 RX ADMIN — ESCITALOPRAM OXALATE 10 MILLIGRAM(S): 20 TABLET ORAL at 13:59

## 2025-01-01 RX ADMIN — Medication 2.03 MG/KG/HR: at 13:59

## 2025-01-01 RX ADMIN — MUPIROCIN CALCIUM 1 APPLICATION(S): 20 CREAM TOPICAL at 05:21

## 2025-01-01 RX ADMIN — Medication 1 APPLICATION(S): at 05:25

## 2025-01-01 RX ADMIN — Medication 40 MILLIEQUIVALENT(S): at 22:07

## 2025-01-01 RX ADMIN — Medication 10.2 MG/KG/HR: at 13:45

## 2025-01-01 RX ADMIN — Medication 100 MILLIEQUIVALENT(S): at 05:29

## 2025-01-01 RX ADMIN — MUPIROCIN CALCIUM 1 APPLICATION(S): 20 CREAM TOPICAL at 22:43

## 2025-01-01 RX ADMIN — PANTOPRAZOLE 40 MILLIGRAM(S): 40 TABLET, DELAYED RELEASE ORAL at 13:51

## 2025-01-01 RX ADMIN — LACOSAMIDE 200 MILLIGRAM(S): 150 TABLET, FILM COATED ORAL at 05:14

## 2025-01-01 RX ADMIN — Medication 1000 MILLIGRAM(S): at 13:31

## 2025-01-01 RX ADMIN — LACOSAMIDE 200 MILLIGRAM(S): 150 TABLET, FILM COATED ORAL at 05:19

## 2025-01-01 RX ADMIN — Medication 1000 MILLIGRAM(S): at 21:23

## 2025-01-01 RX ADMIN — Medication 1 MG/HR: at 11:22

## 2025-01-01 RX ADMIN — SODIUM CHLORIDE 100 MILLILITER(S): 9 INJECTION, SOLUTION INTRAVENOUS at 05:47

## 2025-01-01 RX ADMIN — Medication 1 APPLICATION(S): at 05:17

## 2025-01-01 RX ADMIN — SODIUM CHLORIDE 1000 MILLILITER(S): 9 INJECTION, SOLUTION INTRAVENOUS at 13:10

## 2025-01-01 RX ADMIN — Medication 1000 MILLIGRAM(S): at 05:13

## 2025-01-01 RX ADMIN — Medication 0.5 MILLIGRAM(S): at 13:45

## 2025-01-01 RX ADMIN — CLONAZEPAM 2 MILLIGRAM(S): 0.5 TABLET ORAL at 18:33

## 2025-01-01 RX ADMIN — Medication 1000 MILLIGRAM(S): at 22:00

## 2025-01-01 RX ADMIN — LACOSAMIDE 200 MILLIGRAM(S): 150 TABLET, FILM COATED ORAL at 18:41

## 2025-01-01 RX ADMIN — Medication 25 GRAM(S): at 09:38

## 2025-01-01 RX ADMIN — Medication 1 DROP(S): at 18:07

## 2025-01-01 RX ADMIN — LACOSAMIDE 200 MILLIGRAM(S): 100 TABLET, FILM COATED ORAL at 05:10

## 2025-01-01 RX ADMIN — Medication 40 MILLIGRAM(S): at 13:51

## 2025-01-01 RX ADMIN — CLONAZEPAM 1 MILLIGRAM(S): 0.5 TABLET ORAL at 13:51

## 2025-01-01 RX ADMIN — Medication 1 DROP(S): at 18:18

## 2025-01-01 RX ADMIN — LACOSAMIDE 200 MILLIGRAM(S): 150 TABLET, FILM COATED ORAL at 05:13

## 2025-01-01 RX ADMIN — Medication 40 MILLIGRAM(S): at 13:11

## 2025-01-01 RX ADMIN — Medication 1000 MILLIGRAM(S): at 13:44

## 2025-01-01 RX ADMIN — IPRATROPIUM BROMIDE AND ALBUTEROL SULFATE 3 MILLILITER(S): .5; 2.5 SOLUTION RESPIRATORY (INHALATION) at 20:10

## 2025-01-01 RX ADMIN — Medication 40 MILLIEQUIVALENT(S): at 18:43

## 2025-01-01 RX ADMIN — Medication 250 MILLIGRAM(S): at 18:35

## 2025-01-01 RX ADMIN — Medication 1000 MILLIGRAM(S): at 01:18

## 2025-01-01 RX ADMIN — Medication 1 DROP(S): at 05:05

## 2025-01-01 RX ADMIN — Medication 1 DROP(S): at 18:44

## 2025-01-01 RX ADMIN — Medication 25 GRAM(S): at 05:03

## 2025-01-01 RX ADMIN — Medication 15 MILLILITER(S): at 18:45

## 2025-01-01 RX ADMIN — SODIUM CHLORIDE 500 MILLILITER(S): 9 INJECTION, SOLUTION INTRAVENOUS at 01:55

## 2025-01-01 RX ADMIN — MUPIROCIN CALCIUM 1 APPLICATION(S): 20 CREAM TOPICAL at 13:58

## 2025-01-01 RX ADMIN — Medication 1000 MILLIGRAM(S): at 13:50

## 2025-01-01 RX ADMIN — ACETAMINOPHEN 1000 MILLIGRAM(S): 80 SOLUTION/ DROPS ORAL at 22:49

## 2025-01-01 NOTE — H&P ADULT - NSHPPHYSICALEXAM_GEN_ALL_CORE
PHYSICAL EXAM:  GENERAL: NAD, minimally verbal  HEAD:  Atraumatic, Normocephalic  EYES:  conjunctiva and sclera clear  NECK: Supple  CHEST/LUNG: b/l rhonchi, No wheeze; No crackles; audible respiratory secretions, No accessory muscles used  HEART: Regular rate and rhythm; No murmurs;   ABDOMEN: Soft, Nontender, Nondistended; Bowel sounds present; No guarding  EXTREMITIES:  2+ Peripheral Pulses, No edema  NEUROLOGY: non-focal  SKIN: No rashes or lesions

## 2025-01-01 NOTE — PROGRESS NOTE ADULT - SUBJECTIVE AND OBJECTIVE BOX
INTERVAL HPI/OVERNIGHT EVENTS: pat is lying in bed comfortable       Antimicrobial:  azithromycin  IVPB 500 milliGRAM(s) IV Intermittent every 24 hours  cefTRIAXone   IVPB 1000 milliGRAM(s) IV Intermittent every 24 hours    Cardiovascular:  lisinopril 2.5 milliGRAM(s) Oral daily  metoprolol tartrate 25 milliGRAM(s) Oral two times a day    Pulmonary:  albuterol/ipratropium for Nebulization 3 milliLiter(s) Nebulizer every 6 hours    Hematalogic:  heparin   Injectable 7500 Unit(s) IV Push every 6 hours PRN  heparin   Injectable 3500 Unit(s) IV Push every 6 hours PRN  heparin  Infusion.  Unit(s)/Hr IV Continuous <Continuous>    Other:  acetaminophen   Oral Liquid .. 1000 milliGRAM(s) Oral every 8 hours  artificial  tears Solution 1 Drop(s) Both EYES daily  ascorbic acid 500 milliGRAM(s) Oral daily  atorvastatin 80 milliGRAM(s) Oral at bedtime  bisacodyl Suppository 10 milliGRAM(s) Rectal daily PRN  cyclobenzaprine 5 milliGRAM(s) Oral every 8 hours PRN  dextrose 5%. 1000 milliLiter(s) IV Continuous <Continuous>  folic acid 1 milliGRAM(s) Oral daily  HYDROmorphone  Injectable 0.2 milliGRAM(s) IV Push every 4 hours PRN  HYDROmorphone  Injectable 0.5 milliGRAM(s) IV Push every 4 hours PRN  lacosamide Solution 200 milliGRAM(s) Oral two times a day  levETIRAcetam  Solution 2000 milliGRAM(s) Oral two times a day  melatonin 5 milliGRAM(s) Oral at bedtime  multivitamin 1 Tablet(s) Oral daily  naloxone Injectable 0.4 milliGRAM(s) IV Push once  ondansetron Injectable 4 milliGRAM(s) IV Push every 8 hours PRN  pantoprazole   Suspension 40 milliGRAM(s) Oral daily  polyethylene glycol 3350 17 Gram(s) Oral daily  senna Syrup 10 milliLiter(s) Oral at bedtime  sodium chloride 0.9%. 1000 milliLiter(s) IV Continuous <Continuous>  valproic  acid Syrup 1250 milliGRAM(s) Oral three times a day      Drug Dosing Weight  Height (cm): 170.2 (26 Aug 2024 12:06)  Weight (kg): 90.7 (31 Dec 2024 18:00)  BMI (kg/m2): 31.3 (31 Dec 2024 18:00)  BSA (m2): 2.02 (31 Dec 2024 18:00)    CENTRAL LINE: [ ] YES [ ] NO  LOCATION:   DATE INSERTED:    FAULKNER: [ ] YES [ ] NO    DATE INSERTED:    A-LINE:  [ ] YES [ ] NO  LOCATION:   DATE INSERTED:    PMH/Social Hx/Fam Hx -reviewed admission note, no change since admission  PAST MEDICAL & SURGICAL HISTORY:  Heart failure, unspecified HF chronicity, unspecified heart failure type      ETOH abuse  h/o etoh abuse now moderate drinker      Artificial pacemaker          T(C): 37.1 (01-01-25 @ 13:37), Max: 37.1 (01-01-25 @ 13:37)  HR: 66 (01-01-25 @ 18:14)  BP: 95/57 (01-01-25 @ 18:14)  BP(mean): --  ABP: --  ABP(mean): --  RR: 18 (01-01-25 @ 17:49)  SpO2: 95% (01-01-25 @ 17:49)  Wt(kg): --                PHYSICAL EXAM:    GENERAL: No signs of distress, comfortable  HEAD: Atraumatic, Normocephalic  EYES: EOMI,  ENMT: No erythema, exudates, or enlargement, Moist mucous membranes  NECK: Supple, normal appearance, No JVD; [  ] central line (if applicable) + trach   CHEST/LUNG: No chest deformity, fair bilateral air entry; No rales, rhonchi, wheezing; crackles  HEART: Regular rate and rhythm; No murmurs, rubs, or gallops;   ABDOMEN: Soft, Nontender, Nondistended; Bowel sounds present + PEG  EXTREMITIES:  + Peripheral Pulses, No clubbing, cyanosis, or edema  NERVOUS SYSTEM: awake and alert   LYMPH: No lymphadenopathy noted  SKIN: No rashes or lesions; good turgor, warm, dry      LABS:  CBC Full  -  ( 01 Jan 2025 15:51 )  WBC Count : 7.14 K/uL  RBC Count : 2.63 M/uL  Hemoglobin : 9.0 g/dL  Hematocrit : 25.9 %  Platelet Count - Automated : 192 K/uL  Mean Cell Volume : 98.5 fl  Mean Cell Hemoglobin : 34.2 pg  Mean Cell Hemoglobin Concentration : 34.7 g/dL  Auto Neutrophil # : x  Auto Lymphocyte # : x  Auto Monocyte # : x  Auto Eosinophil # : x  Auto Basophil # : x  Auto Neutrophil % : x  Auto Lymphocyte % : x  Auto Monocyte % : x  Auto Eosinophil % : x  Auto Basophil % : x    01-01    143  |  107  |  24[H]  ----------------------------<  113[H]  3.9   |  33[H]  |  0.66    Ca    8.6      01 Jan 2025 08:14  Phos  3.6     01-01  Mg     2.4     01-01    TPro  5.9[L]  /  Alb  1.8[L]  /  TBili  0.3  /  DBili  x   /  AST  20  /  ALT  14  /  AlkPhos  53  01-01    PT/INR - ( 01 Jan 2025 08:14 )   PT: 15.8 sec;   INR: 1.37 ratio         PTT - ( 01 Jan 2025 15:51 )  PTT:194.4 sec  Urinalysis Basic - ( 01 Jan 2025 08:14 )    Color: x / Appearance: x / SG: x / pH: x  Gluc: 113 mg/dL / Ketone: x  / Bili: x / Urobili: x   Blood: x / Protein: x / Nitrite: x   Leuk Esterase: x / RBC: x / WBC x   Sq Epi: x / Non Sq Epi: x / Bacteria: x          RADIOLOGY & ADDITIONAL STUDIES REVIEWED     < from: CT Pelvis No Cont (12.31.24 @ 23:18) >  IMPRESSION:  1. Acute comminuted and impacted intertrochanteric proximal right femur   fracture.  2. Circumferential urinary bladder wall thickening which could be due to   underdistention versus a cystitis. Correlate with urinalysis.          < from: CT Head No Cont (12.31.24 @ 23:18) >  BRAIN: No apparent acute infarct, hemorrhage or mass. No hydrocephalus.   Encephalomalacia involving much of the left cerebral hemisphere, left MCA   territory, with ex vacuo dilation of the left lateral ventricle again   demonstrated.    CALVARIUM: Status post left hemicraniectomy. The sinking of the skin flap   seen on the previous study is no longer evident today. No acute fracture.    EXTRACRANIAL SOFT TISSUES: No acute findings.    VISUALIZED PORTIONS OF THE PARANASAL SINUSES AND MASTOID AIR CELLS: No   air fluid levels.    IMPRESSION:  No acute intracranial findings.      < end of copied text >    IMPRESSION:  PAST MEDICAL & SURGICAL HISTORY:  Heart failure, unspecified HF chronicity, unspecified heart failure type      ETOH abuse  h/o etoh abuse now moderate drinker      Artificial pacemaker       p/w       IMP: This is a  49 yr old man with  CVA (3/30/2022 with residual aphasia and right sided residual weakness), s/p tracheostomy and peg placement, seizures, HTN, HLD, CHF w/ ICD (initially rEF 30%->55-60% on 03/2021), MDD, Recurrent PE, Afib (on Eliquis), obesity s/p bariatric intervention presented to the ED after a fall 3 days ago, and outpatient xray showed intertrochanteric fracture of right femur. He also had fevers outpatient, and CXR concerning for left lower lobe infilrtrate. Patient is being admitted to  for management of right intertrochanteric femur fracture and pneumonia.                ASSESSMENT     - Right femur fx   - Chronic hypoxic resp failure   - Dysphagia with PEG  - CVA   - CAD CHF       Plan     - No sedation   - Continue o2 via TC   - Duoneb q6h   - 2 DECHO   - NPO / hold feed after midnight   - Cards eval noted   - Eliquis changed to hep gtt  - Doubt PNA , continue antibx until cultures are known

## 2025-01-01 NOTE — H&P ADULT - PROBLEM SELECTOR PLAN 1
- P/w with pain from fall on 12/28.   - Outpatient xray showed right intertrochanteric femur fracture.   - Xray right hip: Minimally displaced right intertrochanteric femoral fracture  - Started pain regimen.   - Consult ortho in AM.   - NPO and holding AC for possible surgical intervention.

## 2025-01-01 NOTE — CONSULT NOTE ADULT - SUBJECTIVE AND OBJECTIVE BOX
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    As per nursing home documentation, pt had a fall on 12/28/24. Subsequently worked with PT where he complained of pain. Xray showed proximal femur intertrochanteric fracture. Patient was transferred to the hospital for surgical intervention. Of note, he has also been having fevers and was started on ceftriaxone and azithromycin for suspected pneumonia on 12/29/2024.       PMHx:   Heart failure, unspecified HF chronicity, unspecified heart failure type    ETOH abuse    PSHx:   Artificial pacemaker    Allergies:  shellfish (Unknown)  No Known Drug Allergies      Home Meds:    Current Medications:   acetaminophen     Tablet .. 650 milliGRAM(s) Oral every 6 hours PRN  albuterol/ipratropium for Nebulization 3 milliLiter(s) Nebulizer every 6 hours  artificial  tears Solution 1 Drop(s) Both EYES daily  ascorbic acid 500 milliGRAM(s) Oral daily  atorvastatin 80 milliGRAM(s) Oral at bedtime  azithromycin  IVPB 500 milliGRAM(s) IV Intermittent every 24 hours  bisacodyl 5 milliGRAM(s) Oral daily PRN  cefTRIAXone   IVPB 1000 milliGRAM(s) IV Intermittent every 24 hours  folic acid 1 milliGRAM(s) Oral daily  heparin   Injectable 7500 Unit(s) IV Push every 6 hours PRN  heparin   Injectable 3500 Unit(s) IV Push every 6 hours PRN  heparin  Infusion.  Unit(s)/Hr IV Continuous <Continuous>  lacosamide Solution 200 milliGRAM(s) Oral two times a day  levETIRAcetam  Solution 2000 milliGRAM(s) Oral two times a day  lisinopril 2.5 milliGRAM(s) Oral daily  melatonin 5 milliGRAM(s) Oral at bedtime  metoprolol tartrate 25 milliGRAM(s) Oral two times a day  morphine  - Injectable 2 milliGRAM(s) IV Push every 4 hours PRN  morphine  - Injectable 4 milliGRAM(s) IV Push every 4 hours PRN  multivitamin 1 Tablet(s) Oral daily  naloxone Injectable 0.4 milliGRAM(s) IV Push once  ondansetron Injectable 4 milliGRAM(s) IV Push every 8 hours PRN  pantoprazole   Suspension 40 milliGRAM(s) Oral daily  polyethylene glycol 3350 17 Gram(s) Oral daily  senna 2 Tablet(s) Oral at bedtime  valproic  acid Syrup 1250 milliGRAM(s) Oral three times a day      FAMILY HISTORY:  No pertinent family history in first degree relatives      Social History:  Smoking History:  Alcohol Use:  Drug Use:    REVIEW OF SYSTEMS:  Unable to obtain due to aphasia    Physical Exam:  T(F): 98.8 (01-01), Max: 98.8 (01-01)  HR: 71 (01-01) (65 - 90)  BP: 108/70 (01-01) (108/70 - 154/89)  RR: 18 (01-01)  SpO2: 96% (01-01)  GENERAL: No acute distress  ENT: trach  CHEST/LUNG: Clear to auscultation anteriorly  BACK: No spinal tenderness  HEART: Regular rate and rhythm; No murmurs, rubs, or gallops  ABDOMEN: Soft  EXTREMITIES:  No clubbing, cyanosis, or edema      LINES:    Labs:  reviewed                        8.3    5.93  )-----------( 167      ( 01 Jan 2025 08:14 )             24.6     01-01    143  |  107  |  24[H]  ----------------------------<  113[H]  3.9   |  33[H]  |  0.66    Ca    8.6      01 Jan 2025 08:14  Phos  3.6     01-01  Mg     2.4     01-01    TPro  5.9[L]  /  Alb  1.8[L]  /  TBili  0.3  /  DBili  x   /  AST  20  /  ALT  14  /  AlkPhos  53  01-01    PT/INR - ( 01 Jan 2025 08:14 )   PT: 15.8 sec;   INR: 1.37 ratio         PTT - ( 01 Jan 2025 08:14 )  PTT:37.0 sec    Cardiovascular Diagnostic Testing:    ECG: sinus rhythm with LBBB vs v-pacing    Echo: pending    Imaging:    CXR:  reviewed

## 2025-01-01 NOTE — PROGRESS NOTE ADULT - SUBJECTIVE AND OBJECTIVE BOX
DANNIE SHIRLEY  MRN-[id number]     ORTHOPEDIC CONSULT:    Diagnosis:  R Hip Intertrochanteric Fracture     49yMale    Patient is a 50 y/o M pt with mother at the bedside- with PMHx CVA (3/30/2022 with residual aphasia and right sided residual weakness), s/p tracheostomy and peg placement, seizures, HTN, HLD, CHF w/ ICD (initially rEF 30%->55-60% on 03/2021), MDD, Recurrent PE, Afib (on Eliquis), obesity s/p bariatric intervention presented to the ER after a fall 3 days ago. Patient is minimally verbal due to tracheostomy and not able to provide history. He confirmed that he has pain in right leg. However, could not provide  further history. As per NH papers patient had a fall on12/28/2024. He was getting PT done today when he complained of severe pain. Outpatient xray showed proximal femur intertrochanteric fracture. Patient was transferred to the hospital for surgical intervention. As per NH papers, he has also been having fevers and was started on ceftriaxone and azithromycin for suspected pneumonia on 12/29/2024.    Pt himself and his mother deny any actual falls.  Pain is localized to the R hip and alleviated with rest. Pt does not ambulate as per the mother.   Pain is  well controlled.     Vital Signs Last 24 Hrs  T(C): 37.1 (01 Jan 2025 13:37), Max: 37.1 (01 Jan 2025 13:37)  T(F): 98.8 (01 Jan 2025 13:37), Max: 98.8 (01 Jan 2025 13:37)  HR: 71 (01 Jan 2025 13:00) (65 - 90)  BP: 108/70 (01 Jan 2025 13:00) (108/70 - 154/89)  BP(mean): --  RR: 18 (01 Jan 2025 13:00) (18 - 20)  SpO2: 96% (01 Jan 2025 13:00) (96% - 100%)    Parameters below as of 01 Jan 2025 13:00  Patient On (Oxygen Delivery Method): htdrotrack  O2 Flow (L/min): 3    I&O's Detail    Physical Exam:    General: NAD, resting comfortably in bed.    R Hip:   Skin is pink and warm. Tender at the fracture site. Decreased ROM of the affected hip due to pain. SILT.  Positive logroll test.  Lower extremity:  No calf tenderness, calves are soft. 2+pulses. NVI. 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 Pt denies any sensation to the RLE as well.                           8.3    5.93  )-----------( 167      ( 01 Jan 2025 08:14 )             24.6     01-01    143  |  107  |  24[H]  ----------------------------<  113[H]  3.9   |  33[H]  |  0.66    Ca    8.6      01 Jan 2025 08:14  Phos  3.6     01-01  Mg     2.4     01-01    TPro  5.9[L]  /  Alb  1.8[L]  /  TBili  0.3  /  DBili  x   /  AST  20  /  ALT  14  /  AlkPhos  53  01-01    PT/INR - ( 01 Jan 2025 08:14 )   PT: 15.8 sec;   INR: 1.37 ratio         PTT - ( 01 Jan 2025 08:14 )  PTT:37.0 sec  Urinalysis Basic - ( 01 Jan 2025 08:14 )    Color: x / Appearance: x / SG: x / pH: x  Gluc: 113 mg/dL / Ketone: x  / Bili: x / Urobili: x   Blood: x / Protein: x / Nitrite: x   Leuk Esterase: x / RBC: x / WBC x   Sq Epi: x / Non Sq Epi: x / Bacteria: x    XR: R hip IT fx      Impression:  50 y/o M with R Hip Intertrochanteric Fracture    Plan:  -  D/w mother at bedside regarding pts fx and different treatment plans- pts mother would like to pursue surgical intervention for her son- Right hip IM nailing once medically optimized  -  Pain control  -  Dvt prophylaxis with Venodynes/ Heparin drip due to afib and PE hx as per medical team  -  Keep NPO after midnight tonight  -  Surgeon:  Dr. Coello  -  NWB to RLE  -  Recommend to transfuse PRBC prior to surgery due to low H/H  -  Consent: Pending  -  Case d/w medical team

## 2025-01-01 NOTE — PATIENT PROFILE ADULT - FUNCTIONAL ASSESSMENT - DAILY ACTIVITY SCORE.
Physical Exam    Feet: Diabetic foot exam performed during this visit.    Monofilament test performed  Right foot - number of sites tested - 12  Right foot - number of sites sensed - 12  Left foot number of sites tested 12  Left foot - number of sites sensed 12.    Right leg vibrations - normal  Left leg vibrations - normal  Right foot first MTP joint ROM is normal.  Left foot first MTP joint ROM is normal.  Vascular Status: right foot vasculature normal.  Left foot vasculature normal.  Right foot skin intact. Left foot skin intact.   6

## 2025-01-01 NOTE — H&P ADULT - ASSESSMENT
Patient is a 48 y/o M pt with PMHx CVA (3/30/2022 with residual aphasia and right sided residual weakness), s/p tracheostomy and peg placement, seizures, HTN, HLD, CHF w/ ICD (initially rEF 30%->55-60% on 03/2021), MDD, Recurrent PE, Afib (on Eliquis), obesity s/p bariatric intervention presented to the ED after a fall 3 days ago, and outpatient xray showed intertrochanteric fracture of right femur. He also had fevers outpatient, and CXR concerning for left lower lobe infilrtrate. Patient is being admitted to  for management of right intertrochanteric femur fracture and pneumonia.

## 2025-01-01 NOTE — H&P ADULT - HISTORY OF PRESENT ILLNESS
Patient is a 48 y/o M pt with PMHx CVA (3/30/2022 with residual aphasia and right sided residual weakness), s/p tracheostomy and peg placement, seizures, HTN, HLD, CHF w/ ICD (initially rEF 30%->55-60% on 03/2021), MDD, Recurrent PE, Afib (on Eliquis), obesity s/p bariatric intervention presented to the ED after a fall 3 days ago. Patient is minimally verbal dye to tracheostomy and not able to provide history. He confirmed that he has pain in right leg. However, could not provide  further history. As per NH papers patient had a fall on12/28/2024. He was getting PT done today when he complained of severe pain. Outpatient xray showed proximal femur intertrochanteric fracture. Patient was transferred to the hospital for surgical intervention. As per NH papers, he has also been having fevers and was started on ceftriaxone and azithromycin for suspected pneumonia on 12/29/2024.

## 2025-01-01 NOTE — H&P ADULT - PROBLEM SELECTOR PLAN 2
- As per NH had fevers outpatient and was started on ceftriaxone and azithro on 12/29.   - Audible upper airway secretions and rhonchi.   - CXR - left lower lobe infiltrate worse compared to previous.   - C/w ceftriaxone and azithro until 12/2 (total 5 day course)  - Obtain sputum culture.   - F/U atypical pneumonia work up.

## 2025-01-01 NOTE — CHART NOTE - NSCHARTNOTEFT_GEN_A_CORE
HPI:  Patient is a 48 y/o M pt with PMHx CVA (3/30/2022 with residual aphasia and right sided residual weakness), s/p tracheostomy and peg placement, seizures, HTN, HLD, CHF w/ ICD (initially rEF 30%->55-60% on 03/2021), MDD, Recurrent PE, Afib (on Eliquis), obesity s/p bariatric intervention presented to the ED after a fall 3 days ago. Patient is minimally verbal dye to tracheostomy and not able to provide history. He confirmed that he has pain in right leg. However, could not provide  further history. As per NH papers patient had a fall on12/28/2024. He was getting PT done today when he complained of severe pain. Outpatient xray showed proximal femur intertrochanteric fracture. Patient was transferred to the hospital for surgical intervention. As per NH papers, he has also been having fevers and was started on ceftriaxone and azithromycin for suspected pneumonia on 12/29/2024. (01 Jan 2025 00:49)        OBJECTIVE:  Vital Signs Last 24 Hrs  T(C): 37.1 (01 Jan 2025 13:37), Max: 37.1 (01 Jan 2025 13:37)  T(F): 98.8 (01 Jan 2025 13:37), Max: 98.8 (01 Jan 2025 13:37)  HR: 71 (01 Jan 2025 13:00) (65 - 90)  BP: 108/70 (01 Jan 2025 13:00) (108/70 - 154/89)  BP(mean): --  RR: 18 (01 Jan 2025 13:00) (18 - 20)  SpO2: 96% (01 Jan 2025 13:00) (96% - 100%)    Parameters below as of 01 Jan 2025 13:00  Patient On (Oxygen Delivery Method): htdrotrack  O2 Flow (L/min): 3      LABS:                        8.3    5.93  )-----------( 167      ( 01 Jan 2025 08:14 )             24.6     01-01    143  |  107  |  24[H]  ----------------------------<  113[H]  3.9   |  33[H]  |  0.66    Ca    8.6      01 Jan 2025 08:14  Phos  3.6     01-01  Mg     2.4     01-01    TPro  5.9[L]  /  Alb  1.8[L]  /  TBili  0.3  /  DBili  x   /  AST  20  /  ALT  14  /  AlkPhos  53  01-01      ASSESSMENT:  JOE Arroyo, takes Eliquis for A fib & h/o PE      PLAN:   OR with ortho on 1/2  Cards consulted for clearance  	TTE  	ICD interrogation  NPOpMN  Hep gtt    FOLLOW UP/RESULTS:  Repeat CBC  Hold Hep gtt

## 2025-01-01 NOTE — PATIENT PROFILE ADULT - FALL HARM RISK - HARM RISK INTERVENTIONS

## 2025-01-02 DIAGNOSIS — Z01.818 ENCOUNTER FOR OTHER PREPROCEDURAL EXAMINATION: ICD-10-CM

## 2025-01-02 LAB
ALBUMIN SERPL ELPH-MCNC: 1.8 G/DL — LOW (ref 3.5–5)
ALP SERPL-CCNC: 56 U/L — SIGNIFICANT CHANGE UP (ref 40–120)
ALT FLD-CCNC: 16 U/L DA — SIGNIFICANT CHANGE UP (ref 10–60)
ANION GAP SERPL CALC-SCNC: 4 MMOL/L — LOW (ref 5–17)
ANION GAP SERPL CALC-SCNC: 6 MMOL/L — SIGNIFICANT CHANGE UP (ref 5–17)
APTT BLD: 33.5 SEC — SIGNIFICANT CHANGE UP (ref 24.5–35.6)
APTT BLD: 44.8 SEC — HIGH (ref 24.5–35.6)
AST SERPL-CCNC: 19 U/L — SIGNIFICANT CHANGE UP (ref 10–40)
BILIRUB SERPL-MCNC: 0.3 MG/DL — SIGNIFICANT CHANGE UP (ref 0.2–1.2)
BUN SERPL-MCNC: 14 MG/DL — SIGNIFICANT CHANGE UP (ref 7–18)
BUN SERPL-MCNC: 18 MG/DL — SIGNIFICANT CHANGE UP (ref 7–18)
CALCIUM SERPL-MCNC: 8.3 MG/DL — LOW (ref 8.4–10.5)
CALCIUM SERPL-MCNC: 8.5 MG/DL — SIGNIFICANT CHANGE UP (ref 8.4–10.5)
CHLORIDE SERPL-SCNC: 104 MMOL/L — SIGNIFICANT CHANGE UP (ref 96–108)
CHLORIDE SERPL-SCNC: 106 MMOL/L — SIGNIFICANT CHANGE UP (ref 96–108)
CO2 SERPL-SCNC: 28 MMOL/L — SIGNIFICANT CHANGE UP (ref 22–31)
CO2 SERPL-SCNC: 33 MMOL/L — HIGH (ref 22–31)
CREAT SERPL-MCNC: 0.53 MG/DL — SIGNIFICANT CHANGE UP (ref 0.5–1.3)
CREAT SERPL-MCNC: 0.58 MG/DL — SIGNIFICANT CHANGE UP (ref 0.5–1.3)
EGFR: 119 ML/MIN/1.73M2 — SIGNIFICANT CHANGE UP
EGFR: 122 ML/MIN/1.73M2 — SIGNIFICANT CHANGE UP
GLUCOSE BLDC GLUCOMTR-MCNC: 129 MG/DL — HIGH (ref 70–99)
GLUCOSE BLDC GLUCOMTR-MCNC: 131 MG/DL — HIGH (ref 70–99)
GLUCOSE BLDC GLUCOMTR-MCNC: 147 MG/DL — HIGH (ref 70–99)
GLUCOSE BLDC GLUCOMTR-MCNC: 156 MG/DL — HIGH (ref 70–99)
GLUCOSE SERPL-MCNC: 133 MG/DL — HIGH (ref 70–99)
GLUCOSE SERPL-MCNC: 160 MG/DL — HIGH (ref 70–99)
HCT VFR BLD CALC: 24.1 % — LOW (ref 39–50)
HCT VFR BLD CALC: 28.4 % — LOW (ref 39–50)
HGB BLD-MCNC: 8.1 G/DL — LOW (ref 13–17)
HGB BLD-MCNC: 9.8 G/DL — LOW (ref 13–17)
INR BLD: 1.55 RATIO — HIGH (ref 0.85–1.16)
LEGIONELLA AG UR QL: NEGATIVE — SIGNIFICANT CHANGE UP
M PNEUMO IGM SER-ACNC: 0.58 INDEX — SIGNIFICANT CHANGE UP (ref 0–0.9)
MAGNESIUM SERPL-MCNC: 2.4 MG/DL — SIGNIFICANT CHANGE UP (ref 1.6–2.6)
MCHC RBC-ENTMCNC: 32.9 PG — SIGNIFICANT CHANGE UP (ref 27–34)
MCHC RBC-ENTMCNC: 33.6 G/DL — SIGNIFICANT CHANGE UP (ref 32–36)
MCHC RBC-ENTMCNC: 33.6 PG — SIGNIFICANT CHANGE UP (ref 27–34)
MCHC RBC-ENTMCNC: 34.5 G/DL — SIGNIFICANT CHANGE UP (ref 32–36)
MCV RBC AUTO: 100 FL — SIGNIFICANT CHANGE UP (ref 80–100)
MCV RBC AUTO: 95.3 FL — SIGNIFICANT CHANGE UP (ref 80–100)
MYCOPLASMA AG SPEC QL: NEGATIVE — SIGNIFICANT CHANGE UP
NRBC # BLD: 0 /100 WBCS — SIGNIFICANT CHANGE UP (ref 0–0)
NRBC # BLD: 0 /100 WBCS — SIGNIFICANT CHANGE UP (ref 0–0)
PHOSPHATE SERPL-MCNC: 4.1 MG/DL — SIGNIFICANT CHANGE UP (ref 2.5–4.5)
PLATELET # BLD AUTO: 190 K/UL — SIGNIFICANT CHANGE UP (ref 150–400)
PLATELET # BLD AUTO: 208 K/UL — SIGNIFICANT CHANGE UP (ref 150–400)
POTASSIUM SERPL-MCNC: 3.4 MMOL/L — LOW (ref 3.5–5.3)
POTASSIUM SERPL-MCNC: 3.4 MMOL/L — LOW (ref 3.5–5.3)
POTASSIUM SERPL-SCNC: 3.4 MMOL/L — LOW (ref 3.5–5.3)
POTASSIUM SERPL-SCNC: 3.4 MMOL/L — LOW (ref 3.5–5.3)
PROT SERPL-MCNC: 6 G/DL — SIGNIFICANT CHANGE UP (ref 6–8.3)
PROTHROM AB SERPL-ACNC: 18.1 SEC — HIGH (ref 9.9–13.4)
RBC # BLD: 2.41 M/UL — LOW (ref 4.2–5.8)
RBC # BLD: 2.98 M/UL — LOW (ref 4.2–5.8)
RBC # FLD: 14.6 % — HIGH (ref 10.3–14.5)
RBC # FLD: 15.1 % — HIGH (ref 10.3–14.5)
S PNEUM AG UR QL: NEGATIVE — SIGNIFICANT CHANGE UP
SODIUM SERPL-SCNC: 140 MMOL/L — SIGNIFICANT CHANGE UP (ref 135–145)
SODIUM SERPL-SCNC: 141 MMOL/L — SIGNIFICANT CHANGE UP (ref 135–145)
WBC # BLD: 4.88 K/UL — SIGNIFICANT CHANGE UP (ref 3.8–10.5)
WBC # BLD: 9.55 K/UL — SIGNIFICANT CHANGE UP (ref 3.8–10.5)
WBC # FLD AUTO: 4.88 K/UL — SIGNIFICANT CHANGE UP (ref 3.8–10.5)
WBC # FLD AUTO: 9.55 K/UL — SIGNIFICANT CHANGE UP (ref 3.8–10.5)

## 2025-01-02 PROCEDURE — 27245 TREAT THIGH FRACTURE: CPT | Mod: RT

## 2025-01-02 PROCEDURE — 76000 FLUOROSCOPY <1 HR PHYS/QHP: CPT | Mod: 26

## 2025-01-02 PROCEDURE — 99232 SBSQ HOSP IP/OBS MODERATE 35: CPT

## 2025-01-02 PROCEDURE — 27245 TREAT THIGH FRACTURE: CPT | Mod: AS,RT

## 2025-01-02 PROCEDURE — 99233 SBSQ HOSP IP/OBS HIGH 50: CPT

## 2025-01-02 PROCEDURE — 99497 ADVNCD CARE PLAN 30 MIN: CPT

## 2025-01-02 DEVICE — KIT PREP BONE BIO-PREP: Type: IMPLANTABLE DEVICE | Site: RIGHT | Status: FUNCTIONAL

## 2025-01-02 DEVICE — IMPLANTABLE DEVICE: Type: IMPLANTABLE DEVICE | Site: RIGHT | Status: FUNCTIONAL

## 2025-01-02 DEVICE — NAIL INTRAMED TROCHANTER 125 DEG 10X170MM: Type: IMPLANTABLE DEVICE | Site: RIGHT | Status: FUNCTIONAL

## 2025-01-02 DEVICE — GUIDEWIRE BALL TIP 3MM X 1000MM FOR T2 R1.5 FEMORAL NAILING SYSTEM: Type: IMPLANTABLE DEVICE | Site: RIGHT | Status: FUNCTIONAL

## 2025-01-02 DEVICE — SCREW LOKG 5X40MM: Type: IMPLANTABLE DEVICE | Site: RIGHT | Status: FUNCTIONAL

## 2025-01-02 DEVICE — PIN ORTHO PRECISION TAPER 3.9X450MM: Type: IMPLANTABLE DEVICE | Site: RIGHT | Status: FUNCTIONAL

## 2025-01-02 DEVICE — GWIRE W/O THRD 3.2X450MM STRL: Type: IMPLANTABLE DEVICE | Site: RIGHT | Status: FUNCTIONAL

## 2025-01-02 RX ORDER — LACOSAMIDE 100 MG/1
200 TABLET, FILM COATED ORAL EVERY 12 HOURS
Refills: 0 | Status: DISCONTINUED | OUTPATIENT
Start: 2025-01-02 | End: 2025-01-02

## 2025-01-02 RX ORDER — CHLORHEXIDINE GLUCONATE 1.2 MG/ML
1 RINSE ORAL DAILY
Refills: 0 | Status: DISCONTINUED | OUTPATIENT
Start: 2025-01-02 | End: 2025-01-13

## 2025-01-02 RX ORDER — VALPROIC ACID 250 MG/5ML
1250 SOLUTION ORAL EVERY 8 HOURS
Refills: 0 | Status: DISCONTINUED | OUTPATIENT
Start: 2025-01-02 | End: 2025-01-02

## 2025-01-02 RX ORDER — PANTOPRAZOLE 40 MG/1
40 TABLET, DELAYED RELEASE ORAL DAILY
Refills: 0 | Status: DISCONTINUED | OUTPATIENT
Start: 2025-01-02 | End: 2025-01-13

## 2025-01-02 RX ORDER — LEVETIRACETAM 100 MG/ML
2000 SOLUTION ORAL EVERY 12 HOURS
Refills: 0 | Status: DISCONTINUED | OUTPATIENT
Start: 2025-01-02 | End: 2025-01-02

## 2025-01-02 RX ORDER — VALPROIC ACID 250 MG/5ML
1250 SOLUTION ORAL THREE TIMES A DAY
Refills: 0 | Status: DISCONTINUED | OUTPATIENT
Start: 2025-01-02 | End: 2025-01-13

## 2025-01-02 RX ORDER — LACOSAMIDE 100 MG/1
200 TABLET, FILM COATED ORAL
Refills: 0 | Status: DISCONTINUED | OUTPATIENT
Start: 2025-01-02 | End: 2025-01-13

## 2025-01-02 RX ORDER — MORPHINE SULFATE 15 MG
4 TABLET, EXTENDED RELEASE ORAL
Refills: 0 | Status: DISCONTINUED | OUTPATIENT
Start: 2025-01-02 | End: 2025-01-02

## 2025-01-02 RX ORDER — POTASSIUM CHLORIDE 600 MG/1
10 TABLET, FILM COATED, EXTENDED RELEASE ORAL ONCE
Refills: 0 | Status: COMPLETED | OUTPATIENT
Start: 2025-01-02 | End: 2025-01-02

## 2025-01-02 RX ORDER — SODIUM CHLORIDE 9 MG/ML
1000 INJECTION, SOLUTION INTRAVENOUS
Refills: 0 | Status: DISCONTINUED | OUTPATIENT
Start: 2025-01-02 | End: 2025-01-02

## 2025-01-02 RX ORDER — LEVETIRACETAM 100 MG/ML
2000 SOLUTION ORAL
Refills: 0 | Status: DISCONTINUED | OUTPATIENT
Start: 2025-01-02 | End: 2025-01-13

## 2025-01-02 RX ADMIN — SODIUM CHLORIDE 90 MILLILITER(S): 9 INJECTION, SOLUTION INTRAVENOUS at 06:50

## 2025-01-02 RX ADMIN — VALPROIC ACID 1250 MILLIGRAM(S): 250 SOLUTION ORAL at 06:49

## 2025-01-02 RX ADMIN — LACOSAMIDE 200 MILLIGRAM(S): 100 TABLET, FILM COATED ORAL at 06:49

## 2025-01-02 RX ADMIN — Medication 0.5 MILLIGRAM(S): at 08:44

## 2025-01-02 RX ADMIN — IPRATROPIUM BROMIDE AND ALBUTEROL SULFATE 3 MILLILITER(S): .5; 2.5 SOLUTION RESPIRATORY (INHALATION) at 02:30

## 2025-01-02 RX ADMIN — CHLORHEXIDINE GLUCONATE 1 APPLICATION(S): 1.2 RINSE ORAL at 14:14

## 2025-01-02 RX ADMIN — ACETAMINOPHEN 1000 MILLIGRAM(S): 80 SOLUTION/ DROPS ORAL at 00:05

## 2025-01-02 RX ADMIN — Medication 0.5 MILLIGRAM(S): at 09:44

## 2025-01-02 RX ADMIN — LEVETIRACETAM 600 MILLIGRAM(S): 100 SOLUTION ORAL at 19:10

## 2025-01-02 RX ADMIN — LACOSAMIDE 140 MILLIGRAM(S): 100 TABLET, FILM COATED ORAL at 19:37

## 2025-01-02 RX ADMIN — ACETAMINOPHEN 1000 MILLIGRAM(S): 80 SOLUTION/ DROPS ORAL at 06:50

## 2025-01-02 RX ADMIN — ATORVASTATIN CALCIUM 80 MILLIGRAM(S): 40 TABLET, FILM COATED ORAL at 23:00

## 2025-01-02 RX ADMIN — SENNOSIDES 10 MILLILITER(S): 8.6 TABLET, FILM COATED ORAL at 23:01

## 2025-01-02 RX ADMIN — Medication 0.5 MILLIGRAM(S): at 22:00

## 2025-01-02 RX ADMIN — Medication 4 MILLIGRAM(S): at 18:25

## 2025-01-02 RX ADMIN — LEVETIRACETAM 2000 MILLIGRAM(S): 100 SOLUTION ORAL at 06:49

## 2025-01-02 RX ADMIN — Medication 5 MILLIGRAM(S): at 23:01

## 2025-01-02 RX ADMIN — IPRATROPIUM BROMIDE AND ALBUTEROL SULFATE 3 MILLILITER(S): .5; 2.5 SOLUTION RESPIRATORY (INHALATION) at 20:26

## 2025-01-02 RX ADMIN — Medication 0.5 MILLIGRAM(S): at 21:24

## 2025-01-02 RX ADMIN — SODIUM CHLORIDE 90 MILLILITER(S): 9 INJECTION, SOLUTION INTRAVENOUS at 00:10

## 2025-01-02 RX ADMIN — IPRATROPIUM BROMIDE AND ALBUTEROL SULFATE 3 MILLILITER(S): .5; 2.5 SOLUTION RESPIRATORY (INHALATION) at 08:19

## 2025-01-02 RX ADMIN — VALPROIC ACID 1250 MILLIGRAM(S): 250 SOLUTION ORAL at 23:05

## 2025-01-02 RX ADMIN — ACETAMINOPHEN 1000 MILLIGRAM(S): 80 SOLUTION/ DROPS ORAL at 07:02

## 2025-01-02 RX ADMIN — Medication 25 MILLIGRAM(S): at 19:29

## 2025-01-02 RX ADMIN — CEFTRIAXONE SODIUM 100 MILLIGRAM(S): 1 INJECTION, POWDER, FOR SOLUTION INTRAMUSCULAR; INTRAVENOUS at 00:10

## 2025-01-02 RX ADMIN — POTASSIUM CHLORIDE 100 MILLIEQUIVALENT(S): 600 TABLET, FILM COATED, EXTENDED RELEASE ORAL at 13:47

## 2025-01-02 RX ADMIN — POLYSORBATE 80 1 DROP(S): 100 SOLUTION/ DROPS OPHTHALMIC at 14:11

## 2025-01-02 RX ADMIN — AZITHROMYCIN MONOHYDRATE 255 MILLIGRAM(S): 200 POWDER, FOR SUSPENSION ORAL at 01:49

## 2025-01-02 NOTE — CONSULT NOTE ADULT - NS ATTEND AMEND GEN_ALL_CORE FT
Impression: This is a 48 Y/O Male from [facility] presented with Fall .  As per NH papers patient had a fall on12/28/2024. Admitted to  for management of right intertrochanteric femur fracture. For pulmonary evaluation of Left Lung Pneumonia with Chronic Hypoxic Respiratory Failure with Hx of recurrent PE .     Suggestion :  O2 saturation 98% . Hydro trach O2 3L . Monitor O2 saturation trend .   Oral, trach care, suction.   Not a candidate for decannulation nor trach capping at this time.   NPO for Orthopedic procedure .   Continue Duoneb via nebulization Q 6 Hours.  On Ceftriaxone 1 Gm IVPB Daily.
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 Device found to be al EOL. No plans for inpatient generator change. Should follow up with his outpatient implanting EP. EP to sign off.

## 2025-01-02 NOTE — PROGRESS NOTE ADULT - NS ATTEND AMEND GEN_ALL_CORE FT
49 year old M with CVA 3/30/2022 with residual aphasia and right sided residual weakness, s/p tracheostomy and peg placement, seizures, HTN, HLD, ?HF with recovered EF s/p ?Bi-V ICD (initially EF 30%->55-60% on 03/2021), MDD, PE, Afib on Eliquis who was referred by nursing home after fall on 12/28/24, found to have R intertrochanteric fracture, requiring surgery. Cardiology consulted for pre-op evaluation.    1) Pre-op cardiac optimization, prior to orthopedic surgery  - His RCRI is 2 which elevates his cardiac risk for planned orthopedic surgery  - He denies chest pain or shortness of breath (responded by nodding yes/no)  - EKG showed sinus rhythm with LBBB  - TTE 1/1/25 showed normal LV function, mildly dilated aortic root (4.2 cm)  - There is no evidence of active ACS, arrhythmia, clinical heart failure, or significant valvular disease. He is medically optimized from cardiac standpoint and may proceed to planned orthopedic surgery without further cardiac testing.  - He may hold Eliquis for 2 days prior to surgery but should resume as soon as safe from surgical perspective    2) ?HF with recovered EF s/p ?Bi-V ICD (initially EF 30%->55-60% on 03/2021)  - Euvolemic off diuretics  - ICD battery depleted. EP Dr. Traylor consulted, no plan for gen change given poor functional status, comorbidities, and recovered EF    3) CVA 3/30/2022 with residual aphasia and right sided residual weakness  4) Afib  - Eliquis as above  - Continue metoprolol    5) mild aortic root dilation (4.2 cm)  - BP control  - Can obtain non-urgent CTA Aorta

## 2025-01-02 NOTE — PROGRESS NOTE ADULT - NS ATTEND AMEND GEN_ALL_CORE FT
Medication(s) to Refill:   Requested Prescriptions     Pending Prescriptions Disp Refills   • Pantoprazole Sodium 40 MG Oral Tab  tablet 1     Sig: Take 1 tablet (40 mg total) by mouth 2 (two) times daily.          Reason for Medication Refill being s Plan for OR today for fracture repair  Cont. antibiotics for aspiration   Cardiology and EP recs noted  Tracheostomy management per protocol  Not a candidate for decannulation   Cont. care in SCU

## 2025-01-02 NOTE — PROGRESS NOTE ADULT - ASSESSMENT
49 year old M with CVA 3/30/2022 with residual aphasia and right sided residual weakness, s/p tracheostomy and peg placement, seizures, HTN, HLD, ?HF with recovered EF s/p ?Bi-V ICD (initially EF 30%->55-60% on 03/2021), MDD, PE, Afib on Eliquis who was referred by nursing home after fall on 12/28/24, found to have R intertrochanteric fracture, requiring surgery. Cardiology consulted for pre-op evaluation.

## 2025-01-02 NOTE — PROGRESS NOTE ADULT - ASSESSMENT
Patient is a 50 y/o M pt with PMHx CVA (3/30/2022 with residual aphasia and right sided residual weakness), s/p tracheostomy and peg placement, seizures, HTN, HLD, CHF w/ ICD (initially rEF 30%->55-60% on 03/2021), MDD, Recurrent PE, Afib (on Eliquis), obesity s/p bariatric intervention presented to the ED after a fall 3 days prior to admission, and outpatient xray showed intertrochanteric fracture of right femur. He also had fevers outpatient, and CXR concerning for left lower lobe infiltrate Patient is being admitted to  for management of right intertrochanteric femur fracture and pneumonia.     1/2/25: Patient anemia possible anemia of chronic disease, PRBC 1 unit transfused for OR, recommended by Ortho Hemoglobin above 10, however will transfuse one unit 1/2/25 as patient with no active bleeding noted. Cardiac cleared for surgery. ICD no detection, however EP recommends no need to replace as ICD placed initially due to previous low EF. TTE resulting LBBB, LVSF normal EF 59%. Patient planned for OR tonight.

## 2025-01-02 NOTE — GOALS OF CARE CONVERSATION - ADVANCED CARE PLANNING - CONVERSATION DETAILS
Spoke in full detail with Mother Maryanne Buchanan British speaking  ID#[id number] regarding life sustaining treatment in the event of cardiac and respiratory arrest. Patient mother verbalizes that she want cardiac chest compression if patient goes into cardiac arrest. Patient A+Ox1. Patient has Tracheostomy to hydro-trach and PEG tube in situ. Patient is a Full code. Patient mother encouraged to verbalize concerns and emotional support given.

## 2025-01-02 NOTE — PROGRESS NOTE ADULT - SUBJECTIVE AND OBJECTIVE BOX
DANNIE [id number]  49yMale    Diagnosis: 49yMale S/p Right Hip IM Nailing POD#0  Patient was seen and evaluated at bedside.   Patient with complaints of pain to right hip      Vital Signs Last 24 Hrs  T(C): 36.9 (02 Jan 2025 17:35), Max: 37 (02 Jan 2025 07:19)  T(F): 98.4 (02 Jan 2025 17:35), Max: 98.6 (02 Jan 2025 07:19)  HR: 104 (02 Jan 2025 18:30) (63 - 111)  BP: 111/79 (02 Jan 2025 18:30) (83/73 - 139/97)  BP(mean): 90 (02 Jan 2025 18:30) (65 - 93)  RR: 24 (02 Jan 2025 18:30) (16 - 26)  SpO2: 92% (02 Jan 2025 18:30) (92% - 100%)    Parameters below as of 02 Jan 2025 17:45  Patient On (Oxygen Delivery Method): tracheostomy collar  O2 Flow (L/min): 15    I&O's Detail    02 Jan 2025 07:01  -  02 Jan 2025 19:20  --------------------------------------------------------  IN:  Total IN: 0 mL    OUT:    Indwelling Catheter - Urethral (mL): 800 mL  Total OUT: 800 mL    Total NET: -800 mL          Physical Exam:  General:  NAD, resting comfortably in bed.  Right Hip:  Dressing is C/D/I. Skin is pink and warm.  No erythema. SILT.  Wound with no drainage, healing well.   Lower extremity:  No calf tenderness, calves are soft. 2+pulses. NVI. 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 Pt denies any sensation to the RLE as well.                         9.8    9.55  )-----------( 208      ( 02 Jan 2025 18:00 )             28.4     01-02    140  |  106  |  14  ----------------------------<  133[H]  3.4[L]   |  28  |  0.53    Ca    8.5      02 Jan 2025 18:00  Phos  4.1     01-02  Mg     2.4     01-02    TPro  6.0  /  Alb  1.8[L]  /  TBili  0.3  /  DBili  x   /  AST  19  /  ALT  16  /  AlkPhos  56  01-02      Impression:  49yMale S/p Right Hip IM Nailing POD#0  Plan:  -  Pain management  -  DVT prophylaxis with venodynes    > Full anticoagulation can be restarted at medical teams discretion - no orthopedic contraindication  -  Daily Physical Therapy:  WBAT of the Right lower extremity with appropriate assistive device   -  Discharge planning  -  Continue with antibiotics as per medicine   -  Case d/w DR. Rodriguez

## 2025-01-02 NOTE — PROGRESS NOTE ADULT - PROBLEM SELECTOR PLAN 1
- His RCRI is 2 which elevates his cardiac risk for planned orthopedic surgery  - He denies chest pain or shortness of breath (responded by nodding yes/no)  - However, there is no evidence of active ACS, arrhythmia, clinical heart failure, or significant valvular disease  - ICD interrogation revealed battery capacity depletion  -EP eval for ICD generator replacement post ortho sx  - F/U TTE. If TTE is unremarkable, patient is optimized from cardiac standpoint and may proceed to planned orthopedic surgery without further cardiac testing  - He may hold Eliquis for 2 days prior to surgery but should resume as soon as safe from surgical perspective - His RCRI is 2 which elevates his cardiac risk for planned orthopedic surgery  - He denies chest pain or shortness of breath (responded by nodding yes/no)  - However, there is no evidence of active ACS, arrhythmia, clinical heart failure, or significant valvular disease  - ICD interrogation revealed battery capacity depletion  -EP eval for ICD generator replacement post ortho sx  - TTE with normal EF, patient is optimized from cardiac standpoint and may proceed to planned orthopedic surgery without further cardiac testing  - He may hold Eliquis for 2 days prior to surgery but should resume as soon as safe from surgical perspective

## 2025-01-02 NOTE — CONSULT NOTE ADULT - PROBLEM SELECTOR RECOMMENDATION 9
-Echo with normal LV function (EF 59%)  - ICD with depleted buttery since 08/08/2024  - no indication for generator change given poor functional status, comorbidities, and recovered EF -Echo with normal LV function (EF 59%)  - ICD with depleted buttery since 08/08/2024  - no plans for generator change as inpatient. Should follow up as outpatient with him implanting EP.

## 2025-01-02 NOTE — PROGRESS NOTE ADULT - PROBLEM SELECTOR PLAN 2
- CXR - left lower lobe infiltrate worse compared to previous.   - C/w ceftriaxone and azithro until 12/2 (total 5 day course)  - Obtain sputum culture.   - F/U atypical pneumonia work up.

## 2025-01-02 NOTE — PROGRESS NOTE ADULT - SUBJECTIVE AND OBJECTIVE BOX
DANNIE SHIRLEY    SCU progress note    INTERVAL HPI/OVERNIGHT EVENTS: ***    DNR [ ]   DNI  [  ]    Covid - 19 PCR:     The 4Ms    What Matters Most: see GOC  Age appropriate Medications/Screen for High Risk Medication: Yes  Mentation: see CAM below  Mobility: defer to physical exam    The Confusion Assessment Method (CAM) Diagnostic Algorithm     1: Acute Onset or Fluctuating Course  - Is there evidence of an acute change in mental status from the patient’s baseline? Did the (abnormal) behavior  fluctuate during the day, that is, tend to come and go, or increase and decrease in severity?  [ ] YES [ ] NO     2: Inattention  - Did the patient have difficulty focusing attention, being easily distractible, or having difficulty keeping track of what was being said?  [ ] YES [ ] NO     3: Disorganized thinking  -Was the patient’s thinking disorganized or incoherent, such as rambling or irrelevant conversation, unclear or illogical flow of ideas, or unpredictable switching from subject to subject?  [ ] YES [ ] NO    4: Altered Level of consciousness?  [ ] YES [ ] NO    The diagnosis of delirium by CAM requires the presence of features 1 and 2 and either 3 or 4.    PRESSORS: [ ] YES [ ] NO  cefTRIAXone   IVPB 1000 milliGRAM(s) IV Intermittent every 24 hours    Cardiovascular:  Heart Failure  Acute   Acute on Chronic  Chronic       lisinopril 2.5 milliGRAM(s) Oral daily  metoprolol tartrate 25 milliGRAM(s) Oral two times a day    Pulmonary:  albuterol/ipratropium for Nebulization 3 milliLiter(s) Nebulizer every 6 hours    Hematalogic:    Other:  acetaminophen   Oral Liquid .. 1000 milliGRAM(s) Oral every 8 hours  artificial  tears Solution 1 Drop(s) Both EYES daily  ascorbic acid 500 milliGRAM(s) Oral daily  atorvastatin 80 milliGRAM(s) Oral at bedtime  bisacodyl Suppository 10 milliGRAM(s) Rectal daily PRN  cyclobenzaprine 5 milliGRAM(s) Oral every 8 hours PRN  dextrose 5%. 1000 milliLiter(s) IV Continuous <Continuous>  folic acid 1 milliGRAM(s) Oral daily  HYDROmorphone  Injectable 0.2 milliGRAM(s) IV Push every 4 hours PRN  HYDROmorphone  Injectable 0.5 milliGRAM(s) IV Push every 4 hours PRN  lacosamide Solution 200 milliGRAM(s) Oral two times a day  levETIRAcetam  Solution 2000 milliGRAM(s) Oral two times a day  melatonin 5 milliGRAM(s) Oral at bedtime  multivitamin 1 Tablet(s) Oral daily  naloxone Injectable 0.4 milliGRAM(s) IV Push once  ondansetron Injectable 4 milliGRAM(s) IV Push every 8 hours PRN  pantoprazole   Suspension 40 milliGRAM(s) Oral daily  polyethylene glycol 3350 17 Gram(s) Oral daily  senna Syrup 10 milliLiter(s) Oral at bedtime  sodium chloride 0.9%. 1000 milliLiter(s) IV Continuous <Continuous>  valproic  acid Syrup 1250 milliGRAM(s) Oral three times a day    acetaminophen   Oral Liquid .. 1000 milliGRAM(s) Oral every 8 hours  albuterol/ipratropium for Nebulization 3 milliLiter(s) Nebulizer every 6 hours  artificial  tears Solution 1 Drop(s) Both EYES daily  ascorbic acid 500 milliGRAM(s) Oral daily  atorvastatin 80 milliGRAM(s) Oral at bedtime  bisacodyl Suppository 10 milliGRAM(s) Rectal daily PRN  cefTRIAXone   IVPB 1000 milliGRAM(s) IV Intermittent every 24 hours  cyclobenzaprine 5 milliGRAM(s) Oral every 8 hours PRN  dextrose 5%. 1000 milliLiter(s) IV Continuous <Continuous>  folic acid 1 milliGRAM(s) Oral daily  HYDROmorphone  Injectable 0.2 milliGRAM(s) IV Push every 4 hours PRN  HYDROmorphone  Injectable 0.5 milliGRAM(s) IV Push every 4 hours PRN  lacosamide Solution 200 milliGRAM(s) Oral two times a day  levETIRAcetam  Solution 2000 milliGRAM(s) Oral two times a day  lisinopril 2.5 milliGRAM(s) Oral daily  melatonin 5 milliGRAM(s) Oral at bedtime  metoprolol tartrate 25 milliGRAM(s) Oral two times a day  multivitamin 1 Tablet(s) Oral daily  naloxone Injectable 0.4 milliGRAM(s) IV Push once  ondansetron Injectable 4 milliGRAM(s) IV Push every 8 hours PRN  pantoprazole   Suspension 40 milliGRAM(s) Oral daily  polyethylene glycol 3350 17 Gram(s) Oral daily  senna Syrup 10 milliLiter(s) Oral at bedtime  sodium chloride 0.9%. 1000 milliLiter(s) IV Continuous <Continuous>  valproic  acid Syrup 1250 milliGRAM(s) Oral three times a day    Drug Dosing Weight  Height (cm): 170.2 (26 Aug 2024 12:06)  Weight (kg): 90.7 (31 Dec 2024 18:00)  BMI (kg/m2): 31.3 (31 Dec 2024 18:00)  BSA (m2): 2.02 (31 Dec 2024 18:00)    CENTRAL LINE: [ ] YES [ ] NO  LOCATION:   DATE INSERTED:  REMOVE: [ ] YES [ ] NO  EXPLAIN:    FAULKNER: [ ] YES [ ] NO    DATE INSERTED:  REMOVE:  [ ] YES [ ] NO  EXPLAIN:    PAST MEDICAL & SURGICAL HISTORY:  Heart failure, unspecified HF chronicity, unspecified heart failure type      ETOH abuse  h/o etoh abuse now moderate drinker      Artificial pacemaker                          PHYSICAL EXAM:    GENERAL: NAD, well-groomed, well-developed  HEAD:  Atraumatic, Normocephalic  EYES: EOMI, PERRLA, conjunctiva and sclera clear  ENMT: No tonsillar erythema, exudates, or enlargement; Moist mucous membranes, Good dentition, No lesions  NECK: Supple, No JVD, Normal thyroid  NERVOUS SYSTEM:  Alert & Oriented X3, Good concentration; Motor Strength 5/5 B/L upper and lower extremities; DTRs 2+ intact and symmetric  CHEST/LUNG: Clear to percussion bilaterally; No rales, rhonchi, wheezing, or rubs  HEART: Regular rate and rhythm; No murmurs, rubs, or gallops  ABDOMEN: Soft, Nontender, Nondistended; Bowel sounds present  EXTREMITIES:  2+ Peripheral Pulses, No clubbing, cyanosis, or edema  LYMPH: No lymphadenopathy noted  SKIN: No rashes or lesions      LABS:  CBC Full  -  ( 02 Jan 2025 06:16 )  WBC Count : 4.88 K/uL  RBC Count : 2.41 M/uL  Hemoglobin : 8.1 g/dL  Hematocrit : 24.1 %  Platelet Count - Automated : 190 K/uL  Mean Cell Volume : 100.0 fl  Mean Cell Hemoglobin : 33.6 pg  Mean Cell Hemoglobin Concentration : 33.6 g/dL  Auto Neutrophil # : x  Auto Lymphocyte # : x  Auto Monocyte # : x  Auto Eosinophil # : x  Auto Basophil # : x  Auto Neutrophil % : x  Auto Lymphocyte % : x  Auto Monocyte % : x  Auto Eosinophil % : x  Auto Basophil % : x    01-02    141  |  104  |  18  ----------------------------<  160[H]  3.4[L]   |  33[H]  |  0.58    Ca    8.3[L]      02 Jan 2025 06:16  Phos  4.1     01-02  Mg     2.4     01-02    TPro  6.0  /  Alb  1.8[L]  /  TBili  0.3  /  DBili  x   /  AST  19  /  ALT  16  /  AlkPhos  56  01-02    PT/INR - ( 02 Jan 2025 06:16 )   PT: 18.1 sec;   INR: 1.55 ratio         PTT - ( 02 Jan 2025 06:16 )  PTT:33.5 sec  Urinalysis Basic - ( 02 Jan 2025 06:16 )    Color: x / Appearance: x / SG: x / pH: x  Gluc: 160 mg/dL / Ketone: x  / Bili: x / Urobili: x   Blood: x / Protein: x / Nitrite: x   Leuk Esterase: x / RBC: x / WBC x   Sq Epi: x / Non Sq Epi: x / Bacteria: x            [  ]  DVT Prophylaxis  [  ]  Nutrition, Brand, Rate         Goal Rate        Abnormal Nutritional Status -  Malnutrition   Cachexia      Morbid Obesity BMI >/=40    RADIOLOGY & ADDITIONAL STUDIES:  ***    Goals of Care Discussion with Family/Proxy/Other   - see note from/family meeting set up for...     DANNIE SHIRLEY    SCU progress note    INTERVAL HPI/OVERNIGHT EVENTS: ***Patient admitted overnight for right hip fracture.     DNR [ ]   DNI  [  ] Full Code    Covid - 19 PCR:     The 4Ms    What Matters Most: see GOC  Age appropriate Medications/Screen for High Risk Medication: Yes  Mentation: see CAM below  Mobility: defer to physical exam    The Confusion Assessment Method (CAM) Diagnostic Algorithm     1: Acute Onset or Fluctuating Course  - Is there evidence of an acute change in mental status from the patient’s baseline? Did the (abnormal) behavior  fluctuate during the day, that is, tend to come and go, or increase and decrease in severity?  [ ] YES [ ] NO     2: Inattention  - Did the patient have difficulty focusing attention, being easily distractible, or having difficulty keeping track of what was being said?  [ ] YES [ ] NO     3: Disorganized thinking  -Was the patient’s thinking disorganized or incoherent, such as rambling or irrelevant conversation, unclear or illogical flow of ideas, or unpredictable switching from subject to subject?  [ ] YES [ ] NO    4: Altered Level of consciousness?  [ ] YES [ ] NO    The diagnosis of delirium by CAM requires the presence of features 1 and 2 and either 3 or 4.    PRESSORS: [ ] YES [ ] NO  cefTRIAXone   IVPB 1000 milliGRAM(s) IV Intermittent every 24 hours    Cardiovascular:  Heart Failure  Acute   Acute on Chronic  Chronic       lisinopril 2.5 milliGRAM(s) Oral daily  metoprolol tartrate 25 milliGRAM(s) Oral two times a day    Pulmonary:  albuterol/ipratropium for Nebulization 3 milliLiter(s) Nebulizer every 6 hours    Hematalogic:    Other:  acetaminophen   Oral Liquid .. 1000 milliGRAM(s) Oral every 8 hours  artificial  tears Solution 1 Drop(s) Both EYES daily  ascorbic acid 500 milliGRAM(s) Oral daily  atorvastatin 80 milliGRAM(s) Oral at bedtime  bisacodyl Suppository 10 milliGRAM(s) Rectal daily PRN  cyclobenzaprine 5 milliGRAM(s) Oral every 8 hours PRN  dextrose 5%. 1000 milliLiter(s) IV Continuous <Continuous>  folic acid 1 milliGRAM(s) Oral daily  HYDROmorphone  Injectable 0.2 milliGRAM(s) IV Push every 4 hours PRN  HYDROmorphone  Injectable 0.5 milliGRAM(s) IV Push every 4 hours PRN  lacosamide Solution 200 milliGRAM(s) Oral two times a day  levETIRAcetam  Solution 2000 milliGRAM(s) Oral two times a day  melatonin 5 milliGRAM(s) Oral at bedtime  multivitamin 1 Tablet(s) Oral daily  naloxone Injectable 0.4 milliGRAM(s) IV Push once  ondansetron Injectable 4 milliGRAM(s) IV Push every 8 hours PRN  pantoprazole   Suspension 40 milliGRAM(s) Oral daily  polyethylene glycol 3350 17 Gram(s) Oral daily  senna Syrup 10 milliLiter(s) Oral at bedtime  sodium chloride 0.9%. 1000 milliLiter(s) IV Continuous <Continuous>  valproic  acid Syrup 1250 milliGRAM(s) Oral three times a day    acetaminophen   Oral Liquid .. 1000 milliGRAM(s) Oral every 8 hours  albuterol/ipratropium for Nebulization 3 milliLiter(s) Nebulizer every 6 hours  artificial  tears Solution 1 Drop(s) Both EYES daily  ascorbic acid 500 milliGRAM(s) Oral daily  atorvastatin 80 milliGRAM(s) Oral at bedtime  bisacodyl Suppository 10 milliGRAM(s) Rectal daily PRN  cefTRIAXone   IVPB 1000 milliGRAM(s) IV Intermittent every 24 hours  cyclobenzaprine 5 milliGRAM(s) Oral every 8 hours PRN  dextrose 5%. 1000 milliLiter(s) IV Continuous <Continuous>  folic acid 1 milliGRAM(s) Oral daily  HYDROmorphone  Injectable 0.2 milliGRAM(s) IV Push every 4 hours PRN  HYDROmorphone  Injectable 0.5 milliGRAM(s) IV Push every 4 hours PRN  lacosamide Solution 200 milliGRAM(s) Oral two times a day  levETIRAcetam  Solution 2000 milliGRAM(s) Oral two times a day  lisinopril 2.5 milliGRAM(s) Oral daily  melatonin 5 milliGRAM(s) Oral at bedtime  metoprolol tartrate 25 milliGRAM(s) Oral two times a day  multivitamin 1 Tablet(s) Oral daily  naloxone Injectable 0.4 milliGRAM(s) IV Push once  ondansetron Injectable 4 milliGRAM(s) IV Push every 8 hours PRN  pantoprazole   Suspension 40 milliGRAM(s) Oral daily  polyethylene glycol 3350 17 Gram(s) Oral daily  senna Syrup 10 milliLiter(s) Oral at bedtime  sodium chloride 0.9%. 1000 milliLiter(s) IV Continuous <Continuous>  valproic  acid Syrup 1250 milliGRAM(s) Oral three times a day    Drug Dosing Weight  Height (cm): 170.2 (26 Aug 2024 12:06)  Weight (kg): 90.7 (31 Dec 2024 18:00)  BMI (kg/m2): 31.3 (31 Dec 2024 18:00)  BSA (m2): 2.02 (31 Dec 2024 18:00)    CENTRAL LINE: [ ] YES [ ] NO  LOCATION:   DATE INSERTED:  REMOVE: [ ] YES [ ] NO  EXPLAIN:    FAULKNER: [ ] YES [ ] NO    DATE INSERTED:  REMOVE:  [ ] YES [ ] NO  EXPLAIN:    PAST MEDICAL & SURGICAL HISTORY:  Heart failure, unspecified HF chronicity, unspecified heart failure type      ETOH abuse  h/o etoh abuse now moderate drinker      Artificial pacemaker                          PHYSICAL EXAM:    GENERAL: NAD, well-groomed, well-developed  HEAD:  Atraumatic, Normocephalic  EYES: EOMI, PERRLA, conjunctiva and sclera clear  ENMT: No tonsillar erythema, exudates, or enlargement; Moist mucous membranes, Good dentition, No lesions  NECK: Supple, No JVD, Normal thyroid  NERVOUS SYSTEM:  Alert & Oriented X3, Good concentration; Motor Strength 5/5 B/L upper and lower extremities; DTRs 2+ intact and symmetric  CHEST/LUNG: Clear to percussion bilaterally; No rales, rhonchi, wheezing, or rubs  HEART: Regular rate and rhythm; No murmurs, rubs, or gallops  ABDOMEN: Soft, Nontender, Nondistended; Bowel sounds present  EXTREMITIES:  2+ Peripheral Pulses, No clubbing, cyanosis, or edema  LYMPH: No lymphadenopathy noted  SKIN: No rashes or lesions      LABS:  CBC Full  -  ( 02 Jan 2025 06:16 )  WBC Count : 4.88 K/uL  RBC Count : 2.41 M/uL  Hemoglobin : 8.1 g/dL  Hematocrit : 24.1 %  Platelet Count - Automated : 190 K/uL  Mean Cell Volume : 100.0 fl  Mean Cell Hemoglobin : 33.6 pg  Mean Cell Hemoglobin Concentration : 33.6 g/dL  Auto Neutrophil # : x  Auto Lymphocyte # : x  Auto Monocyte # : x  Auto Eosinophil # : x  Auto Basophil # : x  Auto Neutrophil % : x  Auto Lymphocyte % : x  Auto Monocyte % : x  Auto Eosinophil % : x  Auto Basophil % : x    01-02    141  |  104  |  18  ----------------------------<  160[H]  3.4[L]   |  33[H]  |  0.58    Ca    8.3[L]      02 Jan 2025 06:16  Phos  4.1     01-02  Mg     2.4     01-02    TPro  6.0  /  Alb  1.8[L]  /  TBili  0.3  /  DBili  x   /  AST  19  /  ALT  16  /  AlkPhos  56  01-02    PT/INR - ( 02 Jan 2025 06:16 )   PT: 18.1 sec;   INR: 1.55 ratio         PTT - ( 02 Jan 2025 06:16 )  PTT:33.5 sec  Urinalysis Basic - ( 02 Jan 2025 06:16 )    Color: x / Appearance: x / SG: x / pH: x  Gluc: 160 mg/dL / Ketone: x  / Bili: x / Urobili: x   Blood: x / Protein: x / Nitrite: x   Leuk Esterase: x / RBC: x / WBC x   Sq Epi: x / Non Sq Epi: x / Bacteria: x            [  ]  DVT Prophylaxis  [  ]  Nutrition, Brand, Rate         Goal Rate        Abnormal Nutritional Status -  Malnutrition   Cachexia      Morbid Obesity BMI >/=40    RADIOLOGY & ADDITIONAL STUDIES:  ***    Goals of Care Discussion with Family/Proxy/Other   - see note from/family meeting set up for...     DANNIE SHIRLEY    SCU progress note    INTERVAL HPI/OVERNIGHT EVENTS: ***Patient admitted overnight for right hip fracture.     DNR [ ]   DNI  [  ] Full Code    Covid - 19 PCR: 1/1/2025 negative    The 4Ms    What Matters Most: see [facility]  Age appropriate Medications/Screen for High Risk Medication: Yes  Mentation: see CAM below  Mobility: defer to physical exam    The Confusion Assessment Method (CAM) Diagnostic Algorithm     1: Acute Onset or Fluctuating Course  - Is there evidence of an acute change in mental status from the patient’s baseline? Did the (abnormal) behavior  fluctuate during the day, that is, tend to come and go, or increase and decrease in severity?  [ ] YES [x ] NO     2: Inattention  - Did the patient have difficulty focusing attention, being easily distractible, or having difficulty keeping track of what was being said?  [ ] YES [x ] NO     3: Disorganized thinking  -Was the patient’s thinking disorganized or incoherent, such as rambling or irrelevant conversation, unclear or illogical flow of ideas, or unpredictable switching from subject to subject?  [ ] YES [x ] NO    4: Altered Level of consciousness?  [ ] YES [x ] NO    The diagnosis of delirium by CAM requires the presence of features 1 and 2 and either 3 or 4.    PRESSORS: [ ] YES [x ] NO  cefTRIAXone   IVPB 1000 milliGRAM(s) IV Intermittent every 24 hours    Cardiovascular:  Heart Failure  Acute   Acute on Chronic  Chronic       lisinopril 2.5 milliGRAM(s) Oral daily  metoprolol tartrate 25 milliGRAM(s) Oral two times a day    Pulmonary:  albuterol/ipratropium for Nebulization 3 milliLiter(s) Nebulizer every 6 hours    Hematalogic:    Other:  acetaminophen   Oral Liquid .. 1000 milliGRAM(s) Oral every 8 hours  artificial  tears Solution 1 Drop(s) Both EYES daily  ascorbic acid 500 milliGRAM(s) Oral daily  atorvastatin 80 milliGRAM(s) Oral at bedtime  bisacodyl Suppository 10 milliGRAM(s) Rectal daily PRN  cyclobenzaprine 5 milliGRAM(s) Oral every 8 hours PRN  dextrose 5%. 1000 milliLiter(s) IV Continuous <Continuous>  folic acid 1 milliGRAM(s) Oral daily  HYDROmorphone  Injectable 0.2 milliGRAM(s) IV Push every 4 hours PRN  HYDROmorphone  Injectable 0.5 milliGRAM(s) IV Push every 4 hours PRN  lacosamide Solution 200 milliGRAM(s) Oral two times a day  levETIRAcetam  Solution 2000 milliGRAM(s) Oral two times a day  melatonin 5 milliGRAM(s) Oral at bedtime  multivitamin 1 Tablet(s) Oral daily  naloxone Injectable 0.4 milliGRAM(s) IV Push once  ondansetron Injectable 4 milliGRAM(s) IV Push every 8 hours PRN  pantoprazole   Suspension 40 milliGRAM(s) Oral daily  polyethylene glycol 3350 17 Gram(s) Oral daily  senna Syrup 10 milliLiter(s) Oral at bedtime  sodium chloride 0.9%. 1000 milliLiter(s) IV Continuous <Continuous>  valproic  acid Syrup 1250 milliGRAM(s) Oral three times a day    acetaminophen   Oral Liquid .. 1000 milliGRAM(s) Oral every 8 hours  albuterol/ipratropium for Nebulization 3 milliLiter(s) Nebulizer every 6 hours  artificial  tears Solution 1 Drop(s) Both EYES daily  ascorbic acid 500 milliGRAM(s) Oral daily  atorvastatin 80 milliGRAM(s) Oral at bedtime  bisacodyl Suppository 10 milliGRAM(s) Rectal daily PRN  cefTRIAXone   IVPB 1000 milliGRAM(s) IV Intermittent every 24 hours  cyclobenzaprine 5 milliGRAM(s) Oral every 8 hours PRN  dextrose 5%. 1000 milliLiter(s) IV Continuous <Continuous>  folic acid 1 milliGRAM(s) Oral daily  HYDROmorphone  Injectable 0.2 milliGRAM(s) IV Push every 4 hours PRN  HYDROmorphone  Injectable 0.5 milliGRAM(s) IV Push every 4 hours PRN  lacosamide Solution 200 milliGRAM(s) Oral two times a day  levETIRAcetam  Solution 2000 milliGRAM(s) Oral two times a day  lisinopril 2.5 milliGRAM(s) Oral daily  melatonin 5 milliGRAM(s) Oral at bedtime  metoprolol tartrate 25 milliGRAM(s) Oral two times a day  multivitamin 1 Tablet(s) Oral daily  naloxone Injectable 0.4 milliGRAM(s) IV Push once  ondansetron Injectable 4 milliGRAM(s) IV Push every 8 hours PRN  pantoprazole   Suspension 40 milliGRAM(s) Oral daily  polyethylene glycol 3350 17 Gram(s) Oral daily  senna Syrup 10 milliLiter(s) Oral at bedtime  sodium chloride 0.9%. 1000 milliLiter(s) IV Continuous <Continuous>  valproic  acid Syrup 1250 milliGRAM(s) Oral three times a day    Drug Dosing Weight  Height (cm): 170.2 (26 Aug 2024 12:06)  Weight (kg): 90.7 (31 Dec 2024 18:00)  BMI (kg/m2): 31.3 (31 Dec 2024 18:00)  BSA (m2): 2.02 (31 Dec 2024 18:00)    CENTRAL LINE: [ ] YES [x ] NO  LOCATION:   DATE INSERTED:  REMOVE: [ ] YES [ ] NO  EXPLAIN:    FAULKNER: [ ] YES [x ] NO    DATE INSERTED:  REMOVE:  [ ] YES [ ] NO  EXPLAIN:    PAST MEDICAL & SURGICAL HISTORY:  Heart failure, unspecified HF chronicity, unspecified heart failure type      ETOH abuse  h/o etoh abuse now moderate drinker      Artificial pacemaker      PHYSICAL EXAM:    GENERAL: NAD, well-groomed, well-developed  HEAD: craniotomy left parietal, needs helmet  EYES: EOMI, PERRLA, conjunctiva and sclera clear  ENMT: No tonsillar erythema, exudates, or enlargement; Moist mucous membranes, Good dentition, No lesions  NECK: tracheostomy-hydrotrach, No JVD, Normal thyroid  NERVOUS SYSTEM:  Alert & Oriented X1, Motor Strength 2/5 B/L upper and lower extremities  CHEST/LUNG: bibasilar crackles; No rales, rhonchi, wheezing, or rubs  HEART: Regular rate and rhythm; No murmurs, rubs, or gallops  ABDOMEN: PEG, Soft, Nontender, Nondistended; Bowel sounds present  EXTREMITIES:  2+ Peripheral Pulses, No clubbing, cyanosis, or edema  LYMPH: No lymphadenopathy noted  SKIN: No rashes or lesions      LABS:  CBC Full  -  ( 02 Jan 2025 06:16 )  WBC Count : 4.88 K/uL  RBC Count : 2.41 M/uL  Hemoglobin : 8.1 g/dL  Hematocrit : 24.1 %  Platelet Count - Automated : 190 K/uL  Mean Cell Volume : 100.0 fl  Mean Cell Hemoglobin : 33.6 pg  Mean Cell Hemoglobin Concentration : 33.6 g/dL  Auto Neutrophil # : x  Auto Lymphocyte # : x  Auto Monocyte # : x  Auto Eosinophil # : x  Auto Basophil # : x  Auto Neutrophil % : x  Auto Lymphocyte % : x  Auto Monocyte % : x  Auto Eosinophil % : x  Auto Basophil % : x    01-02    141  |  104  |  18  ----------------------------<  160[H]  3.4[L]   |  33[H]  |  0.58    Ca    8.3[L]      02 Jan 2025 06:16  Phos  4.1     01-02  Mg     2.4     01-02    TPro  6.0  /  Alb  1.8[L]  /  TBili  0.3  /  DBili  x   /  AST  19  /  ALT  16  /  AlkPhos  56  01-02    PT/INR - ( 02 Jan 2025 06:16 )   PT: 18.1 sec;   INR: 1.55 ratio         PTT - ( 02 Jan 2025 06:16 )  PTT:33.5 sec  Urinalysis Basic - ( 02 Jan 2025 06:16 )    Color: x / Appearance: x / SG: x / pH: x  Gluc: 160 mg/dL / Ketone: x  / Bili: x / Urobili: x   Blood: x / Protein: x / Nitrite: x   Leuk Esterase: x / RBC: x / WBC x   Sq Epi: x / Non Sq Epi: x / Bacteria: x      [  ]  DVT Prophylaxis  [  ]  Nutrition, Brand, Rate         Goal Rate        Abnormal Nutritional Status -  Malnutrition   Cachexia         RADIOLOGY & ADDITIONAL STUDIES:  ***  < from: CT Pelvis No Cont (12.31.24 @ 23:18) >    ACC: [id number] EXAM:  CT PELVIS ONLY   ORDERED BY:  ARUNA RUBIO     PROCEDURE DATE:  12/31/2024          INTERPRETATION:  CLINICAL INFORMATION: Status post fall with right hip   pain.    COMPARISON: CT of the abdomen and pelvis from 9/3/2018.    CONTRAST/COMPLICATIONS:  IV Contrast: NONE  Oral Contrast: NONE  .    PROCEDURE:  CT of the Pelvis was performed.  Sagittal and coronal reformats were performed.    FINDINGS:  BLADDER: Minimally distended. Circumferential wall thickening.  REPRODUCTIVE ORGANS: Prostate within normal limits.  LYMPH NODES: No pelvic lymphadenopathy.    VISUALIZED PORTIONS:  BOWEL: Within normal limits.  PERITONEUM/RETROPERITONEUM: Within normal limits.  VESSELS: Within normal limits.  ABDOMINAL WALL: Within normal limits.  BONES: Acute comminuted and impacted intertrochanteric proximal right   femur fracture with varus angulation. No additional fracture or   dislocation. Nonspecific cyst in the right femoral head.  SOFT TISSUES: Enlargement of the right iliopsoas, gluteus minimus and   gluteus medius muscles with adjacent inflammatory change likely due to   muscle strain and/or trauma. Mild inflammatory change adjacent to the   intertrochanteric proximal right femur fracture.    IMPRESSION:  1. Acute comminuted and impacted intertrochanteric proximal right femur   fracture.  2. Circumferential urinary bladder wall thickening which could be due to   underdistention versus a cystitis. Correlate with urinalysis.        --- End of Report ---    < end of copied text >  < from: CT Head No Cont (12.31.24 @ 23:18) >  ACC: [id number] EXAM:  CT BRAIN   ORDERED BY:  ARUNA RUBIO     PROCEDURE DATE:  12/31/2024          INTERPRETATION:  EXAM: CT HEAD WITHOUT INTRAVENOUS CONTRAST    CLINICAL INFORMATION: Fall.    TECHNIQUE: Noncontrast axial CT images were acquired through the head.   Two-dimensional sagittal and coronal reformats were generated.    COMPARISON STUDY: 8/26/2024 CT head.    FINDINGS:    BRAIN: No apparent acute infarct, hemorrhage or mass. No hydrocephalus.   Encephalomalacia involving much of the left cerebral hemisphere, left MCA   territory, with ex vacuo dilation of the left lateral ventricle again   demonstrated.    CALVARIUM: Status post left hemicraniectomy. The sinking of the skin flap   seen on the previous study is no longer evident today. No acute fracture.    EXTRACRANIAL SOFT TISSUES: No acute findings.    VISUALIZED PORTIONS OF THE PARANASAL SINUSES AND MASTOID AIR CELLS: No   air fluid levels.    IMPRESSION:  No acute intracranial findings.        --- End of Report ---      < end of copied text >  < from: Xray Femur 2 Views, Right (12.31.24 @ 19:08) >    IMPRESSION:    Minimally displaced right intertrochanteric femoral fracture    --- End of Report ---      < end of copied text >  < from: Xray Femur 2 Views, Right (12.31.24 @ 19:08) >    IMPRESSION:    Minimally displaced right intertrochanteric femoral fracture    --- End of Report ---      < end of copied text >  < from: TTE W or WO Ultrasound Enhancing Agent (01.01.25 @ 12:44) >  _______________________________________________________________________________________     CONCLUSIONS:      1. Aortic root at the sinuses of Valsalva isdilated, measuring 4.20 cm (indexed 2.00 cm/m²).   2. Abnormal septal motion consistent with left bundle branch block. Left ventricular systolic function is normal with an ejection fraction of 59 % by Garcia's method of disks.   3. There is increased LV mass and concentric hypertrophy.   4. Normal right ventricular cavity size, with normal wall thickness, and normal right ventricular systolic function.   5. Device lead is visualized in the right ventricle.   6. Consider CTA of the chest to better evaluate the aorta if clinically appropriate.    ________________________________________________________________________________________    < end of copied text >    Goals of Care Discussion with Family/Proxy/Other

## 2025-01-02 NOTE — BRIEF OPERATIVE NOTE - NSICDXBRIEFPREOP_GEN_ALL_CORE_FT
PRE-OP DIAGNOSIS:  Intertrochanteric fracture of right hip 02-Jan-2025 17:18:44  Jose Elias Urbina

## 2025-01-02 NOTE — PROGRESS NOTE ADULT - SUBJECTIVE AND OBJECTIVE BOX
DANNIE SHIRLEY  MRN-[id number]     ORTHOPEDIC CONSULT:    Diagnosis:  R Hip Intertrochanteric Fracture     49yMale    Patient is a 50 y/o M pt with mother at the bedside- with PMHx CVA (3/30/2022 with residual aphasia and right sided residual weakness), s/p tracheostomy and peg placement, seizures, HTN, HLD, CHF w/ ICD (initially rEF 30%->55-60% on 03/2021), MDD, Recurrent PE, Afib (on Eliquis), obesity s/p bariatric intervention presented to the ER after a fall 3 days ago. Patient is minimally verbal due to tracheostomy and not able to provide history. He confirmed that he has pain in right leg. However, could not provide  further history. As per NH papers patient had a fall on12/28/2024. He was getting PT done today when he complained of severe pain. Outpatient xray showed proximal femur intertrochanteric fracture. Patient was transferred to the hospital for surgical intervention. As per NH papers, he has also been having fevers and was started on ceftriaxone and azithromycin for suspected pneumonia on 12/29/2024.    Pt himself and his mother deny any actual falls.  Pt does not ambulate as per the mother.     Patient seen and evaluated at beside without family member present. Patient minimally verbal at baseline 2/2 stroke and tracheostomy tube.  Patient able to understand questions and pointed to his right hip when asked where his pain is.  Has been on fracture care bedrest.  Able to obtain ROS by patient shaking his head yes or no. Pt denies Fever, Chest pain, SOB, dyspnea, paresthesias, N/V/D, abdominal pain, syncope, or pain anywhere else.       Vital Signs Last 24 Hrs  T(C): 36.6 (02 Jan 2025 06:02), Max: 37.1 (01 Jan 2025 13:37)  T(F): 97.8 (02 Jan 2025 06:02), Max: 98.8 (01 Jan 2025 13:37)  HR: 63 (02 Jan 2025 06:02) (63 - 74)  BP: 94/59 (02 Jan 2025 06:02) (88/59 - 108/70)  BP(mean): 70 (02 Jan 2025 06:02) (70 - 70)  ABP: --  ABP(mean): --  RR: 20 (02 Jan 2025 06:02) (18 - 20)  SpO2: 94% (02 Jan 2025 06:02) (94% - 96%)    O2 Parameters below as of 02 Jan 2025 06:02  Patient On (Oxygen Delivery Method): tracheostomy collar  O2 Flow (L/min): 10      I&O's Detail    02 Jan 2025 07:01  -  02 Jan 2025 08:17  --------------------------------------------------------  IN:  Total IN: 0 mL    OUT:    Indwelling Catheter - Urethral (mL): 800 mL  Total OUT: 800 mL    Total NET: -800 mL              Physical Exam:    General: NAD, resting comfortably in bed.    R Hip:   Skin is pink and warm. Tender at the fracture site. SILT.  Positive logroll test.  RLE: Patient unable to dorsiflex or plantarflex at right side 2/2 stroke in 2022. Diminshed sensation to right knee and below.   LLE: Full painless ROM, 5/5 strength with dorsi/plantar   Lower extremity bilateral :  No calf tenderness, calves are soft. 2+pulses. NVI. 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 Pt denies any sensation to the RLE as well.                           01-02    141  |  104  |  18  ----------------------------<  160[H]  3.4[L]   |  33[H]  |  0.58    Ca    8.3[L]      02 Jan 2025 06:16  Phos  4.1     01-02  Mg     2.4     01-02    TPro  6.0  /  Alb  1.8[L]  /  TBili  0.3  /  DBili  x   /  AST  19  /  ALT  16  /  AlkPhos  56  01-02             8.1    4.88  )-----------( 190      ( 02 Jan 2025 06:16 )             24.1         Radiology:  < from: CT Pelvis No Cont (12.31.24 @ 23:18) >    ACC: [id number] EXAM:  CT PELVIS ONLY   ORDERED BY:  ARUNA RUBIO     PROCEDURE DATE:  12/31/2024          INTERPRETATION:  CLINICAL INFORMATION: Status post fall with right hip   pain.    COMPARISON: CT of the abdomen and pelvis from 9/3/2018.    CONTRAST/COMPLICATIONS:  IV Contrast: NONE  Oral Contrast: NONE  .    PROCEDURE:  CT of the Pelvis was performed.  Sagittal and coronal reformats were performed.    FINDINGS:  BLADDER: Minimally distended. Circumferential wall thickening.  REPRODUCTIVE ORGANS: Prostate within normal limits.  LYMPH NODES: No pelvic lymphadenopathy.    VISUALIZED PORTIONS:  BOWEL: Within normal limits.  PERITONEUM/RETROPERITONEUM: Within normal limits.  VESSELS: Within normal limits.  ABDOMINAL WALL: Within normal limits.  BONES: Acute comminuted and impacted intertrochanteric proximal right   femur fracture with varus angulation. No additional fracture or   dislocation. Nonspecific cyst in the right femoral head.  SOFT TISSUES: Enlargement of the right iliopsoas, gluteus minimus and   gluteus medius muscles with adjacent inflammatory change likely due to   muscle strain and/or trauma. Mild inflammatory change adjacent to the   intertrochanteric proximal right femur fracture.    IMPRESSION:  1. Acute comminuted and impacted intertrochanteric proximal right femur   fracture.  2. Circumferential urinary bladder wall thickening which could be due to   underdistention versus a cystitis. Correlate with urinalysis.        --- End of Report ---            ELLIS SIMENTAL MD; Attending Radiologist  This document has been electronically signed. Jan 1 2025  3:57AM    < end of copied text >          Impression:  50 y/o M with R Hip Intertrochanteric Fracture    Plan:  -  Patient is for OR today 1/2/25 for Right hip IM nailing     >NPO today     >Hold chemical DVT ppx today  -  Medical/cardio clearance pending   -  Please transfuse 1-2 units of PRBC prior to surgery for Hg of 8.1. Goal of Hg prior to surgery is 10.0  -  Pain control   -  Dvt prophylaxis with Venodynes  -  Surgeon:  Dr. Coello  -  NWB to [facility]- bedrest for now   -  Consent: Pending  -  Case d/w medical team

## 2025-01-02 NOTE — PROGRESS NOTE ADULT - PROBLEM SELECTOR PLAN 5
- Stable  - BP goal of < 140/90  - continue home Lisinopril 2.5mg via PEG daily and Metoprolol 25mg via PED BID meds w/ holding parameters  -monitor BP & titrate BP meds PRN

## 2025-01-02 NOTE — PROGRESS NOTE ADULT - PROBLEM SELECTOR PLAN 1
- P/w with pain from fall on 12/28.   - Outpatient xray showed right intertrochanteric femur fracture.   - Xray right hip: Minimally displaced right intertrochanteric femoral fracture  - continue pain regimen.   - NPO and holding AC for possible surgical intervention.  Plan for OR today  Ortho consulted

## 2025-01-02 NOTE — BRIEF OPERATIVE NOTE - NSICDXBRIEFPROCEDURE_GEN_ALL_CORE_FT
PROCEDURES:  Intramedullary rodding of right hip using locking nail with lag screw in femoral neck with distal locking screw 02-Jan-2025 17:19:08  Jose Elias Urbina

## 2025-01-02 NOTE — CONSULT NOTE ADULT - SUBJECTIVE AND OBJECTIVE BOX
Time of visit:    CHIEF COMPLAINT: Patient is a 49y old  Male who presents with a chief complaint of Right intertrochanteric femur fracture (02 Jan 2025 10:54)      HPI:  Patient is a 48 y/o M pt with PMHx CVA (3/30/2022 with residual aphasia and right sided residual weakness), s/p tracheostomy and peg placement, seizures, HTN, HLD, CHF w/ ICD (initially rEF 30%->55-60% on 03/2021), MDD, Recurrent PE, Afib (on Eliquis), obesity s/p bariatric intervention presented to the ED after a fall 3 days ago. Patient is minimally verbal dye to tracheostomy and not able to provide history. He confirmed that he has pain in right leg. However, could not provide  further history. As per NH papers patient had a fall on12/28/2024. He was getting PT done today when he complained of severe pain. Outpatient xray showed proximal femur intertrochanteric fracture. Patient was transferred to the hospital for surgical intervention. As per NH papers, he has also been having fevers and was started on ceftriaxone and azithromycin for suspected pneumonia on 12/29/2024. (01 Jan 2025 00:49)   Patient seen and examined.     PAST MEDICAL & SURGICAL HISTORY:  Heart failure, unspecified HF chronicity, unspecified heart failure type      ETOH abuse  h/o etoh abuse now moderate drinker      Artificial pacemaker          Allergies    shellfish (Unknown)  No Known Drug Allergies    Intolerances        MEDICATIONS  (STANDING):  albuterol/ipratropium for Nebulization 3 milliLiter(s) Nebulizer every 6 hours  artificial  tears Solution 1 Drop(s) Both EYES daily  ascorbic acid 500 milliGRAM(s) Oral daily  atorvastatin 80 milliGRAM(s) Oral at bedtime  cefTRIAXone   IVPB 1000 milliGRAM(s) IV Intermittent every 24 hours  dextrose 5%. 1000 milliLiter(s) (90 mL/Hr) IV Continuous <Continuous>  folic acid 1 milliGRAM(s) Oral daily  lacosamide IVPB 200 milliGRAM(s) IV Intermittent every 12 hours  levETIRAcetam  IVPB 2000 milliGRAM(s) IV Intermittent every 12 hours  lisinopril 2.5 milliGRAM(s) Oral daily  melatonin 5 milliGRAM(s) Oral at bedtime  metoprolol tartrate 25 milliGRAM(s) Oral two times a day  multivitamin 1 Tablet(s) Oral daily  naloxone Injectable 0.4 milliGRAM(s) IV Push once  pantoprazole  Injectable 40 milliGRAM(s) IV Push daily  polyethylene glycol 3350 17 Gram(s) Oral daily  senna Syrup 10 milliLiter(s) Oral at bedtime  sodium chloride 0.9%. 1000 milliLiter(s) (90 mL/Hr) IV Continuous <Continuous>  valproate sodium   IVPB 1250 milliGRAM(s) IV Intermittent every 8 hours      MEDICATIONS  (PRN):  bisacodyl Suppository 10 milliGRAM(s) Rectal daily PRN Constipation  cyclobenzaprine 5 milliGRAM(s) Oral every 8 hours PRN Spasm  HYDROmorphone  Injectable 0.2 milliGRAM(s) IV Push every 4 hours PRN Moderate Pain (4 - 6)  HYDROmorphone  Injectable 0.5 milliGRAM(s) IV Push every 4 hours PRN Severe Pain (7 - 10)  ondansetron Injectable 4 milliGRAM(s) IV Push every 8 hours PRN Nausea and/or Vomiting   Medications up to date at time of exam.    Medications up to date at time of exam.    FAMILY HISTORY:  No pertinent family history in first degree relatives      REVIEW OF SYSTEMS:    CONSTITUTIONAL: No fevers, chills on exam.     HEENT:  No oral lesion. Maintain NPO .    CARDIOVASCULAR:  No facial grimace of chest discomfort.     RESPIRATORY:  No s/sx of SOB on exam. No wheezing.    GASTROINTESTINAL:  No facial grimace of abdominal pain. No vomiting on exam.     GENITOURINARY: No reported hematuria.     NEUROLOGIC:  No seizure on exam.     PSYCHIATRIC:  No emotional distress on exam.     PHYSICAL EXAMINATION:    GENERAL: No acute distress.     Vital Signs Last 24 Hrs  T(C): 36.7 (02 Jan 2025 10:45), Max: 36.7 (01 Jan 2025 20:59)  T(F): 98 (02 Jan 2025 10:45), Max: 98.1 (01 Jan 2025 20:59)  HR: 77 (02 Jan 2025 10:45) (63 - 77)  BP: 111/68 (02 Jan 2025 10:45) (88/59 - 139/97)  BP(mean): 70 (02 Jan 2025 06:02) (70 - 70)  RR: 18 (02 Jan 2025 10:45) (18 - 20)  SpO2: 98% (02 Jan 2025 10:45) (94% - 98%)    Parameters below as of 02 Jan 2025 10:45  Patient On (Oxygen Delivery Method): hydrotrack   O2 Flow (L/min): 3       HEENT: Head is normocephalic and atraumatic. Extraocular muscles are intact. Mucous membranes are moist.     NECK: Non infected Trach .    LUNGS: Non labored. No wheezing. No use of accessory muscle.     HEART: S1 S2 irregular rate and rhythm .    ABDOMEN: Soft, nontender, and nondistended. +ve Peg. Active bowel sounds.     NEUROLOGIC: Awake, responsive to tactile and verbal stimuli  .     SKIN: Warm, dry, good turgor.      LABS:                        8.1    4.88  )-----------( 190      ( 02 Jan 2025 06:16 )             24.1     01-02    141  |  104  |  18  ----------------------------<  160[H]  3.4[L]   |  33[H]  |  0.58    Ca    8.3[L]      02 Jan 2025 06:16  Phos  4.1     01-02  Mg     2.4     01-02    TPro  6.0  /  Alb  1.8[L]  /  TBili  0.3  /  DBili  x   /  AST  19  /  ALT  16  /  AlkPhos  56  01-02    PT/INR - ( 02 Jan 2025 06:16 )   PT: 18.1 sec;   INR: 1.55 ratio         PTT - ( 02 Jan 2025 06:16 )  PTT:33.5 sec  Urinalysis Basic - ( 02 Jan 2025 06:16 )    Color: x / Appearance: x / SG: x / pH: x  Gluc: 160 mg/dL / Ketone: x  / Bili: x / Urobili: x   Blood: x / Protein: x / Nitrite: x   Leuk Esterase: x / RBC: x / WBC x   Sq Epi: x / Non Sq Epi: x / Bacteria: x      MICROBIOLOGY: (if applicable)    RADIOLOGY & ADDITIONAL STUDIES:  EKG:   CXR:   < from: CT Pelvis No Cont (12.31.24 @ 23:18) >    ACC: [id number] EXAM:  CT PELVIS ONLY   ORDERED BY:  ARUNA RUBIO     PROCEDURE DATE:  12/31/2024          INTERPRETATION:  CLINICAL INFORMATION: Status post fall with right hip   pain.    COMPARISON: CT of the abdomen and pelvis from 9/3/2018.    CONTRAST/COMPLICATIONS:  IV Contrast: NONE  Oral Contrast: NONE  .    PROCEDURE:  CT of the Pelvis was performed.  Sagittal and coronal reformats were performed.    FINDINGS:  BLADDER: Minimally distended. Circumferential wall thickening.  REPRODUCTIVE ORGANS: Prostate within normal limits.  LYMPH NODES: No pelvic lymphadenopathy.    VISUALIZED PORTIONS:  BOWEL: Within normal limits.  PERITONEUM/RETROPERITONEUM: Within normal limits.  VESSELS: Within normal limits.  ABDOMINAL WALL: Within normal limits.  BONES: Acute comminuted and impacted intertrochanteric proximal right   femur fracture with varus angulation. No additional fracture or   dislocation. Nonspecific cyst in the right femoral head.  SOFT TISSUES: Enlargement of the right iliopsoas, gluteus minimus and   gluteus medius muscles with adjacent inflammatory change likely due to   muscle strain and/or trauma. Mild inflammatory change adjacent to the   intertrochanteric proximal right femur fracture.    IMPRESSION:  1. Acute comminuted and impacted intertrochanteric proximal right femur   fracture.  2. Circumferential urinary bladder wall thickening which could be due to   underdistention versus a cystitis. Correlate with urinalysis.        --- End of Report ---            ELLIS SIMENTAL MD; Attending Radiologist  This document has been electronically signed. Jan 1 2025  3:57AM    < end of copied text >    ECHO:    IMPRESSION: 49y Male PAST MEDICAL & SURGICAL HISTORY:  Heart failure, unspecified HF chronicity, unspecified heart failure type      ETOH abuse  h/o etoh abuse now moderate drinker      Artificial pacemaker       Impression: This is a 50 Y/O Male from [facility] presented with Fall .  As per NH papers patient had a fall on12/28/2024. Admitted to  for management of right intertrochanteric femur fracture. For pulmonary evaluation of Left Lung Pneumonia with Chronic Hypoxic Respiratory Failure with Hx of recurrent PE .     Suggestion :  O2 saturation 98% . Hydro trach O2 3L . Monitor O2 saturation trend .   Oral, trach care, suction.   Not a candidate for decannulation nor trach capping at this time.   NPO for Orthopedic procedure .   Continue Duoneb via nebulization Q 6 Hours.  On Ceftriaxone 1 Gm IVPB Daily.

## 2025-01-02 NOTE — CONSULT NOTE ADULT - SUBJECTIVE AND OBJECTIVE BOX
CHIEF COMPLAINT:    HPI:    PAST MEDICAL & SURGICAL HISTORY:  Heart failure, unspecified HF chronicity, unspecified heart failure type      ETOH abuse  h/o etoh abuse now moderate drinker      Artificial pacemaker          Allergies    shellfish (Unknown)  No Known Drug Allergies    Intolerances        MEDICATIONS  (STANDING):  albuterol/ipratropium for Nebulization 3 milliLiter(s) Nebulizer every 6 hours  artificial  tears Solution 1 Drop(s) Both EYES daily  ascorbic acid 500 milliGRAM(s) Oral daily  atorvastatin 80 milliGRAM(s) Oral at bedtime  cefTRIAXone   IVPB 1000 milliGRAM(s) IV Intermittent every 24 hours  chlorhexidine 2% Cloths 1 Application(s) Topical daily  dextrose 5%. 1000 milliLiter(s) (90 mL/Hr) IV Continuous <Continuous>  folic acid 1 milliGRAM(s) Oral daily  lacosamide IVPB 200 milliGRAM(s) IV Intermittent every 12 hours  levETIRAcetam  IVPB 2000 milliGRAM(s) IV Intermittent every 12 hours  lisinopril 2.5 milliGRAM(s) Oral daily  melatonin 5 milliGRAM(s) Oral at bedtime  metoprolol tartrate 25 milliGRAM(s) Oral two times a day  multivitamin 1 Tablet(s) Oral daily  naloxone Injectable 0.4 milliGRAM(s) IV Push once  pantoprazole  Injectable 40 milliGRAM(s) IV Push daily  polyethylene glycol 3350 17 Gram(s) Oral daily  senna Syrup 10 milliLiter(s) Oral at bedtime  sodium chloride 0.9%. 1000 milliLiter(s) (90 mL/Hr) IV Continuous <Continuous>  valproate sodium   IVPB 1250 milliGRAM(s) IV Intermittent every 8 hours    MEDICATIONS  (PRN):  bisacodyl Suppository 10 milliGRAM(s) Rectal daily PRN Constipation  cyclobenzaprine 5 milliGRAM(s) Oral every 8 hours PRN Spasm  HYDROmorphone  Injectable 0.2 milliGRAM(s) IV Push every 4 hours PRN Moderate Pain (4 - 6)  HYDROmorphone  Injectable 0.5 milliGRAM(s) IV Push every 4 hours PRN Severe Pain (7 - 10)  ondansetron Injectable 4 milliGRAM(s) IV Push every 8 hours PRN Nausea and/or Vomiting      FAMILY HISTORY:  No pertinent family history in first degree relatives        ***No family history of premature coronary artery disease or sudden cardiac death    SOCIAL HISTORY:  Smoking-  Alcohol-  Illicit Drug use-    REVIEW OF SYSTEMS:  Constitutional: [ ] fever, [ ]weight loss,  [ ]fatigue  Eyes: [ ] visual changes  Respiratory: [ ]shortness of breath;  [ ] cough, [ ]wheezing, [ ]chills, [ ]hemoptysis  Cardiovascular: [ ] chest pain, [ ]palpitations, [ ]dizziness,  [ ]leg swelling [ ]syncope  Gastrointestinal: [ ] abdominal pain, [ ]nausea, [ ]vomiting,  [ ]diarrhea   Genitourinary: [ ] dysuria, [ ] hematuria  Neurologic: [ ] headaches [ ] tremors  [ ] weakness [ ] lightheadedness  Skin: [ ] itching, [ ]burning, [ ] rashes  Endocrine: [ ] heat or cold intolerance  Musculoskeletal: [ ] joint pain or swelling; [ ] muscle, back, or extremity pain  Psychiatric: [ ] depression, [ ]anxiety, [ ]mood swings, or [ ]difficulty sleeping  Hematologic: [ ] easy bruising, [ ] bleeding gums     [ x] All others negative	  [ ] Unable to obtain    Vital Signs Last 24 Hrs  T(C): 37 (02 Jan 2025 14:04), Max: 37 (02 Jan 2025 07:19)  T(F): 98.6 (02 Jan 2025 14:04), Max: 98.6 (02 Jan 2025 07:19)  HR: 71 (02 Jan 2025 14:04) (63 - 77)  BP: 114/76 (02 Jan 2025 14:04) (88/59 - 139/97)  BP(mean): 70 (02 Jan 2025 06:02) (70 - 70)  RR: 18 (02 Jan 2025 14:04) (18 - 20)  SpO2: 100% (02 Jan 2025 14:04) (94% - 100%)    Parameters below as of 02 Jan 2025 14:04  Patient On (Oxygen Delivery Method): hydrotrack  O2 Flow (L/min): 3    I&O's Summary    02 Jan 2025 07:01  -  02 Jan 2025 16:27  --------------------------------------------------------  IN: 0 mL / OUT: 800 mL / NET: -800 mL        PHYSICAL EXAM:  General: No acute distress  HEENT: EOMI  Neck:  No JVD  Lungs: Clear to auscultation bilaterally; No rales or wheezing  Heart: Regular rate and rhythm; No murmurs, rubs, or gallops  Abdomen: soft, non tender, non distended   Extremities: warm, no edema   Nervous system:  Alert & Oriented X3  Psychiatric: Normal affect  Skin: No rashes or lesions    LABS:  01-02    141  |  104  |  18  ----------------------------<  160[H]  3.4[L]   |  33[H]  |  0.58    Ca    8.3[L]      02 Jan 2025 06:16  Phos  4.1     01-02  Mg     2.4     01-02    TPro  6.0  /  Alb  1.8[L]  /  TBili  0.3  /  DBili  x   /  AST  19  /  ALT  16  /  AlkPhos  56  01-02    Creatinine Trend: 0.58<--, 0.66<--, 0.66<--                        8.1    4.88  )-----------( 190      ( 02 Jan 2025 06:16 )             24.1     PT/INR - ( 02 Jan 2025 06:16 )   PT: 18.1 sec;   INR: 1.55 ratio         PTT - ( 02 Jan 2025 06:16 )  PTT:33.5 sec    Lipid Panel:   Cardiac Enzymes:           RADIOLOGY:    ECG [my interpretation]:    TELEMETRY:    ECHO:    STRESS TEST:    CATHETERIZATION: CHIEF COMPLAINT: s/p fall    HPI:   49 year old M with CVA 3/30/2022 with residual aphasia and right sided residual weakness, s/p tracheostomy and peg placement, seizures, HTN, HLD, ?HF with recovered EF s/p ?Bi-V ICD (initially EF 30%->55-60% on 03/2021), MDD, PE, Afib on Eliquis who was referred by nursing home after fall on 12/28/24.  As per nursing home documentation, pt had a fall on 12/28/24. Subsequently worked with PT where he complained of pain. Xray showed proximal femur intertrochanteric fracture. Patient was transferred to the hospital for surgical intervention. Of note, he has also been having fevers and was started on ceftriaxone and azithromycin for suspected pneumonia on 12/29/2024.       PAST MEDICAL & SURGICAL HISTORY:  Heart failure, unspecified HF chronicity, unspecified heart failure type      ETOH abuse  h/o etoh abuse now moderate drinker      ICD          Allergies    shellfish (Unknown)  No Known Drug Allergies    Intolerances        MEDICATIONS  (STANDING):  albuterol/ipratropium for Nebulization 3 milliLiter(s) Nebulizer every 6 hours  artificial  tears Solution 1 Drop(s) Both EYES daily  ascorbic acid 500 milliGRAM(s) Oral daily  atorvastatin 80 milliGRAM(s) Oral at bedtime  cefTRIAXone   IVPB 1000 milliGRAM(s) IV Intermittent every 24 hours  chlorhexidine 2% Cloths 1 Application(s) Topical daily  dextrose 5%. 1000 milliLiter(s) (90 mL/Hr) IV Continuous <Continuous>  folic acid 1 milliGRAM(s) Oral daily  lacosamide IVPB 200 milliGRAM(s) IV Intermittent every 12 hours  levETIRAcetam  IVPB 2000 milliGRAM(s) IV Intermittent every 12 hours  lisinopril 2.5 milliGRAM(s) Oral daily  melatonin 5 milliGRAM(s) Oral at bedtime  metoprolol tartrate 25 milliGRAM(s) Oral two times a day  multivitamin 1 Tablet(s) Oral daily  naloxone Injectable 0.4 milliGRAM(s) IV Push once  pantoprazole  Injectable 40 milliGRAM(s) IV Push daily  polyethylene glycol 3350 17 Gram(s) Oral daily  senna Syrup 10 milliLiter(s) Oral at bedtime  sodium chloride 0.9%. 1000 milliLiter(s) (90 mL/Hr) IV Continuous <Continuous>  valproate sodium   IVPB 1250 milliGRAM(s) IV Intermittent every 8 hours    MEDICATIONS  (PRN):  bisacodyl Suppository 10 milliGRAM(s) Rectal daily PRN Constipation  cyclobenzaprine 5 milliGRAM(s) Oral every 8 hours PRN Spasm  HYDROmorphone  Injectable 0.2 milliGRAM(s) IV Push every 4 hours PRN Moderate Pain (4 - 6)  HYDROmorphone  Injectable 0.5 milliGRAM(s) IV Push every 4 hours PRN Severe Pain (7 - 10)  ondansetron Injectable 4 milliGRAM(s) IV Push every 8 hours PRN Nausea and/or Vomiting      FAMILY HISTORY:  No pertinent family history in first degree relatives        ***No family history of premature coronary artery disease or sudden cardiac death    SOCIAL HISTORY:  Smoking-denies  Alcohol-denies  Illicit Drug use-denies    REVIEW OF SYSTEMS:  Constitutional: [ ] fever, [ ]weight loss,  [ ]fatigue  Eyes: [ ] visual changes  Respiratory: [ ]shortness of breath;  [ ] cough, [ ]wheezing, [ ]chills, [ ]hemoptysis  Cardiovascular: [ ] chest pain, [ ]palpitations, [ ]dizziness,  [ ]leg swelling [ ]syncope  Gastrointestinal: [ ] abdominal pain, [ ]nausea, [ ]vomiting,  [ ]diarrhea   Genitourinary: [ ] dysuria, [ ] hematuria  Neurologic: [ ] headaches [ ] tremors  [ ] weakness [ ] lightheadedness  Skin: [ ] itching, [ ]burning, [ ] rashes  Endocrine: [ ] heat or cold intolerance  Musculoskeletal: [ ] joint pain or swelling; [ ] muscle, back, or extremity pain  Psychiatric: [ ] depression, [ ]anxiety, [ ]mood swings, or [ ]difficulty sleeping  Hematologic: [ ] easy bruising, [ ] bleeding gums     [ ] All others negative	  [ x] Unable to obtain due to aphasia    Vital Signs Last 24 Hrs  T(C): 37 (02 Jan 2025 14:04), Max: 37 (02 Jan 2025 07:19)  T(F): 98.6 (02 Jan 2025 14:04), Max: 98.6 (02 Jan 2025 07:19)  HR: 71 (02 Jan 2025 14:04) (63 - 77)  BP: 114/76 (02 Jan 2025 14:04) (88/59 - 139/97)  BP(mean): 70 (02 Jan 2025 06:02) (70 - 70)  RR: 18 (02 Jan 2025 14:04) (18 - 20)  SpO2: 100% (02 Jan 2025 14:04) (94% - 100%)    Parameters below as of 02 Jan 2025 14:04  Patient On (Oxygen Delivery Method): hydrotrack  O2 Flow (L/min): 3    I&O's Summary    02 Jan 2025 07:01  -  02 Jan 2025 16:27  --------------------------------------------------------  IN: 0 mL / OUT: 800 mL / NET: -800 mL        PHYSICAL EXAM:  GENERAL: No acute distress  ENT: trach  CHEST/LUNG: Clear to auscultation anteriorly  BACK: No spinal tenderness  HEART: Regular rate and rhythm; No murmurs, rubs, or gallops  ABDOMEN: Soft, +PEG  EXTREMITIES:  No clubbing, cyanosis, or edema        LABS:  01-02    141  |  104  |  18  ----------------------------<  160[H]  3.4[L]   |  33[H]  |  0.58    Ca    8.3[L]      02 Jan 2025 06:16  Phos  4.1     01-02  Mg     2.4     01-02    TPro  6.0  /  Alb  1.8[L]  /  TBili  0.3  /  DBili  x   /  AST  19  /  ALT  16  /  AlkPhos  56  01-02    Creatinine Trend: 0.58<--, 0.66<--, 0.66<--                        8.1    4.88  )-----------( 190      ( 02 Jan 2025 06:16 )             24.1     PT/INR - ( 02 Jan 2025 06:16 )   PT: 18.1 sec;   INR: 1.55 ratio         PTT - ( 02 Jan 2025 06:16 )  PTT:33.5 sec    Lipid Panel:   Cardiac Enzymes:           RADIOLOGY: < from: Xray Chest 1 View- PORTABLE-Urgent (Xray Chest 1 View- PORTABLE-Urgent .) (01.01.25 @ 00:53) >    IMPRESSION: Tracheostomy cannula reidentified in position. Low lung   volumes. Trace right pleural effusion with right basilar atelectasis.   Correlate clinically for concomitant infection. Otherwise grossly clear   lungs. No other changes.    --- End of Report ---        < end of copied text >  < from: CT Pelvis No Cont (12.31.24 @ 23:18) >  IMPRESSION:  1. Acute comminuted and impacted intertrochanteric proximal right femur   fracture.  2. Circumferential urinary bladder wall thickening which could be due to   underdistention versus a cystitis. Correlate with urinalysis.        --- End of Report ---              < end of copied text >  < from: CT Head No Cont (12.31.24 @ 23:18) >  IMPRESSION:  No acute intracranial findings.        --- End of Report ---    < end of copied text >  < from: Xray Femur 2 Views, Right (12.31.24 @ 19:08) >  IMPRESSION:    Minimally displaced right intertrochanteric femoral fracture    --- End of Report ---        < end of copied text >  < from: Xray Pelvis AP only (12.31.24 @ 19:08) >    IMPRESSION:    Minimally displaced right intertrochanteric femoral fracture    --- End of Report ---      < end of copied text >      ECG :< from: 12 Lead ECG (01.01.25 @ 11:27) >  Diagnosis Line Normal sinus rhythm hr 84  Left bundle branch block  Abnormal ECG    Confirmed by MARIAM CANO, GERTRUDIS ([id number]) on 1/2/2025 12:36:07 PM    < end of copied text >      TELEMETRY: n/A    ECHO: < from: TTE W or WO Ultrasound Enhancing Agent (01.01.25 @ 12:44) >  CONCLUSIONS:      1. Aortic root at the sinuses of Valsalva isdilated, measuring 4.20 cm (indexed 2.00 cm/m²).   2. Abnormal septal motion consistent with left bundle branch block. Left ventricular systolic function is normal with an ejection fraction of 59 % by Garcia's method of disks.   3. There is increased LV mass and concentric hypertrophy.   4. Normal right ventricular cavity size, with normal wall thickness, and normal right ventricular systolic function.   5. Device lead is visualized in the right ventricle.   6. Consider CTA of the chest to better evaluate the aorta if clinically appropriate.    ________________________________________________________________________________________  FINDINGS:     Left Ventricle:  Abnormal (paradoxical) septal motion consistent with left bundle branch block. Left ventricular systolic function is normal with a calculated ejection fraction of 59 % by the Garcia's biplane method of disks. There is increased LV mass and concentric hypertrophy. There is normal left ventricular diastolic function.     Right Ventricle:  The right ventricular cavity is normal in size, with normal wall thickness and right ventricular systolic function is normal. A device lead is visualized in the right ventricle.     Left Atrium:  The left atrium is normal in size with an indexed volume of 31.97 ml/m².     Right Atrium:  The right atrium is normal in size.     Aortic Valve:  The aortic valve appears trileaflet.     Mitral Valve:  There is trace mitral regurgitation.     Tricuspid Valve:  There is mild tricuspid regurgitation. There is insufficient tricuspid regurgitation detected to calculate pulmonary artery systolic pressure.     Pulmonic Valve:  There is trace pulmonic regurgitation.     Aorta:  The aortic root at the sinuses of Valsalva is dilated, measuring 4.20 cm (indexed 2.00 cm/m²).     Pericardium:  No pericardial effusion seen.  ____________________________________________________________________    < end of copied text >    ICD Interrogation-[employer] ICD (INOGEN X4 CRT-D G148)--revealed battery capacity depleted on 08/08/2024

## 2025-01-02 NOTE — PROGRESS NOTE ADULT - SUBJECTIVE AND OBJECTIVE BOX
PRESENTING CC: Right intertrochanteric femur fracture    OVERNIGHT EVENTS: No new events overnight. s/p ICD interrogation-battery capacity depleted      PMH:   PAST MEDICAL & SURGICAL HISTORY:  Heart failure, unspecified HF chronicity, unspecified heart failure type    ETOH abuse  h/o etoh abuse now moderate drinker  ICD        Allergies    shellfish (Unknown)  No Known Drug Allergies    Intolerances        MEDICATIONS  (STANDING):  acetaminophen   Oral Liquid .. 1000 milliGRAM(s) Oral every 8 hours  albuterol/ipratropium for Nebulization 3 milliLiter(s) Nebulizer every 6 hours  artificial  tears Solution 1 Drop(s) Both EYES daily  ascorbic acid 500 milliGRAM(s) Oral daily  atorvastatin 80 milliGRAM(s) Oral at bedtime  cefTRIAXone   IVPB 1000 milliGRAM(s) IV Intermittent every 24 hours  dextrose 5%. 1000 milliLiter(s) (90 mL/Hr) IV Continuous <Continuous>  folic acid 1 milliGRAM(s) Oral daily  lacosamide Solution 200 milliGRAM(s) Oral two times a day  levETIRAcetam  Solution 2000 milliGRAM(s) Oral two times a day  lisinopril 2.5 milliGRAM(s) Oral daily  melatonin 5 milliGRAM(s) Oral at bedtime  metoprolol tartrate 25 milliGRAM(s) Oral two times a day  multivitamin 1 Tablet(s) Oral daily  naloxone Injectable 0.4 milliGRAM(s) IV Push once  pantoprazole   Suspension 40 milliGRAM(s) Oral daily  polyethylene glycol 3350 17 Gram(s) Oral daily  senna Syrup 10 milliLiter(s) Oral at bedtime  sodium chloride 0.9%. 1000 milliLiter(s) (90 mL/Hr) IV Continuous <Continuous>  valproic  acid Syrup 1250 milliGRAM(s) Oral three times a day    MEDICATIONS  (PRN):  bisacodyl Suppository 10 milliGRAM(s) Rectal daily PRN Constipation  cyclobenzaprine 5 milliGRAM(s) Oral every 8 hours PRN Spasm  HYDROmorphone  Injectable 0.2 milliGRAM(s) IV Push every 4 hours PRN Moderate Pain (4 - 6)  HYDROmorphone  Injectable 0.5 milliGRAM(s) IV Push every 4 hours PRN Severe Pain (7 - 10)  ondansetron Injectable 4 milliGRAM(s) IV Push every 8 hours PRN Nausea and/or Vomiting      FAMILY HISTORY:  No pertinent family history in first degree relatives      Reviewed; no change from my prior note    SOCIAL HISTORY:  Reviewed, no change from my prior note    REVIEW OF SYSTEMS:  Constitutional: [ ] fever, [ ]weight loss,  [ ]fatigue  Eyes: [ ] visual changes  Respiratory: [ ]shortness of breath;  [ ] cough, [ ]wheezing, [ ]chills, [ ]hemoptysis  Cardiovascular: [ ] chest pain, [ ]palpitations, [ ]dizziness,  [ ]leg swelling [ ]syncope  Gastrointestinal: [ ] abdominal pain, [ ]nausea, [ ]vomiting,  [ ]diarrhea   Genitourinary: [ ] dysuria, [ ] hematuria  Neurologic: [ ] headaches [ ] tremors [ ] weakness [ ] lightheadedness  Skin: [ ] itching, [ ]burning, [ ] rashes  Endocrine: [ ] heat or cold intolerance  Musculoskeletal: [ ] joint pain or swelling; [ ] muscle, back, or extremity pain [x]right hip pain  Psychiatric: [ ] depression, [ ]anxiety, [ ]mood swings, or [ ]difficulty sleeping  Hematologic: [ ] easy bruising, [ ] bleeding gums    [x] All remaining systems negative except as per above.   [  ] Unable to obtain    Vital Signs Last 24 Hrs  T(C): 36.6 (02 Jan 2025 06:02), Max: 37.1 (01 Jan 2025 13:37)  T(F): 97.8 (02 Jan 2025 06:02), Max: 98.8 (01 Jan 2025 13:37)  HR: 67 (02 Jan 2025 08:40) (63 - 74)  BP: 139/97 (02 Jan 2025 08:40) (88/59 - 139/97)  BP(mean): 70 (02 Jan 2025 06:02) (70 - 70)  RR: 18 (02 Jan 2025 08:40) (18 - 20)  SpO2: 98% (02 Jan 2025 08:40) (94% - 98%)    Parameters below as of 02 Jan 2025 08:40  Patient On (Oxygen Delivery Method): tracheostomy collar  O2 Flow (L/min): 10    I&O's Summary    02 Jan 2025 07:01  -  02 Jan 2025 09:30  --------------------------------------------------------  IN: 0 mL / OUT: 800 mL / NET: -800 mL      Physical Exam:  GENERAL: No acute distress  ENT: trach  CHEST/LUNG: Clear to auscultation anteriorly  BACK: No spinal tenderness  HEART: Regular rate and rhythm; No murmurs, rubs, or gallops  ABDOMEN: Soft  EXTREMITIES:  No clubbing, cyanosis, or edema      LABS:  01-02    141  |  104  |  18  ----------------------------<  160[H]  3.4[L]   |  33[H]  |  0.58    Ca    8.3[L]      02 Jan 2025 06:16  Phos  4.1     01-02  Mg     2.4     01-02    TPro  6.0  /  Alb  1.8[L]  /  TBili  0.3  /  DBili  x   /  AST  19  /  ALT  16  /  AlkPhos  56  01-02    Creatinine Trend: 0.58<--, 0.66<--, 0.66<--                        8.1    4.88  )-----------( 190      ( 02 Jan 2025 06:16 )             24.1     PT/INR - ( 02 Jan 2025 06:16 )   PT: 18.1 sec;   INR: 1.55 ratio         PTT - ( 02 Jan 2025 06:16 )  PTT:33.5 sec  Lipid Panel:   Cardiac Enzymes:         RADIOLOGY: < from: CT Pelvis No Cont (12.31.24 @ 23:18) >    IMPRESSION:  1. Acute comminuted and impacted intertrochanteric proximal right femur   fracture.  2. Circumferential urinary bladder wall thickening which could be due to   underdistention versus a cystitis. Correlate with urinalysis.    < end of copied text >  < from: CT Head No Cont (12.31.24 @ 23:18) >    IMPRESSION:  No acute intracranial findings.          < end of copied text >  < from: Xray Femur 2 Views, Right (12.31.24 @ 19:08) >  IMPRESSION:    Minimally displaced right intertrochanteric femoral fracture    --- End of Report ---      < end of copied text >  < from: Xray Pelvis AP only (12.31.24 @ 19:08) >  IMPRESSION:    Minimally displaced right intertrochanteric femoral fracture    --- End of Report ---      < end of copied text >      ECG: sinus rhythm with LBBB vs v-pacing      TELEMETRY: N/A    ECHO: preliminary low normal EF 50-55%                  PRESENTING CC: Right intertrochanteric femur fracture    OVERNIGHT EVENTS: No new events overnight. s/p ICD interrogation-battery capacity depleted      PMH:   PAST MEDICAL & SURGICAL HISTORY:  Heart failure, unspecified HF chronicity, unspecified heart failure type    ETOH abuse  h/o etoh abuse now moderate drinker  ICD        Allergies    shellfish (Unknown)  No Known Drug Allergies    Intolerances        MEDICATIONS  (STANDING):  acetaminophen   Oral Liquid .. 1000 milliGRAM(s) Oral every 8 hours  albuterol/ipratropium for Nebulization 3 milliLiter(s) Nebulizer every 6 hours  artificial  tears Solution 1 Drop(s) Both EYES daily  ascorbic acid 500 milliGRAM(s) Oral daily  atorvastatin 80 milliGRAM(s) Oral at bedtime  cefTRIAXone   IVPB 1000 milliGRAM(s) IV Intermittent every 24 hours  dextrose 5%. 1000 milliLiter(s) (90 mL/Hr) IV Continuous <Continuous>  folic acid 1 milliGRAM(s) Oral daily  lacosamide Solution 200 milliGRAM(s) Oral two times a day  levETIRAcetam  Solution 2000 milliGRAM(s) Oral two times a day  lisinopril 2.5 milliGRAM(s) Oral daily  melatonin 5 milliGRAM(s) Oral at bedtime  metoprolol tartrate 25 milliGRAM(s) Oral two times a day  multivitamin 1 Tablet(s) Oral daily  naloxone Injectable 0.4 milliGRAM(s) IV Push once  pantoprazole   Suspension 40 milliGRAM(s) Oral daily  polyethylene glycol 3350 17 Gram(s) Oral daily  senna Syrup 10 milliLiter(s) Oral at bedtime  sodium chloride 0.9%. 1000 milliLiter(s) (90 mL/Hr) IV Continuous <Continuous>  valproic  acid Syrup 1250 milliGRAM(s) Oral three times a day    MEDICATIONS  (PRN):  bisacodyl Suppository 10 milliGRAM(s) Rectal daily PRN Constipation  cyclobenzaprine 5 milliGRAM(s) Oral every 8 hours PRN Spasm  HYDROmorphone  Injectable 0.2 milliGRAM(s) IV Push every 4 hours PRN Moderate Pain (4 - 6)  HYDROmorphone  Injectable 0.5 milliGRAM(s) IV Push every 4 hours PRN Severe Pain (7 - 10)  ondansetron Injectable 4 milliGRAM(s) IV Push every 8 hours PRN Nausea and/or Vomiting      FAMILY HISTORY:  No pertinent family history in first degree relatives      Reviewed; no change from my prior note    SOCIAL HISTORY:  Reviewed, no change from my prior note    REVIEW OF SYSTEMS:  Constitutional: [ ] fever, [ ]weight loss,  [ ]fatigue  Eyes: [ ] visual changes  Respiratory: [ ]shortness of breath;  [ ] cough, [ ]wheezing, [ ]chills, [ ]hemoptysis  Cardiovascular: [ ] chest pain, [ ]palpitations, [ ]dizziness,  [ ]leg swelling [ ]syncope  Gastrointestinal: [ ] abdominal pain, [ ]nausea, [ ]vomiting,  [ ]diarrhea   Genitourinary: [ ] dysuria, [ ] hematuria  Neurologic: [ ] headaches [ ] tremors [ ] weakness [ ] lightheadedness  Skin: [ ] itching, [ ]burning, [ ] rashes  Endocrine: [ ] heat or cold intolerance  Musculoskeletal: [ ] joint pain or swelling; [ ] muscle, back, or extremity pain [x]right hip pain  Psychiatric: [ ] depression, [ ]anxiety, [ ]mood swings, or [ ]difficulty sleeping  Hematologic: [ ] easy bruising, [ ] bleeding gums    [x] All remaining systems negative except as per above.   [  ] Unable to obtain    Vital Signs Last 24 Hrs  T(C): 36.6 (02 Jan 2025 06:02), Max: 37.1 (01 Jan 2025 13:37)  T(F): 97.8 (02 Jan 2025 06:02), Max: 98.8 (01 Jan 2025 13:37)  HR: 67 (02 Jan 2025 08:40) (63 - 74)  BP: 139/97 (02 Jan 2025 08:40) (88/59 - 139/97)  BP(mean): 70 (02 Jan 2025 06:02) (70 - 70)  RR: 18 (02 Jan 2025 08:40) (18 - 20)  SpO2: 98% (02 Jan 2025 08:40) (94% - 98%)    Parameters below as of 02 Jan 2025 08:40  Patient On (Oxygen Delivery Method): tracheostomy collar  O2 Flow (L/min): 10    I&O's Summary    02 Jan 2025 07:01  -  02 Jan 2025 09:30  --------------------------------------------------------  IN: 0 mL / OUT: 800 mL / NET: -800 mL      Physical Exam:  GENERAL: No acute distress  ENT: trach  CHEST/LUNG: Clear to auscultation anteriorly  BACK: No spinal tenderness  HEART: Regular rate and rhythm; No murmurs, rubs, or gallops  ABDOMEN: Soft  EXTREMITIES:  No clubbing, cyanosis, or edema      LABS:  01-02    141  |  104  |  18  ----------------------------<  160[H]  3.4[L]   |  33[H]  |  0.58    Ca    8.3[L]      02 Jan 2025 06:16  Phos  4.1     01-02  Mg     2.4     01-02    TPro  6.0  /  Alb  1.8[L]  /  TBili  0.3  /  DBili  x   /  AST  19  /  ALT  16  /  AlkPhos  56  01-02    Creatinine Trend: 0.58<--, 0.66<--, 0.66<--                        8.1    4.88  )-----------( 190      ( 02 Jan 2025 06:16 )             24.1     PT/INR - ( 02 Jan 2025 06:16 )   PT: 18.1 sec;   INR: 1.55 ratio         PTT - ( 02 Jan 2025 06:16 )  PTT:33.5 sec  Lipid Panel:   Cardiac Enzymes:         RADIOLOGY: < from: CT Pelvis No Cont (12.31.24 @ 23:18) >    IMPRESSION:  1. Acute comminuted and impacted intertrochanteric proximal right femur   fracture.  2. Circumferential urinary bladder wall thickening which could be due to   underdistention versus a cystitis. Correlate with urinalysis.    < end of copied text >  < from: CT Head No Cont (12.31.24 @ 23:18) >    IMPRESSION:  No acute intracranial findings.          < end of copied text >  < from: Xray Femur 2 Views, Right (12.31.24 @ 19:08) >  IMPRESSION:    Minimally displaced right intertrochanteric femoral fracture    --- End of Report ---      < end of copied text >  < from: Xray Pelvis AP only (12.31.24 @ 19:08) >  IMPRESSION:    Minimally displaced right intertrochanteric femoral fracture    --- End of Report ---      < end of copied text >      ECG: sinus rhythm with LBBB vs v-pacing      TELEMETRY: N/A    ECHO:  < from: TTE W or WO Ultrasound Enhancing Agent (01.01.25 @ 12:44) >     CONCLUSIONS:      1. Aortic root at the sinuses of Valsalva isdilated, measuring 4.20 cm (indexed 2.00 cm/m²).   2. Abnormal septal motion consistent with left bundle branch block. Left ventricular systolic function is normal with an ejection fraction of 59 % by Garcia's method of disks.   3. There is increased LV mass and concentric hypertrophy.   4. Normal right ventricular cavity size, with normal wall thickness, and normal right ventricular systolic function.   5. Device lead is visualized in the right ventricle.   6. Consider CTA of the chest to better evaluate the aorta if clinically appropriate.    ________________________________________________________________________________________  FINDINGS:     Left Ventricle:  Abnormal (paradoxical) septal motion consistent with left bundle branch block. Left ventricular systolic function is normal with a calculated ejection fraction of 59 % by the Garcia's biplane method of disks. There is increased LV mass and concentric hypertrophy. There is normal left ventricular diastolic function.     Right Ventricle:  The right ventricular cavity is normal in size, with normal wall thickness and right ventricular systolic function is normal. A device lead is visualized in the right ventricle.     Left Atrium:  The left atrium is normal in size with an indexed volume of 31.97 ml/m².     Right Atrium:  The right atrium is normal in size.     Aortic Valve:  The aortic valve appears trileaflet.     Mitral Valve:  There is trace mitral regurgitation.     Tricuspid Valve:  There is mild tricuspid regurgitation. There is insufficient tricuspid regurgitation detected to calculate pulmonary artery systolic pressure.     Pulmonic Valve:  There is trace pulmonic regurgitation.     Aorta:  The aortic root at the sinuses of Valsalva is dilated, measuring 4.20 cm (indexed 2.00 cm/m²).     Pericardium:  No pericardial effusion seen.  ____________________________________________________________________    < end of copied text >

## 2025-01-02 NOTE — BRIEF OPERATIVE NOTE - NSICDXBRIEFPOSTOP_GEN_ALL_CORE_FT
POST-OP DIAGNOSIS:  Intertrochanteric fracture of right hip 02-Jan-2025 17:19:20  Jose Elias Urbina

## 2025-01-03 DIAGNOSIS — I95.9 HYPOTENSION, UNSPECIFIED: ICD-10-CM

## 2025-01-03 LAB
ALBUMIN SERPL ELPH-MCNC: 1.8 G/DL — LOW (ref 3.5–5)
ALBUMIN SERPL ELPH-MCNC: 2.1 G/DL — LOW (ref 3.5–5)
ALP SERPL-CCNC: 60 U/L — SIGNIFICANT CHANGE UP (ref 40–120)
ALP SERPL-CCNC: 68 U/L — SIGNIFICANT CHANGE UP (ref 40–120)
ALT FLD-CCNC: 15 U/L DA — SIGNIFICANT CHANGE UP (ref 10–60)
ALT FLD-CCNC: 16 U/L DA — SIGNIFICANT CHANGE UP (ref 10–60)
ANION GAP SERPL CALC-SCNC: 4 MMOL/L — LOW (ref 5–17)
ANION GAP SERPL CALC-SCNC: 5 MMOL/L — SIGNIFICANT CHANGE UP (ref 5–17)
APPEARANCE UR: CLEAR — SIGNIFICANT CHANGE UP
APTT BLD: 35.9 SEC — HIGH (ref 24.5–35.6)
AST SERPL-CCNC: 23 U/L — SIGNIFICANT CHANGE UP (ref 10–40)
AST SERPL-CCNC: 23 U/L — SIGNIFICANT CHANGE UP (ref 10–40)
BASOPHILS # BLD AUTO: 0.02 K/UL — SIGNIFICANT CHANGE UP (ref 0–0.2)
BASOPHILS NFR BLD AUTO: 0.3 % — SIGNIFICANT CHANGE UP (ref 0–2)
BILIRUB SERPL-MCNC: 0.4 MG/DL — SIGNIFICANT CHANGE UP (ref 0.2–1.2)
BILIRUB SERPL-MCNC: 0.4 MG/DL — SIGNIFICANT CHANGE UP (ref 0.2–1.2)
BILIRUB UR-MCNC: NEGATIVE — SIGNIFICANT CHANGE UP
BUN SERPL-MCNC: 12 MG/DL — SIGNIFICANT CHANGE UP (ref 7–18)
BUN SERPL-MCNC: 13 MG/DL — SIGNIFICANT CHANGE UP (ref 7–18)
CALCIUM SERPL-MCNC: 8.3 MG/DL — LOW (ref 8.4–10.5)
CALCIUM SERPL-MCNC: 8.3 MG/DL — LOW (ref 8.4–10.5)
CHLORIDE SERPL-SCNC: 106 MMOL/L — SIGNIFICANT CHANGE UP (ref 96–108)
CHLORIDE SERPL-SCNC: 108 MMOL/L — SIGNIFICANT CHANGE UP (ref 96–108)
CO2 SERPL-SCNC: 30 MMOL/L — SIGNIFICANT CHANGE UP (ref 22–31)
CO2 SERPL-SCNC: 34 MMOL/L — HIGH (ref 22–31)
COLOR SPEC: SIGNIFICANT CHANGE UP
CREAT SERPL-MCNC: 0.64 MG/DL — SIGNIFICANT CHANGE UP (ref 0.5–1.3)
CREAT SERPL-MCNC: 0.7 MG/DL — SIGNIFICANT CHANGE UP (ref 0.5–1.3)
DIFF PNL FLD: NEGATIVE — SIGNIFICANT CHANGE UP
EGFR: 112 ML/MIN/1.73M2 — SIGNIFICANT CHANGE UP
EGFR: 115 ML/MIN/1.73M2 — SIGNIFICANT CHANGE UP
EOSINOPHIL # BLD AUTO: 0.15 K/UL — SIGNIFICANT CHANGE UP (ref 0–0.5)
EOSINOPHIL NFR BLD AUTO: 1.9 % — SIGNIFICANT CHANGE UP (ref 0–6)
FLUAV AG NPH QL: SIGNIFICANT CHANGE UP
FLUBV AG NPH QL: SIGNIFICANT CHANGE UP
GLUCOSE BLDC GLUCOMTR-MCNC: 124 MG/DL — HIGH (ref 70–99)
GLUCOSE BLDC GLUCOMTR-MCNC: 129 MG/DL — HIGH (ref 70–99)
GLUCOSE BLDC GLUCOMTR-MCNC: 144 MG/DL — HIGH (ref 70–99)
GLUCOSE BLDC GLUCOMTR-MCNC: 146 MG/DL — HIGH (ref 70–99)
GLUCOSE BLDC GLUCOMTR-MCNC: 171 MG/DL — HIGH (ref 70–99)
GLUCOSE SERPL-MCNC: 120 MG/DL — HIGH (ref 70–99)
GLUCOSE SERPL-MCNC: 125 MG/DL — HIGH (ref 70–99)
GLUCOSE UR QL: NEGATIVE MG/DL — SIGNIFICANT CHANGE UP
HCT VFR BLD CALC: 25.4 % — LOW (ref 39–50)
HCT VFR BLD CALC: 25.7 % — LOW (ref 39–50)
HCT VFR BLD CALC: 27.1 % — LOW (ref 39–50)
HGB BLD-MCNC: 8.8 G/DL — LOW (ref 13–17)
HGB BLD-MCNC: 9 G/DL — LOW (ref 13–17)
HGB BLD-MCNC: 9.4 G/DL — LOW (ref 13–17)
IMM GRANULOCYTES NFR BLD AUTO: 0.3 % — SIGNIFICANT CHANGE UP (ref 0–0.9)
INR BLD: 1.72 RATIO — HIGH (ref 0.85–1.16)
KETONES UR-MCNC: ABNORMAL MG/DL
LACTATE SERPL-SCNC: 1.8 MMOL/L — SIGNIFICANT CHANGE UP (ref 0.7–2)
LEUKOCYTE ESTERASE UR-ACNC: NEGATIVE — SIGNIFICANT CHANGE UP
LYMPHOCYTES # BLD AUTO: 2.29 K/UL — SIGNIFICANT CHANGE UP (ref 1–3.3)
LYMPHOCYTES # BLD AUTO: 28.7 % — SIGNIFICANT CHANGE UP (ref 13–44)
MAGNESIUM SERPL-MCNC: 2.1 MG/DL — SIGNIFICANT CHANGE UP (ref 1.6–2.6)
MCHC RBC-ENTMCNC: 33.2 PG — SIGNIFICANT CHANGE UP (ref 27–34)
MCHC RBC-ENTMCNC: 33.8 PG — SIGNIFICANT CHANGE UP (ref 27–34)
MCHC RBC-ENTMCNC: 34.1 PG — HIGH (ref 27–34)
MCHC RBC-ENTMCNC: 34.6 G/DL — SIGNIFICANT CHANGE UP (ref 32–36)
MCHC RBC-ENTMCNC: 34.7 G/DL — SIGNIFICANT CHANGE UP (ref 32–36)
MCHC RBC-ENTMCNC: 35 G/DL — SIGNIFICANT CHANGE UP (ref 32–36)
MCV RBC AUTO: 95.8 FL — SIGNIFICANT CHANGE UP (ref 80–100)
MCV RBC AUTO: 96.6 FL — SIGNIFICANT CHANGE UP (ref 80–100)
MCV RBC AUTO: 98.2 FL — SIGNIFICANT CHANGE UP (ref 80–100)
MONOCYTES # BLD AUTO: 1.04 K/UL — HIGH (ref 0–0.9)
MONOCYTES NFR BLD AUTO: 13 % — SIGNIFICANT CHANGE UP (ref 2–14)
NEUTROPHILS # BLD AUTO: 4.45 K/UL — SIGNIFICANT CHANGE UP (ref 1.8–7.4)
NEUTROPHILS NFR BLD AUTO: 55.8 % — SIGNIFICANT CHANGE UP (ref 43–77)
NITRITE UR-MCNC: NEGATIVE — SIGNIFICANT CHANGE UP
NRBC # BLD: 0 /100 WBCS — SIGNIFICANT CHANGE UP (ref 0–0)
PH UR: 6 — SIGNIFICANT CHANGE UP (ref 5–8)
PHOSPHATE SERPL-MCNC: 3.1 MG/DL — SIGNIFICANT CHANGE UP (ref 2.5–4.5)
PLATELET # BLD AUTO: 221 K/UL — SIGNIFICANT CHANGE UP (ref 150–400)
PLATELET # BLD AUTO: 228 K/UL — SIGNIFICANT CHANGE UP (ref 150–400)
PLATELET # BLD AUTO: 233 K/UL — SIGNIFICANT CHANGE UP (ref 150–400)
POTASSIUM SERPL-MCNC: 3.8 MMOL/L — SIGNIFICANT CHANGE UP (ref 3.5–5.3)
POTASSIUM SERPL-MCNC: 3.9 MMOL/L — SIGNIFICANT CHANGE UP (ref 3.5–5.3)
POTASSIUM SERPL-SCNC: 3.8 MMOL/L — SIGNIFICANT CHANGE UP (ref 3.5–5.3)
POTASSIUM SERPL-SCNC: 3.9 MMOL/L — SIGNIFICANT CHANGE UP (ref 3.5–5.3)
PROT SERPL-MCNC: 5.8 G/DL — LOW (ref 6–8.3)
PROT SERPL-MCNC: 6.4 G/DL — SIGNIFICANT CHANGE UP (ref 6–8.3)
PROT UR-MCNC: NEGATIVE MG/DL — SIGNIFICANT CHANGE UP
PROTHROM AB SERPL-ACNC: 19.9 SEC — HIGH (ref 9.9–13.4)
RBC # BLD: 2.65 M/UL — LOW (ref 4.2–5.8)
RBC # BLD: 2.66 M/UL — LOW (ref 4.2–5.8)
RBC # BLD: 2.76 M/UL — LOW (ref 4.2–5.8)
RBC # FLD: 15.3 % — HIGH (ref 10.3–14.5)
RBC # FLD: 15.6 % — HIGH (ref 10.3–14.5)
RBC # FLD: 15.8 % — HIGH (ref 10.3–14.5)
RSV RNA NPH QL NAA+NON-PROBE: SIGNIFICANT CHANGE UP
SARS-COV-2 RNA SPEC QL NAA+PROBE: SIGNIFICANT CHANGE UP
SARS-COV-2 RNA SPEC QL NAA+PROBE: SIGNIFICANT CHANGE UP
SODIUM SERPL-SCNC: 143 MMOL/L — SIGNIFICANT CHANGE UP (ref 135–145)
SODIUM SERPL-SCNC: 144 MMOL/L — SIGNIFICANT CHANGE UP (ref 135–145)
SP GR SPEC: 1.03 — SIGNIFICANT CHANGE UP (ref 1–1.03)
UROBILINOGEN FLD QL: 1 MG/DL — SIGNIFICANT CHANGE UP (ref 0.2–1)
VIT D25+D1,25 OH+D1,25 PNL SERPL-MCNC: 19 PG/ML — LOW (ref 19.9–79.3)
WBC # BLD: 5.96 K/UL — SIGNIFICANT CHANGE UP (ref 3.8–10.5)
WBC # BLD: 7.78 K/UL — SIGNIFICANT CHANGE UP (ref 3.8–10.5)
WBC # BLD: 7.97 K/UL — SIGNIFICANT CHANGE UP (ref 3.8–10.5)
WBC # FLD AUTO: 5.96 K/UL — SIGNIFICANT CHANGE UP (ref 3.8–10.5)
WBC # FLD AUTO: 7.78 K/UL — SIGNIFICANT CHANGE UP (ref 3.8–10.5)
WBC # FLD AUTO: 7.97 K/UL — SIGNIFICANT CHANGE UP (ref 3.8–10.5)

## 2025-01-03 PROCEDURE — 99232 SBSQ HOSP IP/OBS MODERATE 35: CPT

## 2025-01-03 PROCEDURE — 71045 X-RAY EXAM CHEST 1 VIEW: CPT | Mod: 26

## 2025-01-03 RX ORDER — PIPERACILLIN AND TAZOBACTAM 3; .375 G/15ML; G/15ML
3.38 INJECTION, POWDER, LYOPHILIZED, FOR SOLUTION INTRAVENOUS ONCE
Refills: 0 | Status: COMPLETED | OUTPATIENT
Start: 2025-01-03 | End: 2025-01-03

## 2025-01-03 RX ORDER — SODIUM CHLORIDE 9 MG/ML
1000 INJECTION, SOLUTION INTRAVENOUS ONCE
Refills: 0 | Status: COMPLETED | OUTPATIENT
Start: 2025-01-03 | End: 2025-01-03

## 2025-01-03 RX ORDER — ACETAMINOPHEN 80 MG/.8ML
1000 SOLUTION/ DROPS ORAL ONCE
Refills: 0 | Status: COMPLETED | OUTPATIENT
Start: 2025-01-03 | End: 2025-01-03

## 2025-01-03 RX ORDER — BACLOFEN 10 MG/1
5 TABLET ORAL EVERY 8 HOURS
Refills: 0 | Status: DISCONTINUED | OUTPATIENT
Start: 2025-01-03 | End: 2025-01-03

## 2025-01-03 RX ORDER — PIPERACILLIN AND TAZOBACTAM 3; .375 G/15ML; G/15ML
3.38 INJECTION, POWDER, LYOPHILIZED, FOR SOLUTION INTRAVENOUS EVERY 8 HOURS
Refills: 0 | Status: DISCONTINUED | OUTPATIENT
Start: 2025-01-04 | End: 2025-01-04

## 2025-01-03 RX ORDER — APIXABAN 5 MG/1
5 TABLET, FILM COATED ORAL EVERY 12 HOURS
Refills: 0 | Status: DISCONTINUED | OUTPATIENT
Start: 2025-01-03 | End: 2025-01-03

## 2025-01-03 RX ORDER — MIDODRINE HYDROCHLORIDE 5 MG/1
5 TABLET ORAL THREE TIMES A DAY
Refills: 0 | Status: DISCONTINUED | OUTPATIENT
Start: 2025-01-03 | End: 2025-01-13

## 2025-01-03 RX ORDER — METOPROLOL TARTRATE 50 MG
25 TABLET ORAL
Refills: 0 | Status: DISCONTINUED | OUTPATIENT
Start: 2025-01-03 | End: 2025-01-03

## 2025-01-03 RX ORDER — SODIUM CHLORIDE 9 MG/ML
500 INJECTION, SOLUTION INTRAMUSCULAR; INTRAVENOUS; SUBCUTANEOUS ONCE
Refills: 0 | Status: COMPLETED | OUTPATIENT
Start: 2025-01-03 | End: 2025-01-03

## 2025-01-03 RX ADMIN — CEFTRIAXONE SODIUM 100 MILLIGRAM(S): 1 INJECTION, POWDER, FOR SOLUTION INTRAMUSCULAR; INTRAVENOUS at 01:21

## 2025-01-03 RX ADMIN — APIXABAN 5 MILLIGRAM(S): 5 TABLET, FILM COATED ORAL at 14:10

## 2025-01-03 RX ADMIN — Medication 500 MILLIGRAM(S): at 12:32

## 2025-01-03 RX ADMIN — SENNOSIDES 10 MILLILITER(S): 8.6 TABLET, FILM COATED ORAL at 22:31

## 2025-01-03 RX ADMIN — Medication 0.2 MILLIGRAM(S): at 12:47

## 2025-01-03 RX ADMIN — Medication 50 MILLILITER(S): at 02:24

## 2025-01-03 RX ADMIN — MIDODRINE HYDROCHLORIDE 5 MILLIGRAM(S): 5 TABLET ORAL at 02:05

## 2025-01-03 RX ADMIN — SODIUM CHLORIDE 500 MILLILITER(S): 9 INJECTION, SOLUTION INTRAMUSCULAR; INTRAVENOUS; SUBCUTANEOUS at 17:30

## 2025-01-03 RX ADMIN — ATORVASTATIN CALCIUM 80 MILLIGRAM(S): 40 TABLET, FILM COATED ORAL at 22:29

## 2025-01-03 RX ADMIN — VALPROIC ACID 1250 MILLIGRAM(S): 250 SOLUTION ORAL at 22:29

## 2025-01-03 RX ADMIN — PIPERACILLIN AND TAZOBACTAM 25 GRAM(S): 3; .375 INJECTION, POWDER, LYOPHILIZED, FOR SOLUTION INTRAVENOUS at 23:10

## 2025-01-03 RX ADMIN — VALPROIC ACID 1250 MILLIGRAM(S): 250 SOLUTION ORAL at 14:11

## 2025-01-03 RX ADMIN — POLYSORBATE 80 1 DROP(S): 100 SOLUTION/ DROPS OPHTHALMIC at 02:05

## 2025-01-03 RX ADMIN — ACETAMINOPHEN 400 MILLIGRAM(S): 80 SOLUTION/ DROPS ORAL at 02:10

## 2025-01-03 RX ADMIN — IPRATROPIUM BROMIDE AND ALBUTEROL SULFATE 3 MILLILITER(S): .5; 2.5 SOLUTION RESPIRATORY (INHALATION) at 14:58

## 2025-01-03 RX ADMIN — Medication 0.2 MILLIGRAM(S): at 12:32

## 2025-01-03 RX ADMIN — Medication 0.5 MILLIGRAM(S): at 10:05

## 2025-01-03 RX ADMIN — PIPERACILLIN AND TAZOBACTAM 200 GRAM(S): 3; .375 INJECTION, POWDER, LYOPHILIZED, FOR SOLUTION INTRAVENOUS at 18:59

## 2025-01-03 RX ADMIN — MIDODRINE HYDROCHLORIDE 5 MILLIGRAM(S): 5 TABLET ORAL at 17:44

## 2025-01-03 RX ADMIN — LACOSAMIDE 200 MILLIGRAM(S): 100 TABLET, FILM COATED ORAL at 18:07

## 2025-01-03 RX ADMIN — Medication 50 MILLILITER(S): at 17:56

## 2025-01-03 RX ADMIN — LACOSAMIDE 200 MILLIGRAM(S): 100 TABLET, FILM COATED ORAL at 06:22

## 2025-01-03 RX ADMIN — Medication 0.5 MILLIGRAM(S): at 09:49

## 2025-01-03 RX ADMIN — Medication 1 MILLIGRAM(S): at 12:30

## 2025-01-03 RX ADMIN — IPRATROPIUM BROMIDE AND ALBUTEROL SULFATE 3 MILLILITER(S): .5; 2.5 SOLUTION RESPIRATORY (INHALATION) at 03:48

## 2025-01-03 RX ADMIN — IPRATROPIUM BROMIDE AND ALBUTEROL SULFATE 3 MILLILITER(S): .5; 2.5 SOLUTION RESPIRATORY (INHALATION) at 20:14

## 2025-01-03 RX ADMIN — CHLORHEXIDINE GLUCONATE 1 APPLICATION(S): 1.2 RINSE ORAL at 12:33

## 2025-01-03 RX ADMIN — VALPROIC ACID 1250 MILLIGRAM(S): 250 SOLUTION ORAL at 06:22

## 2025-01-03 RX ADMIN — Medication 1 TABLET(S): at 12:30

## 2025-01-03 RX ADMIN — IPRATROPIUM BROMIDE AND ALBUTEROL SULFATE 3 MILLILITER(S): .5; 2.5 SOLUTION RESPIRATORY (INHALATION) at 08:36

## 2025-01-03 RX ADMIN — LEVETIRACETAM 2000 MILLIGRAM(S): 100 SOLUTION ORAL at 05:41

## 2025-01-03 RX ADMIN — Medication 0.5 MILLIGRAM(S): at 14:25

## 2025-01-03 RX ADMIN — LEVETIRACETAM 2000 MILLIGRAM(S): 100 SOLUTION ORAL at 18:08

## 2025-01-03 RX ADMIN — Medication 17 GRAM(S): at 12:32

## 2025-01-03 RX ADMIN — Medication 5 MILLIGRAM(S): at 22:28

## 2025-01-03 RX ADMIN — SODIUM CHLORIDE 1000 MILLILITER(S): 9 INJECTION, SOLUTION INTRAVENOUS at 18:47

## 2025-01-03 RX ADMIN — ACETAMINOPHEN 1000 MILLIGRAM(S): 80 SOLUTION/ DROPS ORAL at 03:00

## 2025-01-03 RX ADMIN — Medication 0.5 MILLIGRAM(S): at 14:10

## 2025-01-03 RX ADMIN — PANTOPRAZOLE 40 MILLIGRAM(S): 40 TABLET, DELAYED RELEASE ORAL at 12:30

## 2025-01-03 NOTE — PHYSICAL THERAPY INITIAL EVALUATION ADULT - DIAGNOSIS, PT EVAL
(ICF Model) Pt. present w/deficits in Body Structures/Function (Impairments), incl: Strength, Balance, ROM pain , leading to deficits in performing the below noted Activities (Limitations).

## 2025-01-03 NOTE — PROGRESS NOTE ADULT - PROBLEM SELECTOR PLAN 3
-Soft BP with drop in SBP during PT eval  -Likely orthostatic hypotension vs hypovolemia   -Start Midodrene 5mg TID  -Monitor BP per unit protocol   -Repeat CBC at 20:00   -H&H stable  - Afebrile, no leukocytosis

## 2025-01-03 NOTE — PHYSICAL THERAPY INITIAL EVALUATION ADULT - ADDITIONAL COMMENTS
pt. is poor historian. Per [facility] papers pt. receiving skilled rehab level services. Was ETA [facility] dependent transfer

## 2025-01-03 NOTE — PHYSICAL THERAPY INITIAL EVALUATION ADULT - PLANNED THERAPY INTERVENTIONS, PT EVAL
balance training/bed mobility training/gait training/neuromuscular re-education/orthotic fitting/training/postural re-education/ROM/strengthening/stretching/transfer training/wheelchair management/propulsion training

## 2025-01-03 NOTE — PROGRESS NOTE ADULT - ASSESSMENT
(4) no impairment Patient is a 48 y/o M pt with PMHx CVA (3/30/2022 with residual aphasia and right sided residual weakness), s/p tracheostomy and peg placement, seizures, HTN, HLD, CHF w/ ICD (initially rEF 30%->55-60% on 03/2021), MDD, Recurrent PE, Afib (on Eliquis), obesity s/p bariatric intervention presented to the ED after a fall 3 days prior to admission, and outpatient xray showed intertrochanteric fracture of right femur. He also had fevers outpatient, and CXR concerning for left lower lobe infiltrate Patient is being admitted to  for management of right intertrochanteric femur fracture and pneumonia.     1/2/25: Patient anemia possible anemia of chronic disease, PRBC 1 unit transfused for OR, recommended by Ortho Hemoglobin above 10, however will transfuse one unit 1/2/25 as patient with no active bleeding noted. Cardiac cleared for surgery. ICD no detection, however EP recommends no need to replace as ICD placed initially due to previous low EF. TTE resulting LBBB, LVSF normal EF 59%. Patient planned for OR tonight.   01/03/2025: Restarted Eliquis. S/p R hip IM nailing POD #1. PT reccs [facility]KELL. WBAT. Dispo: Placement back to [place]. Will monitor on AC for 24 hrs to ensure H&H remains stable and monitor surgical wound.

## 2025-01-03 NOTE — PROGRESS NOTE ADULT - NS ATTEND AMEND GEN_ALL_CORE FT
S/p R Hip IM Nail  Cont. antibiotics for aspiration   Cardiology and EP recs noted  Tracheostomy management per protocol  Not a candidate for decannulation   Low BP for now, will hold antihypertensives  Ok to start AC per Orthopedics  Monitor for signs of bleeding  PT evaluation   Dispo planning

## 2025-01-03 NOTE — PHYSICAL THERAPY INITIAL EVALUATION ADULT - IMPAIRMENTS CONTRIBUTING IMPAIRED BED MOBILITY, REHAB EVAL
impaired balance/cognition/decreased flexibility/impaired motor control/abnormal muscle tone/pain/impaired postural control/decreased ROM/decreased sensation/impaired sensory feedback/decreased strength

## 2025-01-03 NOTE — PHYSICAL THERAPY INITIAL EVALUATION ADULT - ACTIVE RANGE OF MOTION EXAMINATION, REHAB EVAL
except Lt hip ~2/3 range. No active motion RT UE/LE/bilateral upper extremity Active ROM was WFL (within functional limits)/bilateral  lower extremity Active ROM was WFL (within functional limits)

## 2025-01-03 NOTE — PHYSICAL THERAPY INITIAL EVALUATION ADULT - PASSIVE RANGE OF MOTION EXAMINATION, REHAB EVAL
except Rt shoulder ~80 deg flx and Rt wist ~5 deg arc from neutral and fingers in flx contracture/bilateral upper extremity Passive ROM was WFL (within functional limits)/bilateral lower extremity Passive ROM was WFL (within functional limits)

## 2025-01-03 NOTE — PHYSICAL THERAPY INITIAL EVALUATION ADULT - REFERRAL TO ANOTHER SERVICE NEEDED, PT EVAL
pain mgnt; Also coordinating with to have [facility] at braces/splints sent over to hospital/occupational therapy

## 2025-01-03 NOTE — DIETITIAN INITIAL EVALUATION ADULT - PERTINENT MEDS FT
MEDICATIONS  (STANDING):  albuterol/ipratropium for Nebulization 3 milliLiter(s) Nebulizer every 6 hours  artificial  tears Solution 1 Drop(s) Both EYES daily  ascorbic acid 500 milliGRAM(s) Oral daily  atorvastatin 80 milliGRAM(s) Oral at bedtime  chlorhexidine 2% Cloths 1 Application(s) Topical daily  dextrose 5%. 1000 milliLiter(s) (90 mL/Hr) IV Continuous <Continuous>  folic acid 1 milliGRAM(s) Oral daily  lacosamide Solution 200 milliGRAM(s) Oral two times a day  levETIRAcetam  Solution 2000 milliGRAM(s) Enteral Tube two times a day  lisinopril 2.5 milliGRAM(s) Oral daily  melatonin 5 milliGRAM(s) Oral at bedtime  metoprolol tartrate 25 milliGRAM(s) Oral two times a day  multivitamin 1 Tablet(s) Oral daily  naloxone Injectable 0.4 milliGRAM(s) IV Push once  pantoprazole  Injectable 40 milliGRAM(s) IV Push daily  polyethylene glycol 3350 17 Gram(s) Oral daily  senna Syrup 10 milliLiter(s) Oral at bedtime  sodium chloride 0.9%. 1000 milliLiter(s) (90 mL/Hr) IV Continuous <Continuous>  valproic  acid Syrup 1250 milliGRAM(s) Oral three times a day    MEDICATIONS  (PRN):  bisacodyl Suppository 10 milliGRAM(s) Rectal daily PRN Constipation  cyclobenzaprine 5 milliGRAM(s) Oral every 8 hours PRN Spasm  HYDROmorphone  Injectable 0.2 milliGRAM(s) IV Push every 4 hours PRN Moderate Pain (4 - 6)  HYDROmorphone  Injectable 0.5 milliGRAM(s) IV Push every 4 hours PRN Severe Pain (7 - 10)  ondansetron Injectable 4 milliGRAM(s) IV Push every 8 hours PRN Nausea and/or Vomiting

## 2025-01-03 NOTE — PROGRESS NOTE ADULT - SUBJECTIVE AND OBJECTIVE BOX
DANNIE SHIRLEY    SCU progress note    INTERVAL HPI/OVERNIGHT EVENTS: No acute finds.     FULl CODE    Covid - 19 PCR: Negative    The 4Ms    What Matters Most: see GOC  Age appropriate Medications/Screen for High Risk Medication: Yes  Mentation: see CAM below  Mobility: defer to physical exam    The Confusion Assessment Method (CAM) Diagnostic Algorithm     1: Acute Onset or Fluctuating Course  - Is there evidence of an acute change in mental status from the patient’s baseline? Did the (abnormal) behavior  fluctuate during the day, that is, tend to come and go, or increase and decrease in severity?  [ ] YES [x ] NO     2: Inattention  - Did the patient have difficulty focusing attention, being easily distractible, or having difficulty keeping track of what was being said?  [ ] YES [ x] NO     3: Disorganized thinking  -Was the patient’s thinking disorganized or incoherent, such as rambling or irrelevant conversation, unclear or illogical flow of ideas, or unpredictable switching from subject to subject?  [ ] YES [x ] NO    4: Altered Level of consciousness?  [ ] YES [ x] NO    The diagnosis of delirium by CAM requires the presence of features 1 and 2 and either 3 or 4.    PRESSORS: [ ] YES [x ] NO    Cardiovascular:  Heart Failure  Acute   Acute on Chronic  Chronic       lisinopril 2.5 milliGRAM(s) Oral daily  metoprolol tartrate 25 milliGRAM(s) Oral two times a day    Pulmonary:  albuterol/ipratropium for Nebulization 3 milliLiter(s) Nebulizer every 6 hours    Hematalogic:  apixaban 5 milliGRAM(s) Oral every 12 hours    Other:  artificial  tears Solution 1 Drop(s) Both EYES daily  ascorbic acid 500 milliGRAM(s) Oral daily  atorvastatin 80 milliGRAM(s) Oral at bedtime  bisacodyl Suppository 10 milliGRAM(s) Rectal daily PRN  chlorhexidine 2% Cloths 1 Application(s) Topical daily  cyclobenzaprine 5 milliGRAM(s) Oral every 8 hours PRN  dextrose 5%. 1000 milliLiter(s) IV Continuous <Continuous>  folic acid 1 milliGRAM(s) Oral daily  HYDROmorphone  Injectable 0.2 milliGRAM(s) IV Push every 4 hours PRN  HYDROmorphone  Injectable 0.5 milliGRAM(s) IV Push every 4 hours PRN  lacosamide Solution 200 milliGRAM(s) Oral two times a day  levETIRAcetam  Solution 2000 milliGRAM(s) Enteral Tube two times a day  melatonin 5 milliGRAM(s) Oral at bedtime  multivitamin 1 Tablet(s) Oral daily  naloxone Injectable 0.4 milliGRAM(s) IV Push once  ondansetron Injectable 4 milliGRAM(s) IV Push every 8 hours PRN  pantoprazole  Injectable 40 milliGRAM(s) IV Push daily  polyethylene glycol 3350 17 Gram(s) Oral daily  senna Syrup 10 milliLiter(s) Oral at bedtime  sodium chloride 0.9%. 1000 milliLiter(s) IV Continuous <Continuous>  valproic  acid Syrup 1250 milliGRAM(s) Oral three times a day    albuterol/ipratropium for Nebulization 3 milliLiter(s) Nebulizer every 6 hours  apixaban 5 milliGRAM(s) Oral every 12 hours  artificial  tears Solution 1 Drop(s) Both EYES daily  ascorbic acid 500 milliGRAM(s) Oral daily  atorvastatin 80 milliGRAM(s) Oral at bedtime  bisacodyl Suppository 10 milliGRAM(s) Rectal daily PRN  chlorhexidine 2% Cloths 1 Application(s) Topical daily  cyclobenzaprine 5 milliGRAM(s) Oral every 8 hours PRN  dextrose 5%. 1000 milliLiter(s) IV Continuous <Continuous>  folic acid 1 milliGRAM(s) Oral daily  HYDROmorphone  Injectable 0.2 milliGRAM(s) IV Push every 4 hours PRN  HYDROmorphone  Injectable 0.5 milliGRAM(s) IV Push every 4 hours PRN  lacosamide Solution 200 milliGRAM(s) Oral two times a day  levETIRAcetam  Solution 2000 milliGRAM(s) Enteral Tube two times a day  lisinopril 2.5 milliGRAM(s) Oral daily  melatonin 5 milliGRAM(s) Oral at bedtime  metoprolol tartrate 25 milliGRAM(s) Oral two times a day  multivitamin 1 Tablet(s) Oral daily  naloxone Injectable 0.4 milliGRAM(s) IV Push once  ondansetron Injectable 4 milliGRAM(s) IV Push every 8 hours PRN  pantoprazole  Injectable 40 milliGRAM(s) IV Push daily  polyethylene glycol 3350 17 Gram(s) Oral daily  senna Syrup 10 milliLiter(s) Oral at bedtime  sodium chloride 0.9%. 1000 milliLiter(s) IV Continuous <Continuous>  valproic  acid Syrup 1250 milliGRAM(s) Oral three times a day    Drug Dosing Weight  Height (cm): 170.2 (26 Aug 2024 12:06)  Weight (kg): 90.7 (31 Dec 2024 18:00)  BMI (kg/m2): 31.3 (31 Dec 2024 18:00)  BSA (m2): 2.02 (31 Dec 2024 18:00)    CENTRAL LINE: [ ] YES x] NO  LOCATION:   DATE INSERTED:  REMOVE: [ ] YES [ ] NO  EXPLAIN:    FAULKNER: [ ] YES x] NO    DATE INSERTED:  REMOVE:  [ ] YES [ ] NO  EXPLAIN:    PAST MEDICAL & SURGICAL HISTORY:  Heart failure, unspecified HF chronicity, unspecified heart failure type      ETOH abuse  h/o etoh abuse now moderate drinker      Artificial pacemaker    01-02 @ 07:01  -  01-03 @ 07:00  --------------------------------------------------------  IN: 0 mL / OUT: 800 mL / NET: -800 mL        PHYSICAL EXAM:    GENERAL: NAD; well-groomed, well-developed  HEAD: Hx of Left-sided Crani   EYES: EOMI,  conjunctiva and sclera clear  ENMT:  Dry mucous membranes, Good dentition, No lesions  NECK: Tracheostomy.   NERVOUS SYSTEM:  Alert & Oriented X1. Good concentration; follows commands. Right side hemiparesis. Lifts LUE and LLE AG.   CHEST/LUNG: RLL diminished.  No rales, rhonchi, wheezing, or rubs. Trach to humidified O2.   HEART: Regular rate and rhythm; No murmurs, rubs, or gallops  ABDOMEN: Soft, Nontender, Nondistended; Bowel sounds present. PEG in place.   EXTREMITIES:  2+ Peripheral Pulses, No clubbing, cyanosis, or edema  SKIN: No rashes noted.       LABS:  CBC Full  -  ( 03 Jan 2025 08:40 )  WBC Count : 5.96 K/uL  RBC Count : 2.76 M/uL  Hemoglobin : 9.4 g/dL  Hematocrit : 27.1 %  Platelet Count - Automated : 233 K/uL  Mean Cell Volume : 98.2 fl  Mean Cell Hemoglobin : 34.1 pg  Mean Cell Hemoglobin Concentration : 34.7 g/dL  Auto Neutrophil # : x  Auto Lymphocyte # : x  Auto Monocyte # : x  Auto Eosinophil # : x  Auto Basophil # : x  Auto Neutrophil % : x  Auto Lymphocyte % : x  Auto Monocyte % : x  Auto Eosinophil % : x  Auto Basophil % : x    01-03    144  |  106  |  12  ----------------------------<  120[H]  3.8   |  34[H]  |  0.64    Ca    8.3[L]      03 Jan 2025 08:40  Phos  3.1     01-03  Mg     2.1     01-03    TPro  6.4  /  Alb  2.1[L]  /  TBili  0.4  /  DBili  x   /  AST  23  /  ALT  16  /  AlkPhos  68  01-03    PT/INR - ( 02 Jan 2025 06:16 )   PT: 18.1 sec;   INR: 1.55 ratio         PTT - ( 02 Jan 2025 06:16 )  PTT:33.5 sec  Urinalysis Basic - ( 03 Jan 2025 08:40 )    Color: x / Appearance: x / SG: x / pH: x  Gluc: 120 mg/dL / Ketone: x  / Bili: x / Urobili: x   Blood: x / Protein: x / Nitrite: x   Leuk Esterase: x / RBC: x / WBC x   Sq Epi: x / Non Sq Epi: x / Bacteria: x    [ x ]  DVT Prophylaxis  [x  ]  Nutrition: Tube feeding     RADIOLOGY & ADDITIONAL STUDIES:   < from: Xray Chest 1 View- PORTABLE-Urgent (Xray Chest 1 View- PORTABLE-Urgent .) (01.01.25 @ 00:53) >  ACC: [id number] EXAM:  XR CHEST PORTABLE URGENT 1V   ORDERED BY: NICOLA OROPEZA     PROCEDURE DATE:  01/01/2025          INTERPRETATION:  Seizure, rule out pneumonia.    AP chest. Prior May 26, 2024.    IMPRESSION: Tracheostomy cannula reidentified in position. Low lung   volumes. Trace right pleural effusion with right basilar atelectasis.   Correlate clinically for concomitant infection. Otherwise grossly clear   lungs. No other changes.    --- End of Report ---            VENESSA SAWYER MD; Attending Radiologist  This document has been electronically signed. Jan 2 2025  2:16PM    < end of copied text >

## 2025-01-03 NOTE — DIETITIAN INITIAL EVALUATION ADULT - OTHER INFO
Pt Visited. Pt with Tracheostomy. Tf Jevity 1.5 @ 60 ml /hr x 24 hr Providing 1440 ml, 2160 calories, protein 92 ml/day, free water 1094 ml /day. D/W RN TF Tolerated. Pt S/P R  IM NAILING. LABS NOTED. . ESTIMATED HT 68 INCHES. Pt w SCD Device. Pt w L craniotomy, H/O CVA.

## 2025-01-03 NOTE — CHART NOTE - NSCHARTNOTEFT_GEN_A_CORE
Rectal temp of 101.4 in the setting of hypotension and tachycardia. Code sepsis called. Sepsis w/u initiated. Started on Zosyn. ID Dr. De La Torre consulted. Hughes inserted for urinary retention.   F/u cultures and Sepsis w/u.   Consult ICU if hypotension refractory to bolus fluid. Rectal temp of 101.4 in the setting of hypotension 84/50 mmHg  and tachycardia HR 102bpm.  Code sepsis called. Sepsis w/u initiated. Started on Zosyn. ID Dr. De La Torre consulted. Hughes inserted for urinary retention.   F/u cultures  Sepsis w/u.   CXR  Consult ICU if hypotension refractory to bolus fluid.   Repeat BP post 1L bolus.     Plan of care discussed with residents and RN.

## 2025-01-03 NOTE — PROGRESS NOTE ADULT - PROBLEM SELECTOR PLAN 9
- D/c Lisinopril in the setting of hypotension  - Started on Midodrine   - Restart Lisinopril when normotensive.

## 2025-01-03 NOTE — CHART NOTE - NSCHARTNOTEFT_GEN_A_CORE
Patient hypotensive with SBP of 88/65 during physical therapy eval. Midodrine initiated. Repeat BP of 84/50  T 99.4 axillary. On RA. Sats 98%; No leukocytosis and H&H stable. Patient found to have urinary retention > 500 ml.       # Hypotension   Orthostatic hypotension vs infectious source?  Rectal temp, and If febrile will send sepsis w/u.    ml bolus now   Albumin 100 ml x 1   C/w Midodrine 5 mg Q 8hrs.   S/p Ceftriaxone.   Held Eliquis in the setting of hypotension and POD # 1. Patient got 1 dose of Eliquis 5mg this afternoon.   Monitor for bleeding       #Urinary retention   Straight cath and send UA   Bladder scan Q 6hrs, if retains, insert Hughes per facility protocol       Discussed plan of care with intensivist and RN.

## 2025-01-03 NOTE — PHYSICAL THERAPY INITIAL EVALUATION ADULT - LIVES WITH, PROFILE
Dr Arreola, Radiologist on the line; now speaking w/Dr. Ospina  
JC Quinones) called w/Dr. Urrutia on the line; now speaking w/Dr. Ospina  
TAP (Vanessa) called w/bed assignment: [facility], room S[facility], report # [phone]  
TAP notified to coordinate transfer to [facility]; Vanessa will call back with cardiac interventionalist  
per mother pt. has been at NH for 1+ year

## 2025-01-03 NOTE — PROGRESS NOTE ADULT - PROBLEM SELECTOR PLAN 1
- P/w with pain from fall on 12/28.   - Outpatient x-ray showed right intertrochanteric femur fracture.   - X-ray right hip: Minimally displaced right intertrochanteric femoral fracture  - continue pain regimen.   - S/p R hip IM nailing POD #1.  - Ortho following   - WBAT

## 2025-01-03 NOTE — PHYSICAL THERAPY INITIAL EVALUATION ADULT - LEVEL OF INDEPENDENCE: SIT/STAND, REHAB EVAL
Unable to attempt at this time. Pt. became very anxious with ++ increasign tremor, RR and HR in sitting. To attempt as pt. tolerates

## 2025-01-03 NOTE — PROGRESS NOTE ADULT - PROBLEM SELECTOR PLAN 12
- SCDs.      Discharge planning:   - PT reccs [facility]  - Restarted AC  - Ortho following (Discussed dispo planning and decision made to monitor for another 24 hours post AC initiation to ensure H&H remains stable and surgical incision has no oozing.   - Hypotension likely orthostatic vs hypovolemia: Started on Midodrine.  - Gentle hydration   - CM to follow on placement back to [facility].

## 2025-01-03 NOTE — PROGRESS NOTE ADULT - PROBLEM SELECTOR PLAN 6
- Restarted  Eliquis in setting of possible surgical intervention.   - C/w metoprolol with parameters.

## 2025-01-03 NOTE — PROGRESS NOTE ADULT - SUBJECTIVE AND OBJECTIVE BOX
Overnight Events: NAEON, pt s/p ortho surgery yesterday. Denies chest pain or SOB.    Review Of Systems: UNABLE TO OBTAIN due to non-verbal status    Current Meds:  albuterol/ipratropium for Nebulization 3 milliLiter(s) Nebulizer every 6 hours  artificial  tears Solution 1 Drop(s) Both EYES daily  ascorbic acid 500 milliGRAM(s) Oral daily  atorvastatin 80 milliGRAM(s) Oral at bedtime  bisacodyl Suppository 10 milliGRAM(s) Rectal daily PRN  chlorhexidine 2% Cloths 1 Application(s) Topical daily  cyclobenzaprine 5 milliGRAM(s) Oral every 8 hours PRN  dextrose 5%. 1000 milliLiter(s) IV Continuous <Continuous>  folic acid 1 milliGRAM(s) Oral daily  HYDROmorphone  Injectable 0.2 milliGRAM(s) IV Push every 4 hours PRN  HYDROmorphone  Injectable 0.5 milliGRAM(s) IV Push every 4 hours PRN  lacosamide Solution 200 milliGRAM(s) Oral two times a day  levETIRAcetam  Solution 2000 milliGRAM(s) Enteral Tube two times a day  lisinopril 2.5 milliGRAM(s) Oral daily  melatonin 5 milliGRAM(s) Oral at bedtime  metoprolol tartrate 25 milliGRAM(s) Oral two times a day  multivitamin 1 Tablet(s) Oral daily  naloxone Injectable 0.4 milliGRAM(s) IV Push once  ondansetron Injectable 4 milliGRAM(s) IV Push every 8 hours PRN  pantoprazole  Injectable 40 milliGRAM(s) IV Push daily  polyethylene glycol 3350 17 Gram(s) Oral daily  senna Syrup 10 milliLiter(s) Oral at bedtime  sodium chloride 0.9%. 1000 milliLiter(s) IV Continuous <Continuous>  valproic  acid Syrup 1250 milliGRAM(s) Oral three times a day      Vitals:  T(F): 97.8 (01-03), Max: 100 (01-03)  HR: 95 (01-03) (71 - 111)  BP: 96/68 (01-03) (83/73 - 114/84)  RR: 20 (01-03)  SpO2: 98% (01-03)  I&O's Summary    02 Jan 2025 07:01 - 03 Jan 2025 07:00  --------------------------------------------------------  IN: 0 mL / OUT: 800 mL / NET: -800 mL        Physical Exam:  GENERAL: No acute distress  ENT: trach  CHEST/LUNG: Clear to auscultation anteriorly  BACK: No spinal tenderness  HEART: Regular rate and rhythm; No murmurs, rubs, or gallops  ABDOMEN: Soft  EXTREMITIES:  No clubbing, cyanosis, or edema                          9.4    5.96  )-----------( 233      ( 03 Jan 2025 08:40 )             27.1     01-03    144  |  106  |  12  ----------------------------<  120[H]  3.8   |  34[H]  |  0.64    Ca    8.3[L]      03 Jan 2025 08:40  Phos  3.1     01-03  Mg     2.1     01-03    TPro  6.4  /  Alb  2.1[L]  /  TBili  0.4  /  DBili  x   /  AST  23  /  ALT  16  /  AlkPhos  68  01-03    PT/INR - ( 02 Jan 2025 06:16 )   PT: 18.1 sec;   INR: 1.55 ratio         PTT - ( 02 Jan 2025 06:16 )  PTT:33.5 sec              Echo:    Stress Testing:     Cath:    Imaging:      Assessment and Recommendations:        Onel Cisneros MD ([employer] TEAMS message PREFERRED)  Cardiology Attending  [facility] - Cardiology  [address] [place] [address], 4th Floor, [place] [address]  Office: [phone]    All Cardiology service information can be found 24/7 on [email or web address], password: [email or web address]

## 2025-01-03 NOTE — PROGRESS NOTE ADULT - ASSESSMENT
49 year old M with CVA 3/30/2022 with residual aphasia and right sided residual weakness, s/p tracheostomy and peg placement, seizures, HTN, HLD, ?HF with recovered EF s/p ?Bi-V ICD (initially EF 30%->55-60% on 03/2021), MDD, PE, Afib on Eliquis who was referred by nursing home after fall on 12/28/24, found to have R intertrochanteric fracture, requiring surgery. Cardiology consulted for pre-op evaluation. He is now s/p ortho surgery 1/2/25.    1) ?HF with recovered EF s/p ?Bi-V ICD (initially EF 30%->55-60% on 03/2021)  - Euvolemic off diuretics  - EKG showed sinus rhythm with LBBB  - TTE 1/1/25 showed normal LV function, mildly dilated aortic root (4.2 cm)  - ICD battery depleted. EP Dr. Traylor consulted, no plan for gen change given poor functional status, comorbidities, and recovered EF    2) CVA 3/30/2022 with residual aphasia and right sided residual weakness  3) Afib  - Please resume home Eliquis once stable from bleeding/ortho perspective  - Continue metoprolol    4) mild aortic root dilation (4.2 cm)  - BP control  - Can obtain non-urgent CTA Aorta

## 2025-01-03 NOTE — PHYSICAL THERAPY INITIAL EVALUATION ADULT - IMPAIRMENTS FOUND, PT EVAL
cognitive impairment/gait, locomotion, and balance/gross motor/muscle strength/poor safety awareness/posture/ROM/sensory integrity/tone

## 2025-01-03 NOTE — PHYSICAL THERAPY INITIAL EVALUATION ADULT - GENERAL OBSERVATIONS, REHAB EVAL
Consult received, chart reviewed. Patient received supine in bed, NAD, +trach collar, SCDs, PEG. Lt craniectomy. + Lt LUE/LLE Tremor (mother reports pt. has this at times) Patient agreed to EVALUATION from Physical Therapist. Mother present

## 2025-01-03 NOTE — DIETITIAN INITIAL EVALUATION ADULT - ENTERAL
JEVITY  1.5  INITIAL GOAL RATE @  50 ML/HR X 24 HR AS TOLERATED TO PROVIDE  1200 ML, 1800 CALORIES, PROTEIN 77 GMS, FREE WATER 912 ML/DAY.  JEVITY  1.5  INITIAL GOAL RATE @  50 ML/HR X 24 HR AS TOLERATED TO PROVIDE  1200 ML, 1800 CALORIES, PROTEIN 77 GMS, FREE WATER 912 ML/DAY. + PRO SOURCE 1 PKT BID TO PROVIDE ADDITIONAL 30 GMS OF PROTEIN, 120 CALORIES)

## 2025-01-03 NOTE — PROGRESS NOTE ADULT - SUBJECTIVE AND OBJECTIVE BOX
DANNIE [id number]  49yMale    Diagnosis: 49yMale S/p Right Hip IM Nailing POD#1  Patient was seen and evaluated at bedside.   Patient with complaints of pain to right hip      ICU Vital Signs Last 24 Hrs  T(C): 36.6 (03 Jan 2025 05:16), Max: 37.8 (03 Jan 2025 01:52)  T(F): 97.8 (03 Jan 2025 05:16), Max: 100 (03 Jan 2025 01:52)  HR: 82 (03 Jan 2025 05:16) (67 - 111)  BP: 94/58 (03 Jan 2025 05:16) (83/73 - 139/97)  BP(mean): 67 (03 Jan 2025 05:16) (65 - 93)  ABP: --  ABP(mean): --  RR: 18 (03 Jan 2025 05:16) (16 - 26)  SpO2: 93% (03 Jan 2025 05:16) (92% - 100%)    O2 Parameters below as of 03 Jan 2025 05:16  Patient On (Oxygen Delivery Method): tracheostomy collar  O2 Flow (L/min): 8  O2 Concentration (%): 30          Physical Exam:  General:  NAD, resting comfortably in bed.  Right Hip:  Dressing is C/D/I. Skin is pink and warm.  No erythema. SILT.  Wound with no drainage, healing well.   Lower extremity:  No calf tenderness, calves are soft. 2+pulses. NVI. 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 Pt denies any sensation to the RLE as well.                                    9.8    9.55  )-----------( 208      ( 02 Jan 2025 18:00 )             28.4   01-02    140  |  106  |  14  ----------------------------<  133[H]  3.4[L]   |  28  |  0.53    Ca    8.5      02 Jan 2025 18:00  Phos  4.1     01-02  Mg     2.4     01-02    TPro  6.0  /  Alb  1.8[L]  /  TBili  0.3  /  DBili  x   /  AST  19  /  ALT  16  /  AlkPhos  56  01-02      Impression:  49yMale S/p Right Hip IM Nailing POD#1  Plan:  -  Pain management  -  DVT prophylaxis with venodynes    > Full anticoagulation can be restarted at medical teams discretion - no orthopedic contraindication  -  Daily Physical Therapy:  WBAT of the Right lower extremity with appropriate assistive device   -  Discharge planning  -  Continue with antibiotics as per medicine   -  Case d/w DR. Rodriguez

## 2025-01-03 NOTE — PROGRESS NOTE ADULT - PROBLEM SELECTOR PLAN 2
- CXR - left lower lobe infiltrate worse compared to previous.   - S/p ceftriaxone and azithro.   -  sputum culture 1/1/2025: + Pseudomonas aeruginosa  - Legionella negative.   - O2 supplement

## 2025-01-03 NOTE — DIETITIAN INITIAL EVALUATION ADULT - PERTINENT LABORATORY DATA
01-03    144  |  106  |  12  ----------------------------<  120[H]  3.8   |  34[H]  |  0.64    Ca    8.3[L]      03 Jan 2025 08:40  Phos  3.1     01-03  Mg     2.1     01-03    TPro  6.4  /  Alb  2.1[L]  /  TBili  0.4  /  DBili  x   /  AST  23  /  ALT  16  /  AlkPhos  68  01-03  POCT Blood Glucose.: 144 mg/dL (01-03-25 @ 07:13)

## 2025-01-03 NOTE — CHART NOTE - NSCHARTNOTEFT_GEN_A_CORE
Code sepsis called for rectal temp of 101.3F and Tachycardia of 109.  patient evaluated at bedside.  Appears at baseline per coverning NP and RN.  Patient was noted to be hypotensive earlier today, at first believed to be 2/2 orthostatic however remained with SBP in 80s despite starting Midodrine.  Patients baseline BP per bedside RN of 97/50s.  Repeat SBP at Code sepsis of 99.  HR remained in low 100s.  Patient with history of HF recorevered EF with most recent EF of 59% without diastolic dysfunction.  Patient completed CAP treatment with CTX + AZT.  Will order Sepsis workup for now with CBC w/diff, BMP, Lactate, procal, Flu/Covid, Blood cultures, CXR, UA w/ reflux culture.  Will give Acetaminophen.  Will give 500cc LR bolus for now with plan that if BP drops to below 90s will give another 500cc LR bolus.  Will start Zosyn for now, Primary team consulted ID Dr. Tamayo during code sepsis. Will reeavl patient in within 2 hours.

## 2025-01-03 NOTE — DIETITIAN INITIAL EVALUATION ADULT - ORAL INTAKE PTA/DIET HISTORY
PT IS FROM NH : JEVITY 1.2 1500 ML , 1800 CALORIES. + PRO SOURCE 1 PKT X3 , EXTRA WATER FLUSH  450 Q 6 HR

## 2025-01-04 DIAGNOSIS — Z75.8 OTHER PROBLEMS RELATED TO MEDICAL FACILITIES AND OTHER HEALTH CARE: ICD-10-CM

## 2025-01-04 DIAGNOSIS — A41.9 SEPSIS, UNSPECIFIED ORGANISM: ICD-10-CM

## 2025-01-04 DIAGNOSIS — K59.00 CONSTIPATION, UNSPECIFIED: ICD-10-CM

## 2025-01-04 LAB
-  AZTREONAM: SIGNIFICANT CHANGE UP
-  CEFEPIME: SIGNIFICANT CHANGE UP
-  CEFTAZIDIME: SIGNIFICANT CHANGE UP
-  CIPROFLOXACIN: SIGNIFICANT CHANGE UP
-  IMIPENEM: SIGNIFICANT CHANGE UP
-  LEVOFLOXACIN: SIGNIFICANT CHANGE UP
-  MEROPENEM: SIGNIFICANT CHANGE UP
-  PIPERACILLIN/TAZOBACTAM: SIGNIFICANT CHANGE UP
ALBUMIN SERPL ELPH-MCNC: 2 G/DL — LOW (ref 3.5–5)
ALP SERPL-CCNC: 62 U/L — SIGNIFICANT CHANGE UP (ref 40–120)
ALT FLD-CCNC: 16 U/L DA — SIGNIFICANT CHANGE UP (ref 10–60)
ANION GAP SERPL CALC-SCNC: 6 MMOL/L — SIGNIFICANT CHANGE UP (ref 5–17)
AST SERPL-CCNC: 24 U/L — SIGNIFICANT CHANGE UP (ref 10–40)
BILIRUB SERPL-MCNC: 0.5 MG/DL — SIGNIFICANT CHANGE UP (ref 0.2–1.2)
BUN SERPL-MCNC: 10 MG/DL — SIGNIFICANT CHANGE UP (ref 7–18)
CALCIUM SERPL-MCNC: 8.3 MG/DL — LOW (ref 8.4–10.5)
CHLORIDE SERPL-SCNC: 107 MMOL/L — SIGNIFICANT CHANGE UP (ref 96–108)
CO2 SERPL-SCNC: 31 MMOL/L — SIGNIFICANT CHANGE UP (ref 22–31)
CREAT SERPL-MCNC: 0.68 MG/DL — SIGNIFICANT CHANGE UP (ref 0.5–1.3)
CULTURE RESULTS: ABNORMAL
E FAECALIS DNA BLD POS QL NAA+NON-PROBE: SIGNIFICANT CHANGE UP
EGFR: 113 ML/MIN/1.73M2 — SIGNIFICANT CHANGE UP
GLUCOSE BLDC GLUCOMTR-MCNC: 122 MG/DL — HIGH (ref 70–99)
GLUCOSE BLDC GLUCOMTR-MCNC: 168 MG/DL — HIGH (ref 70–99)
GLUCOSE BLDC GLUCOMTR-MCNC: 172 MG/DL — HIGH (ref 70–99)
GLUCOSE SERPL-MCNC: 136 MG/DL — HIGH (ref 70–99)
GRAM STN FLD: ABNORMAL
GRAM STN FLD: ABNORMAL
HCT VFR BLD CALC: 26.7 % — LOW (ref 39–50)
HGB BLD-MCNC: 9.2 G/DL — LOW (ref 13–17)
MAGNESIUM SERPL-MCNC: 2.1 MG/DL — SIGNIFICANT CHANGE UP (ref 1.6–2.6)
MCHC RBC-ENTMCNC: 33.9 PG — SIGNIFICANT CHANGE UP (ref 27–34)
MCHC RBC-ENTMCNC: 34.5 G/DL — SIGNIFICANT CHANGE UP (ref 32–36)
MCV RBC AUTO: 98.5 FL — SIGNIFICANT CHANGE UP (ref 80–100)
METHOD TYPE: SIGNIFICANT CHANGE UP
METHOD TYPE: SIGNIFICANT CHANGE UP
NRBC # BLD: 0 /100 WBCS — SIGNIFICANT CHANGE UP (ref 0–0)
ORGANISM # SPEC MICROSCOPIC CNT: ABNORMAL
ORGANISM # SPEC MICROSCOPIC CNT: ABNORMAL
PHOSPHATE SERPL-MCNC: 3 MG/DL — SIGNIFICANT CHANGE UP (ref 2.5–4.5)
PLATELET # BLD AUTO: 254 K/UL — SIGNIFICANT CHANGE UP (ref 150–400)
POTASSIUM SERPL-MCNC: 3.6 MMOL/L — SIGNIFICANT CHANGE UP (ref 3.5–5.3)
POTASSIUM SERPL-SCNC: 3.6 MMOL/L — SIGNIFICANT CHANGE UP (ref 3.5–5.3)
PROCALCITONIN SERPL-MCNC: 0.08 NG/ML — SIGNIFICANT CHANGE UP (ref 0.02–0.1)
PROT SERPL-MCNC: 6.1 G/DL — SIGNIFICANT CHANGE UP (ref 6–8.3)
RBC # BLD: 2.71 M/UL — LOW (ref 4.2–5.8)
RBC # FLD: 15.6 % — HIGH (ref 10.3–14.5)
SODIUM SERPL-SCNC: 144 MMOL/L — SIGNIFICANT CHANGE UP (ref 135–145)
SPECIMEN SOURCE: SIGNIFICANT CHANGE UP
WBC # BLD: 7.62 K/UL — SIGNIFICANT CHANGE UP (ref 3.8–10.5)
WBC # FLD AUTO: 7.62 K/UL — SIGNIFICANT CHANGE UP (ref 3.8–10.5)

## 2025-01-04 PROCEDURE — 74018 RADEX ABDOMEN 1 VIEW: CPT | Mod: 26

## 2025-01-04 RX ORDER — BISACODYL 5 MG
10 TABLET, DELAYED RELEASE (ENTERIC COATED) ORAL DAILY
Refills: 0 | Status: DISCONTINUED | OUTPATIENT
Start: 2025-01-04 | End: 2025-01-05

## 2025-01-04 RX ORDER — MEROPENEM 1 G/20ML
INJECTION, POWDER, FOR SOLUTION INTRAVENOUS
Refills: 0 | Status: DISCONTINUED | OUTPATIENT
Start: 2025-01-04 | End: 2025-01-06

## 2025-01-04 RX ORDER — MEROPENEM 1 G/20ML
1000 INJECTION, POWDER, FOR SOLUTION INTRAVENOUS EVERY 8 HOURS
Refills: 0 | Status: DISCONTINUED | OUTPATIENT
Start: 2025-01-04 | End: 2025-01-06

## 2025-01-04 RX ORDER — MEROPENEM 1 G/20ML
1000 INJECTION, POWDER, FOR SOLUTION INTRAVENOUS ONCE
Refills: 0 | Status: COMPLETED | OUTPATIENT
Start: 2025-01-04 | End: 2025-01-04

## 2025-01-04 RX ORDER — ACETAMINOPHEN 80 MG/.8ML
1000 SOLUTION/ DROPS ORAL ONCE
Refills: 0 | Status: COMPLETED | OUTPATIENT
Start: 2025-01-04 | End: 2025-01-04

## 2025-01-04 RX ORDER — APIXABAN 5 MG/1
5 TABLET, FILM COATED ORAL EVERY 12 HOURS
Refills: 0 | Status: DISCONTINUED | OUTPATIENT
Start: 2025-01-04 | End: 2025-01-13

## 2025-01-04 RX ADMIN — IPRATROPIUM BROMIDE AND ALBUTEROL SULFATE 3 MILLILITER(S): .5; 2.5 SOLUTION RESPIRATORY (INHALATION) at 02:30

## 2025-01-04 RX ADMIN — IPRATROPIUM BROMIDE AND ALBUTEROL SULFATE 3 MILLILITER(S): .5; 2.5 SOLUTION RESPIRATORY (INHALATION) at 20:40

## 2025-01-04 RX ADMIN — PANTOPRAZOLE 40 MILLIGRAM(S): 40 TABLET, DELAYED RELEASE ORAL at 11:36

## 2025-01-04 RX ADMIN — APIXABAN 5 MILLIGRAM(S): 5 TABLET, FILM COATED ORAL at 11:35

## 2025-01-04 RX ADMIN — MEROPENEM 100 MILLIGRAM(S): 1 INJECTION, POWDER, FOR SOLUTION INTRAVENOUS at 15:01

## 2025-01-04 RX ADMIN — MIDODRINE HYDROCHLORIDE 5 MILLIGRAM(S): 5 TABLET ORAL at 11:36

## 2025-01-04 RX ADMIN — MEROPENEM 100 MILLIGRAM(S): 1 INJECTION, POWDER, FOR SOLUTION INTRAVENOUS at 11:35

## 2025-01-04 RX ADMIN — VALPROIC ACID 1250 MILLIGRAM(S): 250 SOLUTION ORAL at 14:17

## 2025-01-04 RX ADMIN — SENNOSIDES 10 MILLILITER(S): 8.6 TABLET, FILM COATED ORAL at 22:10

## 2025-01-04 RX ADMIN — APIXABAN 5 MILLIGRAM(S): 5 TABLET, FILM COATED ORAL at 17:20

## 2025-01-04 RX ADMIN — Medication 17 GRAM(S): at 11:31

## 2025-01-04 RX ADMIN — Medication 500 MILLIGRAM(S): at 11:31

## 2025-01-04 RX ADMIN — Medication 1 TABLET(S): at 11:36

## 2025-01-04 RX ADMIN — LEVETIRACETAM 2000 MILLIGRAM(S): 100 SOLUTION ORAL at 05:26

## 2025-01-04 RX ADMIN — PIPERACILLIN AND TAZOBACTAM 25 GRAM(S): 3; .375 INJECTION, POWDER, LYOPHILIZED, FOR SOLUTION INTRAVENOUS at 07:13

## 2025-01-04 RX ADMIN — MIDODRINE HYDROCHLORIDE 5 MILLIGRAM(S): 5 TABLET ORAL at 17:28

## 2025-01-04 RX ADMIN — IPRATROPIUM BROMIDE AND ALBUTEROL SULFATE 3 MILLILITER(S): .5; 2.5 SOLUTION RESPIRATORY (INHALATION) at 14:37

## 2025-01-04 RX ADMIN — POLYSORBATE 80 1 DROP(S): 100 SOLUTION/ DROPS OPHTHALMIC at 11:36

## 2025-01-04 RX ADMIN — Medication 10 MILLIGRAM(S): at 16:28

## 2025-01-04 RX ADMIN — ATORVASTATIN CALCIUM 80 MILLIGRAM(S): 40 TABLET, FILM COATED ORAL at 22:09

## 2025-01-04 RX ADMIN — Medication 5 MILLIGRAM(S): at 22:09

## 2025-01-04 RX ADMIN — MIDODRINE HYDROCHLORIDE 5 MILLIGRAM(S): 5 TABLET ORAL at 07:01

## 2025-01-04 RX ADMIN — ACETAMINOPHEN 1000 MILLIGRAM(S): 80 SOLUTION/ DROPS ORAL at 11:45

## 2025-01-04 RX ADMIN — ACETAMINOPHEN 400 MILLIGRAM(S): 80 SOLUTION/ DROPS ORAL at 11:30

## 2025-01-04 RX ADMIN — MEROPENEM 100 MILLIGRAM(S): 1 INJECTION, POWDER, FOR SOLUTION INTRAVENOUS at 22:09

## 2025-01-04 RX ADMIN — CHLORHEXIDINE GLUCONATE 1 APPLICATION(S): 1.2 RINSE ORAL at 11:37

## 2025-01-04 RX ADMIN — LEVETIRACETAM 2000 MILLIGRAM(S): 100 SOLUTION ORAL at 17:27

## 2025-01-04 RX ADMIN — VALPROIC ACID 1250 MILLIGRAM(S): 250 SOLUTION ORAL at 22:13

## 2025-01-04 RX ADMIN — LACOSAMIDE 200 MILLIGRAM(S): 100 TABLET, FILM COATED ORAL at 17:20

## 2025-01-04 RX ADMIN — VALPROIC ACID 1250 MILLIGRAM(S): 250 SOLUTION ORAL at 05:27

## 2025-01-04 RX ADMIN — IPRATROPIUM BROMIDE AND ALBUTEROL SULFATE 3 MILLILITER(S): .5; 2.5 SOLUTION RESPIRATORY (INHALATION) at 08:10

## 2025-01-04 RX ADMIN — Medication 1 MILLIGRAM(S): at 11:36

## 2025-01-04 RX ADMIN — LACOSAMIDE 200 MILLIGRAM(S): 100 TABLET, FILM COATED ORAL at 05:38

## 2025-01-04 NOTE — PROGRESS NOTE ADULT - SUBJECTIVE AND OBJECTIVE BOX
DANNIE SHIRLEY    SCU progress note    INTERVAL HPI/OVERNIGHT EVENTS: ***    DNR [ ]   DNI  [  ]    Covid - 19 PCR:     The 4Ms    What Matters Most: see GOC  Age appropriate Medications/Screen for High Risk Medication: Yes  Mentation: see CAM below  Mobility: defer to physical exam    The Confusion Assessment Method (CAM) Diagnostic Algorithm     1: Acute Onset or Fluctuating Course  - Is there evidence of an acute change in mental status from the patient’s baseline? Did the (abnormal) behavior  fluctuate during the day, that is, tend to come and go, or increase and decrease in severity?  [ ] YES [ ] NO     2: Inattention  - Did the patient have difficulty focusing attention, being easily distractible, or having difficulty keeping track of what was being said?  [ ] YES [ ] NO     3: Disorganized thinking  -Was the patient’s thinking disorganized or incoherent, such as rambling or irrelevant conversation, unclear or illogical flow of ideas, or unpredictable switching from subject to subject?  [ ] YES [ ] NO    4: Altered Level of consciousness?  [ ] YES [ ] NO    The diagnosis of delirium by CAM requires the presence of features 1 and 2 and either 3 or 4.    PRESSORS: [ ] YES [ ] NO  piperacillin/tazobactam IVPB.. 3.375 Gram(s) IV Intermittent every 8 hours    Cardiovascular:  Heart Failure  Acute   Acute on Chronic  Chronic       midodrine. 5 milliGRAM(s) Oral three times a day    Pulmonary:  albuterol/ipratropium for Nebulization 3 milliLiter(s) Nebulizer every 6 hours    Hematalogic:    Other:  artificial  tears Solution 1 Drop(s) Both EYES daily  ascorbic acid 500 milliGRAM(s) Oral daily  atorvastatin 80 milliGRAM(s) Oral at bedtime  bisacodyl Suppository 10 milliGRAM(s) Rectal daily PRN  chlorhexidine 2% Cloths 1 Application(s) Topical daily  folic acid 1 milliGRAM(s) Oral daily  HYDROmorphone  Injectable 0.2 milliGRAM(s) IV Push every 4 hours PRN  HYDROmorphone  Injectable 0.5 milliGRAM(s) IV Push every 4 hours PRN  lacosamide Solution 200 milliGRAM(s) Oral two times a day  levETIRAcetam  Solution 2000 milliGRAM(s) Enteral Tube two times a day  melatonin 5 milliGRAM(s) Oral at bedtime  multivitamin 1 Tablet(s) Oral daily  naloxone Injectable 0.4 milliGRAM(s) IV Push once  ondansetron Injectable 4 milliGRAM(s) IV Push every 8 hours PRN  pantoprazole  Injectable 40 milliGRAM(s) IV Push daily  polyethylene glycol 3350 17 Gram(s) Oral daily  senna Syrup 10 milliLiter(s) Oral at bedtime  valproic  acid Syrup 1250 milliGRAM(s) Oral three times a day    albuterol/ipratropium for Nebulization 3 milliLiter(s) Nebulizer every 6 hours  artificial  tears Solution 1 Drop(s) Both EYES daily  ascorbic acid 500 milliGRAM(s) Oral daily  atorvastatin 80 milliGRAM(s) Oral at bedtime  bisacodyl Suppository 10 milliGRAM(s) Rectal daily PRN  chlorhexidine 2% Cloths 1 Application(s) Topical daily  folic acid 1 milliGRAM(s) Oral daily  HYDROmorphone  Injectable 0.2 milliGRAM(s) IV Push every 4 hours PRN  HYDROmorphone  Injectable 0.5 milliGRAM(s) IV Push every 4 hours PRN  lacosamide Solution 200 milliGRAM(s) Oral two times a day  levETIRAcetam  Solution 2000 milliGRAM(s) Enteral Tube two times a day  melatonin 5 milliGRAM(s) Oral at bedtime  midodrine. 5 milliGRAM(s) Oral three times a day  multivitamin 1 Tablet(s) Oral daily  naloxone Injectable 0.4 milliGRAM(s) IV Push once  ondansetron Injectable 4 milliGRAM(s) IV Push every 8 hours PRN  pantoprazole  Injectable 40 milliGRAM(s) IV Push daily  piperacillin/tazobactam IVPB.. 3.375 Gram(s) IV Intermittent every 8 hours  polyethylene glycol 3350 17 Gram(s) Oral daily  senna Syrup 10 milliLiter(s) Oral at bedtime  valproic  acid Syrup 1250 milliGRAM(s) Oral three times a day    Drug Dosing Weight  Height (cm): 170.2 (26 Aug 2024 12:06)  Weight (kg): 90.7 (31 Dec 2024 18:00)  BMI (kg/m2): 31.3 (31 Dec 2024 18:00)  BSA (m2): 2.02 (31 Dec 2024 18:00)    CENTRAL LINE: [ ] YES [ ] NO  LOCATION:   DATE INSERTED:  REMOVE: [ ] YES [ ] NO  EXPLAIN:    KAUSHIK: [ ] YES [ ] NO    DATE INSERTED:  REMOVE:  [ ] YES [ ] NO  EXPLAIN:    PAST MEDICAL & SURGICAL HISTORY:  Heart failure, unspecified HF chronicity, unspecified heart failure type      ETOH abuse  h/o etoh abuse now moderate drinker      Artificial pacemaker                  01-03 @ 07:01  -  01-04 @ 07:00  --------------------------------------------------------  IN: 0 mL / OUT: 675 mL / NET: -675 mL            PHYSICAL EXAM:    GENERAL: NAD, well-groomed, well-developed  HEAD:  Atraumatic, Normocephalic  EYES: EOMI, PERRLA, conjunctiva and sclera clear  ENMT: No tonsillar erythema, exudates, or enlargement; Moist mucous membranes, Good dentition, No lesions  NECK: Supple, No JVD, Normal thyroid  NERVOUS SYSTEM:  Alert & Oriented X3, Good concentration; Motor Strength 5/5 B/L upper and lower extremities; DTRs 2+ intact and symmetric  CHEST/LUNG: Clear to percussion bilaterally; No rales, rhonchi, wheezing, or rubs  HEART: Regular rate and rhythm; No murmurs, rubs, or gallops  ABDOMEN: Soft, Nontender, Nondistended; Bowel sounds present  EXTREMITIES:  2+ Peripheral Pulses, No clubbing, cyanosis, or edema  LYMPH: No lymphadenopathy noted  SKIN: No rashes or lesions      LABS:  CBC Full  -  ( 03 Jan 2025 18:30 )  WBC Count : 7.97 K/uL  RBC Count : 2.65 M/uL  Hemoglobin : 8.8 g/dL  Hematocrit : 25.4 %  Platelet Count - Automated : 221 K/uL  Mean Cell Volume : 95.8 fl  Mean Cell Hemoglobin : 33.2 pg  Mean Cell Hemoglobin Concentration : 34.6 g/dL  Auto Neutrophil # : 4.45 K/uL  Auto Lymphocyte # : 2.29 K/uL  Auto Monocyte # : 1.04 K/uL  Auto Eosinophil # : 0.15 K/uL  Auto Basophil # : 0.02 K/uL  Auto Neutrophil % : 55.8 %  Auto Lymphocyte % : 28.7 %  Auto Monocyte % : 13.0 %  Auto Eosinophil % : 1.9 %  Auto Basophil % : 0.3 %    01-03    143  |  108  |  13  ----------------------------<  125[H]  3.9   |  30  |  0.70    Ca    8.3[L]      03 Jan 2025 18:30  Phos  3.1     01-03  Mg     2.1     01-03    TPro  5.8[L]  /  Alb  1.8[L]  /  TBili  0.4  /  DBili  x   /  AST  23  /  ALT  15  /  AlkPhos  60  01-03    PT/INR - ( 03 Jan 2025 18:30 )   PT: 19.9 sec;   INR: 1.72 ratio         PTT - ( 03 Jan 2025 18:30 )  PTT:35.9 sec  Urinalysis Basic - ( 03 Jan 2025 18:30 )    Color: x / Appearance: x / SG: x / pH: x  Gluc: 125 mg/dL / Ketone: x  / Bili: x / Urobili: x   Blood: x / Protein: x / Nitrite: x   Leuk Esterase: x / RBC: x / WBC x   Sq Epi: x / Non Sq Epi: x / Bacteria: x            [  ]  DVT Prophylaxis  [  ]  Nutrition, Brand, Rate         Goal Rate        Abnormal Nutritional Status -  Malnutrition   Cachexia      Morbid Obesity BMI >/=40    RADIOLOGY & ADDITIONAL STUDIES:  ***    Goals of Care Discussion with Family/Proxy/Other   - see note from/family meeting set up for...     DANNIE SHIRLEY    SCU progress note    INTERVAL HPI/OVERNIGHT EVENTS: No acute events.     FULL CODE    Covid - 19 PCR: Negative    The 4Ms    What Matters Most: see GOC  Age appropriate Medications/Screen for High Risk Medication: Yes  Mentation: see CAM below  Mobility: defer to physical exam    The Confusion Assessment Method (CAM) Diagnostic Algorithm     1: Acute Onset or Fluctuating Course  - Is there evidence of an acute change in mental status from the patient’s baseline? Did the (abnormal) behavior  fluctuate during the day, that is, tend to come and go, or increase and decrease in severity?  [ ] YES [x ] NO     2: Inattention  - Did the patient have difficulty focusing attention, being easily distractible, or having difficulty keeping track of what was being said?  [ ] YES [x ] NO     3: Disorganized thinking  -Was the patient’s thinking disorganized or incoherent, such as rambling or irrelevant conversation, unclear or illogical flow of ideas, or unpredictable switching from subject to subject?  [ ] YES [ ] NO Unable to assess    4: Altered Level of consciousness?  [ ] YES [ x] NO    The diagnosis of delirium by CAM requires the presence of features 1 and 2 and either 3 or 4.    PRESSORS: [ ] YES [x ] NO  piperacillin/tazobactam IVPB.. 3.375 Gram(s) IV Intermittent every 8 hours    Cardiovascular:  Heart Failure  Acute   Acute on Chronic  Chronic       midodrine. 5 milliGRAM(s) Oral three times a day    Pulmonary:  albuterol/ipratropium for Nebulization 3 milliLiter(s) Nebulizer every 6 hours    Hematalogic:    Other:  artificial  tears Solution 1 Drop(s) Both EYES daily  ascorbic acid 500 milliGRAM(s) Oral daily  atorvastatin 80 milliGRAM(s) Oral at bedtime  bisacodyl Suppository 10 milliGRAM(s) Rectal daily PRN  chlorhexidine 2% Cloths 1 Application(s) Topical daily  folic acid 1 milliGRAM(s) Oral daily  HYDROmorphone  Injectable 0.2 milliGRAM(s) IV Push every 4 hours PRN  HYDROmorphone  Injectable 0.5 milliGRAM(s) IV Push every 4 hours PRN  lacosamide Solution 200 milliGRAM(s) Oral two times a day  levETIRAcetam  Solution 2000 milliGRAM(s) Enteral Tube two times a day  melatonin 5 milliGRAM(s) Oral at bedtime  multivitamin 1 Tablet(s) Oral daily  naloxone Injectable 0.4 milliGRAM(s) IV Push once  ondansetron Injectable 4 milliGRAM(s) IV Push every 8 hours PRN  pantoprazole  Injectable 40 milliGRAM(s) IV Push daily  polyethylene glycol 3350 17 Gram(s) Oral daily  senna Syrup 10 milliLiter(s) Oral at bedtime  valproic  acid Syrup 1250 milliGRAM(s) Oral three times a day    albuterol/ipratropium for Nebulization 3 milliLiter(s) Nebulizer every 6 hours  artificial  tears Solution 1 Drop(s) Both EYES daily  ascorbic acid 500 milliGRAM(s) Oral daily  atorvastatin 80 milliGRAM(s) Oral at bedtime  bisacodyl Suppository 10 milliGRAM(s) Rectal daily PRN  chlorhexidine 2% Cloths 1 Application(s) Topical daily  folic acid 1 milliGRAM(s) Oral daily  HYDROmorphone  Injectable 0.2 milliGRAM(s) IV Push every 4 hours PRN  HYDROmorphone  Injectable 0.5 milliGRAM(s) IV Push every 4 hours PRN  lacosamide Solution 200 milliGRAM(s) Oral two times a day  levETIRAcetam  Solution 2000 milliGRAM(s) Enteral Tube two times a day  melatonin 5 milliGRAM(s) Oral at bedtime  midodrine. 5 milliGRAM(s) Oral three times a day  multivitamin 1 Tablet(s) Oral daily  naloxone Injectable 0.4 milliGRAM(s) IV Push once  ondansetron Injectable 4 milliGRAM(s) IV Push every 8 hours PRN  pantoprazole  Injectable 40 milliGRAM(s) IV Push daily  piperacillin/tazobactam IVPB.. 3.375 Gram(s) IV Intermittent every 8 hours  polyethylene glycol 3350 17 Gram(s) Oral daily  senna Syrup 10 milliLiter(s) Oral at bedtime  valproic  acid Syrup 1250 milliGRAM(s) Oral three times a day    Drug Dosing Weight  Height (cm): 170.2 (26 Aug 2024 12:06)  Weight (kg): 90.7 (31 Dec 2024 18:00)  BMI (kg/m2): 31.3 (31 Dec 2024 18:00)  BSA (m2): 2.02 (31 Dec 2024 18:00)    CENTRAL LINE: [ ] YES [ x] NO  LOCATION:   DATE INSERTED:  REMOVE: [ ] YES [ ] NO  EXPLAIN:    FAULKNER: [x ] YES [ ] NO    DATE INSERTED: 01/03/2025  REMOVE:  [ ] YES [ ] NO  EXPLAIN:    PAST MEDICAL & SURGICAL HISTORY:  Heart failure, unspecified HF chronicity, unspecified heart failure type      ETOH abuse  h/o etoh abuse now moderate drinker      Artificial pacemaker      01-03 @ 07:01  -  01-04 @ 07:00  --------------------------------------------------------  IN: 0 mL / OUT: 675 mL / NET: -675 mL    ROS: + pain to right hip.     PHYSICAL EXAM:    GENERAL: NAD, well-groomed, well-developed  HEAD:  Hx of Crani. Scalp asymmetry. Crani on left side.   EYES: PERRLA, conjunctiva and sclera clear  ENMT: Moist mucous membranes, Good dentition, No lesions  NECK: Tracheostomy   NERVOUS SYSTEM:  Alert & Oriented X self, Good concentration; R hemiparesis. Follows simple commands. Moves LUE and LLE spontaneously.   CHEST/LUNG: Diminished RLL.  No rales, rhonchi, wheezing, or rubs. Green thin sputum moderate amount from inline. On trach collar 30% FiO2.   HEART: Regular rate and rhythm; No murmurs, rubs, or gallops  ABDOMEN: Soft, Nontender, Nondistended; Bowel sounds present. PEG in place  : Faulkner in place.   EXTREMITIES:  2+ Peripheral Pulses, No clubbing, cyanosis, or edema  SKIN: Surgical incision right hip. Dressing C/D/I.       LABS:  CBC Full  -  ( 03 Jan 2025 18:30 )  WBC Count : 7.97 K/uL  RBC Count : 2.65 M/uL  Hemoglobin : 8.8 g/dL  Hematocrit : 25.4 %  Platelet Count - Automated : 221 K/uL  Mean Cell Volume : 95.8 fl  Mean Cell Hemoglobin : 33.2 pg  Mean Cell Hemoglobin Concentration : 34.6 g/dL  Auto Neutrophil # : 4.45 K/uL  Auto Lymphocyte # : 2.29 K/uL  Auto Monocyte # : 1.04 K/uL  Auto Eosinophil # : 0.15 K/uL  Auto Basophil # : 0.02 K/uL  Auto Neutrophil % : 55.8 %  Auto Lymphocyte % : 28.7 %  Auto Monocyte % : 13.0 %  Auto Eosinophil % : 1.9 %  Auto Basophil % : 0.3 %    01-03    143  |  108  |  13  ----------------------------<  125[H]  3.9   |  30  |  0.70    Ca    8.3[L]      03 Jan 2025 18:30  Phos  3.1     01-03  Mg     2.1     01-03    TPro  5.8[L]  /  Alb  1.8[L]  /  TBili  0.4  /  DBili  x   /  AST  23  /  ALT  15  /  AlkPhos  60  01-03    PT/INR - ( 03 Jan 2025 18:30 )   PT: 19.9 sec;   INR: 1.72 ratio         PTT - ( 03 Jan 2025 18:30 )  PTT:35.9 sec  Urinalysis Basic - ( 03 Jan 2025 18:30 )    Color: x / Appearance: x / SG: x / pH: x  Gluc: 125 mg/dL / Ketone: x  / Bili: x / Urobili: x   Blood: x / Protein: x / Nitrite: x   Leuk Esterase: x / RBC: x / WBC x   Sq Epi: x / Non Sq Epi: x / Bacteria: x    [x  ]  DVT Prophylaxis    RADIOLOGY & ADDITIONAL STUDIES:   < from: Xray Chest 1 View AP/PA (01.03.25 @ 19:20) >  ACC: [id number] EXAM:  XR CHEST AP OR PA 1V   ORDERED BY: LAINA MELTON     PROCEDURE DATE:  01/03/2025          INTERPRETATION:  EXAMINATION: XR CHEST    CLINICAL INDICATION: Sepsis    TECHNIQUE: Single frontal view of the chest was obtained.    COMPARISON: Chest x-ray 1/1/2025.    FINDINGS:    Tracheostomy.    Left-sided AICD.    The heart is normal in size.    No focal consolidation. No pneumothorax. No pleural effusion.    No acute osseous abnormalities.    IMPRESSION:  No focal consolidation.    --- End of Report ---         SHANTE LIVE DO; Attending Radiologist  This document has been electronically signed. Jan 4 2025  3:27PM    < end of copied text >

## 2025-01-04 NOTE — PROGRESS NOTE ADULT - PROBLEM SELECTOR PLAN 4
- F/U tracheostomy care guidelines.   - C/w mucolytic agents, and pulmonary toilet, suctioning. - Code sepsis called on 1/3 in the setting of hypotension, tachycardia and fever 101.4  - Sepsis w/u in progress  - Sputum cx 1/1 + for Pseudomonas   - Bcx 1/3 + for Gram positive for cocci in pairs and chains pending sensitivity  - Zosyn switched to Meropenem   - CXR noted.   - ID Dr. De La Torre consulted on 1/3.   - S/p 1.5 L IVF bolus on 1/3.   - Lactate and procal wnl  - f/u cultures and ID reccs.

## 2025-01-04 NOTE — PROGRESS NOTE ADULT - PROBLEM SELECTOR PLAN 7
-Restarted Eliquis in setting of possible surgical intervention. - R hemiparesis   - C/w atorvastatin.  - Not on aspirin as per med rec.

## 2025-01-04 NOTE — PROGRESS NOTE ADULT - NS ATTEND AMEND GEN_ALL_CORE FT
S/p R Hip IM Nail  Code sepsis yesterday   Blood cultures positive , awaiting sensitivities  Cont. broad spectrum antibiotics   Cardiology and EP recs noted  Tracheostomy management per protocol  Not a candidate for decannulation   Low BP for now, will hold antihypertensives  Ok to start AC per Orthopedics  Monitor for signs of bleeding  Pain control  PT evaluation S/p R Hip IM Nail  Code sepsis yesterday   Blood cultures positive , awaiting sensitivities  Cont. broad spectrum antibiotics   Cardiology and EP recs noted  Tracheostomy management per protocol  Not a candidate for decannulation   Low BP for now, will hold antihypertensives  Ok to start AC per Orthopedics  Monitor for signs of bleeding  Pain control  Obtain abdominal xray  PT evaluation

## 2025-01-04 NOTE — PROGRESS NOTE ADULT - ASSESSMENT
Patient is a 50 y/o M pt with PMHx CVA (3/30/2022 with residual aphasia and right sided residual weakness), s/p tracheostomy and peg placement, seizures, HTN, HLD, CHF w/ ICD (initially rEF 30%->55-60% on 03/2021), MDD, Recurrent PE, Afib (on Eliquis), obesity s/p bariatric intervention presented to the ED after a fall 3 days prior to admission, and outpatient xray showed intertrochanteric fracture of right femur. He also had fevers outpatient, and CXR concerning for left lower lobe infiltrate Patient is being admitted to  for management of right intertrochanteric femur fracture and pneumonia.     1/2/25: Patient anemia possible anemia of chronic disease, PRBC 1 unit transfused for OR, recommended by Ortho Hemoglobin above 10, however will transfuse one unit 1/2/25 as patient with no active bleeding noted. Cardiac cleared for surgery. ICD no detection, however EP recommends no need to replace as ICD placed initially due to previous low EF. TTE resulting LBBB, LVSF normal EF 59%. Patient planned for OR tonight.   01/03/2025: Restarted Eliquis. S/p R hip IM nailing POD #1. PT reccs MARKLEL. WBAT. Dispo: Placement back to [place]. Will monitor on AC for 24 hrs to ensure H&H remains stable and monitor surgical wound.   01/04:  Patient is a 50 y/o M pt with PMHx CVA (3/30/2022 with residual aphasia and right sided residual weakness), s/p tracheostomy and peg placement, seizures, HTN, HLD, CHF w/ ICD (initially rEF 30%->55-60% on 03/2021), MDD, Recurrent PE, Afib (on Eliquis), obesity s/p bariatric intervention presented to the ED after a fall 3 days prior to admission, and outpatient xray showed intertrochanteric fracture of right femur. He also had fevers outpatient, and CXR concerning for left lower lobe infiltrate Patient is being admitted to  for management of right intertrochanteric femur fracture and pneumonia.     1/2/25: Patient anemia possible anemia of chronic disease, PRBC 1 unit transfused for OR, recommended by Ortho Hemoglobin above 10, however will transfuse one unit 1/2/25 as patient with no active bleeding noted. Cardiac cleared for surgery. ICD no detection, however EP recommends no need to replace as ICD placed initially due to previous low EF. TTE resulting LBBB, LVSF normal EF 59%. Patient planned for OR tonight.   01/03/2025: Restarted Eliquis. S/p R hip IM nailing POD #1. PT reccs MARKELL. WBAT. Dispo: Placement back to [facility]. Will monitor on AC for 24 hrs to ensure H&H remains stable and monitor surgical wound.   01/04: S/p Code sepsis on 1/3 in the setting of fever, tachycardia and hypotension. CXR shows no consolidation.  Bcx 1/3 + for Gram positive for cocci in pairs and chains, pending sensitivity. Soft BP. Sputum cx from 1/1 + pseudomonas aeruginosa. Zosyn switched to Meropenem. Ucx sent on 1/3. Afebrile. No leukocytosis. Lactate and procal wnl. ID Dr. De La Torre consulted on 1/3. S/p R hip IM nailing POD #2.  Ortho reccs appreciated. AC resumed. KUB to r/o SBO vs Ileus. Bowel regimen.

## 2025-01-04 NOTE — PROGRESS NOTE ADULT - SUBJECTIVE AND OBJECTIVE BOX
DANNIE [id number]  49yMale    Diagnosis: 49yMale S/p Right Hip IM Nailing POD#2  Patient was seen and evaluated at bedside.   Patient with complaints of pain to right hip, unchanged from yesterday.     Vital Signs Last 24 Hrs  T(C): 36.9 (04 Jan 2025 05:29), Max: 38.4 (03 Jan 2025 17:28)  T(F): 98.4 (04 Jan 2025 05:29), Max: 101.2 (03 Jan 2025 17:28)  HR: 86 (04 Jan 2025 11:25) (71 - 102)  BP: 103/64 (04 Jan 2025 11:25) (84/50 - 104/61)  BP(mean): 72 (04 Jan 2025 11:25) (57 - 72)  RR: 18 (04 Jan 2025 09:50) (18 - 22)  SpO2: 98% (04 Jan 2025 09:50) (90% - 98%)    Parameters below as of 04 Jan 2025 09:50  Patient On (Oxygen Delivery Method): tracheostomy collar  O2 Flow (L/min): 8  O2 Concentration (%): 28      Physical Exam:  General:  NAD, resting comfortably in bed.  Right Hip:  Dressing is C/D/I. Skin is pink and warm.  No erythema. SILT.  Wound with no drainage, healing well.   Lower extremity:  No calf tenderness, calves are soft. 2+pulses. NVI. 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 Pt denies any sensation to the RLE as well.                                                9.2    7.62  )-----------( 254      ( 04 Jan 2025 07:20 )             26.7     01-04    144  |  107  |  10  ----------------------------<  136[H]  3.6   |  31  |  0.68    Ca    8.3[L]      04 Jan 2025 07:20  Phos  3.0     01-04  Mg     2.1     01-04    TPro  6.1  /  Alb  2.0[L]  /  TBili  0.5  /  DBili  x   /  AST  24  /  ALT  16  /  AlkPhos  62  01-04        Impression:  49yMale S/p Right Hip IM Nailing POD#2  Plan:  -  Pain management  -  DVT prophylaxis with venodynes    > Full anticoagulation can be restarted at medical teams discretion - no orthopedic contraindication  -  Daily Physical Therapy:  WBAT of the Right lower extremity with appropriate assistive device   -  Discharge planning  -  Continue with antibiotics as per medicine   -  Case d/w DR. Rodriguez

## 2025-01-04 NOTE — PROGRESS NOTE ADULT - PROBLEM SELECTOR PLAN 9
- D/c Lisinopril in the setting of hypotension  - Started on Midodrine   - Restart Lisinopril when normotensive. JenC/W Eliquis

## 2025-01-04 NOTE — PROGRESS NOTE ADULT - PROBLEM SELECTOR PLAN 1
- P/w with pain from fall on 12/28.   - Outpatient x-ray showed right intertrochanteric femur fracture.   - X-ray right hip: Minimally displaced right intertrochanteric femoral fracture  - continue pain regimen.   - S/p R hip IM nailing POD #1.  - Ortho following   - WBAT - P/w with pain from fall on 12/28.   - Outpatient x-ray showed right intertrochanteric femur fracture.   - X-ray right hip: Minimally displaced right intertrochanteric femoral fracture  - continue pain regimen.   - S/p R hip IM nailing POD #2.   - Ortho following   - WBAT  - AC resumed.  - Hydromorphone PRN  - Bowel regimen

## 2025-01-04 NOTE — PROGRESS NOTE ADULT - PROBLEM SELECTOR PLAN 6
- Restarted  Eliquis in setting of possible surgical intervention.   - C/w metoprolol with parameters. - F/U tracheostomy care guidelines.   - C/w mucolytic agents, and pulmonary toilet, suctioning.

## 2025-01-04 NOTE — PROGRESS NOTE ADULT - PROBLEM SELECTOR PLAN 2
- CXR - left lower lobe infiltrate worse compared to previous.   - S/p ceftriaxone and azithro.   -  sputum culture 1/1/2025: + Pseudomonas aeruginosa  - Legionella negative.   - O2 supplement - CXR - left lower lobe infiltrate worse compared to previous.   - S/p ceftriaxone and azithro.   -  sputum culture 1/1/2025: + Pseudomonas aeruginosa.   - Zosyn d/c and started on Meropenem   - Legionella negative.   - O2 supplement via trach collar   - ID Dr. De La Torre consulted on 1/3.  - Aspiration precautions

## 2025-01-04 NOTE — PROGRESS NOTE ADULT - PROBLEM SELECTOR PLAN 5
- C/w atorvastatin.  - Not on aspirin as per med rec. -KUB to r/o SBO  - Bowel regimen   - Stool count.

## 2025-01-04 NOTE — PROGRESS NOTE ADULT - PROBLEM SELECTOR PLAN 12
- SCDs.      Discharge planning:   - PT reccs MARKELL  - Restarted AC  - Ortho following (Discussed dispo planning and decision made to monitor for another 24 hours post AC initiation to ensure H&H remains stable and surgical incision has no oozing.   - Hypotension likely orthostatic vs hypovolemia: Started on Midodrine.  - Gentle hydration   - CM to follow on placement back to [place] [facility]. - C/w atorvastatin.    # DVT ppx  - Eliquis     # Discharge planning:   - From [facility]  - Code sepsis on 1/3  - F/u cultures  - C/w Meropenem   - F/u ID reccs  - S/p R hip IM nailing POD #2. Ortho followin. WBAT  - PT reccs MARKELL

## 2025-01-04 NOTE — PROGRESS NOTE ADULT - PROBLEM SELECTOR PLAN 11
- Not in exacerbation.   - C/w metoprolol. - Hx of HTN   - D/c Lisinopril on 1/3 in the setting of hypotension  - Started on Midodrine on 1/3  - Restart Lisinopril when normotensive.

## 2025-01-04 NOTE — PROGRESS NOTE ADULT - PROBLEM SELECTOR PLAN 3
-Soft BP with drop in SBP during PT eval  -Likely orthostatic hypotension vs hypovolemia   -Start Midodrene 5mg TID  -Monitor BP per unit protocol   -Repeat CBC at 20:00   -H&H stable  - Afebrile, no leukocytosis -Soft BP with drop in SBP during PT eval on 1/3.   - Hypotension accompanied by spike in temp on 1/3  - S/p Code sepsis on 1/3. Sepsis w/u in progress  - Zosyn switched to Meropenem in the setting of sputum cx + for Pseudomonas.   -C/w  Midodrene 5mg TID  -Monitor BP per unit protocol   -H&H stable  - Today 1/4 Afebrile, no leukocytosis. Procal and Lactate wnl.   - ID Dr. De La Torre consulted on 1/3.

## 2025-01-05 LAB
-  AZTREONAM: SIGNIFICANT CHANGE UP
-  CEFEPIME: SIGNIFICANT CHANGE UP
-  CEFTAZIDIME: SIGNIFICANT CHANGE UP
-  CIPROFLOXACIN: SIGNIFICANT CHANGE UP
-  IMIPENEM: SIGNIFICANT CHANGE UP
-  LEVOFLOXACIN: SIGNIFICANT CHANGE UP
-  MEROPENEM: SIGNIFICANT CHANGE UP
-  PIPERACILLIN/TAZOBACTAM: SIGNIFICANT CHANGE UP
ALBUMIN SERPL ELPH-MCNC: 1.9 G/DL — LOW (ref 3.5–5)
ALP SERPL-CCNC: 61 U/L — SIGNIFICANT CHANGE UP (ref 40–120)
ALT FLD-CCNC: 18 U/L DA — SIGNIFICANT CHANGE UP (ref 10–60)
ANION GAP SERPL CALC-SCNC: 3 MMOL/L — LOW (ref 5–17)
AST SERPL-CCNC: 30 U/L — SIGNIFICANT CHANGE UP (ref 10–40)
BILIRUB SERPL-MCNC: 0.4 MG/DL — SIGNIFICANT CHANGE UP (ref 0.2–1.2)
BUN SERPL-MCNC: 13 MG/DL — SIGNIFICANT CHANGE UP (ref 7–18)
CALCIUM SERPL-MCNC: 8.5 MG/DL — SIGNIFICANT CHANGE UP (ref 8.4–10.5)
CHLORIDE SERPL-SCNC: 106 MMOL/L — SIGNIFICANT CHANGE UP (ref 96–108)
CO2 SERPL-SCNC: 33 MMOL/L — HIGH (ref 22–31)
CREAT SERPL-MCNC: 0.54 MG/DL — SIGNIFICANT CHANGE UP (ref 0.5–1.3)
CULTURE RESULTS: ABNORMAL
EGFR: 121 ML/MIN/1.73M2 — SIGNIFICANT CHANGE UP
GLUCOSE BLDC GLUCOMTR-MCNC: 104 MG/DL — HIGH (ref 70–99)
GLUCOSE BLDC GLUCOMTR-MCNC: 125 MG/DL — HIGH (ref 70–99)
GLUCOSE SERPL-MCNC: 132 MG/DL — HIGH (ref 70–99)
HCT VFR BLD CALC: 24.5 % — LOW (ref 39–50)
HGB BLD-MCNC: 8.5 G/DL — LOW (ref 13–17)
MAGNESIUM SERPL-MCNC: 2.3 MG/DL — SIGNIFICANT CHANGE UP (ref 1.6–2.6)
MCHC RBC-ENTMCNC: 34.1 PG — HIGH (ref 27–34)
MCHC RBC-ENTMCNC: 34.7 G/DL — SIGNIFICANT CHANGE UP (ref 32–36)
MCV RBC AUTO: 98.4 FL — SIGNIFICANT CHANGE UP (ref 80–100)
METHOD TYPE: SIGNIFICANT CHANGE UP
NRBC # BLD: 0 /100 WBCS — SIGNIFICANT CHANGE UP (ref 0–0)
ORGANISM # SPEC MICROSCOPIC CNT: ABNORMAL
ORGANISM # SPEC MICROSCOPIC CNT: ABNORMAL
PHOSPHATE SERPL-MCNC: 3 MG/DL — SIGNIFICANT CHANGE UP (ref 2.5–4.5)
PLATELET # BLD AUTO: 259 K/UL — SIGNIFICANT CHANGE UP (ref 150–400)
POTASSIUM SERPL-MCNC: 3.7 MMOL/L — SIGNIFICANT CHANGE UP (ref 3.5–5.3)
POTASSIUM SERPL-SCNC: 3.7 MMOL/L — SIGNIFICANT CHANGE UP (ref 3.5–5.3)
PROT SERPL-MCNC: 6 G/DL — SIGNIFICANT CHANGE UP (ref 6–8.3)
RBC # BLD: 2.49 M/UL — LOW (ref 4.2–5.8)
RBC # FLD: 15.2 % — HIGH (ref 10.3–14.5)
SODIUM SERPL-SCNC: 142 MMOL/L — SIGNIFICANT CHANGE UP (ref 135–145)
SPECIMEN SOURCE: SIGNIFICANT CHANGE UP
WBC # BLD: 7.09 K/UL — SIGNIFICANT CHANGE UP (ref 3.8–10.5)
WBC # FLD AUTO: 7.09 K/UL — SIGNIFICANT CHANGE UP (ref 3.8–10.5)

## 2025-01-05 RX ORDER — ACETAMINOPHEN 80 MG/.8ML
1000 SOLUTION/ DROPS ORAL ONCE
Refills: 0 | Status: COMPLETED | OUTPATIENT
Start: 2025-01-05 | End: 2025-01-06

## 2025-01-05 RX ORDER — ACETAMINOPHEN 80 MG/.8ML
1000 SOLUTION/ DROPS ORAL ONCE
Refills: 0 | Status: COMPLETED | OUTPATIENT
Start: 2025-01-05 | End: 2025-01-05

## 2025-01-05 RX ADMIN — POLYSORBATE 80 1 DROP(S): 100 SOLUTION/ DROPS OPHTHALMIC at 11:36

## 2025-01-05 RX ADMIN — PANTOPRAZOLE 40 MILLIGRAM(S): 40 TABLET, DELAYED RELEASE ORAL at 11:34

## 2025-01-05 RX ADMIN — LEVETIRACETAM 2000 MILLIGRAM(S): 100 SOLUTION ORAL at 17:18

## 2025-01-05 RX ADMIN — LACOSAMIDE 200 MILLIGRAM(S): 100 TABLET, FILM COATED ORAL at 17:16

## 2025-01-05 RX ADMIN — LACOSAMIDE 200 MILLIGRAM(S): 100 TABLET, FILM COATED ORAL at 05:25

## 2025-01-05 RX ADMIN — MIDODRINE HYDROCHLORIDE 5 MILLIGRAM(S): 5 TABLET ORAL at 11:34

## 2025-01-05 RX ADMIN — LEVETIRACETAM 2000 MILLIGRAM(S): 100 SOLUTION ORAL at 05:26

## 2025-01-05 RX ADMIN — IPRATROPIUM BROMIDE AND ALBUTEROL SULFATE 3 MILLILITER(S): .5; 2.5 SOLUTION RESPIRATORY (INHALATION) at 03:31

## 2025-01-05 RX ADMIN — MIDODRINE HYDROCHLORIDE 5 MILLIGRAM(S): 5 TABLET ORAL at 05:24

## 2025-01-05 RX ADMIN — SENNOSIDES 10 MILLILITER(S): 8.6 TABLET, FILM COATED ORAL at 22:28

## 2025-01-05 RX ADMIN — Medication 500 MILLIGRAM(S): at 11:36

## 2025-01-05 RX ADMIN — Medication 1 MILLIGRAM(S): at 11:34

## 2025-01-05 RX ADMIN — MEROPENEM 100 MILLIGRAM(S): 1 INJECTION, POWDER, FOR SOLUTION INTRAVENOUS at 05:24

## 2025-01-05 RX ADMIN — IPRATROPIUM BROMIDE AND ALBUTEROL SULFATE 3 MILLILITER(S): .5; 2.5 SOLUTION RESPIRATORY (INHALATION) at 14:03

## 2025-01-05 RX ADMIN — IPRATROPIUM BROMIDE AND ALBUTEROL SULFATE 3 MILLILITER(S): .5; 2.5 SOLUTION RESPIRATORY (INHALATION) at 08:36

## 2025-01-05 RX ADMIN — APIXABAN 5 MILLIGRAM(S): 5 TABLET, FILM COATED ORAL at 17:16

## 2025-01-05 RX ADMIN — Medication 1 TABLET(S): at 11:34

## 2025-01-05 RX ADMIN — IPRATROPIUM BROMIDE AND ALBUTEROL SULFATE 3 MILLILITER(S): .5; 2.5 SOLUTION RESPIRATORY (INHALATION) at 20:47

## 2025-01-05 RX ADMIN — MIDODRINE HYDROCHLORIDE 5 MILLIGRAM(S): 5 TABLET ORAL at 17:18

## 2025-01-05 RX ADMIN — Medication 17 GRAM(S): at 11:35

## 2025-01-05 RX ADMIN — APIXABAN 5 MILLIGRAM(S): 5 TABLET, FILM COATED ORAL at 05:25

## 2025-01-05 RX ADMIN — ATORVASTATIN CALCIUM 80 MILLIGRAM(S): 40 TABLET, FILM COATED ORAL at 22:27

## 2025-01-05 RX ADMIN — ACETAMINOPHEN 1000 MILLIGRAM(S): 80 SOLUTION/ DROPS ORAL at 14:26

## 2025-01-05 RX ADMIN — ACETAMINOPHEN 400 MILLIGRAM(S): 80 SOLUTION/ DROPS ORAL at 14:11

## 2025-01-05 RX ADMIN — CHLORHEXIDINE GLUCONATE 1 APPLICATION(S): 1.2 RINSE ORAL at 11:36

## 2025-01-05 RX ADMIN — VALPROIC ACID 1250 MILLIGRAM(S): 250 SOLUTION ORAL at 22:27

## 2025-01-05 RX ADMIN — MEROPENEM 100 MILLIGRAM(S): 1 INJECTION, POWDER, FOR SOLUTION INTRAVENOUS at 22:26

## 2025-01-05 RX ADMIN — Medication 5 MILLIGRAM(S): at 22:27

## 2025-01-05 RX ADMIN — VALPROIC ACID 1250 MILLIGRAM(S): 250 SOLUTION ORAL at 14:11

## 2025-01-05 RX ADMIN — MEROPENEM 100 MILLIGRAM(S): 1 INJECTION, POWDER, FOR SOLUTION INTRAVENOUS at 14:00

## 2025-01-05 RX ADMIN — VALPROIC ACID 1250 MILLIGRAM(S): 250 SOLUTION ORAL at 06:29

## 2025-01-05 NOTE — DISCHARGE NOTE PROVIDER - NSDCFUADDAPPT_GEN_ALL_CORE_FT
Please call to make appointments for patient to follow up with orthopedics within 1 week from discharge date.     Dressing change:   Cleanse with NS, pat dry and apply Mepilex dressing 3 x week or sooner if saturated.     Staples to be removed at facility on 1/16/2025.

## 2025-01-05 NOTE — PROGRESS NOTE ADULT - PROBLEM SELECTOR PLAN 5
-KUB to r/o SBO  - Bowel regimen   - Stool count. -KUB to r/o SBO  - Bowel regimen   - S/p large BM today- scale back on bowel regimen

## 2025-01-05 NOTE — PROGRESS NOTE ADULT - NS ATTEND AMEND GEN_ALL_CORE FT
S/p R Hip IM Nail  Cont. broad spectrum antibiotics   Repeat blood cultures for clearance   Cardiology and EP recs noted  Tracheostomy management per protocol  Not a candidate for decannulation   Low BP for now, will hold antihypertensives  Cont. AC for now   Monitor for signs of bleeding  Pain control  Concern for constipation based on abdominal xray, improved with ducolax suppository  PT evaluation

## 2025-01-05 NOTE — DISCHARGE NOTE PROVIDER - CARE PROVIDER_API CALL
Cheyenne Salinas  Internal Medicine  [address] [place] [address]  Phone: [phone]  Fax: [phone]  Follow Up Time:     Aguila Coello  Orthopaedic Trauma  [address] [facility], [address] [place] [address]  Phone: [phone]  Fax: [phone]  Follow Up Time:    Cheyenne Salinas  Internal Medicine  [address] [place] [address]  Phone: [phone]  Fax: [phone]  Follow Up Time:     Aguila Coello  Orthopaedic Trauma  [address] [facility], [address] [place] [address]  Phone: [phone]  Fax: [phone]  Follow Up Time:     Cindy Rodriguez  Spine Surgery  [address] [facility], [facility] [address] [place] [address]  Phone: [phone]  Fax: [phone]  Follow Up Time: 1 week   Cheyenne Salinas  Internal Medicine  [address] [place] [address]  Phone: [phone]  Fax: [phone]  Follow Up Time:     Cindy Rodriguez  Spine Surgery  [address] [place] [address], [address] 6 [place] [address]  Phone: [phone]  Fax: [phone]  Follow Up Time: 2 weeks

## 2025-01-05 NOTE — DISCHARGE NOTE PROVIDER - CARE PROVIDERS DIRECT ADDRESSES
,DirectAddress_Unknown,[email or web address] ,DirectAddress_Unknown,[email or web address] ,DirectAddress_Unknown,[email or web address]

## 2025-01-05 NOTE — DISCHARGE NOTE PROVIDER - NSDCCPCAREPLAN_GEN_ALL_CORE_FT
PRINCIPAL DISCHARGE DIAGNOSIS  Diagnosis: Femur neck fracture  Assessment and Plan of Treatment: You were found to have a right hip fracture and you had surgery to fix the fracture. Report any symptoms of increasing pain, swelling, discoloration, inability to bear weight on your right leg.      SECONDARY DISCHARGE DIAGNOSES  Diagnosis: Left lower lobe pneumonia  Assessment and Plan of Treatment: Pneumonia is a lung infection that can cause a fever, cough, and trouble breathing.  Continue all antibiotics as ordered until complete.  Call your health care provider upon arrival home from hospital and make a follow up appointment for one week.  If your cough worsens, you develop fever greater than 101', you have shaking chills, a fast heartbeat, trouble breathing and/or feel your are breathing much faster than usual, increasing sputum from your trach, call your healthcare provider.  Make sure you wash your hands frequently.      Diagnosis: Tracheostomy dependent  Assessment and Plan of Treatment: Report any increasing symptoms of shortness of breath, report any redness surrounding your trach site/stoma, pain at trach site, increasing shortness of breath.    Diagnosis: Hyperlipidemia  Assessment and Plan of Treatment: Please continue taking your cholesterol medication as prescribed and follow up with your doctor for routine blood work and including evaluation of your liver function while taking medication for your cholesterol.  Report any changes in the color of your skin such as yellowing of your skin, changes in the color of your urine, itching skin, cramps to your lower legs.      Diagnosis: Atrial fibrillation  Assessment and Plan of Treatment: Please continue with your anticoagulation medication as instructed in the medication reconciliation and your primary care physician.  Please continue with you rate control medication as instructed in the medication reconciliation and your primary care physician.      Diagnosis: Hypertension  Assessment and Plan of Treatment: You are being treated for high blood pressure.  Continue taking your medication as prescribed to help prevent or minimize your risk of end organ damage.  Follow up with your doctor for routine blood pressure evaluation and medication regimen.  Report any symptoms of headaces with nausea or vomiting, visual changes, heart palpitation, chest pain or shortness.      Diagnosis: Seizures  Assessment and Plan of Treatment: Continue your medications as prescribed   Follow up with PCP and Neurologist outpatient   Seizure Precautions   - No driving for 1 year as per [facility] law, or for 6 months if approved by physician    - Avoid activities that may cause self-harm if there is sudden loss of consciousness for at least 6 months, including but not limited to:   Operating heavy machinery, Standing at heights or near open flames, Bathing or Swimming alone  Continue seizure medications as prescribed to avoind breakthrough seizures and sudden unexpected death in epilepsy (SUDEP) in the setting of antiepileptic medication noncompliance      Diagnosis: Recurrent pulmonary embolism  Assessment and Plan of Treatment: Take your anticoagulation as directed.  Follow up with your health care provider within one week. Call for appointment.  If you develop shortness of breath or if your shortness of breath worsens call your Health Care Provider or go to the Emergency Department.

## 2025-01-05 NOTE — PROGRESS NOTE ADULT - ASSESSMENT
Patient is a 48 y/o M pt with PMHx CVA (3/30/2022 with residual aphasia and right sided residual weakness), s/p tracheostomy and peg placement, seizures, HTN, HLD, CHF w/ ICD (initially rEF 30%->55-60% on 03/2021), MDD, Recurrent PE, Afib (on Eliquis), obesity s/p bariatric intervention presented to the ED after a fall 3 days prior to admission, and outpatient xray showed intertrochanteric fracture of right femur. He also had fevers outpatient, and CXR concerning for left lower lobe infiltrate Patient is being admitted to  for management of right intertrochanteric femur fracture and pneumonia.     1/2/25: Patient anemia possible anemia of chronic disease, PRBC 1 unit transfused for OR, recommended by Ortho Hemoglobin above 10, however will transfuse one unit 1/2/25 as patient with no active bleeding noted. Cardiac cleared for surgery. ICD no detection, however EP recommends no need to replace as ICD placed initially due to previous low EF. TTE resulting LBBB, LVSF normal EF 59%. Patient planned for OR tonight.   01/03/2025: Restarted Eliquis. S/p R hip IM nailing POD #1. PT reccs MARKELL. WBAT. Dispo: Placement back to [place]. Will monitor on AC for 24 hrs to ensure H&H remains stable and monitor surgical wound.   01/04: S/p Code sepsis on 1/3 in the setting of fever, tachycardia and hypotension. CXR shows no consolidation.  Bcx 1/3 + for Gram positive for cocci in pairs and chains, pending sensitivity. Soft BP. Sputum cx from 1/1 + pseudomonas aeruginosa. Zosyn switched to Meropenem. Ucx sent on 1/3. Afebrile. No leukocytosis. Lactate and procal wnl. ID Dr. De La Torre consulted on 1/3. S/p R hip IM nailing POD #2.  Ortho reccs appreciated. AC resumed. KUB to r/o SBO vs Ileus. Bowel regimen.     INCOMPLETE::::::01/5 Patient is a 50 y/o M pt with PMHx CVA (3/30/2022 with residual aphasia and right sided residual weakness), s/p tracheostomy and peg placement, seizures, HTN, HLD, CHF w/ ICD (initially rEF 30%->55-60% on 03/2021), MDD, Recurrent PE, Afib (on Eliquis), obesity s/p bariatric intervention presented to the ED after a fall 3 days prior to admission, and outpatient xray showed intertrochanteric fracture of right femur. He also had fevers outpatient, and CXR concerning for left lower lobe infiltrate Patient is being admitted to  for management of right intertrochanteric femur fracture and pneumonia.     1/2/25: Patient anemia possible anemia of chronic disease, PRBC 1 unit transfused for OR, recommended by Ortho Hemoglobin above 10, however will transfuse one unit 1/2/25 as patient with no active bleeding noted. Cardiac cleared for surgery. ICD no detection, however EP recommends no need to replace as ICD placed initially due to previous low EF. TTE resulting LBBB, LVSF normal EF 59%. Patient planned for OR tonight.   01/03/2025: Restarted Eliquis. S/p R hip IM nailing POD #1. PT reccs MARKELL. WBAT. Dispo: Placement back to [place]. Will monitor on AC for 24 hrs to ensure H&H remains stable and monitor surgical wound.   01/04: S/p Code sepsis on 1/3 in the setting of fever, tachycardia and hypotension. CXR shows no consolidation.  Bcx 1/3 + for Gram positive for cocci in pairs and chains, pending sensitivity. Soft BP. Sputum cx from 1/1 + pseudomonas aeruginosa. Zosyn switched to Meropenem. Ucx sent on 1/3. Afebrile. No leukocytosis. Lactate and procal wnl. ID Dr. De La Torre consulted on 1/3. S/p R hip IM nailing POD #2.  Ortho reccs appreciated. AC resumed. KUB to r/o SBO vs Ileus. Bowel regimen.   01/05- Blood cx with Entero Faecalis, ID following and currently on Meropenem. Pending official Abd x-ray read.  Plan for TOV on 1/6.

## 2025-01-05 NOTE — PROGRESS NOTE ADULT - PROBLEM SELECTOR PLAN 3
-Soft BP with drop in SBP during PT eval on 1/3.   - Hypotension accompanied by spike in temp on 1/3  - S/p Code sepsis on 1/3. Sepsis w/u in progress  - Zosyn switched to Meropenem in the setting of sputum cx + for Pseudomonas.   -C/w  Midodrene 5mg TID  -Monitor BP per unit protocol   -H&H stable  - Today 1/4 Afebrile, no leukocytosis. Procal and Lactate wnl.   - ID Dr. De La Torre consulted on 1/3. -Soft BP with drop in SBP during PT eval on 1/3.   - Hypotension accompanied by spike in temp on 1/3  - S/p Code sepsis on 1/3. Sepsis w/u in progress  - Zosyn switched to Meropenem in the setting of sputum cx + for Pseudomonas.   -C/w  Midodrine 5mg TID  -Monitor BP per unit protocol   -H&H stable  - Remains afebrile, no leukocytosis, Procal and Lactate wnl.   - ID Dr. De La Torre consulted on 1/3.

## 2025-01-05 NOTE — DISCHARGE NOTE PROVIDER - NSDCCPTREATMENT_GEN_ALL_CORE_FT
PRINCIPAL PROCEDURE  Procedure: Intramedullary rodding of right hip using locking nail with lag screw in femoral neck with distal locking screw  Findings and Treatment: Pain Management- *See Attached Medication Reconciliation  Weight Bearing Status:  WBAT to RLE with walker  Equipment needs: Pattie, Walker  Dressing: Please keep bandage/dressing Clean, Dry, and Intact.  Mepilex dressing to be removed only if not in good condition or when staples are to be removed. If mepilex dressing is violated, change with dressing every 2- 3 days with 4x4, abd pads, and ACE bandage.   Dvt prophylaxis: Continue home eliquis dose  Incision site: NURSING to remove staples on 1/16/25   PT/Occupational Therapy are Activities of Daily Living as appropriate  Follow-up with Dr. Rodriguez in TWO  WEEKS at [phone].   If patient has drainage from the wound, please contact the surgeon's office.   If patient has intractable pain, please contact the surgeon's office.   If excessively warm at the incision site is noted, please contact the surgeon's office.   If redness is noted, especially spreading, please contact the surgeon's office.   If patient has fever over 101F please return to the hospital through the Emergency Room.   If agnes blood is saturating the dressing, please return to the hospital through the Emergency Room.  If drainage of fluid or pus is noted, please return to the hospital through the Emergency Room.

## 2025-01-05 NOTE — DISCHARGE NOTE PROVIDER - PROVIDER TOKENS
PROVIDER:[TOKEN:[[id number]]],PROVIDER:[TOKEN:[[id number]]] PROVIDER:[TOKEN:[[id number]]],PROVIDER:[TOKEN:[[id number]]],PROVIDER:[TOKEN:[[id number]],FOLLOWUP:[1 week]] PROVIDER:[TOKEN:[[id number]]],PROVIDER:[TOKEN:[[id number]],FOLLOWUP:[2 weeks]]

## 2025-01-05 NOTE — PROGRESS NOTE ADULT - PROBLEM SELECTOR PLAN 2
- CXR - left lower lobe infiltrate worse compared to previous.   - S/p ceftriaxone and azithro.   -  sputum culture 1/1/2025: + Pseudomonas aeruginosa.   - Zosyn d/c and started on Meropenem   - Legionella negative.   - O2 supplement via trach collar   - ID Dr. De La Torre consulted on 1/3.  - Aspiration precautions - CXR - left lower lobe infiltrate worse compared to previous.   - S/p ceftriaxone and azithro.   -  sputum culture 1/1/2025: + Pseudomonas aeruginosa and blood cx + for Entero Faecalis  - Zosyn d/arjun and started on Meropenem on 1/4   - Legionella negative.   - O2 supplement via trach collar   - ID Dr. De La Torre consulted on 1/3.  - Aspiration precautions

## 2025-01-05 NOTE — CONSULT NOTE ADULT - CONSULT REASON
ICD with depleted battery
fever/ Bacteremia with Enterococcus
LLL PNA, Chronic Hypoxic Respiratory Failure , trach care.
pre-op

## 2025-01-05 NOTE — PROGRESS NOTE ADULT - PROBLEM SELECTOR PLAN 4
- Code sepsis called on 1/3 in the setting of hypotension, tachycardia and fever 101.4  - Sepsis w/u in progress  - Sputum cx 1/1 + for Pseudomonas   - Bcx 1/3 + for Gram positive for cocci in pairs and chains pending sensitivity  - Zosyn switched to Meropenem   - CXR noted.   - ID Dr. De La Torre consulted on 1/3.   - S/p 1.5 L IVF bolus on 1/3.   - Lactate and procal wnl  - f/u cultures and ID reccs. - Code sepsis called on 1/3 in the setting of hypotension, tachycardia and fever 101.4  - Bacteremic   - Sputum cx 1/1 + for Pseudomonas   - Bcx 1/3 + for Gram positive for cocci in pairs and chains, entero faecalis  - Zosyn switched to Meropenem   - CXR noted.   - ID Dr. De La Torre consulted on 1/3.   - S/p 1.5 L IVF bolus on 1/3.   - Lactate and procal wnl  - f/u ID reccs.

## 2025-01-05 NOTE — PROGRESS NOTE ADULT - SUBJECTIVE AND OBJECTIVE BOX
DANNIE [id number]  49yMale    Diagnosis: 49yMale S/p Right Hip IM Nailing POD#3  Patient was seen and evaluated at bedside.   Patient with complaints of pain to right hip, unchanged from yesterday.     Vital Signs Last 24 Hrs  T(C): 36.7 (05 Jan 2025 05:18), Max: 37.3 (04 Jan 2025 16:34)  T(F): 98.1 (05 Jan 2025 05:18), Max: 99.2 (04 Jan 2025 16:34)  HR: 74 (05 Jan 2025 08:34) (68 - 86)  BP: 94/54 (05 Jan 2025 05:18) (94/54 - 105/60)  BP(mean): 63 (05 Jan 2025 05:18) (63 - 76)  RR: 19 (05 Jan 2025 08:34) (18 - 21)  SpO2: 95% (05 Jan 2025 08:34) (92% - 99%)    Parameters below as of 05 Jan 2025 08:34  Patient On (Oxygen Delivery Method): tracheostomy collar  O2 Flow (L/min): 3      Physical Exam:  General:  NAD, resting comfortably in bed.  Right Hip:  Dressing is C/D/I. Skin is pink and warm.  No erythema. SILT.  Wound with no drainage, healing well.   Lower extremity:  No calf tenderness, calves are soft. 2+pulses. NVI. 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 Pt denies any sensation to the RLE as well.                                    8.5    7.09  )-----------( 259      ( 05 Jan 2025 06:38 )             24.5                                01-05    142  |  106  |  13  ----------------------------<  132[H]  3.7   |  33[H]  |  0.54    Ca    8.5      05 Jan 2025 06:38  Phos  3.0     01-05  Mg     2.3     01-05    TPro  6.0  /  Alb  1.9[L]  /  TBili  0.4  /  DBili  x   /  AST  30  /  ALT  18  /  AlkPhos  61  01-05      Impression:  49yMale S/p Right Hip IM Nailing POD#3  Plan:  -  Pain management  -  DVT prophylaxis with venodynes    > Full anticoagulation can be restarted at medical teams discretion - no orthopedic contraindication  -  Daily Physical Therapy:  WBAT of the Right lower extremity with appropriate assistive device   -  Discharge planning  -  Continue with antibiotics as per medicine   -  Case d/w DR. Rodriguez

## 2025-01-05 NOTE — PROGRESS NOTE ADULT - SUBJECTIVE AND OBJECTIVE BOX
DANNIE SHIRLEY    SCU progress note    INTERVAL HPI/OVERNIGHT EVENTS: ***    DNR [ ]   DNI  [  ]    Covid - 19 PCR:     The 4Ms    What Matters Most: see GOC  Age appropriate Medications/Screen for High Risk Medication: Yes  Mentation: see CAM below  Mobility: defer to physical exam    The Confusion Assessment Method (CAM) Diagnostic Algorithm     1: Acute Onset or Fluctuating Course  - Is there evidence of an acute change in mental status from the patient’s baseline? Did the (abnormal) behavior  fluctuate during the day, that is, tend to come and go, or increase and decrease in severity?  [ ] YES [ ] NO     2: Inattention  - Did the patient have difficulty focusing attention, being easily distractible, or having difficulty keeping track of what was being said?  [ ] YES [ ] NO     3: Disorganized thinking  -Was the patient’s thinking disorganized or incoherent, such as rambling or irrelevant conversation, unclear or illogical flow of ideas, or unpredictable switching from subject to subject?  [ ] YES [ ] NO    4: Altered Level of consciousness?  [ ] YES [ ] NO    The diagnosis of delirium by CAM requires the presence of features 1 and 2 and either 3 or 4.    PRESSORS: [ ] YES [ ] NO  meropenem  IVPB 1000 milliGRAM(s) IV Intermittent every 8 hours  meropenem  IVPB        Cardiovascular:  Heart Failure  Acute   Acute on Chronic  Chronic       midodrine. 5 milliGRAM(s) Oral three times a day    Pulmonary:  albuterol/ipratropium for Nebulization 3 milliLiter(s) Nebulizer every 6 hours    Hematalogic:  apixaban 5 milliGRAM(s) Oral every 12 hours    Other:  acetaminophen   IVPB .. 1000 milliGRAM(s) IV Intermittent once  artificial  tears Solution 1 Drop(s) Both EYES daily  ascorbic acid 500 milliGRAM(s) Oral daily  atorvastatin 80 milliGRAM(s) Oral at bedtime  bisacodyl Suppository 10 milliGRAM(s) Rectal daily  chlorhexidine 2% Cloths 1 Application(s) Topical daily  folic acid 1 milliGRAM(s) Oral daily  HYDROmorphone  Injectable 0.5 milliGRAM(s) IV Push every 4 hours PRN  HYDROmorphone  Injectable 0.2 milliGRAM(s) IV Push every 4 hours PRN  lacosamide Solution 200 milliGRAM(s) Oral two times a day  levETIRAcetam  Solution 2000 milliGRAM(s) Enteral Tube two times a day  melatonin 5 milliGRAM(s) Oral at bedtime  multivitamin 1 Tablet(s) Oral daily  naloxone Injectable 0.4 milliGRAM(s) IV Push once  ondansetron Injectable 4 milliGRAM(s) IV Push every 8 hours PRN  pantoprazole  Injectable 40 milliGRAM(s) IV Push daily  polyethylene glycol 3350 17 Gram(s) Oral daily  senna Syrup 10 milliLiter(s) Oral at bedtime  valproic  acid Syrup 1250 milliGRAM(s) Oral three times a day    acetaminophen   IVPB .. 1000 milliGRAM(s) IV Intermittent once  albuterol/ipratropium for Nebulization 3 milliLiter(s) Nebulizer every 6 hours  apixaban 5 milliGRAM(s) Oral every 12 hours  artificial  tears Solution 1 Drop(s) Both EYES daily  ascorbic acid 500 milliGRAM(s) Oral daily  atorvastatin 80 milliGRAM(s) Oral at bedtime  bisacodyl Suppository 10 milliGRAM(s) Rectal daily  chlorhexidine 2% Cloths 1 Application(s) Topical daily  folic acid 1 milliGRAM(s) Oral daily  HYDROmorphone  Injectable 0.5 milliGRAM(s) IV Push every 4 hours PRN  HYDROmorphone  Injectable 0.2 milliGRAM(s) IV Push every 4 hours PRN  lacosamide Solution 200 milliGRAM(s) Oral two times a day  levETIRAcetam  Solution 2000 milliGRAM(s) Enteral Tube two times a day  melatonin 5 milliGRAM(s) Oral at bedtime  meropenem  IVPB 1000 milliGRAM(s) IV Intermittent every 8 hours  meropenem  IVPB      midodrine. 5 milliGRAM(s) Oral three times a day  multivitamin 1 Tablet(s) Oral daily  naloxone Injectable 0.4 milliGRAM(s) IV Push once  ondansetron Injectable 4 milliGRAM(s) IV Push every 8 hours PRN  pantoprazole  Injectable 40 milliGRAM(s) IV Push daily  polyethylene glycol 3350 17 Gram(s) Oral daily  senna Syrup 10 milliLiter(s) Oral at bedtime  valproic  acid Syrup 1250 milliGRAM(s) Oral three times a day    Drug Dosing Weight  Height (cm): 170.2 (26 Aug 2024 12:06)  Weight (kg): 90.7 (31 Dec 2024 18:00)  BMI (kg/m2): 31.3 (31 Dec 2024 18:00)  BSA (m2): 2.02 (31 Dec 2024 18:00)    CENTRAL LINE: [ ] YES [ ] NO  LOCATION:   DATE INSERTED:  REMOVE: [ ] YES [ ] NO  EXPLAIN:    FAULKNER: [ ] YES [ ] NO    DATE INSERTED:  REMOVE:  [ ] YES [ ] NO  EXPLAIN:    PAST MEDICAL & SURGICAL HISTORY:  Heart failure, unspecified HF chronicity, unspecified heart failure type      ETOH abuse  h/o etoh abuse now moderate drinker      Artificial pacemaker                  01-04 @ 07:01  -  01-05 @ 07:00  --------------------------------------------------------  IN: 0 mL / OUT: 520 mL / NET: -520 mL            PHYSICAL EXAM:    GENERAL: NAD, well-groomed, well-developed  HEAD:  Atraumatic, Normocephalic  EYES: EOMI, PERRLA, conjunctiva and sclera clear  ENMT: No tonsillar erythema, exudates, or enlargement; Moist mucous membranes, Good dentition, No lesions  NECK: Supple, No JVD, Normal thyroid  NERVOUS SYSTEM:  Alert & Oriented X3, Good concentration; Motor Strength 5/5 B/L upper and lower extremities; DTRs 2+ intact and symmetric  CHEST/LUNG: Clear to percussion bilaterally; No rales, rhonchi, wheezing, or rubs  HEART: Regular rate and rhythm; No murmurs, rubs, or gallops  ABDOMEN: Soft, Nontender, Nondistended; Bowel sounds present  EXTREMITIES:  2+ Peripheral Pulses, No clubbing, cyanosis, or edema  LYMPH: No lymphadenopathy noted  SKIN: No rashes or lesions      LABS:  CBC Full  -  ( 05 Jan 2025 06:38 )  WBC Count : 7.09 K/uL  RBC Count : 2.49 M/uL  Hemoglobin : 8.5 g/dL  Hematocrit : 24.5 %  Platelet Count - Automated : 259 K/uL  Mean Cell Volume : 98.4 fl  Mean Cell Hemoglobin : 34.1 pg  Mean Cell Hemoglobin Concentration : 34.7 g/dL  Auto Neutrophil # : x  Auto Lymphocyte # : x  Auto Monocyte # : x  Auto Eosinophil # : x  Auto Basophil # : x  Auto Neutrophil % : x  Auto Lymphocyte % : x  Auto Monocyte % : x  Auto Eosinophil % : x  Auto Basophil % : x    01-05    142  |  106  |  13  ----------------------------<  132[H]  3.7   |  33[H]  |  0.54    Ca    8.5      05 Jan 2025 06:38  Phos  3.0     01-05  Mg     2.3     01-05    TPro  6.0  /  Alb  1.9[L]  /  TBili  0.4  /  DBili  x   /  AST  30  /  ALT  18  /  AlkPhos  61  01-05    PT/INR - ( 03 Jan 2025 18:30 )   PT: 19.9 sec;   INR: 1.72 ratio         PTT - ( 03 Jan 2025 18:30 )  PTT:35.9 sec  Urinalysis Basic - ( 05 Jan 2025 06:38 )    Color: x / Appearance: x / SG: x / pH: x  Gluc: 132 mg/dL / Ketone: x  / Bili: x / Urobili: x   Blood: x / Protein: x / Nitrite: x   Leuk Esterase: x / RBC: x / WBC x   Sq Epi: x / Non Sq Epi: x / Bacteria: x            [  ]  DVT Prophylaxis  [  ]  Nutrition, Brand, Rate         Goal Rate        Abnormal Nutritional Status -  Malnutrition   Cachexia      Morbid Obesity BMI >/=40    RADIOLOGY & ADDITIONAL STUDIES:  ***    Goals of Care Discussion with Family/Proxy/Other   - see note from/family meeting set up for...     DANNIE SHIRLEY    SCU progress note    INTERVAL HPI/OVERNIGHT EVENTS: No acute events overnight.     DNR [ ]   DNI  [  ]- FULL CODE    COVID-19 PCR: Heydi (03 Jan 2025 18:30)      [facility] 4[facility]    What Matters Most: see GOC  Age appropriate Medications/Screen for High Risk Medication: Yes  Mentation: see CAM below  Mobility: defer to physical exam    The Confusion Assessment Method (CAM) Diagnostic Algorithm     1: Acute Onset or Fluctuating Course  - Is there evidence of an acute change in mental status from the patient’s baseline? Did the (abnormal) behavior  fluctuate during the day, that is, tend to come and go, or increase and decrease in severity?  [ ] YES [x ] NO     2: Inattention  - Did the patient have difficulty focusing attention, being easily distractible, or having difficulty keeping track of what was being said?  [ ] YES [x ] NO-    3: Disorganized thinking  -Was the patient’s thinking disorganized or incoherent, such as rambling or irrelevant conversation, unclear or illogical flow of ideas, or unpredictable switching from subject to subject?  [ ] YES [ ] NO- Unable to assess    4: Altered Level of consciousness?  [ ] YES [x ] NO-     The diagnosis of delirium by CAM requires the presence of features 1 and 2 and either 3 or 4.    PRESSORS: [ ] YES [x ] NO  meropenem  IVPB 1000 milliGRAM(s) IV Intermittent every 8 hours  meropenem  IVPB        Cardiovascular:  Heart Failure  Acute   Acute on Chronic  Chronic       midodrine. 5 milliGRAM(s) Oral three times a day    Pulmonary:  albuterol/ipratropium for Nebulization 3 milliLiter(s) Nebulizer every 6 hours    Hematalogic:  apixaban 5 milliGRAM(s) Oral every 12 hours    Other:  acetaminophen   IVPB .. 1000 milliGRAM(s) IV Intermittent once  artificial  tears Solution 1 Drop(s) Both EYES daily  ascorbic acid 500 milliGRAM(s) Oral daily  atorvastatin 80 milliGRAM(s) Oral at bedtime  bisacodyl Suppository 10 milliGRAM(s) Rectal daily  chlorhexidine 2% Cloths 1 Application(s) Topical daily  folic acid 1 milliGRAM(s) Oral daily  HYDROmorphone  Injectable 0.5 milliGRAM(s) IV Push every 4 hours PRN  HYDROmorphone  Injectable 0.2 milliGRAM(s) IV Push every 4 hours PRN  lacosamide Solution 200 milliGRAM(s) Oral two times a day  levETIRAcetam  Solution 2000 milliGRAM(s) Enteral Tube two times a day  melatonin 5 milliGRAM(s) Oral at bedtime  multivitamin 1 Tablet(s) Oral daily  naloxone Injectable 0.4 milliGRAM(s) IV Push once  ondansetron Injectable 4 milliGRAM(s) IV Push every 8 hours PRN  pantoprazole  Injectable 40 milliGRAM(s) IV Push daily  polyethylene glycol 3350 17 Gram(s) Oral daily  senna Syrup 10 milliLiter(s) Oral at bedtime  valproic  acid Syrup 1250 milliGRAM(s) Oral three times a day    acetaminophen   IVPB .. 1000 milliGRAM(s) IV Intermittent once  albuterol/ipratropium for Nebulization 3 milliLiter(s) Nebulizer every 6 hours  apixaban 5 milliGRAM(s) Oral every 12 hours  artificial  tears Solution 1 Drop(s) Both EYES daily  ascorbic acid 500 milliGRAM(s) Oral daily  atorvastatin 80 milliGRAM(s) Oral at bedtime  bisacodyl Suppository 10 milliGRAM(s) Rectal daily  chlorhexidine 2% Cloths 1 Application(s) Topical daily  folic acid 1 milliGRAM(s) Oral daily  HYDROmorphone  Injectable 0.5 milliGRAM(s) IV Push every 4 hours PRN  HYDROmorphone  Injectable 0.2 milliGRAM(s) IV Push every 4 hours PRN  lacosamide Solution 200 milliGRAM(s) Oral two times a day  levETIRAcetam  Solution 2000 milliGRAM(s) Enteral Tube two times a day  melatonin 5 milliGRAM(s) Oral at bedtime  meropenem  IVPB 1000 milliGRAM(s) IV Intermittent every 8 hours  meropenem  IVPB      midodrine. 5 milliGRAM(s) Oral three times a day  multivitamin 1 Tablet(s) Oral daily  naloxone Injectable 0.4 milliGRAM(s) IV Push once  ondansetron Injectable 4 milliGRAM(s) IV Push every 8 hours PRN  pantoprazole  Injectable 40 milliGRAM(s) IV Push daily  polyethylene glycol 3350 17 Gram(s) Oral daily  senna Syrup 10 milliLiter(s) Oral at bedtime  valproic  acid Syrup 1250 milliGRAM(s) Oral three times a day    Drug Dosing Weight  Height (cm): 170.2 (26 Aug 2024 12:06)  Weight (kg): 90.7 (31 Dec 2024 18:00)  BMI (kg/m2): 31.3 (31 Dec 2024 18:00)  BSA (m2): 2.02 (31 Dec 2024 18:00)    CENTRAL LINE: [ ] YES [x ] NO  LOCATION:   DATE INSERTED:  REMOVE: [ ] YES [ ] NO  EXPLAIN:    FAULKNER: [x ] YES [ ] NO    DATE INSERTED: 01/03/25  REMOVE:  [ ] YES [x ] NO  EXPLAIN: Sepsis work up    PAST MEDICAL & SURGICAL HISTORY:  Heart failure, unspecified HF chronicity, unspecified heart failure type      ETOH abuse  h/o etoh abuse now moderate drinker      Artificial pacemaker                  01-04 @ 07:01  -  01-05 @ 07:00  --------------------------------------------------------  IN: 0 mL / OUT: 520 mL / NET: -520 mL            PHYSICAL EXAM:    GENERAL: NAD, well-groomed, well-developed  HEAD:  Obvious left temporal deformity from old bleed   EYES: PERRL, conjunctiva and sclera clear  ENMT: No erythema, Moist mucous membranes, Good dentition, No lesions  NECK: + trach, No JVD  NERVOUS SYSTEM:  Alert & Oriented to self, follow sample commands as squeezing and moving   CHEST/LUNG: Clear to percussion bilaterally; No rales, rhonchi, wheezing, or rubs  HEART: Regular rate and rhythm; No murmurs, rubs, or gallops  ABDOMEN: Soft, Nontender, Nondistended; Bowel sounds present  EXTREMITIES:  2+ Peripheral Pulses, No clubbing, cyanosis, or edema  LYMPH: No lymphadenopathy noted  SKIN: No rashes or lesions      LABS:  CBC Full  -  ( 05 Jan 2025 06:38 )  WBC Count : 7.09 K/uL  RBC Count : 2.49 M/uL  Hemoglobin : 8.5 g/dL  Hematocrit : 24.5 %  Platelet Count - Automated : 259 K/uL  Mean Cell Volume : 98.4 fl  Mean Cell Hemoglobin : 34.1 pg  Mean Cell Hemoglobin Concentration : 34.7 g/dL  Auto Neutrophil # : x  Auto Lymphocyte # : x  Auto Monocyte # : x  Auto Eosinophil # : x  Auto Basophil # : x  Auto Neutrophil % : x  Auto Lymphocyte % : x  Auto Monocyte % : x  Auto Eosinophil % : x  Auto Basophil % : x    01-05    142  |  106  |  13  ----------------------------<  132[H]  3.7   |  33[H]  |  0.54    Ca    8.5      05 Jan 2025 06:38  Phos  3.0     01-05  Mg     2.3     01-05    TPro  6.0  /  Alb  1.9[L]  /  TBili  0.4  /  DBili  x   /  AST  30  /  ALT  18  /  AlkPhos  61  01-05    PT/INR - ( 03 Jan 2025 18:30 )   PT: 19.9 sec;   INR: 1.72 ratio         PTT - ( 03 Jan 2025 18:30 )  PTT:35.9 sec  Urinalysis Basic - ( 05 Jan 2025 06:38 )    Color: x / Appearance: x / SG: x / pH: x  Gluc: 132 mg/dL / Ketone: x  / Bili: x / Urobili: x   Blood: x / Protein: x / Nitrite: x   Leuk Esterase: x / RBC: x / WBC x   Sq Epi: x / Non Sq Epi: x / Bacteria: x            [  ]  DVT Prophylaxis  [  ]  Nutrition, Brand, Rate         Goal Rate        Abnormal Nutritional Status -  Malnutrition   Cachexia      Morbid Obesity BMI >/=40    RADIOLOGY & ADDITIONAL STUDIES:  ***    Goals of Care Discussion with Family/Proxy/Other   - see note from/family meeting set up for...     DANNIE SHIRLEY    SCU progress note    INTERVAL HPI/OVERNIGHT EVENTS: No acute events overnight.     DNR [ ]   DNI  [  ]- FULL CODE    COVID-19 PCR: Heydi (03 Jan 2025 18:30)      The 4Ms    What Matters Most: see GOC  Age appropriate Medications/Screen for High Risk Medication: Yes  Mentation: see CAM below  Mobility: defer to physical exam    The Confusion Assessment Method (CAM) Diagnostic Algorithm     1: Acute Onset or Fluctuating Course  - Is there evidence of an acute change in mental status from the patient’s baseline? Did the (abnormal) behavior  fluctuate during the day, that is, tend to come and go, or increase and decrease in severity?  [ ] YES [x ] NO     2: Inattention  - Did the patient have difficulty focusing attention, being easily distractible, or having difficulty keeping track of what was being said?  [ ] YES [x ] NO     3: Disorganized thinking  -Was the patient’s thinking disorganized or incoherent, such as rambling or irrelevant conversation, unclear or illogical flow of ideas, or unpredictable switching from subject to subject?  [ ] YES [ ] NO- Unable to assess    4: Altered Level of consciousness?  [ ] YES [x ] NO-     The diagnosis of delirium by CAM requires the presence of features 1 and 2 and either 3 or 4.    PRESSORS: [ ] YES [x ] NO  meropenem  IVPB 1000 milliGRAM(s) IV Intermittent every 8 hours  meropenem  IVPB        Cardiovascular:  Heart Failure  Acute   Acute on Chronic  Chronic       midodrine. 5 milliGRAM(s) Oral three times a day    Pulmonary:  albuterol/ipratropium for Nebulization 3 milliLiter(s) Nebulizer every 6 hours    Hematalogic:  apixaban 5 milliGRAM(s) Oral every 12 hours    Other:  acetaminophen   IVPB .. 1000 milliGRAM(s) IV Intermittent once  artificial  tears Solution 1 Drop(s) Both EYES daily  ascorbic acid 500 milliGRAM(s) Oral daily  atorvastatin 80 milliGRAM(s) Oral at bedtime  bisacodyl Suppository 10 milliGRAM(s) Rectal daily  chlorhexidine 2% Cloths 1 Application(s) Topical daily  folic acid 1 milliGRAM(s) Oral daily  HYDROmorphone  Injectable 0.5 milliGRAM(s) IV Push every 4 hours PRN  HYDROmorphone  Injectable 0.2 milliGRAM(s) IV Push every 4 hours PRN  lacosamide Solution 200 milliGRAM(s) Oral two times a day  levETIRAcetam  Solution 2000 milliGRAM(s) Enteral Tube two times a day  melatonin 5 milliGRAM(s) Oral at bedtime  multivitamin 1 Tablet(s) Oral daily  naloxone Injectable 0.4 milliGRAM(s) IV Push once  ondansetron Injectable 4 milliGRAM(s) IV Push every 8 hours PRN  pantoprazole  Injectable 40 milliGRAM(s) IV Push daily  polyethylene glycol 3350 17 Gram(s) Oral daily  senna Syrup 10 milliLiter(s) Oral at bedtime  valproic  acid Syrup 1250 milliGRAM(s) Oral three times a day    acetaminophen   IVPB .. 1000 milliGRAM(s) IV Intermittent once  albuterol/ipratropium for Nebulization 3 milliLiter(s) Nebulizer every 6 hours  apixaban 5 milliGRAM(s) Oral every 12 hours  artificial  tears Solution 1 Drop(s) Both EYES daily  ascorbic acid 500 milliGRAM(s) Oral daily  atorvastatin 80 milliGRAM(s) Oral at bedtime  bisacodyl Suppository 10 milliGRAM(s) Rectal daily  chlorhexidine 2% Cloths 1 Application(s) Topical daily  folic acid 1 milliGRAM(s) Oral daily  HYDROmorphone  Injectable 0.5 milliGRAM(s) IV Push every 4 hours PRN  HYDROmorphone  Injectable 0.2 milliGRAM(s) IV Push every 4 hours PRN  lacosamide Solution 200 milliGRAM(s) Oral two times a day  levETIRAcetam  Solution 2000 milliGRAM(s) Enteral Tube two times a day  melatonin 5 milliGRAM(s) Oral at bedtime  meropenem  IVPB 1000 milliGRAM(s) IV Intermittent every 8 hours  meropenem  IVPB      midodrine. 5 milliGRAM(s) Oral three times a day  multivitamin 1 Tablet(s) Oral daily  naloxone Injectable 0.4 milliGRAM(s) IV Push once  ondansetron Injectable 4 milliGRAM(s) IV Push every 8 hours PRN  pantoprazole  Injectable 40 milliGRAM(s) IV Push daily  polyethylene glycol 3350 17 Gram(s) Oral daily  senna Syrup 10 milliLiter(s) Oral at bedtime  valproic  acid Syrup 1250 milliGRAM(s) Oral three times a day    Drug Dosing Weight  Height (cm): 170.2 (26 Aug 2024 12:06)  Weight (kg): 90.7 (31 Dec 2024 18:00)  BMI (kg/m2): 31.3 (31 Dec 2024 18:00)  BSA (m2): 2.02 (31 Dec 2024 18:00)    CENTRAL LINE: [ ] YES [x ] NO  LOCATION:   DATE INSERTED:  REMOVE: [ ] YES [ ] NO  EXPLAIN:    FAULKNER: [x ] YES [ ] NO    DATE INSERTED: 01/03/25  REMOVE:  [ ] YES [x ] NO  EXPLAIN: Sepsis work up    PAST MEDICAL & SURGICAL HISTORY:  Heart failure, unspecified HF chronicity, unspecified heart failure type      ETOH abuse  h/o etoh abuse now moderate drinker      Artificial pacemaker                  01-04 @ 07:01  -  01-05 @ 07:00  --------------------------------------------------------  IN: 0 mL / OUT: 520 mL / NET: -520 mL            PHYSICAL EXAM:    GENERAL: NAD, well-groomed, well-developed  HEAD:  Obvious left temporal deformity from old craniotomy   EYES: PERRL, conjunctiva and sclera clear  ENMT: No erythema, Moist mucous membranes, Good dentition, No lesions  NECK: + trach, No JVD  NERVOUS SYSTEM:  Alert & Oriented to self, follow simple commands as squeezing hands and moving left leg  CHEST/LUNG: Clear to percussion bilaterally; No rales, rhonchi, wheezing, or rubs  HEART: Regular rate and rhythm; No murmurs, rubs, or gallops  ABDOMEN: Soft, Nontender, Nondistended; Bowel sounds present  EXTREMITIES:  2+ Peripheral Pulses, No clubbing, cyanosis, or edema  LYMPH: No lymphadenopathy noted  SKIN: No rashes or lesions      LABS:  CBC Full  -  ( 05 Jan 2025 06:38 )  WBC Count : 7.09 K/uL  RBC Count : 2.49 M/uL  Hemoglobin : 8.5 g/dL  Hematocrit : 24.5 %  Platelet Count - Automated : 259 K/uL  Mean Cell Volume : 98.4 fl  Mean Cell Hemoglobin : 34.1 pg  Mean Cell Hemoglobin Concentration : 34.7 g/dL  Auto Neutrophil # : x  Auto Lymphocyte # : x  Auto Monocyte # : x  Auto Eosinophil # : x  Auto Basophil # : x  Auto Neutrophil % : x  Auto Lymphocyte % : x  Auto Monocyte % : x  Auto Eosinophil % : x  Auto Basophil % : x    01-05    142  |  106  |  13  ----------------------------<  132[H]  3.7   |  33[H]  |  0.54    Ca    8.5      05 Jan 2025 06:38  Phos  3.0     01-05  Mg     2.3     01-05    TPro  6.0  /  Alb  1.9[L]  /  TBili  0.4  /  DBili  x   /  AST  30  /  ALT  18  /  AlkPhos  61  01-05    PT/INR - ( 03 Jan 2025 18:30 )   PT: 19.9 sec;   INR: 1.72 ratio         PTT - ( 03 Jan 2025 18:30 )  PTT:35.9 sec  Urinalysis Basic - ( 05 Jan 2025 06:38 )    Color: x / Appearance: x / SG: x / pH: x  Gluc: 132 mg/dL / Ketone: x  / Bili: x / Urobili: x   Blood: x / Protein: x / Nitrite: x   Leuk Esterase: x / RBC: x / WBC x   Sq Epi: x / Non Sq Epi: x / Bacteria: x            [  ]  DVT Prophylaxis  [  ]  Nutrition, Brand, Rate         Goal Rate        Abnormal Nutritional Status -  Malnutrition   Cachexia      Morbid Obesity BMI >/=40    RADIOLOGY & ADDITIONAL STUDIES:  ***    Goals of Care Discussion with Family/Proxy/Other   - see note from/family meeting set up for...

## 2025-01-05 NOTE — PROGRESS NOTE ADULT - PROBLEM SELECTOR PLAN 1
- P/w with pain from fall on 12/28.   - Outpatient x-ray showed right intertrochanteric femur fracture.   - X-ray right hip: Minimally displaced right intertrochanteric femoral fracture  - continue pain regimen.   - S/p R hip IM nailing POD #2.   - Ortho following   - WBAT  - AC resumed.  - Hydromorphone PRN  - Bowel regimen - P/w with pain from fall on 12/28.   - Outpatient x-ray showed right intertrochanteric femur fracture.   - X-ray right hip: Minimally displaced right intertrochanteric femoral fracture  - continue pain regimen.   - S/p R hip IM nailing POD #4.   - Ortho following   - WBAT  - AC resumed.  - IV Tylenol PRN for pain   - Bowel regimen

## 2025-01-05 NOTE — PROGRESS NOTE ADULT - PROBLEM SELECTOR PLAN 11
- Hx of HTN   - D/c Lisinopril on 1/3 in the setting of hypotension  - Started on Midodrine on 1/3  - Restart Lisinopril when normotensive.

## 2025-01-05 NOTE — DISCHARGE NOTE PROVIDER - NSDCMRMEDTOKEN_GEN_ALL_CORE_FT
acetaminophen 325 mg oral tablet: 2 tab(s) by gastrostomy tube every 6 hours as needed for  pain  acetaminophen 325 mg oral tablet: 2 tab(s) orally every 6 hours as needed for  temp 100F and above (see NH med list)  acetaminophen 325 mg oral tablet: 2 tab(s) by gastrostomy tube once a day at 9 AM 1 hour prior to dressing change  Artificial Tears ophthalmic solution: 1 drop(s) in each eye 2 times a day as needed for  dry eyes  atorvastatin 80 mg oral tablet: 1 tab(s) by gastrostomy tube once a day (at bedtime)  cefTRIAXone 1 g injection: 1 gram(s) intravenously once a day  dextromethorphan-quinidine 20 mg-10 mg oral capsule: 1 cap(s) orally 2 times a day  DuoNeb 0.5 mg-2.5 mg/3 mL inhalation solution: 3 milliliter(s) by nebulizer 2 times a day  Eliquis 5 mg oral tablet: 1 tab(s) by gastrostomy tube every 12 hours  folic acid 1 mg oral tablet: 1 tab(s) by gastrostomy tube once a day  Kameron 7 gram-7 gram-1.5 gram oral powder packet: 1 packet via gastrostomy tube once a day  levETIRAcetam 100 mg/mL oral solution: 20 milliliter(s) by gastrostomy tube every 12 hours  lisinopril 2.5 mg oral tablet: 1 tab(s) by gastrostomy tube once a day  Melatonin 5 mg oral capsule: 1 cap(s) by gastrostomy tube once a day (at bedtime)  Metamucil 3.4 g/3.7 g oral powder for reconstitution: 1 packet(s) by gastrostomy tube once a day  metoprolol tartrate 25 mg oral tablet: 1 tab(s) by gastrostomy tube 2 times a day Hold for BP less than 100 or pulse less than 60  Multiple Vitamins oral tablet: 1 tab(s) by gastrostomy tube once a day  pantoprazole 40 mg oral granule, delayed release: 1 by gastrostomy tube 2 times a day  ProSource No Carb oral liquid: 30 milliliters by gastrostomy tube 3 times a day  Transderm-Scop 1 mg/72 hr transdermal film, extended release: 1 patch transdermally every 3 days  valproic acid 250 mg/5 mL oral liquid: 25 milliliter(s) by gastrostomy tube every 8 hours  Vimpat 200 mg oral tablet: 1 tab(s) by gastrostomy tube every 12 hours  Vitamin C 1000 mg oral tablet: 1 tab(s) by gastrostomy tube once a day  Zithromax 500 mg intravenous injection: 500 unit(s) intravenously once a day   acetaminophen 325 mg oral tablet: 2 tab(s) by gastrostomy tube every 6 hours as needed for  pain  acetaminophen 325 mg oral tablet: 2 tab(s) orally every 6 hours as needed for  temp 100F and above (see NH med list)  acetaminophen 325 mg oral tablet: 2 tab(s) by gastrostomy tube once a day at 9 AM 1 hour prior to dressing change  ampicillin: 2 gram(s) intravenously every 4 hours  Artificial Tears ophthalmic solution: 1 drop(s) in each eye 2 times a day as needed for  dry eyes  atorvastatin 80 mg oral tablet: 1 tab(s) by gastrostomy tube once a day (at bedtime)  dextromethorphan-quinidine 20 mg-10 mg oral capsule: 1 cap(s) orally 2 times a day  DuoNeb 0.5 mg-2.5 mg/3 mL inhalation solution: 3 milliliter(s) by nebulizer 2 times a day  Eliquis 5 mg oral tablet: 1 tab(s) by gastrostomy tube every 12 hours  folic acid 1 mg oral tablet: 1 tab(s) by gastrostomy tube once a day  Kameron 7 gram-7 gram-1.5 gram oral powder packet: 1 packet via gastrostomy tube once a day  levETIRAcetam 100 mg/mL oral solution: 20 milliliter(s) by gastrostomy tube every 12 hours  lisinopril 2.5 mg oral tablet: 1 tab(s) by gastrostomy tube once a day  Melatonin 5 mg oral capsule: 1 cap(s) by gastrostomy tube once a day (at bedtime)  Metamucil 3.4 g/3.7 g oral powder for reconstitution: 1 packet(s) by gastrostomy tube once a day  metoprolol tartrate 25 mg oral tablet: 1 tab(s) by gastrostomy tube 2 times a day Hold for BP less than 100 or pulse less than 60  midodrine 5 mg oral tablet: 1 tab(s) orally 3 times a day  Multiple Vitamins oral tablet: 1 tab(s) by gastrostomy tube once a day  pantoprazole 40 mg oral granule, delayed release: 1 by gastrostomy tube 2 times a day  polyethylene glycol 3350 oral powder for reconstitution: 17 gram(s) orally once a day  ProSource No Carb oral liquid: 30 milliliters by gastrostomy tube 3 times a day  senna (sennosides) 8.8 mg/5 mL oral syrup: 10 milliliter(s) orally once a day (at bedtime)  sodium chloride 0.9% injectable solution: 1 dose(s) injectable every hour as needed for flushing Cleanse IV  hub with alcohol 2x2 prior to flushing central line lumen e and after medication administration.  Transderm-Scop 1 mg/72 hr transdermal film, extended release: 1 patch transdermally every 3 days  valproic acid 250 mg/5 mL oral liquid: 25 milliliter(s) by gastrostomy tube every 8 hours  Vimpat 200 mg oral tablet: 1 tab(s) by gastrostomy tube every 12 hours  Vitamin C 1000 mg oral tablet: 1 tab(s) by gastrostomy tube once a day

## 2025-01-05 NOTE — DISCHARGE NOTE PROVIDER - HOSPITAL COURSE
Patient is a 48 y/o M pt with PMHx CVA (3/30/2022 with residual aphasia and right sided residual weakness), s/p tracheostomy and peg placement, seizures, HTN, HLD, CHF w/ ICD (initially rEF 30%->55-60% on 03/2021), MDD, Recurrent PE, Afib (on Eliquis), obesity s/p bariatric intervention presented to the ED after a fall 3 days ago. Patient is minimally verbal due to tracheostomy and not able to provide history. He confirmed that he has pain in right leg. However, could not provide  further history. As per NH papers, patient had a fall on 12/28/2024. He was getting PT done when he complained of severe pain. Outpatient x-ray showed proximal femur intertrochanteric fracture. Patient was transferred to the hospital for surgical intervention. As per NH papers, he has also been having fevers and he was started on ceftriaxone and azithromycin for suspected pneumonia on 12/29/2024.      Patient was admitted for management of his right intertrochanteric femur fracture and pneumonia.  Pt underwent an IM nailing for his rt hip fracture and he was treated with Meropenem for his Pneumonia as his sputum culture grew pseudomonas and his blood culture was positive for Enterococcus Faecalis.      INCOMPLETE:::::::1/5/25     Patient is a 48 y/o M pt with PMHx CVA (3/30/2022 with residual aphasia and right sided residual weakness), s/p tracheostomy and peg placement, seizures, HTN, HLD, CHF w/ ICD (initially rEF 30%->55-60% on 03/2021), MDD, Recurrent PE, Afib (on Eliquis), obesity s/p bariatric intervention presented to the ED after a fall 3 days ago. Patient is minimally verbal due to tracheostomy and not able to provide history. He confirmed that he has pain in right leg. However, could not provide  further history. As per NH papers, patient had a fall on 12/28/2024. He was getting PT done when he complained of severe pain. Outpatient x-ray showed proximal femur intertrochanteric fracture. Patient was transferred to the hospital for surgical intervention. As per NH papers, he has also been having fevers and he was started on ceftriaxone and azithromycin for suspected pneumonia on 12/29/2024.      Patient was admitted for management of his right intertrochanteric femur fracture and pneumonia.  Pt underwent an IM nailing for his rt hip fracture. Patient subsequently spiked fevers and sepsis w/u was initiated. Patient was initially started on Zosyn however it was switched to Meropenem for his Pneumonia as his sputum culture grew pseudomonas and his blood culture was positive for Enterococcus Faecalis. IF Dr. De La Torre following.     ICD no detection, however EP recommends no need to replace as ICD placed initially due to previous low EF. TTE resulting LBBB, LVSF with normal EF 59%. EP Dr. Traylor consulted, no plan for gen change given poor functional status, comorbidities, and recovered EF. Dr. Dacosta was following during this hospitalization.   S/p Code sepsis on 1/3 in the setting of fever, tachycardia and hypotension. CXR showed no consolidation.  Bcx 1/3 + for Gram positive for cocci in pairs and chains. Patient was also hypotensive. Sputum cx from 1/1 + pseudomonas aeruginosa. S/p ceftriaxone and azithro for PNA.  Blood cx + for Entero Faecalis. Zosyn d/arjun and started on Meropenem on 1/4 . Meropenem d/c and cefepime given from 1/06- 1/11.    Blood cx with Enterococcus Faecalis and Ampicillin 2Gms every 4 hours IVPB thru 2/3/25 empirically. S/p PICC on 1/9  for ABX therapy.  Patient tolerating PMV. Patient tolerating trach collar to . Hemodynamically stable and optimized for discharge to [facility]. CM following. Discussed discharge planning with family at bedside and intensivist. Patient to follow up with Ortho.  WBAT of the Right lower extremity with appropriate assistive device. patient to follow up with Dr. Rodriguez.                Patient is a 48 y/o M pt with PMHx CVA (3/30/2022 with residual aphasia and right sided residual weakness), s/p tracheostomy and peg placement, seizures, HTN, HLD, CHF w/ ICD (initially rEF 30%->55-60% on 03/2021), MDD, Recurrent PE, Afib (on Eliquis), obesity s/p bariatric intervention presented to the ED after a fall 3 days ago. Patient is minimally verbal due to tracheostomy and not able to provide history. He confirmed that he has pain in right leg. However, could not provide  further history. As per NH papers, patient had a fall on 12/28/2024. He was getting PT done when he complained of severe pain. Outpatient x-ray showed proximal femur intertrochanteric fracture. Patient was transferred to the hospital for surgical intervention. As per NH papers, he has also been having fevers and he was started on ceftriaxone and azithromycin for suspected pneumonia on 12/29/2024.      Patient was admitted for management of his right intertrochanteric femur fracture and pneumonia.  Pt underwent an IM nailing for his rt hip fracture. Patient subsequently spiked fevers and sepsis w/u was initiated. Patient was initially started on Zosyn however it was switched to Meropenem for his Pneumonia as his sputum culture grew pseudomonas and his blood culture was positive for Enterococcus Faecalis. IF Dr. De La Torre following.     ICD no detection, however EP recommends no need to replace as ICD placed initially due to previous low EF. TTE resulting LBBB, LVSF with normal EF 59%. EP Dr. Traylor consulted, no plan for gen change given poor functional status, comorbidities, and recovered EF. Dr. Dacosta was following during this hospitalization.   S/p Code sepsis on 1/3 in the setting of fever, tachycardia and hypotension. CXR showed no consolidation.  Bcx 1/3 + for Gram positive for cocci in pairs and chains. Patient was also hypotensive. Sputum cx from 1/1 + pseudomonas aeruginosa. S/p ceftriaxone and azithro for PNA.  Blood cx + for Entero Faecalis. Zosyn d/arjun and started on Meropenem on 1/4 . Meropenem d/c and cefepime given from 1/06- 1/11.    Blood cx with Enterococcus Faecalis and Ampicillin 2Gms every 4 hours IVPB thru 2/3/25 empirically. S/p PICC on 1/9  for ABX therapy.  Patient tolerating PMV. Patient tolerating trach collar to RA. Hemodynamically stable and optimized for discharge to [facility]. CM following. Discussed discharge planning with family at bedside and intensivist. Patient to follow up with Ortho.  WBAT of the Right lower extremity with appropriate assistive device. patient to follow up with Dr. Rodriguez.     for a full account of hospital course please refer to actual medical records for this is a brief summery

## 2025-01-05 NOTE — CONSULT NOTE ADULT - REASON FOR ADMISSION
Right intertrochanteric femur fracture

## 2025-01-05 NOTE — PROGRESS NOTE ADULT - PROBLEM SELECTOR PLAN 12
- C/w atorvastatin.    # DVT ppx  - Eliquis     # Discharge planning:   - From [facility]  - Code sepsis on 1/3  - F/u cultures  - C/w Meropenem   - F/u ID reccs  - S/p R hip IM nailing POD #2. Ortho followin. WBAT  - PT reccs [facility]KE[facility] - C/w atorvastatin.    # Discharge planning:   - From [facility]  - S/p R hip IM nailing POD #4. Ortho following. WBAT  - PT reccs [facility]

## 2025-01-05 NOTE — CONSULT NOTE ADULT - ASSESSMENT
Assessment and Recommendations:  49 year old M with CVA 3/30/2022 with residual aphasia and right sided residual weakness, s/p tracheostomy and peg placement, seizures, HTN, HLD, ?HF with recovered EF s/p ?Bi-V ICD (initially EF 30%->55-60% on 03/2021), MDD, PE, Afib on Eliquis who was referred by nursing home after fall on 12/28/24, found to have R intertrochanteric fracture, requiring surgery. Cardiology consulted for pre-op evaluation.    1) Pre-op cardiac optimization, prior to orthopedic surgery  - His RCRI is 2 which elevates his cardiac risk for planned orthopedic surgery  - He denies chest pain or shortness of breath (responded by nodding yes/no)  - However, there is no evidence of active ACS, arrhythmia, clinical heart failure, or significant valvular disease  - ICD interrogation  - F/U TTE. If TTE is unremarkable, patient is optimized from cardiac standpoint and may proceed to planned orthopedic surgery without further cardiac testing  - He may hold Eliquis for 2 days prior to surgery but should resume as soon as safe from surgical perspective    2) ?HF with recovered EF s/p ?Bi-V ICD (initially EF 30%->55-60% on 03/2021)  - Euvolemic off diuretics  - Check TTE as above    3) CVA 3/30/2022 with residual aphasia and right sided residual weakness  4) Afib  - Eliquis as above  - Continue metoprolol    Onel Cisneros MD ([employer] TEAMS Message PREFERRED)  Cardiology Attending  [facility] Physician [facility] - Cardiology    All Cardiology service information can be found 24/7 on [email or web address], password: [email or web address]!
49 year old M with CVA 3/30/2022 with residual aphasia and right sided residual weakness, s/p tracheostomy and peg placement, seizures, HTN, HLD, ?HF with recovered EF s/p ?Bi-V ICD (initially EF 30%->55-60% on 03/2021), MDD, PE, Afib on Eliquis who was referred by nursing home after fall on 12/28/24, found to have R intertrochanteric fracture, requiring surgery. Electrophysiology was consulted for possible ICD generator change
Fever - improved  Bacteremia - with Enterococcus, ? real or contamination      Plan - Cont Meropenem 1 gm iv q8hrs  will repeat blood cultures  will need a DEXTER to r/o Endocarditis.

## 2025-01-05 NOTE — CONSULT NOTE ADULT - SUBJECTIVE AND OBJECTIVE BOX
HPI:  Patient is a 48 y/o M pt with PMHx CVA (3/30/2022 with residual aphasia and right sided residual weakness), s/p tracheostomy and peg placement, seizures, HTN, HLD, CHF w/ ICD (initially rEF 30%->55-60% on 03/2021), MDD, Recurrent PE, Afib (on Eliquis), obesity s/p bariatric intervention presented to the ED after a fall 3 days ago. Patient is minimally verbal dye to tracheostomy and not able to provide history. He confirmed that he has pain in right leg. However, could not provide  further history. As per NH papers patient had a fall on12/28/2024. He was getting PT done today when he complained of severe pain. Outpatient xray showed proximal femur intertrochanteric fracture. Patient was transferred to the hospital for surgical intervention. As per NH papers, he has also been having fevers and was started on ceftriaxone and azithromycin for suspected pneumonia on 12/29/2024. (01 Jan 2025 00:49)                PAST MEDICAL & SURGICAL HISTORY:  Heart failure, unspecified HF chronicity, unspecified heart failure type      ETOH abuse  h/o etoh abuse now moderate drinker      Artificial pacemaker          shellfish (Unknown)  No Known Drug Allergies      Meds:  albuterol/ipratropium for Nebulization 3 milliLiter(s) Nebulizer every 6 hours  apixaban 5 milliGRAM(s) Oral every 12 hours  artificial  tears Solution 1 Drop(s) Both EYES daily  ascorbic acid 500 milliGRAM(s) Oral daily  atorvastatin 80 milliGRAM(s) Oral at bedtime  chlorhexidine 2% Cloths 1 Application(s) Topical daily  folic acid 1 milliGRAM(s) Oral daily  HYDROmorphone  Injectable 0.2 milliGRAM(s) IV Push every 4 hours PRN  lacosamide Solution 200 milliGRAM(s) Oral two times a day  levETIRAcetam  Solution 2000 milliGRAM(s) Enteral Tube two times a day  melatonin 5 milliGRAM(s) Oral at bedtime  meropenem  IVPB 1000 milliGRAM(s) IV Intermittent every 8 hours  meropenem  IVPB      midodrine. 5 milliGRAM(s) Oral three times a day  multivitamin 1 Tablet(s) Oral daily  naloxone Injectable 0.4 milliGRAM(s) IV Push once  ondansetron Injectable 4 milliGRAM(s) IV Push every 8 hours PRN  pantoprazole  Injectable 40 milliGRAM(s) IV Push daily  polyethylene glycol 3350 17 Gram(s) Oral daily  senna Syrup 10 milliLiter(s) Oral at bedtime  valproic  acid Syrup 1250 milliGRAM(s) Oral three times a day      SOCIAL HISTORY:  Smoker:  YES / NO        PACK YEARS:                         WHEN QUIT?  ETOH use:  YES / NO               FREQUENCY / QUANTITY:  Ilicit Drug use:  YES / NO  Occupation:  Assisted device use (Cane / Walker):  Live with:    FAMILY HISTORY:  No pertinent family history in first degree relatives        VITALS:  Vital Signs Last 24 Hrs  T(C): 37 (05 Jan 2025 14:03), Max: 37 (05 Jan 2025 14:03)  T(F): 98.6 (05 Jan 2025 14:03), Max: 98.6 (05 Jan 2025 14:03)  HR: 70 (05 Jan 2025 14:03) (68 - 75)  BP: 103/56 (05 Jan 2025 14:03) (94/54 - 103/63)  BP(mean): 63 (05 Jan 2025 05:18) (63 - 76)  RR: 17 (05 Jan 2025 14:03) (17 - 21)  SpO2: 94% (05 Jan 2025 14:03) (92% - 99%)    Parameters below as of 05 Jan 2025 14:03  Patient On (Oxygen Delivery Method): tracheostomy collar  O2 Flow (L/min): 3      LABS/DIAGNOSTIC TESTS:                          8.5    7.09  )-----------( 259      ( 05 Jan 2025 06:38 )             24.5     WBC Count: 7.09 K/uL (01-05 @ 06:38)  WBC Count: 7.62 K/uL (01-04 @ 07:20)  WBC Count: 7.97 K/uL (01-03 @ 18:30)  WBC Count: 7.78 K/uL (01-03 @ 16:25)  WBC Count: 5.96 K/uL (01-03 @ 08:40)      01-05    142  |  106  |  13  ----------------------------<  132[H]  3.7   |  33[H]  |  0.54    Ca    8.5      05 Jan 2025 06:38  Phos  3.0     01-05  Mg     2.3     01-05    TPro  6.0  /  Alb  1.9[L]  /  TBili  0.4  /  DBili  x   /  AST  30  /  ALT  18  /  AlkPhos  61  01-05      Urinalysis with Rflx Culture (01.03.25 @ 17:30)   Urine Appearance: Clear  Color: Dark Yellow  Specific Gravity: 1.026  pH Urine: 6.0  Protein, Urine: Negative mg/dL  Glucose Qualitative, Urine: Negative mg/dL  Ketone - Urine: Trace mg/dL  Blood, Urine: Negative  Bilirubin: Negative  Urobilinogen: 1.0 mg/dL  Leukocyte Esterase Concentration: Negative  Nitrite: Negative      Legionella Antigen, Urine: Negative:     LIVER FUNCTIONS - ( 05 Jan 2025 06:38 )  Alb: 1.9 g/dL / Pro: 6.0 g/dL / ALK PHOS: 61 U/L / ALT: 18 U/L DA / AST: 30 U/L / GGT: x                 LACTATE:    ABG -     CULTURES:   .Sputum Sputum  01-03 @ 18:50   Few Pseudomonas aeruginosa  Commensal jaime consistent with body site  --  Pseudomonas aeruginosa      .Blood BLOOD  01-03 @ 18:35   Growth in aerobic bottle: Enterococcus faecalis  Direct identification is available within approximately 3-5  hours either by Blood Panel Multiplexed PCR or Direct  MALDI-TOF. Details: [email or web address]  --  Blood Culture PCR      .Blood BLOOD  01-03 @ 18:27   No growth at 24 hours  --  --      ET Tube ET Tube  01-01 @ 09:25   Few Pseudomonas aeruginosa  Commensal jaime consistent with body site  --  Pseudomonas aeruginosa          RADIOLOGY:      ROS  [  ] UNABLE TO ELICIT               HPI:  Patient is a 48 y/o M pt with PMHx CVA (3/30/2022 with residual aphasia and right sided residual weakness), s/p tracheostomy and peg placement, seizures, HTN, HLD, CHF w/ ICD (initially rEF 30%->55-60% on 03/2021), MDD, Recurrent PE, Afib (on Eliquis), obesity s/p bariatric intervention presented to the ED after a fall 3 days ago. Patient is minimally verbal dye to tracheostomy and not able to provide history. He confirmed that he has pain in right leg. However, could not provide  further history. As per NH papers patient had a fall on12/28/2024. He was getting PT done today when he complained of severe pain. Outpatient xray showed proximal femur intertrochanteric fracture. Patient was transferred to the hospital for surgical intervention. As per NH papers, he has also been having fevers and was started on ceftriaxone and azithromycin for suspected pneumonia on 12/29/2024. (01 Jan 2025 00:49)        History as above, asked to see this patient who was admitted from a NH after falling and fractured his right hip, he had surgical repair here. He developed a fever here and now was found to have Enterococcus in his blood cultures. He has a Tracheostomy and peg and is unable to talk but can answer leading questions by shaking his head or nodding. He was started on Meropenem empirically as he has thick tracheal secretions though he has no infiltrates on CXR. He has no more fevers, he denies chills, no diarrhea , no coughing worse than baseline, no chest pain.        PAST MEDICAL & SURGICAL HISTORY:  Heart failure, unspecified HF chronicity, unspecified heart failure type      ETOH abuse  h/o etoh abuse now moderate drinker      Artificial pacemaker          shellfish (Unknown)  No Known Drug Allergies      Meds:  albuterol/ipratropium for Nebulization 3 milliLiter(s) Nebulizer every 6 hours  apixaban 5 milliGRAM(s) Oral every 12 hours  artificial  tears Solution 1 Drop(s) Both EYES daily  ascorbic acid 500 milliGRAM(s) Oral daily  atorvastatin 80 milliGRAM(s) Oral at bedtime  chlorhexidine 2% Cloths 1 Application(s) Topical daily  folic acid 1 milliGRAM(s) Oral daily  HYDROmorphone  Injectable 0.2 milliGRAM(s) IV Push every 4 hours PRN  lacosamide Solution 200 milliGRAM(s) Oral two times a day  levETIRAcetam  Solution 2000 milliGRAM(s) Enteral Tube two times a day  melatonin 5 milliGRAM(s) Oral at bedtime  meropenem  IVPB 1000 milliGRAM(s) IV Intermittent every 8 hours  meropenem  IVPB      midodrine. 5 milliGRAM(s) Oral three times a day  multivitamin 1 Tablet(s) Oral daily  naloxone Injectable 0.4 milliGRAM(s) IV Push once  ondansetron Injectable 4 milliGRAM(s) IV Push every 8 hours PRN  pantoprazole  Injectable 40 milliGRAM(s) IV Push daily  polyethylene glycol 3350 17 Gram(s) Oral daily  senna Syrup 10 milliLiter(s) Oral at bedtime  valproic  acid Syrup 1250 milliGRAM(s) Oral three times a day      SOCIAL HISTORY:  Smoker:  YES, active smoker and smokes via his trach  ETOH use:  YES   Ilicit Drug use:  denies      FAMILY HISTORY:  No pertinent family history in first degree relatives        VITALS:  Vital Signs Last 24 Hrs  T(C): 37 (05 Jan 2025 14:03), Max: 37 (05 Jan 2025 14:03)  T(F): 98.6 (05 Jan 2025 14:03), Max: 98.6 (05 Jan 2025 14:03)  HR: 70 (05 Jan 2025 14:03) (68 - 75)  BP: 103/56 (05 Jan 2025 14:03) (94/54 - 103/63)  BP(mean): 63 (05 Jan 2025 05:18) (63 - 76)  RR: 17 (05 Jan 2025 14:03) (17 - 21)  SpO2: 94% (05 Jan 2025 14:03) (92% - 99%)    Parameters below as of 05 Jan 2025 14:03  Patient On (Oxygen Delivery Method): tracheostomy collar  O2 Flow (L/min): 3      LABS/DIAGNOSTIC TESTS:                          8.5    7.09  )-----------( 259      ( 05 Jan 2025 06:38 )             24.5     WBC Count: 7.09 K/uL (01-05 @ 06:38)  WBC Count: 7.62 K/uL (01-04 @ 07:20)  WBC Count: 7.97 K/uL (01-03 @ 18:30)  WBC Count: 7.78 K/uL (01-03 @ 16:25)  WBC Count: 5.96 K/uL (01-03 @ 08:40)      01-05    142  |  106  |  13  ----------------------------<  132[H]  3.7   |  33[H]  |  0.54    Ca    8.5      05 Jan 2025 06:38  Phos  3.0     01-05  Mg     2.3     01-05    TPro  6.0  /  Alb  1.9[L]  /  TBili  0.4  /  DBili  x   /  AST  30  /  ALT  18  /  AlkPhos  61  01-05      Urinalysis with Rflx Culture (01.03.25 @ 17:30)   Urine Appearance: Clear  Color: Dark Yellow  Specific Gravity: 1.026  pH Urine: 6.0  Protein, Urine: Negative mg/dL  Glucose Qualitative, Urine: Negative mg/dL  Ketone - Urine: Trace mg/dL  Blood, Urine: Negative  Bilirubin: Negative  Urobilinogen: 1.0 mg/dL  Leukocyte Esterase Concentration: Negative  Nitrite: Negative      Legionella Antigen, Urine: Negative:     LIVER FUNCTIONS - ( 05 Jan 2025 06:38 )  Alb: 1.9 g/dL / Pro: 6.0 g/dL / ALK PHOS: 61 U/L / ALT: 18 U/L DA / AST: 30 U/L / GGT: x                 LACTATE:    ABG -     CULTURES:   .Sputum Sputum  01-03 @ 18:50   Few Pseudomonas aeruginosa  Commensal jaime consistent with body site  --  Pseudomonas aeruginosa      .Blood BLOOD  01-03 @ 18:35   Growth in aerobic bottle: Enterococcus faecalis  Direct identification is available within approximately 3-5  hours either by Blood Panel Multiplexed PCR or Direct  MALDI-TOF. Details: [email or web address]  --  Blood Culture PCR      .Blood BLOOD  01-03 @ 18:27   No growth at 24 hours  --  --      ET Tube ET Tube  01-01 @ 09:25   Few Pseudomonas aeruginosa  Commensal jaime consistent with body site  --  Pseudomonas aeruginosa          RADIOLOGY:< from: Xray Abdomen 1 View PORTABLE -Routine (Xray Abdomen 1 View PORTABLE -Routine .) (01.04.25 @ 11:10) >  ACC: [id number] EXAM:  XR ABDOMEN PORTABLE ROUTINE 1V   ORDERED BY: MARJORIE COHEN     PROCEDURE DATE:  01/04/2025          INTERPRETATION:  KUB: AP, 2 images    COMPARISON: No recent priors.    CLINICAL INFORMATION: SBO..    FINDINGS: PEG feeding tubeoverlies LEFT upper quadrant  Redundant air-filled transverse colon. Small bowel pattern nonspecific.  No free intra-abdominal air.  No abnormal calcifications.  The osseous structures are intact.    IMPRESSION:    Redundant air-filled transverse colon. Small bowel pattern nonspecific.  If symptoms warrant further investigation either on repeat abdominal   supine/erect views or abdominal CT scan recommended.    --- End of Report ---            NUPUR EPSTEIN MD; Attending Radiologist  This document has been electronically signed. Jan 6 2025  2:54PM    < end of copied text >  -----------------------------------------------------------------------------------------------------------------------------  ACC: [id number] EXAM:  XR CHEST AP OR PA 1V   ORDERED BY: LAINA MELTON     PROCEDURE DATE:  01/03/2025          INTERPRETATION:  EXAMINATION: XR CHEST    CLINICAL INDICATION: Sepsis    TECHNIQUE: Single frontal view of the chest was obtained.    COMPARISON: Chest x-ray 1/1/2025.    FINDINGS:    Tracheostomy.    Left-sided AICD.    The heart is normal in size.    No focal consolidation. No pneumothorax. No pleural effusion.    No acute osseous abnormalities.    IMPRESSION:  No focal consolidation.    --- End of Report ---             SHANTE LIVE DO; Attending Radiologist  This document has been electronically signed. Jan 4 2025  3:27PM    < end of copied text >        ROS  [  ] UNABLE TO ELICIT, limited

## 2025-01-05 NOTE — CHART NOTE - NSCHARTNOTEFT_GEN_A_CORE
Case discussed with pt's mother at bedside and update her regarding pt's current medical condition.  Plan of care discussed and all questions answered.

## 2025-01-06 LAB
-  AMPICILLIN: SIGNIFICANT CHANGE UP
-  GENTAMICIN SYNERGY: SIGNIFICANT CHANGE UP
-  STREPTOMYCIN SYNERGY: SIGNIFICANT CHANGE UP
-  VANCOMYCIN: SIGNIFICANT CHANGE UP
ALBUMIN SERPL ELPH-MCNC: 2 G/DL — LOW (ref 3.5–5)
ALP SERPL-CCNC: 71 U/L — SIGNIFICANT CHANGE UP (ref 40–120)
ALT FLD-CCNC: 24 U/L DA — SIGNIFICANT CHANGE UP (ref 10–60)
ANION GAP SERPL CALC-SCNC: 3 MMOL/L — LOW (ref 5–17)
AST SERPL-CCNC: 33 U/L — SIGNIFICANT CHANGE UP (ref 10–40)
BILIRUB SERPL-MCNC: 0.4 MG/DL — SIGNIFICANT CHANGE UP (ref 0.2–1.2)
BUN SERPL-MCNC: 14 MG/DL — SIGNIFICANT CHANGE UP (ref 7–18)
CALCIUM SERPL-MCNC: 8.4 MG/DL — SIGNIFICANT CHANGE UP (ref 8.4–10.5)
CHLORIDE SERPL-SCNC: 107 MMOL/L — SIGNIFICANT CHANGE UP (ref 96–108)
CO2 SERPL-SCNC: 33 MMOL/L — HIGH (ref 22–31)
CREAT SERPL-MCNC: 0.56 MG/DL — SIGNIFICANT CHANGE UP (ref 0.5–1.3)
CULTURE RESULTS: ABNORMAL
EGFR: 120 ML/MIN/1.73M2 — SIGNIFICANT CHANGE UP
GLUCOSE BLDC GLUCOMTR-MCNC: 109 MG/DL — HIGH (ref 70–99)
GLUCOSE BLDC GLUCOMTR-MCNC: 122 MG/DL — HIGH (ref 70–99)
GLUCOSE BLDC GLUCOMTR-MCNC: 126 MG/DL — HIGH (ref 70–99)
GLUCOSE SERPL-MCNC: 146 MG/DL — HIGH (ref 70–99)
HCT VFR BLD CALC: 24.7 % — LOW (ref 39–50)
HGB BLD-MCNC: 8.3 G/DL — LOW (ref 13–17)
MAGNESIUM SERPL-MCNC: 2.3 MG/DL — SIGNIFICANT CHANGE UP (ref 1.6–2.6)
MCHC RBC-ENTMCNC: 32.7 PG — SIGNIFICANT CHANGE UP (ref 27–34)
MCHC RBC-ENTMCNC: 33.6 G/DL — SIGNIFICANT CHANGE UP (ref 32–36)
MCV RBC AUTO: 97.2 FL — SIGNIFICANT CHANGE UP (ref 80–100)
METHOD TYPE: SIGNIFICANT CHANGE UP
NRBC # BLD: 0 /100 WBCS — SIGNIFICANT CHANGE UP (ref 0–0)
ORGANISM # SPEC MICROSCOPIC CNT: ABNORMAL
PHOSPHATE SERPL-MCNC: 2.8 MG/DL — SIGNIFICANT CHANGE UP (ref 2.5–4.5)
PLATELET # BLD AUTO: 291 K/UL — SIGNIFICANT CHANGE UP (ref 150–400)
POTASSIUM SERPL-MCNC: 3.7 MMOL/L — SIGNIFICANT CHANGE UP (ref 3.5–5.3)
POTASSIUM SERPL-SCNC: 3.7 MMOL/L — SIGNIFICANT CHANGE UP (ref 3.5–5.3)
PROT SERPL-MCNC: 6 G/DL — SIGNIFICANT CHANGE UP (ref 6–8.3)
RBC # BLD: 2.54 M/UL — LOW (ref 4.2–5.8)
RBC # FLD: 15 % — HIGH (ref 10.3–14.5)
SODIUM SERPL-SCNC: 143 MMOL/L — SIGNIFICANT CHANGE UP (ref 135–145)
SPECIMEN SOURCE: SIGNIFICANT CHANGE UP
WBC # BLD: 5.48 K/UL — SIGNIFICANT CHANGE UP (ref 3.8–10.5)
WBC # FLD AUTO: 5.48 K/UL — SIGNIFICANT CHANGE UP (ref 3.8–10.5)

## 2025-01-06 PROCEDURE — 99233 SBSQ HOSP IP/OBS HIGH 50: CPT

## 2025-01-06 RX ORDER — ACETAMINOPHEN 80 MG/.8ML
1000 SOLUTION/ DROPS ORAL ONCE
Refills: 0 | Status: COMPLETED | OUTPATIENT
Start: 2025-01-06 | End: 2025-01-06

## 2025-01-06 RX ORDER — AMPICILLIN TRIHYDRATE 125 MG/5ML
2 SUSPENSION, RECONSTITUTED, ORAL (ML) ORAL EVERY 4 HOURS
Refills: 0 | Status: DISCONTINUED | OUTPATIENT
Start: 2025-01-06 | End: 2025-01-13

## 2025-01-06 RX ADMIN — LEVETIRACETAM 2000 MILLIGRAM(S): 100 SOLUTION ORAL at 17:36

## 2025-01-06 RX ADMIN — MIDODRINE HYDROCHLORIDE 5 MILLIGRAM(S): 5 TABLET ORAL at 12:23

## 2025-01-06 RX ADMIN — Medication 1 TABLET(S): at 13:39

## 2025-01-06 RX ADMIN — Medication 0.2 MILLIGRAM(S): at 16:58

## 2025-01-06 RX ADMIN — Medication 17 GRAM(S): at 12:24

## 2025-01-06 RX ADMIN — VALPROIC ACID 1250 MILLIGRAM(S): 250 SOLUTION ORAL at 13:38

## 2025-01-06 RX ADMIN — Medication 200 GRAM(S): at 17:36

## 2025-01-06 RX ADMIN — Medication 200 GRAM(S): at 12:21

## 2025-01-06 RX ADMIN — ATORVASTATIN CALCIUM 80 MILLIGRAM(S): 40 TABLET, FILM COATED ORAL at 20:53

## 2025-01-06 RX ADMIN — ACETAMINOPHEN 1000 MILLIGRAM(S): 80 SOLUTION/ DROPS ORAL at 06:14

## 2025-01-06 RX ADMIN — Medication 0.2 MILLIGRAM(S): at 22:58

## 2025-01-06 RX ADMIN — MIDODRINE HYDROCHLORIDE 5 MILLIGRAM(S): 5 TABLET ORAL at 17:37

## 2025-01-06 RX ADMIN — IPRATROPIUM BROMIDE AND ALBUTEROL SULFATE 3 MILLILITER(S): .5; 2.5 SOLUTION RESPIRATORY (INHALATION) at 08:10

## 2025-01-06 RX ADMIN — IPRATROPIUM BROMIDE AND ALBUTEROL SULFATE 3 MILLILITER(S): .5; 2.5 SOLUTION RESPIRATORY (INHALATION) at 20:12

## 2025-01-06 RX ADMIN — IPRATROPIUM BROMIDE AND ALBUTEROL SULFATE 3 MILLILITER(S): .5; 2.5 SOLUTION RESPIRATORY (INHALATION) at 03:16

## 2025-01-06 RX ADMIN — POLYSORBATE 80 1 DROP(S): 100 SOLUTION/ DROPS OPHTHALMIC at 12:21

## 2025-01-06 RX ADMIN — Medication 1 MILLIGRAM(S): at 12:23

## 2025-01-06 RX ADMIN — CHLORHEXIDINE GLUCONATE 1 APPLICATION(S): 1.2 RINSE ORAL at 12:22

## 2025-01-06 RX ADMIN — ACETAMINOPHEN 1000 MILLIGRAM(S): 80 SOLUTION/ DROPS ORAL at 01:12

## 2025-01-06 RX ADMIN — LACOSAMIDE 200 MILLIGRAM(S): 100 TABLET, FILM COATED ORAL at 17:44

## 2025-01-06 RX ADMIN — Medication 500 MILLIGRAM(S): at 12:27

## 2025-01-06 RX ADMIN — Medication 5 MILLIGRAM(S): at 20:52

## 2025-01-06 RX ADMIN — Medication 200 GRAM(S): at 20:54

## 2025-01-06 RX ADMIN — Medication 0.2 MILLIGRAM(S): at 15:40

## 2025-01-06 RX ADMIN — LEVETIRACETAM 2000 MILLIGRAM(S): 100 SOLUTION ORAL at 05:52

## 2025-01-06 RX ADMIN — Medication 0.2 MILLIGRAM(S): at 20:55

## 2025-01-06 RX ADMIN — IPRATROPIUM BROMIDE AND ALBUTEROL SULFATE 3 MILLILITER(S): .5; 2.5 SOLUTION RESPIRATORY (INHALATION) at 14:34

## 2025-01-06 RX ADMIN — APIXABAN 5 MILLIGRAM(S): 5 TABLET, FILM COATED ORAL at 17:36

## 2025-01-06 RX ADMIN — VALPROIC ACID 1250 MILLIGRAM(S): 250 SOLUTION ORAL at 20:52

## 2025-01-06 RX ADMIN — Medication 100 MILLIGRAM(S): at 20:53

## 2025-01-06 RX ADMIN — APIXABAN 5 MILLIGRAM(S): 5 TABLET, FILM COATED ORAL at 05:53

## 2025-01-06 RX ADMIN — ACETAMINOPHEN 400 MILLIGRAM(S): 80 SOLUTION/ DROPS ORAL at 00:12

## 2025-01-06 RX ADMIN — PANTOPRAZOLE 40 MILLIGRAM(S): 40 TABLET, DELAYED RELEASE ORAL at 12:24

## 2025-01-06 RX ADMIN — VALPROIC ACID 1250 MILLIGRAM(S): 250 SOLUTION ORAL at 05:52

## 2025-01-06 RX ADMIN — MEROPENEM 100 MILLIGRAM(S): 1 INJECTION, POWDER, FOR SOLUTION INTRAVENOUS at 05:53

## 2025-01-06 RX ADMIN — LACOSAMIDE 200 MILLIGRAM(S): 100 TABLET, FILM COATED ORAL at 05:52

## 2025-01-06 RX ADMIN — Medication 100 MILLIGRAM(S): at 13:38

## 2025-01-06 RX ADMIN — ACETAMINOPHEN 400 MILLIGRAM(S): 80 SOLUTION/ DROPS ORAL at 05:52

## 2025-01-06 NOTE — PROGRESS NOTE ADULT - NS ATTEND AMEND GEN_ALL_CORE FT
Impression: This is a 48 Y/O Male from [facility] presented with Fall .  As per NH papers patient had a fall on12/28/2024. Admitted to  for management of right intertrochanteric femur fracture. For pulmonary follow up of Left Lung Pneumonia with Chronic Hypoxic Respiratory Failure with Hx of recurrent PE .     Suggestion :  O2 saturation 96% . Hydro trach O2  . So far O2 saturation trend stable.    Oral, trach care, suction.   Not a candidate for decannulation nor trach capping at this time.   Continue Duoneb via nebulization Q 6 Hours.  On Cefepime 2 Gm IVPB Q 8 Hours.  On Ampicillin 2 Gm IVPB Q 4 Hours .   On Apixaban 5 mg Q 12 Hours .

## 2025-01-06 NOTE — PROGRESS NOTE ADULT - SUBJECTIVE AND OBJECTIVE BOX
Cardiology Progress Note  ------------------------------------------------------------------------------------------  SUBJECTIVE:   No events overnight. Denies CP, SOB or Palpitations.   -------------------------------------------------------------------------------------------  ROS:  CV: chest pain (-), palpitation (-), orthopnea (-), PND (-), edema (-)  PULM: SOB (-), cough (-), wheezing (-), hemoptysis (-).   CONST: fever (-), chills (-) or fatigue (-)  GI: abdominal distension (-), abdominal pain (-) , nausea/vomiting (-), hematemesis, (-), melena (-), hematochezia (-)  : dysuria (-), frequency (-), hematuria (-).   NEURO: numbness (-), weakness (-), dizziness (-)  MSK: myalgia (-), joint pain (-)   SKIN: itching (-), rash (-)  HEENT:  visual changes (-); vertigo or throat pain (-);  neck stiffness (-)   Psych: change in mood (-), anxiety (-), depression (-)     All other review of systems is negative unless indicated above.   -------------------------------------------------------------------------------------------  VS:  T(F): 98 (01-06), Max: 98.4 (01-05)  HR: 60 (01-06) (57 - 83)  BP: 105/77 (01-06) (94/59 - 132/70)  RR: 17 (01-06)  SpO2: 97% (01-06)  I&O's Summary    05 Jan 2025 07:01  -  06 Jan 2025 07:00  --------------------------------------------------------  IN: 0 mL / OUT: 875 mL / NET: -875 mL    06 Jan 2025 07:01  -  06 Jan 2025 18:41  --------------------------------------------------------  IN: 0 mL / OUT: 350 mL / NET: -350 mL      ------------------------------------------------------------------------------------------  PHYSICAL EXAM:  General: No acute distress. Awake and conversant.   Eyes: Normal conjunctiva, anicteric. Round symmetric pupils.   ENT: Hearing grossly intact. No nasal discharge.   Neck: Neck is supple. No masses or thyromegaly.   Respiratory: Respirations are non-labored. No wheezing.   Skin: Warm. No rashes or ulcers.   Psych: Alert and oriented. Cooperative, Appropriate mood and affect, Normal judgment.   CV: No lower extremity edema.   MSK: Normal ambulation. No clubbing or cyanosis.   Neuro: Sensation and CN II-XII grossly normal.  -------------------------------------------------------------------------------------------  LABS:  01-06    143  |  107  |  14  ----------------------------<  146[H]  3.7   |  33[H]  |  0.56    Ca    8.4      06 Jan 2025 07:36  Phos  2.8     01-06  Mg     2.3     01-06    TPro  6.0  /  Alb  2.0[L]  /  TBili  0.4  /  DBili  x   /  AST  33  /  ALT  24  /  AlkPhos  71  01-06    Creatinine Trend: 0.56<--, 0.54<--, 0.68<--, 0.70<--, 0.64<--, 0.53<--                        8.3    5.48  )-----------( 291      ( 06 Jan 2025 07:36 )             24.7         Lipid Panel: T(F): 98 (01-06), Max: 98.4 (01-05)  HR: 60 (01-06) (57 - 83)  BP: 105/77 (01-06) (94/59 - 132/70)  RR: 17 (01-06)  SpO2: 97% (01-06)  Cardiac Enzymes:         -------------------------------------------------------------------------------------------  Meds:  albuterol/ipratropium for Nebulization 3 milliLiter(s) Nebulizer every 6 hours  ampicillin  IVPB 2 Gram(s) IV Intermittent every 4 hours  apixaban 5 milliGRAM(s) Oral every 12 hours  artificial  tears Solution 1 Drop(s) Both EYES daily  ascorbic acid 500 milliGRAM(s) Oral daily  atorvastatin 80 milliGRAM(s) Oral at bedtime  cefepime   IVPB 2000 milliGRAM(s) IV Intermittent every 8 hours  chlorhexidine 2% Cloths 1 Application(s) Topical daily  folic acid 1 milliGRAM(s) Oral daily  HYDROmorphone  Injectable 0.2 milliGRAM(s) IV Push every 4 hours PRN  lacosamide Solution 200 milliGRAM(s) Oral two times a day  levETIRAcetam  Solution 2000 milliGRAM(s) Enteral Tube two times a day  melatonin 5 milliGRAM(s) Oral at bedtime  midodrine. 5 milliGRAM(s) Oral three times a day  multivitamin 1 Tablet(s) Oral daily  naloxone Injectable 0.4 milliGRAM(s) IV Push once  ondansetron Injectable 4 milliGRAM(s) IV Push every 8 hours PRN  pantoprazole  Injectable 40 milliGRAM(s) IV Push daily  polyethylene glycol 3350 17 Gram(s) Oral daily  senna Syrup 10 milliLiter(s) Oral at bedtime  valproic  acid Syrup 1250 milliGRAM(s) Oral three times a day    -------------------------------------------------------------------------------------------  Cardiovascular Diagnostic Testing:  -------------------------------------------------------------------------------------------  ECG:     Echo:     Stress Testing:    Cath:    Imaging:    CXR:  reviewed  -------------------------------------------------------------------------------------------  Assessment and Plan:   -------------------------------------------------------------------------------------------  Problems Assessed:   1.  2.  3.  4.    Plan:   •  •  •  •  -------------------------------------------------------------------------------------------  Billing Statement:   76/56/51/36 minutes spent on total encounter. Necessity of time spent during this encounter on this date of service was due to review of medical information in EMR, co-ordination of hospital and outpatient care, discussion with patient and communication with primary team.   -------------------------------------------------------------------------------------------  Manpreet Mock MD   of Cardiology  [facility]  [address], [address]  [place] [address]  Phone: [phone]  Fax: [phone]    Please check [email or web address]: [email or web address] for cardiology service schedule and contact information.  Cardiology Progress Note  ------------------------------------------------------------------------------------------  SUBJECTIVE:   No events overnight. Minimally verbal.   -------------------------------------------------------------------------------------------  ROS:  Unable to obtain.     All other review of systems is negative unless indicated above.   -------------------------------------------------------------------------------------------  VS:  T(F): 98 (01-06), Max: 98.4 (01-05)  HR: 60 (01-06) (57 - 83)  BP: 105/77 (01-06) (94/59 - 132/70)  RR: 17 (01-06)  SpO2: 97% (01-06)  I&O's Summary    05 Jan 2025 07:01  -  06 Jan 2025 07:00  --------------------------------------------------------  IN: 0 mL / OUT: 875 mL / NET: -875 mL    06 Jan 2025 07:01  -  06 Jan 2025 18:41  --------------------------------------------------------  IN: 0 mL / OUT: 350 mL / NET: -350 mL      ------------------------------------------------------------------------------------------  PHYSICAL EXAM:  GENERAL: No acute distress  ENT: trach  CHEST/LUNG: Clear to auscultation anteriorly  BACK: No spinal tenderness  HEART: Regular rate and rhythm; No murmurs, rubs, or gallops  ABDOMEN: Soft  EXTREMITIES:  No clubbing, cyanosis, or edema  -------------------------------------------------------------------------------------------  LABS:  01-06    143  |  107  |  14  ----------------------------<  146[H]  3.7   |  33[H]  |  0.56    Ca    8.4      06 Jan 2025 07:36  Phos  2.8     01-06  Mg     2.3     01-06    TPro  6.0  /  Alb  2.0[L]  /  TBili  0.4  /  DBili  x   /  AST  33  /  ALT  24  /  AlkPhos  71  01-06    Creatinine Trend: 0.56<--, 0.54<--, 0.68<--, 0.70<--, 0.64<--, 0.53<--                        8.3    5.48  )-----------( 291      ( 06 Jan 2025 07:36 )             24.7         Lipid Panel: T(F): 98 (01-06), Max: 98.4 (01-05)  HR: 60 (01-06) (57 - 83)  BP: 105/77 (01-06) (94/59 - 132/70)  RR: 17 (01-06)  SpO2: 97% (01-06)  Cardiac Enzymes:         -------------------------------------------------------------------------------------------  Meds:  albuterol/ipratropium for Nebulization 3 milliLiter(s) Nebulizer every 6 hours  ampicillin  IVPB 2 Gram(s) IV Intermittent every 4 hours  apixaban 5 milliGRAM(s) Oral every 12 hours  artificial  tears Solution 1 Drop(s) Both EYES daily  ascorbic acid 500 milliGRAM(s) Oral daily  atorvastatin 80 milliGRAM(s) Oral at bedtime  cefepime   IVPB 2000 milliGRAM(s) IV Intermittent every 8 hours  chlorhexidine 2% Cloths 1 Application(s) Topical daily  folic acid 1 milliGRAM(s) Oral daily  HYDROmorphone  Injectable 0.2 milliGRAM(s) IV Push every 4 hours PRN  lacosamide Solution 200 milliGRAM(s) Oral two times a day  levETIRAcetam  Solution 2000 milliGRAM(s) Enteral Tube two times a day  melatonin 5 milliGRAM(s) Oral at bedtime  midodrine. 5 milliGRAM(s) Oral three times a day  multivitamin 1 Tablet(s) Oral daily  naloxone Injectable 0.4 milliGRAM(s) IV Push once  ondansetron Injectable 4 milliGRAM(s) IV Push every 8 hours PRN  pantoprazole  Injectable 40 milliGRAM(s) IV Push daily  polyethylene glycol 3350 17 Gram(s) Oral daily  senna Syrup 10 milliLiter(s) Oral at bedtime  valproic  acid Syrup 1250 milliGRAM(s) Oral three times a day    -------------------------------------------------------------------------------------------  Cardiovascular Diagnostic Testing:  -------------------------------------------------------------------------------------------  ECG:     Echo:     Stress Testing:    Cath:    Imaging:    CXR:  reviewed  -------------------------------------------------------------------------------------------  Assessment and Plan:   -------------------------------------------------------------------------------------------  Problems Assessed:   49 year old M with CVA 3/30/2022 with residual aphasia and right sided residual weakness, s/p tracheostomy and peg placement, seizures, HTN, HLD, ?HF with recovered EF s/p ?Bi-V ICD (initially EF 30%->55-60% on 03/2021), MDD, PE, Afib on Eliquis who was referred by nursing home after fall on 12/28/24, found to have R intertrochanteric fracture, requiring surgery. Cardiology consulted for pre-op evaluation. He is now s/p ortho surgery 1/2/25.    1) ?HF with recovered EF s/p ?Bi-V ICD (initially EF 30%->55-60% on 03/2021)  - Euvolemic off diuretics  - EKG showed sinus rhythm with LBBB  - TTE 1/1/25 showed normal LV function, mildly dilated aortic root (4.2 cm)  - ICD battery depleted. EP Dr. Traylor consulted, no plan for gen change given poor functional status, comorbidities, and recovered EF    2) CVA 3/30/2022 with residual aphasia and right sided residual weakness  3) Afib  - Please resume home Eliquis once stable from bleeding/ortho perspective  - Continue metoprolol    4) mild aortic root dilation (4.2 cm)  - BP control  - Can obtain non-urgent CTA Aorta    5) Bacteremia: afebrile, no leukocytosis.   - repeat blood culture  - continue IV antibiotics.    - if persistent blood culture, or fever may consider DEXTER for further evaluation     -------------------------------------------------------------------------------------------  Billing Statement:   51 minutes spent on total encounter. Necessity of time spent during this encounter on this date of service was due to review of medical information in EMR, co-ordination of hospital and outpatient care, discussion with patient and communication with primary team.   -------------------------------------------------------------------------------------------  Manpreet Mock MD   of Cardiology  [facility] at [facility]  [address], [address]  [place] [address]  Phone: [phone]  Fax: [phone]    Please check [email or web address] password: [email or web address] for cardiology service schedule and contact information.

## 2025-01-06 NOTE — PHARMACOTHERAPY INTERVENTION NOTE - COMMENTS
Patient is currently on meropenem for treatment of enterococcus faecalis bacteremia + pneumonia (sputum cultures growing Pseudomonas aeruginosa).    Suggest to de-escalate meropenem to ampicillin (to cover E. faecalis) + cefepime (to cover pneumonia + synergy w/ ampicillin).

## 2025-01-06 NOTE — PROGRESS NOTE ADULT - PROBLEM SELECTOR PLAN 12
- [facility]  - S/p R hip IM nailing POD #4. Ortho following. WBAT  - PT reccs [facility]  - Blood cx pending  - ID plan when cx results  - TOV today

## 2025-01-06 NOTE — PROGRESS NOTE ADULT - PROBLEM SELECTOR PLAN 1
S/p Right Hip IM Nailing POD#4  Ortho following  Pain management  Dressing changed today from Medipore tape to 4x4 and Tegaderm   DVT prophylaxis with venodynes and Eliquis per medicine, no orthopedic contraindications  Daily Physical Therapy:  WBAT of the Right lower extremity with appropriate assistive device   Discharge planning [facility] vs return to prior facility

## 2025-01-06 NOTE — PROGRESS NOTE ADULT - ASSESSMENT
Patient is a 50 y/o M pt with PMHx CVA (3/30/2022 with residual aphasia and right sided residual weakness), s/p tracheostomy and peg placement, seizures, HTN, HLD, CHF w/ ICD (initially rEF 30%->55-60% on 03/2021), MDD, Recurrent PE, Afib (on Eliquis), obesity s/p bariatric intervention presented to the ED after a fall 3 days prior to admission, and outpatient xray showed intertrochanteric fracture of right femur. He also had fevers outpatient, and CXR concerning for left lower lobe infiltrate Patient is being admitted to  for management of right intertrochanteric femur fracture and pneumonia.     1/2/25: Patient anemia possible anemia of chronic disease, PRBC 1 unit transfused for OR, recommended by Ortho Hemoglobin above 10, however will transfuse one unit 1/2/25 as patient with no active bleeding noted. Cardiac cleared for surgery. ICD no detection, however EP recommends no need to replace as ICD placed initially due to previous low EF. TTE resulting LBBB, LVSF normal EF 59%. Patient planned for OR tonight.   01/03/2025: Restarted Eliquis. S/p R hip IM nailing POD #1. PT reccs [facility]. WBAT. Dispo: Placement back to [place]. Will monitor on AC for 24 hrs to ensure H&H remains stable and monitor surgical wound.   01/04: S/p Code sepsis on 1/3 in the setting of fever, tachycardia and hypotension. CXR shows no consolidation.  Bcx 1/3 + for Gram positive for cocci in pairs and chains, pending sensitivity. Soft BP. Sputum cx from 1/1 + pseudomonas aeruginosa. Zosyn switched to Meropenem. Ucx sent on 1/3. Afebrile. No leukocytosis. Lactate and procal wnl. ID Dr. De La Torre consulted on 1/3. S/p R hip IM nailing POD #2.  Ortho reccs appreciated. AC resumed. KUB to r/o SBO vs Ileus. Bowel regimen.   01/05- Blood cx with Entero Faecalis, ID following and currently on Meropenem. Pending official Abd x-ray read.  Plan for TOV on 1/6.

## 2025-01-06 NOTE — PROGRESS NOTE ADULT - SUBJECTIVE AND OBJECTIVE BOX
DANNIE [id number]  49yMale    Diagnosis: 49yMale S/p Right Hip IM Nailing POD#4  Patient was seen and evaluated at bedside. Patient awake with trach collar present, is able to respond and form some words  Patient admits he is doing well with mild pain to right hip, has been improving since surgery, no other complaints of pain at this time  Patient has been working with PT in bed exercises with some PROM for RLE       Vital Signs Last 24 Hrs  T(C): 36.5 (06 Jan 2025 05:26), Max: 37 (05 Jan 2025 14:03)  T(F): 97.7 (06 Jan 2025 05:26), Max: 98.6 (05 Jan 2025 14:03)  HR: 64 (06 Jan 2025 05:26) (57 - 75)  BP: 132/70 (06 Jan 2025 05:26) (94/59 - 132/70)  BP(mean): --  ABP: --  ABP(mean): --  RR: 18 (06 Jan 2025 05:26) (17 - 18)  SpO2: 100% (06 Jan 2025 05:26) (92% - 100%)    O2 Parameters below as of 06 Jan 2025 05:26  Patient On (Oxygen Delivery Method): tracheostomy collar  O2 Flow (L/min): 6      I&O's Detail    05 Jan 2025 07:01  -  06 Jan 2025 07:00  --------------------------------------------------------  IN:  Total IN: 0 mL    OUT:    Indwelling Catheter - Urethral (mL): 875 mL  Total OUT: 875 mL    Total NET: -875 mL        Physical Exam:  General:  NAD, resting comfortably in bed.  Right Hip:  Dressing is C/D/I, dressing changed today from Medipore tape to sterile 4x4 and Tegaderm dressing, staples intact, no signs of bleeding. Skin is pink and warm.  No erythema. SILT.  Wound with no drainage, healing well.   Lower extremity:  No calf tenderness, calves are soft. 2+pulses. NVI. 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 Pt denies any sensation to the RLE as well.                                               8.3    5.48  )-----------( 291      ( 06 Jan 2025 07:36 )             24.7   01-06    143  |  107  |  14  ----------------------------<  146[H]  3.7   |  33[H]  |  0.56    Ca    8.4      06 Jan 2025 07:36  Phos  2.8     01-06  Mg     2.3     01-06    TPro  6.0  /  Alb  2.0[L]  /  TBili  0.4  /  DBili  x   /  AST  33  /  ALT  24  /  AlkPhos  71  01-06      Impression:  49yMale S/p Right Hip IM Nailing POD#4  Plan:  -  Pain management  -  DVT prophylaxis with venodynes and Eliquis per medicine   -    -  Daily Physical Therapy:  WBAT of the Right lower extremity with appropriate assistive device   -  Discharge planning [facility] vs return to prior facility   -  Continue with antibiotics as per medicine   -  Case d/w DR. Rodriguez  DANNIE [id number]  49yMale    Diagnosis: 49yMale S/p Right Hip IM Nailing POD#4  Patient was seen and evaluated at bedside. Patient awake with trach collar present, is able to respond and form some words  Patient admits he is doing well with mild pain to right hip, has been improving since surgery, no other complaints of pain at this time  Patient has been working with PT in bed exercises with some PROM for RLE       Vital Signs Last 24 Hrs  T(C): 36.5 (06 Jan 2025 05:26), Max: 37 (05 Jan 2025 14:03)  T(F): 97.7 (06 Jan 2025 05:26), Max: 98.6 (05 Jan 2025 14:03)  HR: 64 (06 Jan 2025 05:26) (57 - 75)  BP: 132/70 (06 Jan 2025 05:26) (94/59 - 132/70)  BP(mean): --  ABP: --  ABP(mean): --  RR: 18 (06 Jan 2025 05:26) (17 - 18)  SpO2: 100% (06 Jan 2025 05:26) (92% - 100%)    O2 Parameters below as of 06 Jan 2025 05:26  Patient On (Oxygen Delivery Method): tracheostomy collar  O2 Flow (L/min): 6      I&O's Detail    05 Jan 2025 07:01  -  06 Jan 2025 07:00  --------------------------------------------------------  IN:  Total IN: 0 mL    OUT:    Indwelling Catheter - Urethral (mL): 875 mL  Total OUT: 875 mL    Total NET: -875 mL        Physical Exam:  General:  NAD, resting comfortably in bed.  Right Hip:  Dressing is C/D/I, dressing changed today from Medipore tape to sterile 4x4 and Tegaderm dressing, staples intact, no signs of bleeding. Skin is pink and warm.  No erythema. SILT.  Wound with no drainage, healing well.   Lower extremity:  No calf tenderness, calves are soft. 2+pulses. NVI. 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 Pt denies any sensation to the RLE as well.                                               8.3    5.48  )-----------( 291      ( 06 Jan 2025 07:36 )             24.7   01-06    143  |  107  |  14  ----------------------------<  146[H]  3.7   |  33[H]  |  0.56    Ca    8.4      06 Jan 2025 07:36  Phos  2.8     01-06  Mg     2.3     01-06    TPro  6.0  /  Alb  2.0[L]  /  TBili  0.4  /  DBili  x   /  AST  33  /  ALT  24  /  AlkPhos  71  01-06      Impression:  49yMale S/p Right Hip IM Nailing POD#4  Plan:  -  Pain management  -  Dressing changed today from Medipore tape to 4x4 and Tegaderm   -  DVT prophylaxis with venodynes and Eliquis per medicine, no orthopedic contraindications   -  Daily Physical Therapy:  WBAT of the Right lower extremity with appropriate assistive device   -  Discharge planning [facility] vs return to prior facility   -  Continue with antibiotics as per medicine   -  Case d/w DR. Rodriguez

## 2025-01-06 NOTE — PROGRESS NOTE ADULT - SUBJECTIVE AND OBJECTIVE BOX
Time of Visit:  Patient seen and examined.     MEDICATIONS  (STANDING):  albuterol/ipratropium for Nebulization 3 milliLiter(s) Nebulizer every 6 hours  ampicillin  IVPB 2 Gram(s) IV Intermittent every 4 hours  apixaban 5 milliGRAM(s) Oral every 12 hours  artificial  tears Solution 1 Drop(s) Both EYES daily  ascorbic acid 500 milliGRAM(s) Oral daily  atorvastatin 80 milliGRAM(s) Oral at bedtime  cefepime   IVPB 2000 milliGRAM(s) IV Intermittent every 8 hours  chlorhexidine 2% Cloths 1 Application(s) Topical daily  folic acid 1 milliGRAM(s) Oral daily  lacosamide Solution 200 milliGRAM(s) Oral two times a day  levETIRAcetam  Solution 2000 milliGRAM(s) Enteral Tube two times a day  melatonin 5 milliGRAM(s) Oral at bedtime  midodrine. 5 milliGRAM(s) Oral three times a day  multivitamin 1 Tablet(s) Oral daily  naloxone Injectable 0.4 milliGRAM(s) IV Push once  pantoprazole  Injectable 40 milliGRAM(s) IV Push daily  polyethylene glycol 3350 17 Gram(s) Oral daily  senna Syrup 10 milliLiter(s) Oral at bedtime  valproic  acid Syrup 1250 milliGRAM(s) Oral three times a day      MEDICATIONS  (PRN):  HYDROmorphone  Injectable 0.2 milliGRAM(s) IV Push every 4 hours PRN Moderate Pain (4 - 6)  ondansetron Injectable 4 milliGRAM(s) IV Push every 8 hours PRN Nausea and/or Vomiting       Medications up to date at time of exam.      PHYSICAL EXAMINATION:  Vital Signs Last 24 Hrs  T(C): 36.5 (06 Jan 2025 05:26), Max: 37 (05 Jan 2025 14:03)  T(F): 97.7 (06 Jan 2025 05:26), Max: 98.6 (05 Jan 2025 14:03)  HR: 66 (06 Jan 2025 11:19) (57 - 83)  BP: 105/77 (06 Jan 2025 11:19) (94/59 - 132/70)  BP(mean): --  RR: 17 (06 Jan 2025 11:18) (17 - 18)  SpO2: 97% (06 Jan 2025 11:19) (92% - 100%)    Parameters below as of 06 Jan 2025 11:19  Patient On (Oxygen Delivery Method): tracheostomy collar  O2 Flow (L/min): 6  O2 Concentration (%): 28   (if applicable)   General: No acute distress.     HEENT: Left Temporal Deformity form Old Craniotomy . No nasal tenderness. Mucous membranes are moist.     NECK: Non infected Trach .    LUNGS: Non labored. No wheezing. No use of accessory muscle.     HEART: S1 S2 irregular rate and rhythm .    ABDOMEN: Soft, nontender, and nondistended. +ve Peg. Active bowel sounds.     NEUROLOGIC: Awake, responsive to tactile and verbal stimuli  .     SKIN: Warm, dry, good turgor.    LABS:                        8.3    5.48  )-----------( 291      ( 06 Jan 2025 07:36 )             24.7     01-06    143  |  107  |  14  ----------------------------<  146[H]  3.7   |  33[H]  |  0.56    Ca    8.4      06 Jan 2025 07:36  Phos  2.8     01-06  Mg     2.3     01-06    TPro  6.0  /  Alb  2.0[L]  /  TBili  0.4  /  DBili  x   /  AST  33  /  ALT  24  /  AlkPhos  71  01-06      Urinalysis Basic - ( 06 Jan 2025 07:36 )    Color: x / Appearance: x / SG: x / pH: x  Gluc: 146 mg/dL / Ketone: x  / Bili: x / Urobili: x   Blood: x / Protein: x / Nitrite: x   Leuk Esterase: x / RBC: x / WBC x   Sq Epi: x / Non Sq Epi: x / Bacteria: x    Procalcitonin: 0.08 ng/mL (01-03-25 @ 18:30)      MICROBIOLOGY: (if applicable)    RADIOLOGY & ADDITIONAL STUDIES:  EKG:   CXR: < from: Xray Chest 1 View AP/PA (01.03.25 @ 19:20) >    ACC: [id number] EXAM:  XR CHEST AP OR PA 1V   ORDERED BY: LAINA JEROME DATE:  01/03/2025          INTERPRETATION:  EXAMINATION: XR CHEST    CLINICAL INDICATION: Sepsis    TECHNIQUE: Single frontal view of the chest was obtained.    COMPARISON: Chest x-ray 1/1/2025.    FINDINGS:    Tracheostomy.    Left-sided AICD.    The heart is normal in size.    No focal consolidation. No pneumothorax. No pleural effusion.    No acute osseous abnormalities.    IMPRESSION:  No focal consolidation.    --- End of Report ---             SHANTE LIVE DO; Attending Radiologist  This document has been electronically signed. Jan 4 2025  3:27PM    < end of copied text >    ECHO:    IMPRESSION: 49y Male PAST MEDICAL & SURGICAL HISTORY:  Heart failure, unspecified HF chronicity, unspecified heart failure type      ETOH abuse  h/o etoh abuse now moderate drinker      Artificial pacemaker      Impression: This is a 48 Y/O Male from [facility] presented with Fall .  As per NH papers patient had a fall on12/28/2024. Admitted to  for management of right intertrochanteric femur fracture. For pulmonary follow up of Left Lung Pneumonia with Chronic Hypoxic Respiratory Failure with Hx of recurrent PE .     Suggestion :  O2 saturation 96% . Hydro trach O2  . So far O2 saturation trend stable.    Oral, trach care, suction.   Not a candidate for decannulation nor trach capping at this time.   Continue Duoneb via nebulization Q 6 Hours.  On Cefepime 2 Gm IVPB Q 8 Hours.  On Ampicillin 2 Gm IVPB Q 4 Hours .   On Apixaban 5 mg Q 12 Hours .

## 2025-01-06 NOTE — PROGRESS NOTE ADULT - PROBLEM SELECTOR PLAN 2
- S/p ceftriaxone and azithro.   -  sputum culture 1/1/2025: + Pseudomonas aeruginosa and blood cx + for Entero Faecalis  - Zosyn d/arjun and started on Meropenem on 1/4   - Legionella negative  ID Dr. De La Torre consulted  f/u recc  f/u repeat blood culture  - Aspiration precautions.

## 2025-01-06 NOTE — PROGRESS NOTE ADULT - NSPROGADDITIONALINFOA_GEN_ALL_CORE
As per ID Dr. Ibarra will need DEXTER to r/o vegetation. If no veg will treat with IV abx's for 2 weeks.

## 2025-01-06 NOTE — PROGRESS NOTE ADULT - SUBJECTIVE AND OBJECTIVE BOX
DANNIE SHIRLEY    SCU progress note    INTERVAL HPI/OVERNIGHT EVENTS: No acute events overnight    DNR [ ]   DNI  [  ]  Full Code    Covid - 19 PCR:     The 4Ms    What Matters Most: see GOC  Age appropriate Medications/Screen for High Risk Medication: Yes  Mentation: see CAM below  Mobility: defer to physical exam    The Confusion Assessment Method (CAM) Diagnostic Algorithm     1: Acute Onset or Fluctuating Course  - Is there evidence of an acute change in mental status from the patient’s baseline? Did the (abnormal) behavior  fluctuate during the day, that is, tend to come and go, or increase and decrease in severity?  [ ] YES [ ] NO     2: Inattention  - Did the patient have difficulty focusing attention, being easily distractible, or having difficulty keeping track of what was being said?  [ ] YES [ ] NO     3: Disorganized thinking  -Was the patient’s thinking disorganized or incoherent, such as rambling or irrelevant conversation, unclear or illogical flow of ideas, or unpredictable switching from subject to subject?  [ ] YES [ ] NO    4: Altered Level of consciousness?  [ ] YES [ ] NO    The diagnosis of delirium by CAM requires the presence of features 1 and 2 and either 3 or 4.    PRESSORS: [ ] YES [ ] NO  ampicillin  IVPB 2 Gram(s) IV Intermittent every 4 hours  cefepime   IVPB 2000 milliGRAM(s) IV Intermittent every 8 hours    Cardiovascular:  Heart Failure  Acute   Acute on Chronic  Chronic       midodrine. 5 milliGRAM(s) Oral three times a day    Pulmonary:  albuterol/ipratropium for Nebulization 3 milliLiter(s) Nebulizer every 6 hours    Hematalogic:  apixaban 5 milliGRAM(s) Oral every 12 hours    Other:  artificial  tears Solution 1 Drop(s) Both EYES daily  ascorbic acid 500 milliGRAM(s) Oral daily  atorvastatin 80 milliGRAM(s) Oral at bedtime  chlorhexidine 2% Cloths 1 Application(s) Topical daily  folic acid 1 milliGRAM(s) Oral daily  HYDROmorphone  Injectable 0.2 milliGRAM(s) IV Push every 4 hours PRN  lacosamide Solution 200 milliGRAM(s) Oral two times a day  levETIRAcetam  Solution 2000 milliGRAM(s) Enteral Tube two times a day  melatonin 5 milliGRAM(s) Oral at bedtime  multivitamin 1 Tablet(s) Oral daily  naloxone Injectable 0.4 milliGRAM(s) IV Push once  ondansetron Injectable 4 milliGRAM(s) IV Push every 8 hours PRN  pantoprazole  Injectable 40 milliGRAM(s) IV Push daily  polyethylene glycol 3350 17 Gram(s) Oral daily  senna Syrup 10 milliLiter(s) Oral at bedtime  valproic  acid Syrup 1250 milliGRAM(s) Oral three times a day    albuterol/ipratropium for Nebulization 3 milliLiter(s) Nebulizer every 6 hours  ampicillin  IVPB 2 Gram(s) IV Intermittent every 4 hours  apixaban 5 milliGRAM(s) Oral every 12 hours  artificial  tears Solution 1 Drop(s) Both EYES daily  ascorbic acid 500 milliGRAM(s) Oral daily  atorvastatin 80 milliGRAM(s) Oral at bedtime  cefepime   IVPB 2000 milliGRAM(s) IV Intermittent every 8 hours  chlorhexidine 2% Cloths 1 Application(s) Topical daily  folic acid 1 milliGRAM(s) Oral daily  HYDROmorphone  Injectable 0.2 milliGRAM(s) IV Push every 4 hours PRN  lacosamide Solution 200 milliGRAM(s) Oral two times a day  levETIRAcetam  Solution 2000 milliGRAM(s) Enteral Tube two times a day  melatonin 5 milliGRAM(s) Oral at bedtime  midodrine. 5 milliGRAM(s) Oral three times a day  multivitamin 1 Tablet(s) Oral daily  naloxone Injectable 0.4 milliGRAM(s) IV Push once  ondansetron Injectable 4 milliGRAM(s) IV Push every 8 hours PRN  pantoprazole  Injectable 40 milliGRAM(s) IV Push daily  polyethylene glycol 3350 17 Gram(s) Oral daily  senna Syrup 10 milliLiter(s) Oral at bedtime  valproic  acid Syrup 1250 milliGRAM(s) Oral three times a day    Drug Dosing Weight  Height (cm): 170.2 (26 Aug 2024 12:06)  Weight (kg): 90.7 (31 Dec 2024 18:00)  BMI (kg/m2): 31.3 (31 Dec 2024 18:00)  BSA (m2): 2.02 (31 Dec 2024 18:00)    CENTRAL LINE: [ ] YES [ ] NO  LOCATION:   DATE INSERTED:  REMOVE: [ ] YES [ ] NO  EXPLAIN:    FAULKNER: [ ] YES [ ] NO    DATE INSERTED:  REMOVE:  [ ] YES [ ] NO  EXPLAIN:    PAST MEDICAL & SURGICAL HISTORY:  Heart failure, unspecified HF chronicity, unspecified heart failure type      ETOH abuse  h/o etoh abuse now moderate drinker      Artificial pacemaker                  01-05 @ 07:01  -  01-06 @ 07:00  --------------------------------------------------------  IN: 0 mL / OUT: 875 mL / NET: -875 mL            PHYSICAL EXAM:    GENERAL: NAD, well-groomed, well-developed  HEAD:  left temporal deformity from old craniotomy   EYES: EOMI, PERRLA, conjunctiva and sclera clear  ENMT: No tonsillar erythema, exudates, or enlargement; Moist mucous membranes, Good dentition, No lesions  NECK: Supple, No JVD, NECK: + trach, No JVD  NERVOUS SYSTEM:  Alert & Oriented X3, Good concentration; Motor Strength 5/5 B/L upper and lower extremities; DTRs 2+ intact and symmetric  CHEST/LUNG: Clear to percussion bilaterally; No rales, rhonchi, wheezing, or rubs  HEART: Regular rate and rhythm; No murmurs, rubs, or gallops  ABDOMEN: Soft, Nontender, Nondistended; Bowel sounds present  EXTREMITIES:  2+ Peripheral Pulses, No clubbing, cyanosis, or edema  LYMPH: No lymphadenopathy noted  SKIN: No rashes or lesions      LABS:  CBC Full  -  ( 06 Jan 2025 07:36 )  WBC Count : 5.48 K/uL  RBC Count : 2.54 M/uL  Hemoglobin : 8.3 g/dL  Hematocrit : 24.7 %  Platelet Count - Automated : 291 K/uL  Mean Cell Volume : 97.2 fl  Mean Cell Hemoglobin : 32.7 pg  Mean Cell Hemoglobin Concentration : 33.6 g/dL  Auto Neutrophil # : x  Auto Lymphocyte # : x  Auto Monocyte # : x  Auto Eosinophil # : x  Auto Basophil # : x  Auto Neutrophil % : x  Auto Lymphocyte % : x  Auto Monocyte % : x  Auto Eosinophil % : x  Auto Basophil % : x    01-06    143  |  107  |  14  ----------------------------<  146[H]  3.7   |  33[H]  |  0.56    Ca    8.4      06 Jan 2025 07:36  Phos  2.8     01-06  Mg     2.3     01-06    TPro  6.0  /  Alb  2.0[L]  /  TBili  0.4  /  DBili  x   /  AST  33  /  ALT  24  /  AlkPhos  71  01-06      Urinalysis Basic - ( 06 Jan 2025 07:36 )    Color: x / Appearance: x / SG: x / pH: x  Gluc: 146 mg/dL / Ketone: x  / Bili: x / Urobili: x   Blood: x / Protein: x / Nitrite: x   Leuk Esterase: x / RBC: x / WBC x   Sq Epi: x / Non Sq Epi: x / Bacteria: x            [  ]  DVT Prophylaxis  [  ]  Nutrition, Brand, Rate         Goal Rate        Abnormal Nutritional Status -  Malnutrition   Cachexia      Morbid Obesity BMI >/=40    RADIOLOGY & ADDITIONAL STUDIES:  ***    Goals of Care Discussion with Family/Proxy/Other   - see note from/family meeting set up for...

## 2025-01-06 NOTE — CHART NOTE - NSCHARTNOTEFT_GEN_A_CORE
EVENT:   1/6/25, 12am, patient c/o right hip pain 8/10 level and requested pain medication. BP - 94/59, HR - 57.   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 Patient is minimally verbal dye to tracheostomy and not able to provide history. He confirmed that he has pain in right leg. However, could not provide  further history. As per NH papers patient had a fall on12/28/2024. He was getting PT done today when he complained of severe pain. Outpatient x ray showed proximal femur intertrochanteric fracture. Patient was transferred to the hospital for surgical intervention. As per NH papers, he has also been having fevers and was started on ceftriaxone and azithromycin for suspected pneumonia on 12/29/2024.   OBJECTIVE:  Vital Signs Last 24 Hrs  T(C): 36.4 (05 Jan 2025 23:55), Max: 37 (05 Jan 2025 14:03)  T(F): 97.6 (05 Jan 2025 23:55), Max: 98.6 (05 Jan 2025 14:03)  HR: 75 (05 Jan 2025 23:55) (57 - 75)    PLAN:   - Ofirmev (Acetaminophen) 1000 mg IV PB x 1 dose for pain.     FOLLOW UP / RESULT:   - Re-evaluate patient pain level.   - Maintain patient safety and comfort,   - Continue supportive care and treatment.

## 2025-01-06 NOTE — PROGRESS NOTE ADULT - NS ATTEND AMEND GEN_ALL_CORE FT
S/p R Hip IM Nail  Cont. broad spectrum antibiotics   Repeat blood cultures for clearance   Cardiology and EP recs noted  Tracheostomy management per protocol  Not a candidate for decannulation   Low BP for now, will hold antihypertensives  Cont. AC for now   Monitor for signs of bleeding  Pain control  Reported with BM yesterday   Void trial today  PT evaluation

## 2025-01-07 DIAGNOSIS — R78.81 BACTEREMIA: ICD-10-CM

## 2025-01-07 DIAGNOSIS — I77.810 THORACIC AORTIC ECTASIA: ICD-10-CM

## 2025-01-07 LAB
ALBUMIN SERPL ELPH-MCNC: 1.9 G/DL — LOW (ref 3.5–5)
ALP SERPL-CCNC: 81 U/L — SIGNIFICANT CHANGE UP (ref 40–120)
ALT FLD-CCNC: 23 U/L DA — SIGNIFICANT CHANGE UP (ref 10–60)
ANION GAP SERPL CALC-SCNC: 4 MMOL/L — LOW (ref 5–17)
AST SERPL-CCNC: 26 U/L — SIGNIFICANT CHANGE UP (ref 10–40)
BILIRUB SERPL-MCNC: 0.4 MG/DL — SIGNIFICANT CHANGE UP (ref 0.2–1.2)
BUN SERPL-MCNC: 10 MG/DL — SIGNIFICANT CHANGE UP (ref 7–18)
CALCIUM SERPL-MCNC: 8.6 MG/DL — SIGNIFICANT CHANGE UP (ref 8.4–10.5)
CHLORIDE SERPL-SCNC: 110 MMOL/L — HIGH (ref 96–108)
CO2 SERPL-SCNC: 31 MMOL/L — SIGNIFICANT CHANGE UP (ref 22–31)
CREAT SERPL-MCNC: 0.58 MG/DL — SIGNIFICANT CHANGE UP (ref 0.5–1.3)
EGFR: 119 ML/MIN/1.73M2 — SIGNIFICANT CHANGE UP
GLUCOSE BLDC GLUCOMTR-MCNC: 125 MG/DL — HIGH (ref 70–99)
GLUCOSE BLDC GLUCOMTR-MCNC: 136 MG/DL — HIGH (ref 70–99)
GLUCOSE BLDC GLUCOMTR-MCNC: 140 MG/DL — HIGH (ref 70–99)
GLUCOSE BLDC GLUCOMTR-MCNC: 143 MG/DL — HIGH (ref 70–99)
GLUCOSE SERPL-MCNC: 138 MG/DL — HIGH (ref 70–99)
HCT VFR BLD CALC: 25 % — LOW (ref 39–50)
HGB BLD-MCNC: 8.6 G/DL — LOW (ref 13–17)
MAGNESIUM SERPL-MCNC: 2.2 MG/DL — SIGNIFICANT CHANGE UP (ref 1.6–2.6)
MCHC RBC-ENTMCNC: 33.9 PG — SIGNIFICANT CHANGE UP (ref 27–34)
MCHC RBC-ENTMCNC: 34.4 G/DL — SIGNIFICANT CHANGE UP (ref 32–36)
MCV RBC AUTO: 98.4 FL — SIGNIFICANT CHANGE UP (ref 80–100)
NRBC # BLD: 0 /100 WBCS — SIGNIFICANT CHANGE UP (ref 0–0)
PHOSPHATE SERPL-MCNC: 3.5 MG/DL — SIGNIFICANT CHANGE UP (ref 2.5–4.5)
PLATELET # BLD AUTO: 332 K/UL — SIGNIFICANT CHANGE UP (ref 150–400)
POTASSIUM SERPL-MCNC: 4.1 MMOL/L — SIGNIFICANT CHANGE UP (ref 3.5–5.3)
POTASSIUM SERPL-SCNC: 4.1 MMOL/L — SIGNIFICANT CHANGE UP (ref 3.5–5.3)
PROT SERPL-MCNC: 5.8 G/DL — LOW (ref 6–8.3)
RBC # BLD: 2.54 M/UL — LOW (ref 4.2–5.8)
RBC # FLD: 14.8 % — HIGH (ref 10.3–14.5)
SODIUM SERPL-SCNC: 145 MMOL/L — SIGNIFICANT CHANGE UP (ref 135–145)
WBC # BLD: 5.18 K/UL — SIGNIFICANT CHANGE UP (ref 3.8–10.5)
WBC # FLD AUTO: 5.18 K/UL — SIGNIFICANT CHANGE UP (ref 3.8–10.5)

## 2025-01-07 PROCEDURE — 99233 SBSQ HOSP IP/OBS HIGH 50: CPT

## 2025-01-07 RX ADMIN — ATORVASTATIN CALCIUM 80 MILLIGRAM(S): 40 TABLET, FILM COATED ORAL at 21:23

## 2025-01-07 RX ADMIN — VALPROIC ACID 1250 MILLIGRAM(S): 250 SOLUTION ORAL at 05:42

## 2025-01-07 RX ADMIN — Medication 0.2 MILLIGRAM(S): at 14:43

## 2025-01-07 RX ADMIN — VALPROIC ACID 1250 MILLIGRAM(S): 250 SOLUTION ORAL at 21:24

## 2025-01-07 RX ADMIN — Medication 100 MILLIGRAM(S): at 13:32

## 2025-01-07 RX ADMIN — CHLORHEXIDINE GLUCONATE 1 APPLICATION(S): 1.2 RINSE ORAL at 12:44

## 2025-01-07 RX ADMIN — IPRATROPIUM BROMIDE AND ALBUTEROL SULFATE 3 MILLILITER(S): .5; 2.5 SOLUTION RESPIRATORY (INHALATION) at 08:02

## 2025-01-07 RX ADMIN — MIDODRINE HYDROCHLORIDE 5 MILLIGRAM(S): 5 TABLET ORAL at 12:43

## 2025-01-07 RX ADMIN — Medication 200 GRAM(S): at 12:43

## 2025-01-07 RX ADMIN — LEVETIRACETAM 2000 MILLIGRAM(S): 100 SOLUTION ORAL at 17:40

## 2025-01-07 RX ADMIN — Medication 100 MILLIGRAM(S): at 21:23

## 2025-01-07 RX ADMIN — APIXABAN 5 MILLIGRAM(S): 5 TABLET, FILM COATED ORAL at 17:40

## 2025-01-07 RX ADMIN — MIDODRINE HYDROCHLORIDE 5 MILLIGRAM(S): 5 TABLET ORAL at 17:40

## 2025-01-07 RX ADMIN — Medication 1 MILLIGRAM(S): at 12:43

## 2025-01-07 RX ADMIN — Medication 17 GRAM(S): at 12:43

## 2025-01-07 RX ADMIN — Medication 200 GRAM(S): at 21:23

## 2025-01-07 RX ADMIN — IPRATROPIUM BROMIDE AND ALBUTEROL SULFATE 3 MILLILITER(S): .5; 2.5 SOLUTION RESPIRATORY (INHALATION) at 02:44

## 2025-01-07 RX ADMIN — Medication 200 GRAM(S): at 01:26

## 2025-01-07 RX ADMIN — IPRATROPIUM BROMIDE AND ALBUTEROL SULFATE 3 MILLILITER(S): .5; 2.5 SOLUTION RESPIRATORY (INHALATION) at 15:09

## 2025-01-07 RX ADMIN — PANTOPRAZOLE 40 MILLIGRAM(S): 40 TABLET, DELAYED RELEASE ORAL at 12:44

## 2025-01-07 RX ADMIN — Medication 500 MILLIGRAM(S): at 12:44

## 2025-01-07 RX ADMIN — LACOSAMIDE 200 MILLIGRAM(S): 100 TABLET, FILM COATED ORAL at 17:40

## 2025-01-07 RX ADMIN — Medication 100 MILLIGRAM(S): at 05:41

## 2025-01-07 RX ADMIN — Medication 5 MILLIGRAM(S): at 21:23

## 2025-01-07 RX ADMIN — SENNOSIDES 10 MILLILITER(S): 8.6 TABLET, FILM COATED ORAL at 21:24

## 2025-01-07 RX ADMIN — LACOSAMIDE 200 MILLIGRAM(S): 100 TABLET, FILM COATED ORAL at 05:41

## 2025-01-07 RX ADMIN — APIXABAN 5 MILLIGRAM(S): 5 TABLET, FILM COATED ORAL at 05:41

## 2025-01-07 RX ADMIN — LEVETIRACETAM 2000 MILLIGRAM(S): 100 SOLUTION ORAL at 05:42

## 2025-01-07 RX ADMIN — Medication 200 GRAM(S): at 05:40

## 2025-01-07 RX ADMIN — IPRATROPIUM BROMIDE AND ALBUTEROL SULFATE 3 MILLILITER(S): .5; 2.5 SOLUTION RESPIRATORY (INHALATION) at 20:32

## 2025-01-07 RX ADMIN — MIDODRINE HYDROCHLORIDE 5 MILLIGRAM(S): 5 TABLET ORAL at 05:41

## 2025-01-07 RX ADMIN — Medication 0.2 MILLIGRAM(S): at 13:45

## 2025-01-07 RX ADMIN — Medication 1 TABLET(S): at 12:44

## 2025-01-07 RX ADMIN — VALPROIC ACID 1250 MILLIGRAM(S): 250 SOLUTION ORAL at 13:32

## 2025-01-07 RX ADMIN — Medication 200 GRAM(S): at 09:02

## 2025-01-07 RX ADMIN — POLYSORBATE 80 1 DROP(S): 100 SOLUTION/ DROPS OPHTHALMIC at 12:44

## 2025-01-07 RX ADMIN — Medication 200 GRAM(S): at 17:39

## 2025-01-07 NOTE — PROGRESS NOTE ADULT - NS ATTEND AMEND GEN_ALL_CORE FT
High risk of complications with DEXTER given extensive comorbidities and may not significantly change clinical management. Discussions are ongoing with family.

## 2025-01-07 NOTE — PROGRESS NOTE ADULT - PROBLEM SELECTOR PLAN 2
- S/p ceftriaxone and azithro.   -  sputum culture 1/1/2025: + Pseudomonas aeruginosa and blood cx + for Entero Faecalis  - Zosyn d/arjun and started on Meropenem on 1/4   - Now on Cefepime and ampicillin   - Legionella negative  -ID Dr. De La Torre consulted  f/u recc  -f/u repeat blood culture from 1/6  - Aspiration precautions.  - Pulmonology following

## 2025-01-07 NOTE — PROGRESS NOTE ADULT - PROBLEM SELECTOR PLAN 4
-no fevers, leucocytosis  -No DEXTER at present time, since risk outweighs benefit  -IV antibiotics -empirical treatment  - continue serial blood cultures  -obtain daily ECGs for conduction abnormalities

## 2025-01-07 NOTE — PROGRESS NOTE ADULT - PROBLEM SELECTOR PLAN 1
-s/p ?Bi-V ICD (initially EF 30%->55-60% on 03/2021)  - Euvolemic off diuretics  - EKG showed sinus rhythm with LBBB  - TTE 1/1/25 showed normal LV function, mildly dilated aortic root (4.2 cm)  - ICD battery depleted. EP Dr. Traylor consulted, no plan for gen change given poor functional status, comorbidities, and recovered EF

## 2025-01-07 NOTE — PROGRESS NOTE ADULT - PROBLEM SELECTOR PLAN 3
S/p Right Hip IM Nailing POD#5  Ortho following  Pain management  Dressing changed today from Medipore tape to 4x4 and Tegaderm   DVT prophylaxis with venodynes and Eliquis per medicine, no orthopedic contraindications  Daily Physical Therapy:  WBAT of the Right lower extremity with appropriate assistive device   Discharge planning to [facility]

## 2025-01-07 NOTE — PROGRESS NOTE ADULT - ASSESSMENT
49 year old M with CVA 3/30/2022 with residual aphasia and right sided residual weakness, s/p tracheostomy and peg placement, seizures, HTN, HLD, ?HF with recovered EF s/p ?Bi-V ICD (initially EF 30%->55-60% on 03/2021), MDD, PE, Afib on Eliquis who was referred by nursing home after fall on 12/28/24, found to have R intertrochanteric fracture, requiring surgery. Cardiology consulted for pre-op evaluation. He is now s/p ortho surgery 1/2/25.

## 2025-01-07 NOTE — PROGRESS NOTE ADULT - PROBLEM SELECTOR PLAN 12
-C/w  Midodrine 5mg TID  -Monitor BP  - Soft BP with  this morning     # Discharge planning   - Patient on Cefepime and ampicillin   - F/u Bcx of 1/6  - Plan for DEXTER on 1/8   - NPO after midnight.   - ID following   - Dispo: return to [facility] when optimized.   - Passed TOV 1/6.   - Cardio Dr. Deshpande for DEXTER.   - Discussed plan of care

## 2025-01-07 NOTE — PROGRESS NOTE ADULT - NS ATTEND AMEND GEN_ALL_CORE FT
Problem/Plan - 1:  ·  Problem: Bacteremia.   ·  Plan: -Code sepsis on 1/3 in the setting of fever, tachycardia and hypotension.   -CXR shows no consolidation.    - Bcx 1/3 + Enterococcus faecalis  - Sputum cx from 1/1 + pseudomonas aeruginosa.   - UA 1/3 Negative  - Initially Zosyn switched to Meropenem.   - Patient now on Cefepime and ampicillin   - F/u Bcx of 1/6  - Plan for DEXTER on 1/8   - NPO after midnight.   - ID Dr. De La Torre.     Problem/Plan - 2:  ·  Problem: Left lower lobe pneumonia.   ·  Plan: - S/p ceftriaxone and azithro.   -  sputum culture 1/1/2025: + Pseudomonas aeruginosa and blood cx + for Entero Faecalis  - Zosyn d/arjun and started on Meropenem on 1/4   - Now on Cefepime and ampicillin   - Legionella negative  -ID Dr. De La Torre consulted  f/u recc  -f/u repeat blood culture from 1/6  - Aspiration precautions.  - Pulmonology following.     Problem/Plan - 3:  ·  Problem: Fracture of right femur.   ·  Plan: S/p Right Hip IM Nailing POD#5  Ortho following  Pain management  Dressing changed today from Medipore tape to 4x4 and Tegaderm   DVT prophylaxis with venodynes and Eliquis per medicine, no orthopedic contraindications  Daily Physical Therapy:  WBAT of the Right lower extremity with appropriate assistive device   Discharge planning to [facility].     Problem/Plan - 4:  ·  Problem: Tracheostomy dependent.   ·  Plan: Tracheostomy management per protocol  Not a candidate for decannulation.     Problem/Plan - 5:  ·  Problem: History of CVA (cerebrovascular accident).   ·  Plan: R hemiparesis   - C/w atorvastatin  - C/w Eliquis.     Problem/Plan - 6:  ·  Problem: Atrial fibrillation.   ·  Plan: C/w Eliquis and BB   Rate controlled.     Problem/Plan - 7:  ·  Problem: Recurrent pulmonary embolism.   ·  Plan: -C/W Eliquis.     Problem/Plan - 8:  ·  Problem: Hypertension.   ·  Plan: -Lisinopril held ISO infection and hypotension  - BP soft   - Restart Lisinopril when normotensive.

## 2025-01-07 NOTE — PROGRESS NOTE ADULT - ASSESSMENT
Patient is a 50 y/o M pt with PMHx CVA (3/30/2022 with residual aphasia and right sided residual weakness), s/p tracheostomy and peg placement, seizures, HTN, HLD, CHF w/ ICD (initially rEF 30%->55-60% on 03/2021), MDD, Recurrent PE, Afib (on Eliquis), obesity s/p bariatric intervention presented to the ED after a fall 3 days prior to admission, and outpatient xray showed intertrochanteric fracture of right femur. He also had fevers outpatient, and CXR concerning for left lower lobe infiltrate Patient is being admitted to  for management of right intertrochanteric femur fracture and pneumonia.     1/2/25: Patient anemia possible anemia of chronic disease, PRBC 1 unit transfused for OR, recommended by Ortho Hemoglobin above 10, however will transfuse one unit 1/2/25 as patient with no active bleeding noted. Cardiac cleared for surgery. ICD no detection, however EP recommends no need to replace as ICD placed initially due to previous low EF. TTE resulting LBBB, LVSF normal EF 59%. Patient planned for OR tonight.   01/03/2025: Restarted Eliquis. S/p R hip IM nailing POD #1. PT reccs MARKELL. WBAT. Dispo: Placement back to [facility]. Will monitor on AC for 24 hrs to ensure H&H remains stable and monitor surgical wound.   01/04: S/p Code sepsis on 1/3 in the setting of fever, tachycardia and hypotension. CXR shows no consolidation.  Bcx 1/3 + for Gram positive for cocci in pairs and chains, pending sensitivity. Soft BP. Sputum cx from 1/1 + pseudomonas aeruginosa. Zosyn switched to Meropenem. Ucx sent on 1/3. Afebrile. No leukocytosis. Lactate and procal wnl. ID Dr. De La Torre consulted on 1/3. S/p R hip IM nailing POD #2.  Ortho reccs appreciated. AC resumed. KUB to r/o SBO vs Ileus. Bowel regimen.   01/05- Blood cx with Entero Faecalis, ID following and currently on Meropenem. Pending official Abd x-ray read.  Plan for TOV on 1/6.   01/06: NPO after midnight for DEXTER. F/u repeat Bcx from 1/6. S/p R hip IM nailing POD #5. Ortho following. Dispo: [facility]. C/w Cefepime and Ampicillin. F/u ID reccs. Afebrile.

## 2025-01-07 NOTE — PROGRESS NOTE ADULT - SUBJECTIVE AND OBJECTIVE BOX
PRESENTING CC:    OVERNIGHT EVENTS:    SUBJECTIVE:         ------------------------  PMH:   PAST MEDICAL & SURGICAL HISTORY:  Heart failure, unspecified HF chronicity, unspecified heart failure type    ETOH abuse  h/o etoh abuse now moderate drinker    Artificial pacemaker        Allergies    shellfish (Unknown)  No Known Drug Allergies    Intolerances        MEDICATIONS  (STANDING):  albuterol/ipratropium for Nebulization 3 milliLiter(s) Nebulizer every 6 hours  ampicillin  IVPB 2 Gram(s) IV Intermittent every 4 hours  apixaban 5 milliGRAM(s) Oral every 12 hours  artificial  tears Solution 1 Drop(s) Both EYES daily  ascorbic acid 500 milliGRAM(s) Oral daily  atorvastatin 80 milliGRAM(s) Oral at bedtime  cefepime   IVPB 2000 milliGRAM(s) IV Intermittent every 8 hours  chlorhexidine 2% Cloths 1 Application(s) Topical daily  folic acid 1 milliGRAM(s) Oral daily  lacosamide Solution 200 milliGRAM(s) Oral two times a day  levETIRAcetam  Solution 2000 milliGRAM(s) Enteral Tube two times a day  melatonin 5 milliGRAM(s) Oral at bedtime  midodrine. 5 milliGRAM(s) Oral three times a day  multivitamin 1 Tablet(s) Oral daily  naloxone Injectable 0.4 milliGRAM(s) IV Push once  pantoprazole  Injectable 40 milliGRAM(s) IV Push daily  polyethylene glycol 3350 17 Gram(s) Oral daily  senna Syrup 10 milliLiter(s) Oral at bedtime  valproic  acid Syrup 1250 milliGRAM(s) Oral three times a day    MEDICATIONS  (PRN):  HYDROmorphone  Injectable 0.2 milliGRAM(s) IV Push every 4 hours PRN Moderate Pain (4 - 6)  ondansetron Injectable 4 milliGRAM(s) IV Push every 8 hours PRN Nausea and/or Vomiting      FAMILY HISTORY:  No pertinent family history in first degree relatives      Reviewed; no change from my prior note    SOCIAL HISTORY:  Reviewed, no change from my prior note    REVIEW OF SYSTEMS:  Constitutional: [ ] fever, [ ]weight loss,  [ ]fatigue  Eyes: [ ] visual changes  Respiratory: [ ]shortness of breath;  [ ] cough, [ ]wheezing, [ ]chills, [ ]hemoptysis  Cardiovascular: [ ] chest pain, [ ]palpitations, [ ]dizziness,  [ ]leg swelling [ ]syncope  Gastrointestinal: [ ] abdominal pain, [ ]nausea, [ ]vomiting,  [ ]diarrhea   Genitourinary: [ ] dysuria, [ ] hematuria  Neurologic: [ ] headaches [ ] tremors [ ] weakness [ ] lightheadedness  Skin: [ ] itching, [ ]burning, [ ] rashes  Endocrine: [ ] heat or cold intolerance  Musculoskeletal: [ ] joint pain or swelling; [ ] muscle, back, or extremity pain  Psychiatric: [ ] depression, [ ]anxiety, [ ]mood swings, or [ ]difficulty sleeping  Hematologic: [ ] easy bruising, [ ] bleeding gums    [x] All remaining systems negative except as per above.   [  ] Unable to obtain    Vital Signs Last 24 Hrs  T(C): 36.7 (07 Jan 2025 11:09), Max: 36.7 (06 Jan 2025 12:30)  T(F): 98.1 (07 Jan 2025 11:09), Max: 98.1 (07 Jan 2025 11:09)  HR: 69 (07 Jan 2025 08:42) (60 - 73)  BP: 107/76 (07 Jan 2025 05:13) (107/76 - 115/73)  BP(mean): --  RR: 18 (07 Jan 2025 08:42) (17 - 18)  SpO2: 97% (07 Jan 2025 08:42) (93% - 98%)    Parameters below as of 07 Jan 2025 08:42  Patient On (Oxygen Delivery Method): tracheostomy collar  O2 Flow (L/min): 6  O2 Concentration (%): 28  I&O's Summary    06 Jan 2025 07:01  -  07 Jan 2025 07:00  --------------------------------------------------------  IN: 0 mL / OUT: 1350 mL / NET: -1350 mL        PHYSICAL EXAM:  General: No acute distress  HEENT: EOMI  Neck: No JVD  Lungs: Clear to auscultation bilaterally; No rales or wheezing  Heart: Regular rate and rhythm; No murmurs, rubs, or gallops  Abdomen: Soft, non tender, non distended   Extremities: Warm, no edema   Nervous system:  Alert & Oriented X3  Psychiatric: Normal affect  Skin: No rashes or lesions    LABS:  01-07    145  |  110[H]  |  10  ----------------------------<  138[H]  4.1   |  31  |  0.58    Ca    8.6      07 Jan 2025 07:40  Phos  3.5     01-07  Mg     2.2     01-07    TPro  5.8[L]  /  Alb  1.9[L]  /  TBili  0.4  /  DBili  x   /  AST  26  /  ALT  23  /  AlkPhos  81  01-07    Creatinine Trend: 0.58<--, 0.56<--, 0.54<--, 0.68<--, 0.70<--, 0.64<--                        8.6    5.18  )-----------( 332      ( 07 Jan 2025 07:40 )             25.0       Lipid Panel:   Cardiac Enzymes:         RADIOLOGY:    ECG [my interpretation]:    TELEMETRY:    ECHO:    STRESS TEST:    CATHETERIZATION:               PRESENTING CC:    OVERNIGHT EVENTS: No new events overnight. Patient minimally verbal    PMH:   PAST MEDICAL & SURGICAL HISTORY:  Heart failure, unspecified HF chronicity, unspecified heart failure type    ETOH abuse  h/o etoh abuse now moderate drinker    Artificial pacemaker        Allergies    shellfish (Unknown)  No Known Drug Allergies    Intolerances        MEDICATIONS  (STANDING):  albuterol/ipratropium for Nebulization 3 milliLiter(s) Nebulizer every 6 hours  ampicillin  IVPB 2 Gram(s) IV Intermittent every 4 hours  apixaban 5 milliGRAM(s) Oral every 12 hours  artificial  tears Solution 1 Drop(s) Both EYES daily  ascorbic acid 500 milliGRAM(s) Oral daily  atorvastatin 80 milliGRAM(s) Oral at bedtime  cefepime   IVPB 2000 milliGRAM(s) IV Intermittent every 8 hours  chlorhexidine 2% Cloths 1 Application(s) Topical daily  folic acid 1 milliGRAM(s) Oral daily  lacosamide Solution 200 milliGRAM(s) Oral two times a day  levETIRAcetam  Solution 2000 milliGRAM(s) Enteral Tube two times a day  melatonin 5 milliGRAM(s) Oral at bedtime  midodrine. 5 milliGRAM(s) Oral three times a day  multivitamin 1 Tablet(s) Oral daily  naloxone Injectable 0.4 milliGRAM(s) IV Push once  pantoprazole  Injectable 40 milliGRAM(s) IV Push daily  polyethylene glycol 3350 17 Gram(s) Oral daily  senna Syrup 10 milliLiter(s) Oral at bedtime  valproic  acid Syrup 1250 milliGRAM(s) Oral three times a day    MEDICATIONS  (PRN):  HYDROmorphone  Injectable 0.2 milliGRAM(s) IV Push every 4 hours PRN Moderate Pain (4 - 6)  ondansetron Injectable 4 milliGRAM(s) IV Push every 8 hours PRN Nausea and/or Vomiting      FAMILY HISTORY:  No pertinent family history in first degree relatives      Reviewed; no change from my prior note    SOCIAL HISTORY:  Reviewed, no change from my prior note    REVIEW OF SYSTEMS:  Constitutional: [ ] fever, [ ]weight loss,  [ ]fatigue  Eyes: [ ] visual changes  Respiratory: [ ]shortness of breath;  [ ] cough, [ ]wheezing, [ ]chills, [ ]hemoptysis  Cardiovascular: [ ] chest pain, [ ]palpitations, [ ]dizziness,  [ ]leg swelling [ ]syncope  Gastrointestinal: [ ] abdominal pain, [ ]nausea, [ ]vomiting,  [ ]diarrhea   Genitourinary: [ ] dysuria, [ ] hematuria  Neurologic: [ ] headaches [ ] tremors [ ] weakness [ ] lightheadedness  Skin: [ ] itching, [ ]burning, [ ] rashes  Endocrine: [ ] heat or cold intolerance  Musculoskeletal: [ ] joint pain or swelling; [ ] muscle, back, or extremity pain  Psychiatric: [ ] depression, [ ]anxiety, [ ]mood swings, or [ ]difficulty sleeping  Hematologic: [ ] easy bruising, [ ] bleeding gums    [ ] All remaining systems negative except as per above.   [ x ] Unable to obtain    Vital Signs Last 24 Hrs  T(C): 36.7 (07 Jan 2025 11:09), Max: 36.7 (06 Jan 2025 12:30)  T(F): 98.1 (07 Jan 2025 11:09), Max: 98.1 (07 Jan 2025 11:09)  HR: 69 (07 Jan 2025 08:42) (60 - 73)  BP: 107/76 (07 Jan 2025 05:13) (107/76 - 115/73)  BP(mean): --  RR: 18 (07 Jan 2025 08:42) (17 - 18)  SpO2: 97% (07 Jan 2025 08:42) (93% - 98%)    Parameters below as of 07 Jan 2025 08:42  Patient On (Oxygen Delivery Method): tracheostomy collar  O2 Flow (L/min): 6  O2 Concentration (%): 28  I&O's Summary    06 Jan 2025 07:01  -  07 Jan 2025 07:00  --------------------------------------------------------  IN: 0 mL / OUT: 1350 mL / NET: -1350 mL        PHYSICAL EXAM:  General: No acute distress  HEENT: EOMI  Neck: Tracheostomy, No JVD  Lungs: Clear to auscultation bilaterally; No rales or wheezing  Heart: Regular rate and rhythm; No murmurs, rubs, or gallops  Abdomen: Soft, non tender, non distended   Extremities: Warm, no edema   Nervous system:  Alert & Oriented X self and place (hospital). , Good concentration. Nods yes and no. Follows commands. Right side hemiparesis. Contracture of RUE.   Psychiatric: Normal affect  Skin: No rashes or lesions    LABS:  01-07    145  |  110[H]  |  10  ----------------------------<  138[H]  4.1   |  31  |  0.58    Ca    8.6      07 Jan 2025 07:40  Phos  3.5     01-07  Mg     2.2     01-07    TPro  5.8[L]  /  Alb  1.9[L]  /  TBili  0.4  /  DBili  x   /  AST  26  /  ALT  23  /  AlkPhos  81  01-07    Creatinine Trend: 0.58<--, 0.56<--, 0.54<--, 0.68<--, 0.70<--, 0.64<--                        8.6    5.18  )-----------( 332      ( 07 Jan 2025 07:40 )             25.0       Lipid Panel:   Cardiac Enzymes:         RADIOLOGY:    ECG [my interpretation]:    TELEMETRY:    ECHO:    STRESS TEST:    CATHETERIZATION:

## 2025-01-07 NOTE — PROGRESS NOTE ADULT - SUBJECTIVE AND OBJECTIVE BOX
DANNIE SHIRLEY    SCU progress note    INTERVAL HPI/OVERNIGHT EVENTS: No acute events overnight.     FULL CODE    Covid - 19 PCR: Negative    The 4Ms    What Matters Most: see GOC  Age appropriate Medications/Screen for High Risk Medication: Yes  Mentation: see CAM below  Mobility: defer to physical exam    The Confusion Assessment Method (CAM) Diagnostic Algorithm     1: Acute Onset or Fluctuating Course  - Is there evidence of an acute change in mental status from the patient’s baseline? Did the (abnormal) behavior  fluctuate during the day, that is, tend to come and go, or increase and decrease in severity?  [ ] YES [x ] NO     2: Inattention  - Did the patient have difficulty focusing attention, being easily distractible, or having difficulty keeping track of what was being said?  [ ] YES [x ] NO     3: Disorganized thinking  -Was the patient’s thinking disorganized or incoherent, such as rambling or irrelevant conversation, unclear or illogical flow of ideas, or unpredictable switching from subject to subject?  [ ] YES [x ] NO    4: Altered Level of consciousness?  [ ] YES [x ] NO    The diagnosis of delirium by CAM requires the presence of features 1 and 2 and either 3 or 4.    PRESSORS: [ ] YES [ xx] NO  ampicillin  IVPB 2 Gram(s) IV Intermittent every 4 hours  cefepime   IVPB 2000 milliGRAM(s) IV Intermittent every 8 hours    Cardiovascular:  Heart Failure  Acute   Acute on Chronic  Chronic       midodrine. 5 milliGRAM(s) Oral three times a day    Pulmonary:x  albuterol/ipratropium for Nebulization 3 milliLiter(s) Nebulizer every 6 hours    Hematalogic:  apixaban 5 milliGRAM(s) Oral every 12 hours    Other:  artificial  tears Solution 1 Drop(s) Both EYES daily  ascorbic acid 500 milliGRAM(s) Oral daily  atorvastatin 80 milliGRAM(s) Oral at bedtime  chlorhexidine 2% Cloths 1 Application(s) Topical daily  folic acid 1 milliGRAM(s) Oral daily  HYDROmorphone  Injectable 0.2 milliGRAM(s) IV Push every 4 hours PRN  lacosamide Solution 200 milliGRAM(s) Oral two times a day  levETIRAcetam  Solution 2000 milliGRAM(s) Enteral Tube two times a day  melatonin 5 milliGRAM(s) Oral at bedtime  multivitamin 1 Tablet(s) Oral daily  naloxone Injectable 0.4 milliGRAM(s) IV Push once  ondansetron Injectable 4 milliGRAM(s) IV Push every 8 hours PRN  pantoprazole  Injectable 40 milliGRAM(s) IV Push daily  polyethylene glycol 3350 17 Gram(s) Oral daily  senna Syrup 10 milliLiter(s) Oral at bedtime  valproic  acid Syrup 1250 milliGRAM(s) Oral three times a day    albuterol/ipratropium for Nebulization 3 milliLiter(s) Nebulizer every 6 hours  ampicillin  IVPB 2 Gram(s) IV Intermittent every 4 hours  apixaban 5 milliGRAM(s) Oral every 12 hours  artificial  tears Solution 1 Drop(s) Both EYES daily  ascorbic acid 500 milliGRAM(s) Oral daily  atorvastatin 80 milliGRAM(s) Oral at bedtime  cefepime   IVPB 2000 milliGRAM(s) IV Intermittent every 8 hours  chlorhexidine 2% Cloths 1 Application(s) Topical daily  folic acid 1 milliGRAM(s) Oral daily  HYDROmorphone  Injectable 0.2 milliGRAM(s) IV Push every 4 hours PRN  lacosamide Solution 200 milliGRAM(s) Oral two times a day  levETIRAcetam  Solution 2000 milliGRAM(s) Enteral Tube two times a day  melatonin 5 milliGRAM(s) Oral at bedtime  midodrine. 5 milliGRAM(s) Oral three times a day  multivitamin 1 Tablet(s) Oral daily  naloxone Injectable 0.4 milliGRAM(s) IV Push once  ondansetron Injectable 4 milliGRAM(s) IV Push every 8 hours PRN  pantoprazole  Injectable 40 milliGRAM(s) IV Push daily  polyethylene glycol 3350 17 Gram(s) Oral daily  senna Syrup 10 milliLiter(s) Oral at bedtime  valproic  acid Syrup 1250 milliGRAM(s) Oral three times a day    Drug Dosing Weight  Height (cm): 170.2 (26 Aug 2024 12:06)  Weight (kg): 90.7 (31 Dec 2024 18:00)  BMI (kg/m2): 31.3 (31 Dec 2024 18:00)  BSA (m2): 2.02 (31 Dec 2024 18:00)    CENTRAL LINE: [ ] YES [x ] NO  LOCATION:   DATE INSERTED:  REMOVE: [ ] YES [ ] NO  EXPLAIN:    FAULKNER: [ ] YES [x ] NO    DATE INSERTED:  REMOVE:  [ ] YES [ ] NO  EXPLAIN:    PAST MEDICAL & SURGICAL HISTORY:  Heart failure, unspecified HF chronicity, unspecified heart failure type      ETOH abuse  h/o etoh abuse now moderate drinker      Artificial pacemaker      01-06 @ 07:01  -  01-07 @ 07:00  --------------------------------------------------------  IN: 0 mL / OUT: 1350 mL / NET: -1350 mL    ROS: Negative to pain. Limited due to     PHYSICAL EXAM:    GENERAL: NAD  HEAD:  Asymmetrical s/p Crani Left side.   EYES: PERRLA, conjunctiva and sclera clear  ENMT: Moist mucous membranes, Good dentition, No lesions  NECK: Tracheostomy. Site C/D/I.   NERVOUS SYSTEM:  Alert & Oriented X self and place (hospital). , Good concentration. Nods yes and no. Follows commands. Right side hemiparesis. Contracture of RUE.   CHEST/LUNG: Diminished LLL.  No rales, rhonchi, wheezing, or rubs. On 28% FiO2 humidified O2 to track collar.   HEART: Regular rate and rhythm; No murmurs, rubs, or gallops  ABDOMEN: Soft, Nontender, Nondistended; Bowel sounds present. PEG in place.   EXTREMITIES:  2+ Peripheral Pulses, No clubbing, cyanosis, or edema  SKIN: Surgical incision to right hip with stitches. Dressing C/D/I.       LABS:  CBC Full  -  ( 07 Jan 2025 07:40 )  WBC Count : 5.18 K/uL  RBC Count : 2.54 M/uL  Hemoglobin : 8.6 g/dL  Hematocrit : 25.0 %  Platelet Count - Automated : 332 K/uL  Mean Cell Volume : 98.4 fl  Mean Cell Hemoglobin : 33.9 pg  Mean Cell Hemoglobin Concentration : 34.4 g/dL  Auto Neutrophil # : x  Auto Lymphocyte # : x  Auto Monocyte # : x  Auto Eosinophil # : x  Auto Basophil # : x  Auto Neutrophil % : x  Auto Lymphocyte % : x  Auto Monocyte % : x  Auto Eosinophil % : x  Auto Basophil % : x    01-07    145  |  110[H]  |  10  ----------------------------<  138[H]  4.1   |  31  |  0.58    Ca    8.6      07 Jan 2025 07:40  Phos  3.5     01-07  Mg     2.2     01-07    TPro  5.8[L]  /  Alb  1.9[L]  /  TBili  0.4  /  DBili  x   /  AST  26  /  ALT  23  /  AlkPhos  81  01-07      Urinalysis Basic - ( 07 Jan 2025 07:40 )    Color: x / Appearance: x / SG: x / pH: x  Gluc: 138 mg/dL / Ketone: x  / Bili: x / Urobili: x   Blood: x / Protein: x / Nitrite: x   Leuk Esterase: x / RBC: x / WBC x   Sq Epi: x / Non Sq Epi: x / Bacteria: x    [ x ]  DVT Prophylaxis      RADIOLOGY & ADDITIONAL STUDIES:    < from: Xray Abdomen 1 View PORTABLE -Routine (Xray Abdomen 1 View PORTABLE -Routine .) (01.04.25 @ 11:10) >  ACC: [id number] EXAM:  XR ABDOMEN PORTABLE ROUTINE 1V   ORDERED BY: MARJORIE COHEN     PROCEDURE DATE:  01/04/2025          INTERPRETATION:  KUB: AP, 2 images    COMPARISON: No recent priors.    CLINICAL INFORMATION: SBO..    FINDINGS: PEG feeding tubeoverlies LEFT upper quadrant  Redundant air-filled transverse colon. Small bowel pattern nonspecific.  No free intra-abdominal air.  No abnormal calcifications.  The osseous structures are intact.    IMPRESSION:    Redundant air-filled transverse colon. Small bowel pattern nonspecific.  If symptoms warrant further investigation either on repeat abdominal   supine/erect views or abdominal CT scan recommended.    --- End of Report ---            NUPUR EPSTEIN MD; Attending Radiologist  This document has been electronically signed. Jan 6 2025  2:54PM    < end of copied text >  < from: Xray Chest 1 View AP/PA (01.03.25 @ 19:20) >  ACC: [id number] EXAM:  XR CHEST AP OR PA 1V   ORDERED BY: LAINA MELTON     PROCEDURE DATE:  01/03/2025          INTERPRETATION:  EXAMINATION: XR CHEST    CLINICAL INDICATION: Sepsis    TECHNIQUE: Single frontal view of the chest was obtained.    COMPARISON: Chest x-ray 1/1/2025.    FINDINGS:    Tracheostomy.    Left-sided AICD.    The heart is normal in size.    No focal consolidation. No pneumothorax. No pleural effusion.    No acute osseous abnormalities.    IMPRESSION:  No focal consolidation.    --- End of Report ---             SHANTE LIVE DO; Attending Radiologist  This document has been electronically signed. Jan 4 2025  3:27PM    < end of copied text >

## 2025-01-07 NOTE — PROGRESS NOTE ADULT - SUBJECTIVE AND OBJECTIVE BOX
DANNIE [id number]  49yMale    Diagnosis: 49yMale S/p Right Hip IM Nailing POD#5  Patient was seen and evaluated at bedside. Patient awake with trach collar present, is able to respond and form some words  Patient continues to do well, states with minimal pain to the right hip at this time, no complaints of pain at this time  Patient has been working with PT in bed exercises with some PROM for RLE   Pt denies Fever, Chest pain, paresthesias, N/V/D, abdominal pain, syncope, or pain anywhere else.       Vital Signs Last 24 Hrs  T(C): 36.7 (07 Jan 2025 05:13), Max: 36.7 (06 Jan 2025 12:30)  T(F): 98 (07 Jan 2025 05:13), Max: 98 (06 Jan 2025 12:30)  HR: 69 (07 Jan 2025 08:42) (60 - 83)  BP: 107/76 (07 Jan 2025 05:13) (105/77 - 115/73)  BP(mean): --  ABP: --  ABP(mean): --  RR: 18 (07 Jan 2025 08:42) (17 - 18)  SpO2: 97% (07 Jan 2025 08:42) (93% - 100%)    O2 Parameters below as of 07 Jan 2025 08:42  Patient On (Oxygen Delivery Method): tracheostomy collar  O2 Flow (L/min): 6  O2 Concentration (%): 28    I&O's Detail    06 Jan 2025 07:01  -  07 Jan 2025 07:00  --------------------------------------------------------  IN:  Total IN: 0 mL    OUT:    Indwelling Catheter - Urethral (mL): 350 mL    Voided (mL): 1000 mL  Total OUT: 1350 mL    Total NET: -1350 mL        Physical Exam:  General:  NAD, resting comfortably in bed. With trach collar in place  Right Hip:  Dressing is C/D/I, Tegaderm dressing in place, no signs of bleeding. Skin is pink and warm.  No erythema. SILT.  Wound with no drainage, healing well.   Lower extremity:  No calf tenderness, calves are soft. 2+pulses. NVI. 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 Pt denies any sensation to the RLE as well.                                               8.6    5.18  )-----------( 332      ( 07 Jan 2025 07:40 )             25.0   01-07    145  |  110[H]  |  10  ----------------------------<  138[H]  4.1   |  31  |  0.58    Ca    8.6      07 Jan 2025 07:40  Phos  3.5     01-07  Mg     2.2     01-07    TPro  5.8[L]  /  Alb  1.9[L]  /  TBili  0.4  /  DBili  x   /  AST  26  /  ALT  23  /  AlkPhos  81  01-07                Impression:  49yMale S/p Right Hip IM Nailing POD#5  Plan:  -  Pain management  -  DVT prophylaxis with venodynes and Eliquis per medicine, no orthopedic contraindications   -  Daily Physical Therapy:  WBAT of the Right lower extremity with appropriate assistive device   -  Discharge planning [facility] vs return to prior facility   -  Continue with antibiotics as per medicine   -  Case d/w DR. Rodriguez

## 2025-01-07 NOTE — PROGRESS NOTE ADULT - PROBLEM SELECTOR PLAN 1
-Code sepsis on 1/3 in the setting of fever, tachycardia and hypotension.   -CXR shows no consolidation.    - Bcx 1/3 + Enterococcus faecalis  - Sputum cx from 1/1 + pseudomonas aeruginosa.   - UA 1/3 Negative  - Initially Zosyn switched to Meropenem.   - Patient now on Cefepime and ampicillin   - F/u Bcx of 1/6  - Plan for DEXTER on 1/8   - NPO after midnight.   - ID Dr. De La Torre

## 2025-01-08 LAB
ALBUMIN SERPL ELPH-MCNC: 2.2 G/DL — LOW (ref 3.5–5)
ALP SERPL-CCNC: 103 U/L — SIGNIFICANT CHANGE UP (ref 40–120)
ALT FLD-CCNC: 24 U/L DA — SIGNIFICANT CHANGE UP (ref 10–60)
ANION GAP SERPL CALC-SCNC: 5 MMOL/L — SIGNIFICANT CHANGE UP (ref 5–17)
AST SERPL-CCNC: 25 U/L — SIGNIFICANT CHANGE UP (ref 10–40)
BILIRUB SERPL-MCNC: 0.6 MG/DL — SIGNIFICANT CHANGE UP (ref 0.2–1.2)
BUN SERPL-MCNC: 10 MG/DL — SIGNIFICANT CHANGE UP (ref 7–18)
CALCIUM SERPL-MCNC: 8.8 MG/DL — SIGNIFICANT CHANGE UP (ref 8.4–10.5)
CHLORIDE SERPL-SCNC: 108 MMOL/L — SIGNIFICANT CHANGE UP (ref 96–108)
CO2 SERPL-SCNC: 29 MMOL/L — SIGNIFICANT CHANGE UP (ref 22–31)
CREAT SERPL-MCNC: 0.56 MG/DL — SIGNIFICANT CHANGE UP (ref 0.5–1.3)
CULTURE RESULTS: SIGNIFICANT CHANGE UP
EGFR: 120 ML/MIN/1.73M2 — SIGNIFICANT CHANGE UP
GLUCOSE BLDC GLUCOMTR-MCNC: 119 MG/DL — HIGH (ref 70–99)
GLUCOSE BLDC GLUCOMTR-MCNC: 128 MG/DL — HIGH (ref 70–99)
GLUCOSE BLDC GLUCOMTR-MCNC: 162 MG/DL — HIGH (ref 70–99)
GLUCOSE SERPL-MCNC: 116 MG/DL — HIGH (ref 70–99)
HCT VFR BLD CALC: 26.6 % — LOW (ref 39–50)
HGB BLD-MCNC: 9.1 G/DL — LOW (ref 13–17)
MAGNESIUM SERPL-MCNC: 2.1 MG/DL — SIGNIFICANT CHANGE UP (ref 1.6–2.6)
MCHC RBC-ENTMCNC: 34.2 G/DL — SIGNIFICANT CHANGE UP (ref 32–36)
MCHC RBC-ENTMCNC: 34.3 PG — HIGH (ref 27–34)
MCV RBC AUTO: 100.4 FL — HIGH (ref 80–100)
NRBC # BLD: 0 /100 WBCS — SIGNIFICANT CHANGE UP (ref 0–0)
PHOSPHATE SERPL-MCNC: 3.7 MG/DL — SIGNIFICANT CHANGE UP (ref 2.5–4.5)
PLATELET # BLD AUTO: 371 K/UL — SIGNIFICANT CHANGE UP (ref 150–400)
POTASSIUM SERPL-MCNC: 4.3 MMOL/L — SIGNIFICANT CHANGE UP (ref 3.5–5.3)
POTASSIUM SERPL-SCNC: 4.3 MMOL/L — SIGNIFICANT CHANGE UP (ref 3.5–5.3)
PROT SERPL-MCNC: 6.6 G/DL — SIGNIFICANT CHANGE UP (ref 6–8.3)
RBC # BLD: 2.65 M/UL — LOW (ref 4.2–5.8)
RBC # FLD: 15.1 % — HIGH (ref 10.3–14.5)
SODIUM SERPL-SCNC: 142 MMOL/L — SIGNIFICANT CHANGE UP (ref 135–145)
SPECIMEN SOURCE: SIGNIFICANT CHANGE UP
WBC # BLD: 5.79 K/UL — SIGNIFICANT CHANGE UP (ref 3.8–10.5)
WBC # FLD AUTO: 5.79 K/UL — SIGNIFICANT CHANGE UP (ref 3.8–10.5)

## 2025-01-08 PROCEDURE — 99232 SBSQ HOSP IP/OBS MODERATE 35: CPT

## 2025-01-08 PROCEDURE — 74174 CTA ABD&PLVS W/CONTRAST: CPT | Mod: 26

## 2025-01-08 PROCEDURE — 71275 CT ANGIOGRAPHY CHEST: CPT | Mod: 26

## 2025-01-08 RX ADMIN — LEVETIRACETAM 2000 MILLIGRAM(S): 100 SOLUTION ORAL at 04:56

## 2025-01-08 RX ADMIN — SENNOSIDES 10 MILLILITER(S): 8.6 TABLET, FILM COATED ORAL at 22:15

## 2025-01-08 RX ADMIN — LACOSAMIDE 200 MILLIGRAM(S): 100 TABLET, FILM COATED ORAL at 04:57

## 2025-01-08 RX ADMIN — Medication 17 GRAM(S): at 12:01

## 2025-01-08 RX ADMIN — MIDODRINE HYDROCHLORIDE 5 MILLIGRAM(S): 5 TABLET ORAL at 11:55

## 2025-01-08 RX ADMIN — APIXABAN 5 MILLIGRAM(S): 5 TABLET, FILM COATED ORAL at 17:45

## 2025-01-08 RX ADMIN — Medication 100 MILLIGRAM(S): at 22:45

## 2025-01-08 RX ADMIN — Medication 5 MILLIGRAM(S): at 22:14

## 2025-01-08 RX ADMIN — Medication 0.2 MILLIGRAM(S): at 01:23

## 2025-01-08 RX ADMIN — Medication 500 MILLIGRAM(S): at 11:55

## 2025-01-08 RX ADMIN — IPRATROPIUM BROMIDE AND ALBUTEROL SULFATE 3 MILLILITER(S): .5; 2.5 SOLUTION RESPIRATORY (INHALATION) at 09:32

## 2025-01-08 RX ADMIN — Medication 1 MILLIGRAM(S): at 11:55

## 2025-01-08 RX ADMIN — Medication 200 GRAM(S): at 22:15

## 2025-01-08 RX ADMIN — MIDODRINE HYDROCHLORIDE 5 MILLIGRAM(S): 5 TABLET ORAL at 17:47

## 2025-01-08 RX ADMIN — Medication 200 GRAM(S): at 08:38

## 2025-01-08 RX ADMIN — Medication 1 TABLET(S): at 11:55

## 2025-01-08 RX ADMIN — Medication 0.2 MILLIGRAM(S): at 09:09

## 2025-01-08 RX ADMIN — VALPROIC ACID 1250 MILLIGRAM(S): 250 SOLUTION ORAL at 04:56

## 2025-01-08 RX ADMIN — Medication 0.2 MILLIGRAM(S): at 14:48

## 2025-01-08 RX ADMIN — ATORVASTATIN CALCIUM 80 MILLIGRAM(S): 40 TABLET, FILM COATED ORAL at 22:14

## 2025-01-08 RX ADMIN — PANTOPRAZOLE 40 MILLIGRAM(S): 40 TABLET, DELAYED RELEASE ORAL at 11:55

## 2025-01-08 RX ADMIN — Medication 200 GRAM(S): at 11:56

## 2025-01-08 RX ADMIN — Medication 200 GRAM(S): at 01:23

## 2025-01-08 RX ADMIN — VALPROIC ACID 1250 MILLIGRAM(S): 250 SOLUTION ORAL at 11:54

## 2025-01-08 RX ADMIN — Medication 100 MILLIGRAM(S): at 04:56

## 2025-01-08 RX ADMIN — VALPROIC ACID 1250 MILLIGRAM(S): 250 SOLUTION ORAL at 22:15

## 2025-01-08 RX ADMIN — Medication 0.2 MILLIGRAM(S): at 15:10

## 2025-01-08 RX ADMIN — MIDODRINE HYDROCHLORIDE 5 MILLIGRAM(S): 5 TABLET ORAL at 04:56

## 2025-01-08 RX ADMIN — LEVETIRACETAM 2000 MILLIGRAM(S): 100 SOLUTION ORAL at 17:47

## 2025-01-08 RX ADMIN — POLYSORBATE 80 1 DROP(S): 100 SOLUTION/ DROPS OPHTHALMIC at 12:03

## 2025-01-08 RX ADMIN — Medication 100 MILLIGRAM(S): at 17:43

## 2025-01-08 RX ADMIN — CHLORHEXIDINE GLUCONATE 1 APPLICATION(S): 1.2 RINSE ORAL at 11:56

## 2025-01-08 RX ADMIN — LACOSAMIDE 200 MILLIGRAM(S): 100 TABLET, FILM COATED ORAL at 17:53

## 2025-01-08 RX ADMIN — Medication 0.2 MILLIGRAM(S): at 08:38

## 2025-01-08 RX ADMIN — Medication 200 GRAM(S): at 04:56

## 2025-01-08 RX ADMIN — Medication 200 GRAM(S): at 17:45

## 2025-01-08 RX ADMIN — IPRATROPIUM BROMIDE AND ALBUTEROL SULFATE 3 MILLILITER(S): .5; 2.5 SOLUTION RESPIRATORY (INHALATION) at 03:11

## 2025-01-08 RX ADMIN — IPRATROPIUM BROMIDE AND ALBUTEROL SULFATE 3 MILLILITER(S): .5; 2.5 SOLUTION RESPIRATORY (INHALATION) at 20:24

## 2025-01-08 RX ADMIN — Medication 0.2 MILLIGRAM(S): at 02:07

## 2025-01-08 RX ADMIN — IPRATROPIUM BROMIDE AND ALBUTEROL SULFATE 3 MILLILITER(S): .5; 2.5 SOLUTION RESPIRATORY (INHALATION) at 15:23

## 2025-01-08 NOTE — PROGRESS NOTE ADULT - PROBLEM SELECTOR PLAN 1
-Code sepsis on 1/3 in the setting of fever, tachycardia and hypotension.   -CXR shows no consolidation.    - Bcx 1/3 + Enterococcus faecalis  - Sputum cx from 1/1 + pseudomonas aeruginosa.   - UA 1/3 Negative  - Initially Zosyn switched to Meropenem.   - Patient now on Cefepime and ampicillin   - F/u Bcx of 1/6  - Plan for DEXTER on 1/8   - NPO after midnight.   - ID Dr. De La Torre -Code sepsis on 1/3 in the setting of fever, tachycardia and hypotension.   -CXR shows no consolidation.    - Bcx 1/3 + Enterococcus faecalis  - Sputum cx from 1/1 + pseudomonas aeruginosa.   - UA 1/3 Negative  - Initially Zosyn switched to Meropenem.   - Patient now on Cefepime and ampicillin   - F/u Bcx of 1/6  - Plan for possible DEXTER on 1/8   - NPO after midnight.   - ID Dr. De La Torre

## 2025-01-08 NOTE — CHART NOTE - NSCHARTNOTEFT_GEN_A_CORE
Was contacted by primary team regarding possible DEXTER procedure. Case discussed with EP attending, who stated in setting of Enterococcus bacteremia with presence of CIED, the patient will possibly need device explant versus suppressive antibiotic therapy. DEXTER would not currently , and can be performed at the time of device explant, if indicated. EP service to follow up.

## 2025-01-08 NOTE — PROGRESS NOTE ADULT - PROBLEM SELECTOR PLAN 12
-C/w  Midodrine 5mg TID  -Monitor BP  - Soft BP with  this morning     # Discharge planning   - Patient on Cefepime and ampicillin   - F/u Bcx of 1/6  - Plan for DEXTER on 1/8   - NPO after midnight.   - ID following   - Dispo: return to [facility] when optimized.   - Passed TOV 1/6.   - Cardio Dr. Deshpande for DEXTER.   - Discussed plan of care - Resolved  -Monitor BP      # Discharge planning   - Patient on Cefepime and ampicillin   - F/u Bcx of 1/6  - Possible DEXTER vs PICC for abx for 4 weeks. C/w ABX. ID following.  NPO for now for possible DEXTER.   - NPO after midnight.   - ID following   - Dispo: return to [facility] when optimized.   - Passed TOV 1/6.   - Cardio Dr. Deshpande for DEXTER.   - Discussed plan of care  - Wound care consult for sacral wound

## 2025-01-08 NOTE — PROGRESS NOTE ADULT - SUBJECTIVE AND OBJECTIVE BOX
DANNIE [id number]  49yMale    Diagnosis: 49yMale S/p Right Hip IM Nailing POD#6  Patient was seen and evaluated at bedside. Patient awake with trach collar present, is able to respond to questions with expression. minimally verbal   States pain is "so,so" to right hip.  Patient has been working with PT in bed exercises with some PROM for RLE   Pt denies Fever, Chest pain, paresthesias, N/V/D, abdominal pain, syncope, or pain anywhere else.     Vital Signs Last 24 Hrs  T(C): 36.3 (08 Jan 2025 04:43), Max: 36.9 (07 Jan 2025 11:57)  T(F): 97.4 (08 Jan 2025 04:43), Max: 98.4 (07 Jan 2025 11:57)  HR: 93 (08 Jan 2025 04:43) (70 - 93)  BP: 114/72 (08 Jan 2025 04:43) (110/71 - 117/74)  BP(mean): 86 (08 Jan 2025 04:43) (86 - 86)  ABP: --  ABP(mean): --  RR: 19 (08 Jan 2025 04:43) (18 - 20)  SpO2: 97% (08 Jan 2025 04:43) (96% - 98%)    O2 Parameters below as of 08 Jan 2025 04:43  Patient On (Oxygen Delivery Method): tracheostomy collar  O2 Flow (L/min): 10  O2 Concentration (%): 28      I&O's Detail          Physical Exam:  General:  NAD, resting comfortably in bed. With trach collar in place  Right Hip:  Dressing is C/D/I, Tegaderm dressing in place, no signs of bleeding. Skin is pink and warm.  No erythema. SILT.  Wound with no drainage, healing well.   Lower extremity:  No calf tenderness, calves are soft. 2+pulses. NVI. 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 Pt denies any sensation to the RLE as well.                                                          8.6    5.18  )-----------( 332      ( 07 Jan 2025 07:40 )             25.0   01-07    145  |  110[H]  |  10  ----------------------------<  138[H]  4.1   |  31  |  0.58    Ca    8.6      07 Jan 2025 07:40  Phos  3.5     01-07  Mg     2.2     01-07    TPro  5.8[L]  /  Alb  1.9[L]  /  TBili  0.4  /  DBili  x   /  AST  26  /  ALT  23  /  AlkPhos  81  01-07                  Impression:  49yMale S/p Right Hip IM Nailing POD#6  Plan:  -  Pain management  -  DVT prophylaxis with venodynes and Eliquis per medicine, no orthopedic contraindications   -  Daily Physical Therapy:  WBAT of the Right lower extremity with appropriate assistive device   -  Discharge planning [facility] vs return to prior facility   -  Will continue to monitor dressing and wound    -  Continue with care as per medical team. Orthopedically stable at this time.   -  Case d/w DR. Rodriguez

## 2025-01-08 NOTE — PROGRESS NOTE ADULT - PROBLEM SELECTOR PLAN 2
-no fevers, no leucocytosis  -No DEXTER at present time, since risk outweighs benefit  -IV antibiotics -empirical treatment  - continue serial blood cultures  -obtain daily ECGs for conduction abnormalities.

## 2025-01-08 NOTE — PROGRESS NOTE ADULT - NS ATTEND AMEND GEN_ALL_CORE FT
Agree with above. Discussion with Dr. Traylor is ongoing.   No plan for inpatient DEXTER at this time unless there is plan for lead extraction and device explantation.

## 2025-01-08 NOTE — PROGRESS NOTE ADULT - SUBJECTIVE AND OBJECTIVE BOX
PRESENTING CC:    OVERNIGHT EVENTS: No new events overnight. Patient minimally verbal, makes needs known        PMH:   PAST MEDICAL & SURGICAL HISTORY:  Heart failure, unspecified HF chronicity, unspecified heart failure type    ETOH abuse  h/o etoh abuse now moderate drinker    Artificial pacemaker        Allergies    shellfish (Unknown)  No Known Drug Allergies    Intolerances        MEDICATIONS  (STANDING):  albuterol/ipratropium for Nebulization 3 milliLiter(s) Nebulizer every 6 hours  ampicillin  IVPB 2 Gram(s) IV Intermittent every 4 hours  apixaban 5 milliGRAM(s) Oral every 12 hours  artificial  tears Solution 1 Drop(s) Both EYES daily  ascorbic acid 500 milliGRAM(s) Oral daily  atorvastatin 80 milliGRAM(s) Oral at bedtime  cefepime   IVPB 2000 milliGRAM(s) IV Intermittent every 8 hours  chlorhexidine 2% Cloths 1 Application(s) Topical daily  folic acid 1 milliGRAM(s) Oral daily  lacosamide Solution 200 milliGRAM(s) Oral two times a day  levETIRAcetam  Solution 2000 milliGRAM(s) Enteral Tube two times a day  melatonin 5 milliGRAM(s) Oral at bedtime  midodrine. 5 milliGRAM(s) Oral three times a day  multivitamin 1 Tablet(s) Oral daily  naloxone Injectable 0.4 milliGRAM(s) IV Push once  pantoprazole  Injectable 40 milliGRAM(s) IV Push daily  polyethylene glycol 3350 17 Gram(s) Oral daily  senna Syrup 10 milliLiter(s) Oral at bedtime  valproic  acid Syrup 1250 milliGRAM(s) Oral three times a day    MEDICATIONS  (PRN):  HYDROmorphone  Injectable 0.2 milliGRAM(s) IV Push every 4 hours PRN Moderate Pain (4 - 6)  ondansetron Injectable 4 milliGRAM(s) IV Push every 8 hours PRN Nausea and/or Vomiting      FAMILY HISTORY:  No pertinent family history in first degree relatives      Reviewed; no change from my prior note    SOCIAL HISTORY:  Reviewed, no change from my prior note    REVIEW OF SYSTEMS:  Constitutional: [ ] fever, [ ]weight loss,  [ ]fatigue  Eyes: [ ] visual changes  Respiratory: [ ]shortness of breath;  [ ] cough, [ ]wheezing, [ ]chills, [ ]hemoptysis  Cardiovascular: [ ] chest pain, [ ]palpitations, [ ]dizziness,  [ ]leg swelling [ ]syncope  Gastrointestinal: [ ] abdominal pain, [ ]nausea, [ ]vomiting,  [ ]diarrhea   Genitourinary: [ ] dysuria, [ ] hematuria  Neurologic: [ ] headaches [ ] tremors [ ] weakness [ ] lightheadedness  Skin: [ ] itching, [ ]burning, [ ] rashes  Endocrine: [ ] heat or cold intolerance  Musculoskeletal: [ ] joint pain or swelling; [ ] muscle, back, or extremity pain  Psychiatric: [ ] depression, [ ]anxiety, [ ]mood swings, or [ ]difficulty sleeping  Hematologic: [ ] easy bruising, [ ] bleeding gums    [x] All remaining systems negative except as per above.   [  ] Unable to obtain    Vital Signs Last 24 Hrs  T(C): 36.3 (08 Jan 2025 04:43), Max: 36.7 (07 Jan 2025 20:42)  T(F): 97.4 (08 Jan 2025 04:43), Max: 98 (07 Jan 2025 20:42)  HR: 93 (08 Jan 2025 04:43) (75 - 93)  BP: 114/72 (08 Jan 2025 04:43) (110/71 - 114/72)  BP(mean): 86 (08 Jan 2025 04:43) (86 - 86)  RR: 19 (08 Jan 2025 04:43) (19 - 20)  SpO2: 97% (08 Jan 2025 04:43) (96% - 98%)    Parameters below as of 08 Jan 2025 04:43  Patient On (Oxygen Delivery Method): tracheostomy collar  O2 Flow (L/min): 10  O2 Concentration (%): 28  I&O's Summary    PHYSICAL EXAM:  General: No acute distress  HEENT: EOMI  Neck: Tracheostomy, No JVD  Lungs: Clear to auscultation bilaterally; No rales or wheezing  Heart: Regular rate and rhythm; No murmurs, rubs, or gallops  Abdomen: Soft, non tender, non distended   Extremities: Warm, no edema   Nervous system:  Alert & Oriented X self and place (hospital). , Good concentration. Nods yes and no. Follows commands. Right side hemiparesis. Contracture of RUE.   Psychiatric: Normal affect  Skin: No rashes or lesions      LABS:  01-08    142  |  108  |  10  ----------------------------<  116[H]  4.3   |  29  |  0.56    Ca    8.8      08 Jan 2025 10:27  Phos  3.7     01-08  Mg     2.1     01-08    TPro  6.6  /  Alb  2.2[L]  /  TBili  0.6  /  DBili  x   /  AST  25  /  ALT  24  /  AlkPhos  103  01-08    Creatinine Trend: 0.56<--, 0.58<--, 0.56<--, 0.54<--, 0.68<--, 0.70<--                        9.1    5.79  )-----------( 371      ( 08 Jan 2025 10:27 )             26.6       Lipid Panel:   Cardiac Enzymes:         RADIOLOGY:    ECG [my interpretation]:    TELEMETRY:    ECHO:    STRESS TEST:    CATHETERIZATION:

## 2025-01-08 NOTE — CHART NOTE - NSCHARTNOTEFT_GEN_A_CORE
Patient with BiV ICD and enterococcus bacteremia that cleared. No source. Given bacteremia, patient with indication for ICD extraction. I discussed with both his wife and his daughter options of lead extraction vs 6 weeks of abx. I discussed the risks and benefits of lead extraction. Given his high risk of death in the event of perforation, as he is not an open heart surgery candidate, family would like to pursue conservative treatment with abx for 6 weeks. He is not an open heart valve surgery candidate either, and given that DEXTER would not , would avoid DEXTER as well as risks outweigh benefits at this time. If he has recurrent bacteremia in the future off abx, will readdress extraction at that time.

## 2025-01-08 NOTE — PROGRESS NOTE ADULT - PROBLEM SELECTOR PLAN 1
-s/p ?Bi-V ICD (initially EF 30%->55-60% on 03/2021)  - Euvolemic off diuretics  - EKG showed sinus rhythm with LBBB  - TTE 1/1/25 showed normal LV function, mildly dilated aortic root (4.2 cm)  - ICD battery depleted. EP Dr. Traylor consulted, no plan for gen change given poor functional status, comorbidities, and recovered EF.

## 2025-01-08 NOTE — PROGRESS NOTE ADULT - ASSESSMENT
Patient is a 48 y/o M pt with PMHx CVA (3/30/2022 with residual aphasia and right sided residual weakness), s/p tracheostomy and peg placement, seizures, HTN, HLD, CHF w/ ICD (initially rEF 30%->55-60% on 03/2021), MDD, Recurrent PE, Afib (on Eliquis), obesity s/p bariatric intervention presented to the ED after a fall 3 days prior to admission, and outpatient xray showed intertrochanteric fracture of right femur. He also had fevers outpatient, and CXR concerning for left lower lobe infiltrate Patient is being admitted to  for management of right intertrochanteric femur fracture and pneumonia.     1/2/25: Patient anemia possible anemia of chronic disease, PRBC 1 unit transfused for OR, recommended by Ortho Hemoglobin above 10, however will transfuse one unit 1/2/25 as patient with no active bleeding noted. Cardiac cleared for surgery. ICD no detection, however EP recommends no need to replace as ICD placed initially due to previous low EF. TTE resulting LBBB, LVSF normal EF 59%. Patient planned for OR tonight.   01/03/2025: Restarted Eliquis. S/p R hip IM nailing POD #1. PT reccs [facility]. WBAT. Dispo: Placement back to [place]. Will monitor on AC for 24 hrs to ensure H&H remains stable and monitor surgical wound.   01/04: S/p Code sepsis on 1/3 in the setting of fever, tachycardia and hypotension. CXR shows no consolidation.  Bcx 1/3 + for Gram positive for cocci in pairs and chains, pending sensitivity. Soft BP. Sputum cx from 1/1 + pseudomonas aeruginosa. Zosyn switched to Meropenem. Ucx sent on 1/3. Afebrile. No leukocytosis. Lactate and procal wnl. ID Dr. De La Torre consulted on 1/3. S/p R hip IM nailing POD #2.  Ortho reccs appreciated. AC resumed. KUB to r/o SBO vs Ileus. Bowel regimen.   01/05- Blood cx with Entero Faecalis, ID following and currently on Meropenem. Pending official Abd x-ray read.  Plan for TOV on 1/6.   01/07: NPO after midnight for DEXTER vs PICC for empiric coverage. IR consulted on 1/7 for possble PICC; F/u repeat Bcx from 1/6. S/p R hip IM nailing POD #5. Ortho following. Dispo: [facility]. C/w Cefepime and Ampicillin. F/u ID reccs. Afebrile.   01/08:    Patient is a 48 y/o M pt with PMHx CVA (3/30/2022 with residual aphasia and right sided residual weakness), s/p tracheostomy and peg placement, seizures, HTN, HLD, CHF w/ ICD (initially rEF 30%->55-60% on 03/2021), MDD, Recurrent PE, Afib (on Eliquis), obesity s/p bariatric intervention presented to the ED after a fall 3 days prior to admission, and outpatient xray showed intertrochanteric fracture of right femur. He also had fevers outpatient, and CXR concerning for left lower lobe infiltrate Patient is being admitted to  for management of right intertrochanteric femur fracture and pneumonia.     1/2/25: Patient anemia possible anemia of chronic disease, PRBC 1 unit transfused for OR, recommended by Ortho Hemoglobin above 10, however will transfuse one unit 1/2/25 as patient with no active bleeding noted. Cardiac cleared for surgery. ICD no detection, however EP recommends no need to replace as ICD placed initially due to previous low EF. TTE resulting LBBB, LVSF normal EF 59%. Patient planned for OR tonight.   01/03/2025: Restarted Eliquis. S/p R hip IM nailing POD #1. PT reccs [facility]. WBAT. Dispo: Placement back to [place]. Will monitor on AC for 24 hrs to ensure H&H remains stable and monitor surgical wound.   01/04: S/p Code sepsis on 1/3 in the setting of fever, tachycardia and hypotension. CXR shows no consolidation.  Bcx 1/3 + for Gram positive for cocci in pairs and chains, pending sensitivity. Soft BP. Sputum cx from 1/1 + pseudomonas aeruginosa. Zosyn switched to Meropenem. Ucx sent on 1/3. Afebrile. No leukocytosis. Lactate and procal wnl. ID Dr. De La Torre consulted on 1/3. S/p R hip IM nailing POD #2.  Ortho reccs appreciated. AC resumed. KUB to r/o SBO vs Ileus. Bowel regimen.   01/05- Blood cx with Entero Faecalis, ID following and currently on Meropenem. Pending official Abd x-ray read.  Plan for TOV on 1/6.   01/07: NPO after midnight for DEXTER vs PICC for empiric coverage. IR consulted on 1/7 for possble PICC; F/u repeat Bcx from 1/6. S/p R hip IM nailing POD #5. Ortho following. Dispo: [facility]. C/w Cefepime and Ampicillin. F/u ID reccs. Afebrile.   01/08: Wound care consult for sacral wound. F/u Bcx from 1/6; Possible DEXTER vs PICC for abx for 4 weeks. C/w ABX. ID following.  NPO for now for possible DEXTER. Cardio following. Hemodynamically stable and Afebrile. No Leukocytosis. Afebrile and hemodynamically stable.

## 2025-01-08 NOTE — CHART NOTE - NSCHARTNOTEFT_GEN_A_CORE
Assessment:   49yMalePatient is a 49y old  Male who presents with a chief complaint of Right intertrochanteric femur fracture (08 Jan 2025 08:51). Pt visited. Pt w Tracheostomy. Pt is On TF  JEVITY 1.5 @ 50 ML /HR X 24 HR Provides 1200 ml,1800 calories, Protein 77 gms, free water 912 ml /day. + PROSOURCE 1 PKT BID to provide additional 30 Gms of Protein,120 calories. Labs noted. . S/P Code sepsis. D/W RN TF tolerated.  MEDS NOTED ON MVI, FOLIC ACID, VITAMIN C.       Factors impacting intake: [ ] none [ ] nausea  [ ] vomiting [ ] diarrhea [ ] constipation  [ ]chewing problems [X ] swallowing issues  [ ] other:     Diet Prescription: Diet, NPO with Tube Feed:   Tube Feeding Modality: Gastrostomy  Jevity 1.5 Seng  Total Volume for 24 Hours (mL): 1200  Continuous  Starting Tube Feed Rate {mL per Hour}: 30  Increase Tube Feed Rate by (mL): 20     Every 6 hours  Until Goal Tube Feed Rate (mL per Hour): 50  Tube Feed Duration (in Hours): 24  Tube Feed Start Time: 00:00  No Carb Prosource TF     Qty per Day:  1 PKT BID (01-03-25 @ 11:10)    Intake: NPO W TF    Current Weight:   % Weight Change    Pertinent Medications: MEDICATIONS  (STANDING):  albuterol/ipratropium for Nebulization 3 milliLiter(s) Nebulizer every 6 hours  ampicillin  IVPB 2 Gram(s) IV Intermittent every 4 hours  apixaban 5 milliGRAM(s) Oral every 12 hours  artificial  tears Solution 1 Drop(s) Both EYES daily  ascorbic acid 500 milliGRAM(s) Oral daily  atorvastatin 80 milliGRAM(s) Oral at bedtime  cefepime   IVPB 2000 milliGRAM(s) IV Intermittent every 8 hours  chlorhexidine 2% Cloths 1 Application(s) Topical daily  folic acid 1 milliGRAM(s) Oral daily  lacosamide Solution 200 milliGRAM(s) Oral two times a day  levETIRAcetam  Solution 2000 milliGRAM(s) Enteral Tube two times a day  melatonin 5 milliGRAM(s) Oral at bedtime  midodrine. 5 milliGRAM(s) Oral three times a day  multivitamin 1 Tablet(s) Oral daily  naloxone Injectable 0.4 milliGRAM(s) IV Push once  pantoprazole  Injectable 40 milliGRAM(s) IV Push daily  polyethylene glycol 3350 17 Gram(s) Oral daily  senna Syrup 10 milliLiter(s) Oral at bedtime  valproic  acid Syrup 1250 milliGRAM(s) Oral three times a day    MEDICATIONS  (PRN):  HYDROmorphone  Injectable 0.2 milliGRAM(s) IV Push every 4 hours PRN Moderate Pain (4 - 6)  ondansetron Injectable 4 milliGRAM(s) IV Push every 8 hours PRN Nausea and/or Vomiting    Pertinent Labs: 01-08 Na142 mmol/L Glu 116 mg/dL[H] K+ 4.3 mmol/L Cr  0.56 mg/dL BUN 10 mg/dL 01-08 Phos 3.7 mg/dL 01-08 Alb 2.2 g/dL[L]     CAPILLARY BLOOD GLUCOSE      POCT Blood Glucose.: 119 mg/dL (08 Jan 2025 11:43)  POCT Blood Glucose.: 143 mg/dL (07 Jan 2025 17:12)    Skin: NO DTI    Estimated Needs:   [ ] no change since previous assessment  [ ] recalculated:     Previous Nutrition Diagnosis:   [ ] Inadequate Energy Intake [ ]Inadequate Oral Intake [ ] Excessive Energy Intake (X) Swallowing difficulty   [ ] Underweight [ ] Increased Nutrient Needs [ ] Overweight/Obesity   [ ] Altered GI Function [ ] Unintended Weight Loss [ ] Food & Nutrition Related Knowledge Deficit [ ] Malnutrition     Nutrition Diagnosis is [x ] ongoing  [ ] resolved [ ] not applicable     New Nutrition Diagnosis: [ ] not applicable       Interventions:   Recommend  [ ] Change Diet To:  [ ] Nutrition Supplement  [x ] Nutrition Support  [id number] with Jevity 1.5 50 ml /hr x 24 hr +Prosource 1  PKT  BID.   [ ] Other:     Monitoring and Evaluation:   [ ] PO intake [ x ] Tolerance to diet prescription [ x ] weights [ x ] labs[ x ] follow up per protocol  [ ] other:

## 2025-01-08 NOTE — PROGRESS NOTE ADULT - SUBJECTIVE AND OBJECTIVE BOX
DANNIE SHIRLEY    SCU progress note    INTERVAL HPI/OVERNIGHT EVENTS: ***    DNR [ ]   DNI  [  ]    Covid - 19 PCR:     The 4Ms    What Matters Most: see GOC  Age appropriate Medications/Screen for High Risk Medication: Yes  Mentation: see CAM below  Mobility: defer to physical exam    The Confusion Assessment Method (CAM) Diagnostic Algorithm     1: Acute Onset or Fluctuating Course  - Is there evidence of an acute change in mental status from the patient’s baseline? Did the (abnormal) behavior  fluctuate during the day, that is, tend to come and go, or increase and decrease in severity?  [ ] YES [ ] NO     2: Inattention  - Did the patient have difficulty focusing attention, being easily distractible, or having difficulty keeping track of what was being said?  [ ] YES [ ] NO     3: Disorganized thinking  -Was the patient’s thinking disorganized or incoherent, such as rambling or irrelevant conversation, unclear or illogical flow of ideas, or unpredictable switching from subject to subject?  [ ] YES [ ] NO    4: Altered Level of consciousness?  [ ] YES [ ] NO    The diagnosis of delirium by CAM requires the presence of features 1 and 2 and either 3 or 4.    PRESSORS: [ ] YES [ ] NO  ampicillin  IVPB 2 Gram(s) IV Intermittent every 4 hours  cefepime   IVPB 2000 milliGRAM(s) IV Intermittent every 8 hours    Cardiovascular:  Heart Failure  Acute   Acute on Chronic  Chronic       midodrine. 5 milliGRAM(s) Oral three times a day    Pulmonary:  albuterol/ipratropium for Nebulization 3 milliLiter(s) Nebulizer every 6 hours    Hematalogic:  apixaban 5 milliGRAM(s) Oral every 12 hours    Other:  artificial  tears Solution 1 Drop(s) Both EYES daily  ascorbic acid 500 milliGRAM(s) Oral daily  atorvastatin 80 milliGRAM(s) Oral at bedtime  chlorhexidine 2% Cloths 1 Application(s) Topical daily  folic acid 1 milliGRAM(s) Oral daily  HYDROmorphone  Injectable 0.2 milliGRAM(s) IV Push every 4 hours PRN  lacosamide Solution 200 milliGRAM(s) Oral two times a day  levETIRAcetam  Solution 2000 milliGRAM(s) Enteral Tube two times a day  melatonin 5 milliGRAM(s) Oral at bedtime  multivitamin 1 Tablet(s) Oral daily  naloxone Injectable 0.4 milliGRAM(s) IV Push once  ondansetron Injectable 4 milliGRAM(s) IV Push every 8 hours PRN  pantoprazole  Injectable 40 milliGRAM(s) IV Push daily  polyethylene glycol 3350 17 Gram(s) Oral daily  senna Syrup 10 milliLiter(s) Oral at bedtime  valproic  acid Syrup 1250 milliGRAM(s) Oral three times a day    albuterol/ipratropium for Nebulization 3 milliLiter(s) Nebulizer every 6 hours  ampicillin  IVPB 2 Gram(s) IV Intermittent every 4 hours  apixaban 5 milliGRAM(s) Oral every 12 hours  artificial  tears Solution 1 Drop(s) Both EYES daily  ascorbic acid 500 milliGRAM(s) Oral daily  atorvastatin 80 milliGRAM(s) Oral at bedtime  cefepime   IVPB 2000 milliGRAM(s) IV Intermittent every 8 hours  chlorhexidine 2% Cloths 1 Application(s) Topical daily  folic acid 1 milliGRAM(s) Oral daily  HYDROmorphone  Injectable 0.2 milliGRAM(s) IV Push every 4 hours PRN  lacosamide Solution 200 milliGRAM(s) Oral two times a day  levETIRAcetam  Solution 2000 milliGRAM(s) Enteral Tube two times a day  melatonin 5 milliGRAM(s) Oral at bedtime  midodrine. 5 milliGRAM(s) Oral three times a day  multivitamin 1 Tablet(s) Oral daily  naloxone Injectable 0.4 milliGRAM(s) IV Push once  ondansetron Injectable 4 milliGRAM(s) IV Push every 8 hours PRN  pantoprazole  Injectable 40 milliGRAM(s) IV Push daily  polyethylene glycol 3350 17 Gram(s) Oral daily  senna Syrup 10 milliLiter(s) Oral at bedtime  valproic  acid Syrup 1250 milliGRAM(s) Oral three times a day    Drug Dosing Weight  Height (cm): 170.2 (26 Aug 2024 12:06)  Weight (kg): 90.7 (31 Dec 2024 18:00)  BMI (kg/m2): 31.3 (31 Dec 2024 18:00)  BSA (m2): 2.02 (31 Dec 2024 18:00)    CENTRAL LINE: [ ] YES [ ] NO  LOCATION:   DATE INSERTED:  REMOVE: [ ] YES [ ] NO  EXPLAIN:    FAULKNER: [ ] YES [ ] NO    DATE INSERTED:  REMOVE:  [ ] YES [ ] NO  EXPLAIN:    PAST MEDICAL & SURGICAL HISTORY:  Heart failure, unspecified HF chronicity, unspecified heart failure type      ETOH abuse  h/o etoh abuse now moderate drinker      Artificial pacemaker                        PHYSICAL EXAM:    GENERAL: NAD, well-groomed, well-developed  HEAD:  Atraumatic, Normocephalic  EYES: EOMI, PERRLA, conjunctiva and sclera clear  ENMT: No tonsillar erythema, exudates, or enlargement; Moist mucous membranes, Good dentition, No lesions  NECK: Supple, No JVD, Normal thyroid  NERVOUS SYSTEM:  Alert & Oriented X3, Good concentration; Motor Strength 5/5 B/L upper and lower extremities; DTRs 2+ intact and symmetric  CHEST/LUNG: Clear to percussion bilaterally; No rales, rhonchi, wheezing, or rubs  HEART: Regular rate and rhythm; No murmurs, rubs, or gallops  ABDOMEN: Soft, Nontender, Nondistended; Bowel sounds present  EXTREMITIES:  2+ Peripheral Pulses, No clubbing, cyanosis, or edema  LYMPH: No lymphadenopathy noted  SKIN: No rashes or lesions      LABS:  CBC Full  -  ( 07 Jan 2025 07:40 )  WBC Count : 5.18 K/uL  RBC Count : 2.54 M/uL  Hemoglobin : 8.6 g/dL  Hematocrit : 25.0 %  Platelet Count - Automated : 332 K/uL  Mean Cell Volume : 98.4 fl  Mean Cell Hemoglobin : 33.9 pg  Mean Cell Hemoglobin Concentration : 34.4 g/dL  Auto Neutrophil # : x  Auto Lymphocyte # : x  Auto Monocyte # : x  Auto Eosinophil # : x  Auto Basophil # : x  Auto Neutrophil % : x  Auto Lymphocyte % : x  Auto Monocyte % : x  Auto Eosinophil % : x  Auto Basophil % : x    01-07    145  |  110[H]  |  10  ----------------------------<  138[H]  4.1   |  31  |  0.58    Ca    8.6      07 Jan 2025 07:40  Phos  3.5     01-07  Mg     2.2     01-07    TPro  5.8[L]  /  Alb  1.9[L]  /  TBili  0.4  /  DBili  x   /  AST  26  /  ALT  23  /  AlkPhos  81  01-07      Urinalysis Basic - ( 07 Jan 2025 07:40 )    Color: x / Appearance: x / SG: x / pH: x  Gluc: 138 mg/dL / Ketone: x  / Bili: x / Urobili: x   Blood: x / Protein: x / Nitrite: x   Leuk Esterase: x / RBC: x / WBC x   Sq Epi: x / Non Sq Epi: x / Bacteria: x            [  ]  DVT Prophylaxis  [  ]  Nutrition, Brand, Rate         Goal Rate        Abnormal Nutritional Status -  Malnutrition   Cachexia      Morbid Obesity BMI >/=40    RADIOLOGY & ADDITIONAL STUDIES:  ***    Goals of Care Discussion with Family/Proxy/Other   - see note from/family meeting set up for...     DANNIE SHIRLEY    SCU progress note    INTERVAL HPI/OVERNIGHT EVENTS: No acte events overnight.     FULL CODE    Covid - 19 PCR: Negative    The 4Ms    What Matters Most: see GOC  Age appropriate Medications/Screen for High Risk Medication: Yes  Mentation: see CAM below  Mobility: defer to physical exam    The Confusion Assessment Method (CAM) Diagnostic Algorithm     1: Acute Onset or Fluctuating Course  - Is there evidence of an acute change in mental status from the patient’s baseline? Did the (abnormal) behavior  fluctuate during the day, that is, tend to come and go, or increase and decrease in severity?  [ ] YES [x ] NO     2: Inattention  - Did the patient have difficulty focusing attention, being easily distractible, or having difficulty keeping track of what was being said?  [ ] YES [ x] NO     3: Disorganized thinking  -Was the patient’s thinking disorganized or incoherent, such as rambling or irrelevant conversation, unclear or illogical flow of ideas, or unpredictable switching from subject to subject?  [ ] YES [x ] NO    4: Altered Level of consciousness?  [ ] YES [ x] NO    The diagnosis of delirium by CAM requires the presence of features 1 and 2 and either 3 or 4.    PRESSORS: [ ] YES [ ] NO  ampicillin  IVPB 2 Gram(s) IV Intermittent every 4 hours  cefepime   IVPB 2000 milliGRAM(s) IV Intermittent every 8 hours    Cardiovascular:  Heart Failure  Acute   Acute on Chronic  Chronic       midodrine. 5 milliGRAM(s) Oral three times a day    Pulmonary:  albuterol/ipratropium for Nebulization 3 milliLiter(s) Nebulizer every 6 hours    Hematalogic:  apixaban 5 milliGRAM(s) Oral every 12 hours    Other:  artificial  tears Solution 1 Drop(s) Both EYES daily  ascorbic acid 500 milliGRAM(s) Oral daily  atorvastatin 80 milliGRAM(s) Oral at bedtime  chlorhexidine 2% Cloths 1 Application(s) Topical daily  folic acid 1 milliGRAM(s) Oral daily  HYDROmorphone  Injectable 0.2 milliGRAM(s) IV Push every 4 hours PRN  lacosamide Solution 200 milliGRAM(s) Oral two times a day  levETIRAcetam  Solution 2000 milliGRAM(s) Enteral Tube two times a day  melatonin 5 milliGRAM(s) Oral at bedtime  multivitamin 1 Tablet(s) Oral daily  naloxone Injectable 0.4 milliGRAM(s) IV Push once  ondansetron Injectable 4 milliGRAM(s) IV Push every 8 hours PRN  pantoprazole  Injectable 40 milliGRAM(s) IV Push daily  polyethylene glycol 3350 17 Gram(s) Oral daily  senna Syrup 10 milliLiter(s) Oral at bedtime  valproic  acid Syrup 1250 milliGRAM(s) Oral three times a day    albuterol/ipratropium for Nebulization 3 milliLiter(s) Nebulizer every 6 hours  ampicillin  IVPB 2 Gram(s) IV Intermittent every 4 hours  apixaban 5 milliGRAM(s) Oral every 12 hours  artificial  tears Solution 1 Drop(s) Both EYES daily  ascorbic acid 500 milliGRAM(s) Oral daily  atorvastatin 80 milliGRAM(s) Oral at bedtime  cefepime   IVPB 2000 milliGRAM(s) IV Intermittent every 8 hours  chlorhexidine 2% Cloths 1 Application(s) Topical daily  folic acid 1 milliGRAM(s) Oral daily  HYDROmorphone  Injectable 0.2 milliGRAM(s) IV Push every 4 hours PRN  lacosamide Solution 200 milliGRAM(s) Oral two times a day  levETIRAcetam  Solution 2000 milliGRAM(s) Enteral Tube two times a day  melatonin 5 milliGRAM(s) Oral at bedtime  midodrine. 5 milliGRAM(s) Oral three times a day  multivitamin 1 Tablet(s) Oral daily  naloxone Injectable 0.4 milliGRAM(s) IV Push once  ondansetron Injectable 4 milliGRAM(s) IV Push every 8 hours PRN  pantoprazole  Injectable 40 milliGRAM(s) IV Push daily  polyethylene glycol 3350 17 Gram(s) Oral daily  senna Syrup 10 milliLiter(s) Oral at bedtime  valproic  acid Syrup 1250 milliGRAM(s) Oral three times a day    Drug Dosing Weight  Height (cm): 170.2 (26 Aug 2024 12:06)  Weight (kg): 90.7 (31 Dec 2024 18:00)  BMI (kg/m2): 31.3 (31 Dec 2024 18:00)  BSA (m2): 2.02 (31 Dec 2024 18:00)    CENTRAL LINE: [ ] YES [x ] NO  LOCATION:   DATE INSERTED:  REMOVE: [ ] YES [ ] NO  EXPLAIN:    FAULKNER: [ ] YES [x ] NO    DATE INSERTED:  REMOVE:  [ ] YES [ ] NO  EXPLAIN:    PAST MEDICAL & SURGICAL HISTORY:  Heart failure, unspecified HF chronicity, unspecified heart failure type      ETOH abuse  h/o etoh abuse now moderate drinker      Artificial pacemaker      ROS: Denies pain, Denies SOB.     PHYSICAL EXAM:  GENERAL: NAD  HEAD:  asymmetrical  (hx of Crani L side).   EYES: PERRLA, conjunctiva and sclera clear  ENMT:  Moist mucous membranes, Good dentition, No lesions  NECK: Tracheostomy. Site C/D/I  NERVOUS SYSTEM:  Alert & Oriented X seld and place (hospital), Good concentration; Right side hemiparesis. Able to move LUE and LLE AG.   CHEST/LUNG: Diminished LLL.  No rales, rhonchi, wheezing, or rubs On 28% FiO2 of trach collar  HEART: Regular rate and rhythm; No murmurs, rubs, or gallops  ABDOMEN: Soft, Nontender, Nondistended; Bowel sounds present. PEG  EXTREMITIES:  2+ Peripheral Pulses, No clubbing, cyanosis, or edema  SKIN: No rashes. Sacral wound.       LABS:  CBC Full  -  ( 07 Jan 2025 07:40 )  WBC Count : 5.18 K/uL  RBC Count : 2.54 M/uL  Hemoglobin : 8.6 g/dL  Hematocrit : 25.0 %  Platelet Count - Automated : 332 K/uL  Mean Cell Volume : 98.4 fl  Mean Cell Hemoglobin : 33.9 pg  Mean Cell Hemoglobin Concentration : 34.4 g/dL  Auto Neutrophil # : x  Auto Lymphocyte # : x  Auto Monocyte # : x  Auto Eosinophil # : x  Auto Basophil # : x  Auto Neutrophil % : x  Auto Lymphocyte % : x  Auto Monocyte % : x  Auto Eosinophil % : x  Auto Basophil % : x    01-07    145  |  110[H]  |  10  ----------------------------<  138[H]  4.1   |  31  |  0.58    Ca    8.6      07 Jan 2025 07:40  Phos  3.5     01-07  Mg     2.2     01-07    TPro  5.8[L]  /  Alb  1.9[L]  /  TBili  0.4  /  DBili  x   /  AST  26  /  ALT  23  /  AlkPhos  81  01-07      Urinalysis Basic - ( 07 Jan 2025 07:40 )    Color: x / Appearance: x / SG: x / pH: x  Gluc: 138 mg/dL / Ketone: x  / Bili: x / Urobili: x   Blood: x / Protein: x / Nitrite: x   Leuk Esterase: x / RBC: x / WBC x   Sq Epi: x / Non Sq Epi: x / Bacteria: x    [x  ]  DVT Prophylaxis      RADIOLOGY & ADDITIONAL STUDIES:     < from: Xray Abdomen 1 View PORTABLE -Routine (Xray Abdomen 1 View PORTABLE -Routine .) (01.04.25 @ 11:10) >  ACC: [id number] EXAM:  XR ABDOMEN PORTABLE ROUTINE 1V   ORDERED BY: MARJORIE COHEN     PROCEDURE DATE:  01/04/2025          INTERPRETATION:  KUB: AP, 2 images    COMPARISON: No recent priors.    CLINICAL INFORMATION: SBO..    FINDINGS: PEG feeding tubeoverlies LEFT upper quadrant  Redundant air-filled transverse colon. Small bowel pattern nonspecific.  No free intra-abdominal air.  No abnormal calcifications.  The osseous structures are intact.    IMPRESSION:    Redundant air-filled transverse colon. Small bowel pattern nonspecific.  If symptoms warrant further investigation either on repeat abdominal   supine/erect views or abdominal CT scan recommended.    --- End of Report ---            NUPUR EPSTEIN MD; Attending Radiologist  This document has been electronically signed. Jan 6 2025  2:54PM    < end of copied text >

## 2025-01-08 NOTE — PROGRESS NOTE ADULT - ASSESSMENT
49 year old M with CVA 3/30/2022 with residual aphasia and right sided residual weakness, s/p tracheostomy and peg placement, seizures, HTN, HLD, ?HF with recovered EF s/p ?Bi-V ICD (initially EF 30%->55-60% on 03/2021), MDD, PE, Afib on Eliquis who was referred by nursing home after fall on 12/28/24, found to have R intertrochanteric fracture, requiring surgery. Cardiology was consulted

## 2025-01-08 NOTE — PROGRESS NOTE ADULT - NS ATTEND AMEND GEN_ALL_CORE FT
Problem/Plan - 1:  ·  Problem: Bacteremia.   ·  Plan: -Code sepsis on 1/3 in the setting of fever, tachycardia and hypotension.   -CXR shows no consolidation.    - Bcx 1/3 + Enterococcus faecalis  - Sputum cx from 1/1 + pseudomonas aeruginosa.   - UA 1/3 Negative  - Initially Zosyn switched to Meropenem.   - Patient now on Cefepime and ampicillin   - F/u Bcx of 1/6  - Plan for possible DEXTER on 1/8   - NPO after midnight.   - ID Dr. De La Torre.     Problem/Plan - 2:  ·  Problem: Left lower lobe pneumonia.   ·  Plan: - S/p ceftriaxone and azithro.   -  sputum culture 1/1/2025: + Pseudomonas aeruginosa and blood cx + for Entero Faecalis  - Zosyn d/arjun and started on Meropenem on 1/4   - C/w  Cefepime and ampicillin   - Legionella negative  -ID Dr. De La Torre consulted  f/u recc  -f/u repeat blood culture from 1/6  - Aspiration precautions.  - Pulmonology following.     Problem/Plan - 3:  ·  Problem: Fracture of right femur.   ·  Plan: S/p Right Hip IM Nailing POD#5  Ortho following  Pain management  Dressing changed today from Medipore tape to 4x4 and Tegaderm   DVT prophylaxis with venodynes and Eliquis per medicine, no orthopedic contraindications  Daily Physical Therapy:  WBAT of the Right lower extremity with appropriate assistive device   Discharge planning to [facility].     Problem/Plan - 4:  ·  Problem: Tracheostomy dependent.   ·  Plan: Tracheostomy management per protocol  Not a candidate for decannulation.

## 2025-01-08 NOTE — PROGRESS NOTE ADULT - PROBLEM SELECTOR PLAN 2
- S/p ceftriaxone and azithro.   -  sputum culture 1/1/2025: + Pseudomonas aeruginosa and blood cx + for Entero Faecalis  - Zosyn d/arjun and started on Meropenem on 1/4   - Now on Cefepime and ampicillin   - Legionella negative  -ID Dr. De La Torre consulted  f/u recc  -f/u repeat blood culture from 1/6  - Aspiration precautions.  - Pulmonology following - S/p ceftriaxone and azithro.   -  sputum culture 1/1/2025: + Pseudomonas aeruginosa and blood cx + for Entero Faecalis  - Zosyn d/arjun and started on Meropenem on 1/4   - C/w  Cefepime and ampicillin   - Legionella negative  -ID Dr. De La Torre consulted  f/u recc  -f/u repeat blood culture from 1/6  - Aspiration precautions.  - Pulmonology following

## 2025-01-08 NOTE — PROGRESS NOTE ADULT - PROBLEM SELECTOR PLAN 3
S/p Right Hip IM Nailing POD#5  Ortho following  Pain management  Dressing changed today from Medipore tape to 4x4 and Tegaderm   DVT prophylaxis with venodynes and Eliquis per medicine, no orthopedic contraindications  Daily Physical Therapy:  WBAT of the Right lower extremity with appropriate assistive device   Discharge planning to [facility]

## 2025-01-09 DIAGNOSIS — Z71.89 OTHER SPECIFIED COUNSELING: ICD-10-CM

## 2025-01-09 LAB
ALBUMIN SERPL ELPH-MCNC: 2 G/DL — LOW (ref 3.5–5)
ALP SERPL-CCNC: 107 U/L — SIGNIFICANT CHANGE UP (ref 40–120)
ALT FLD-CCNC: 23 U/L DA — SIGNIFICANT CHANGE UP (ref 10–60)
ANION GAP SERPL CALC-SCNC: 5 MMOL/L — SIGNIFICANT CHANGE UP (ref 5–17)
AST SERPL-CCNC: 27 U/L — SIGNIFICANT CHANGE UP (ref 10–40)
BILIRUB SERPL-MCNC: 0.6 MG/DL — SIGNIFICANT CHANGE UP (ref 0.2–1.2)
BUN SERPL-MCNC: 12 MG/DL — SIGNIFICANT CHANGE UP (ref 7–18)
CALCIUM SERPL-MCNC: 8.3 MG/DL — LOW (ref 8.4–10.5)
CHLORIDE SERPL-SCNC: 108 MMOL/L — SIGNIFICANT CHANGE UP (ref 96–108)
CO2 SERPL-SCNC: 28 MMOL/L — SIGNIFICANT CHANGE UP (ref 22–31)
CREAT SERPL-MCNC: 0.63 MG/DL — SIGNIFICANT CHANGE UP (ref 0.5–1.3)
EGFR: 116 ML/MIN/1.73M2 — SIGNIFICANT CHANGE UP
GLUCOSE BLDC GLUCOMTR-MCNC: 107 MG/DL — HIGH (ref 70–99)
GLUCOSE BLDC GLUCOMTR-MCNC: 120 MG/DL — HIGH (ref 70–99)
GLUCOSE BLDC GLUCOMTR-MCNC: 152 MG/DL — HIGH (ref 70–99)
GLUCOSE SERPL-MCNC: 148 MG/DL — HIGH (ref 70–99)
HCT VFR BLD CALC: 26.5 % — LOW (ref 39–50)
HGB BLD-MCNC: 8.9 G/DL — LOW (ref 13–17)
MAGNESIUM SERPL-MCNC: 2.2 MG/DL — SIGNIFICANT CHANGE UP (ref 1.6–2.6)
MCHC RBC-ENTMCNC: 33.6 G/DL — SIGNIFICANT CHANGE UP (ref 32–36)
MCHC RBC-ENTMCNC: 34 PG — SIGNIFICANT CHANGE UP (ref 27–34)
MCV RBC AUTO: 101.1 FL — HIGH (ref 80–100)
NRBC # BLD: 0 /100 WBCS — SIGNIFICANT CHANGE UP (ref 0–0)
PHOSPHATE SERPL-MCNC: 3.4 MG/DL — SIGNIFICANT CHANGE UP (ref 2.5–4.5)
PLATELET # BLD AUTO: 404 K/UL — HIGH (ref 150–400)
POTASSIUM SERPL-MCNC: 3.8 MMOL/L — SIGNIFICANT CHANGE UP (ref 3.5–5.3)
POTASSIUM SERPL-SCNC: 3.8 MMOL/L — SIGNIFICANT CHANGE UP (ref 3.5–5.3)
PROT SERPL-MCNC: 6.3 G/DL — SIGNIFICANT CHANGE UP (ref 6–8.3)
RBC # BLD: 2.62 M/UL — LOW (ref 4.2–5.8)
RBC # FLD: 15.5 % — HIGH (ref 10.3–14.5)
SODIUM SERPL-SCNC: 141 MMOL/L — SIGNIFICANT CHANGE UP (ref 135–145)
WBC # BLD: 6.42 K/UL — SIGNIFICANT CHANGE UP (ref 3.8–10.5)
WBC # FLD AUTO: 6.42 K/UL — SIGNIFICANT CHANGE UP (ref 3.8–10.5)

## 2025-01-09 PROCEDURE — 36573 INSJ PICC RS&I 5 YR+: CPT

## 2025-01-09 PROCEDURE — 77001 FLUOROGUIDE FOR VEIN DEVICE: CPT | Mod: 26,59

## 2025-01-09 PROCEDURE — 99233 SBSQ HOSP IP/OBS HIGH 50: CPT

## 2025-01-09 RX ORDER — ACETAMINOPHEN 80 MG/.8ML
1000 SOLUTION/ DROPS ORAL ONCE
Refills: 0 | Status: COMPLETED | OUTPATIENT
Start: 2025-01-09 | End: 2025-01-09

## 2025-01-09 RX ORDER — ACETAMINOPHEN 80 MG/.8ML
650 SOLUTION/ DROPS ORAL EVERY 6 HOURS
Refills: 0 | Status: DISCONTINUED | OUTPATIENT
Start: 2025-01-09 | End: 2025-01-13

## 2025-01-09 RX ORDER — SODIUM CHLORIDE 9 MG/ML
10 INJECTION, SOLUTION INTRAMUSCULAR; INTRAVENOUS; SUBCUTANEOUS
Refills: 0 | Status: DISCONTINUED | OUTPATIENT
Start: 2025-01-09 | End: 2025-01-13

## 2025-01-09 RX ORDER — CHLORHEXIDINE GLUCONATE 1.2 MG/ML
1 RINSE ORAL DAILY
Refills: 0 | Status: DISCONTINUED | OUTPATIENT
Start: 2025-01-10 | End: 2025-01-13

## 2025-01-09 RX ADMIN — POLYSORBATE 80 1 DROP(S): 100 SOLUTION/ DROPS OPHTHALMIC at 12:49

## 2025-01-09 RX ADMIN — Medication 200 GRAM(S): at 04:14

## 2025-01-09 RX ADMIN — LEVETIRACETAM 2000 MILLIGRAM(S): 100 SOLUTION ORAL at 05:25

## 2025-01-09 RX ADMIN — MIDODRINE HYDROCHLORIDE 5 MILLIGRAM(S): 5 TABLET ORAL at 12:50

## 2025-01-09 RX ADMIN — ACETAMINOPHEN 650 MILLIGRAM(S): 80 SOLUTION/ DROPS ORAL at 18:24

## 2025-01-09 RX ADMIN — Medication 200 GRAM(S): at 18:06

## 2025-01-09 RX ADMIN — Medication 5 MILLIGRAM(S): at 21:12

## 2025-01-09 RX ADMIN — Medication 200 GRAM(S): at 01:38

## 2025-01-09 RX ADMIN — IPRATROPIUM BROMIDE AND ALBUTEROL SULFATE 3 MILLILITER(S): .5; 2.5 SOLUTION RESPIRATORY (INHALATION) at 20:04

## 2025-01-09 RX ADMIN — Medication 17 GRAM(S): at 12:50

## 2025-01-09 RX ADMIN — APIXABAN 5 MILLIGRAM(S): 5 TABLET, FILM COATED ORAL at 05:25

## 2025-01-09 RX ADMIN — Medication 100 MILLIGRAM(S): at 05:25

## 2025-01-09 RX ADMIN — MIDODRINE HYDROCHLORIDE 5 MILLIGRAM(S): 5 TABLET ORAL at 05:25

## 2025-01-09 RX ADMIN — ACETAMINOPHEN 1000 MILLIGRAM(S): 80 SOLUTION/ DROPS ORAL at 09:00

## 2025-01-09 RX ADMIN — IPRATROPIUM BROMIDE AND ALBUTEROL SULFATE 3 MILLILITER(S): .5; 2.5 SOLUTION RESPIRATORY (INHALATION) at 03:04

## 2025-01-09 RX ADMIN — Medication 200 GRAM(S): at 21:11

## 2025-01-09 RX ADMIN — LEVETIRACETAM 2000 MILLIGRAM(S): 100 SOLUTION ORAL at 18:07

## 2025-01-09 RX ADMIN — APIXABAN 5 MILLIGRAM(S): 5 TABLET, FILM COATED ORAL at 18:07

## 2025-01-09 RX ADMIN — PANTOPRAZOLE 40 MILLIGRAM(S): 40 TABLET, DELAYED RELEASE ORAL at 12:50

## 2025-01-09 RX ADMIN — Medication 1 TABLET(S): at 12:50

## 2025-01-09 RX ADMIN — Medication 500 MILLIGRAM(S): at 12:51

## 2025-01-09 RX ADMIN — ACETAMINOPHEN 1000 MILLIGRAM(S): 80 SOLUTION/ DROPS ORAL at 22:00

## 2025-01-09 RX ADMIN — VALPROIC ACID 1250 MILLIGRAM(S): 250 SOLUTION ORAL at 21:12

## 2025-01-09 RX ADMIN — Medication 100 MILLIGRAM(S): at 13:02

## 2025-01-09 RX ADMIN — IPRATROPIUM BROMIDE AND ALBUTEROL SULFATE 3 MILLILITER(S): .5; 2.5 SOLUTION RESPIRATORY (INHALATION) at 15:23

## 2025-01-09 RX ADMIN — ACETAMINOPHEN 400 MILLIGRAM(S): 80 SOLUTION/ DROPS ORAL at 21:27

## 2025-01-09 RX ADMIN — Medication 1 MILLIGRAM(S): at 12:50

## 2025-01-09 RX ADMIN — LACOSAMIDE 200 MILLIGRAM(S): 100 TABLET, FILM COATED ORAL at 18:12

## 2025-01-09 RX ADMIN — CHLORHEXIDINE GLUCONATE 1 APPLICATION(S): 1.2 RINSE ORAL at 12:50

## 2025-01-09 RX ADMIN — Medication 200 GRAM(S): at 08:33

## 2025-01-09 RX ADMIN — Medication 100 MILLIGRAM(S): at 21:28

## 2025-01-09 RX ADMIN — ATORVASTATIN CALCIUM 80 MILLIGRAM(S): 40 TABLET, FILM COATED ORAL at 21:12

## 2025-01-09 RX ADMIN — ACETAMINOPHEN 400 MILLIGRAM(S): 80 SOLUTION/ DROPS ORAL at 08:33

## 2025-01-09 RX ADMIN — LACOSAMIDE 200 MILLIGRAM(S): 100 TABLET, FILM COATED ORAL at 05:25

## 2025-01-09 RX ADMIN — VALPROIC ACID 1250 MILLIGRAM(S): 250 SOLUTION ORAL at 05:24

## 2025-01-09 RX ADMIN — VALPROIC ACID 1250 MILLIGRAM(S): 250 SOLUTION ORAL at 13:01

## 2025-01-09 RX ADMIN — Medication 200 GRAM(S): at 12:53

## 2025-01-09 RX ADMIN — MIDODRINE HYDROCHLORIDE 5 MILLIGRAM(S): 5 TABLET ORAL at 18:07

## 2025-01-09 RX ADMIN — IPRATROPIUM BROMIDE AND ALBUTEROL SULFATE 3 MILLILITER(S): .5; 2.5 SOLUTION RESPIRATORY (INHALATION) at 08:53

## 2025-01-09 NOTE — PROCEDURE NOTE - ADDITIONAL PROCEDURE DETAILS
38 cm 4Fr  PICC inserted via the right cephalic vein.  Sterile dressing applied.  Tip location SVC/ RA Junction.

## 2025-01-09 NOTE — PROGRESS NOTE ADULT - SUBJECTIVE AND OBJECTIVE BOX
DANNIE [id number]  49yMale    Diagnosis: 49yMale S/p Right Hip IM Nailing POD#7  Patient was seen and evaluated at bedside. Patient awake with trach collar present, is able to respond to questions with expression. minimally verbal   Patient states he has minimal pain to right hip at this time, when asked about pain patient said "no" and shook head no   Patient has been working with PT doing very well compared to baseline, is extremely motivated to work with PT, spoke with PT who states patient is sitting at edge of bed and standing multiple times per day is very involved in recovery   Pt denies Fever, Chest pain, paresthesias, N/V/D, abdominal pain, syncope, or pain anywhere else.       Vital Signs Last 24 Hrs  T(C): 36.2 (09 Jan 2025 05:17), Max: 36.4 (08 Jan 2025 20:32)  T(F): 97.2 (09 Jan 2025 05:17), Max: 97.5 (08 Jan 2025 20:32)  HR: 67 (09 Jan 2025 09:00) (66 - 71)  BP: 112/74 (09 Jan 2025 05:17) (97/64 - 131/88)  BP(mean): --  ABP: --  ABP(mean): --  RR: 16 (09 Jan 2025 05:17) (16 - 20)  SpO2: 99% (09 Jan 2025 09:00) (95% - 100%)    O2 Parameters below as of 09 Jan 2025 09:00  Patient On (Oxygen Delivery Method): tracheostomy collar    O2 Concentration (%): 28    I&O's Summary    08 Jan 2025 07:01  -  09 Jan 2025 07:00  --------------------------------------------------------  IN: 0 mL / OUT: 1450 mL / NET: -1450 mL      Physical Exam:  General:  NAD, resting comfortably in bed. With trach collar in place  Right Hip:  Dressing is C/D/I, Tegaderm dressing in place no blood staining at all, no signs of bleeding. Skin is pink and warm.  No erythema. SILT.  Wound with no drainage, healing well. Performed PROM with patient who states still with no pain to hip  Lower extremity:  No calf tenderness, calves are soft. 2+pulses. NVI. 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 Pt denies any sensation to the RLE as well.                                               8.9    6.42  )-----------( 404      ( 09 Jan 2025 05:58 )             26.5   01-09    141  |  108  |  12  ----------------------------<  148[H]  3.8   |  28  |  0.63    Ca    8.3[L]      09 Jan 2025 05:58  Phos  3.4     01-09  Mg     2.2     01-09    TPro  6.3  /  Alb  2.0[L]  /  TBili  0.6  /  DBili  x   /  AST  27  /  ALT  23  /  AlkPhos  107  01-09                      Impression:  49yMale S/p Right Hip IM Nailing POD#7  Plan:  -  Pain management  -  DVT prophylaxis with venodynes and Eliquis per medicine, no orthopedic contraindications   -  Daily Physical Therapy:  WBAT of the Right lower extremity with appropriate assistive device   -  Discharge planning [facility] vs return to prior facility   -  Will continue to monitor dressing and wound    -  Continue with care as per medical team. Orthopedically stable at this time.     >  Patient is bacteremic per medicine, will monitor right hip closely for signs of infection  -  Case d/w DR. Rodriguez

## 2025-01-09 NOTE — PROGRESS NOTE ADULT - PROBLEM SELECTOR PLAN 11
Continue eliquis 5mg BID.  Continue cefepime. ID f/u for length of antibiotics.  Scheduled for PICC today.  Discharge planning underway.

## 2025-01-09 NOTE — CHART NOTE - NSCHARTNOTEFT_GEN_A_CORE
EVENT: Notified by RN, pt is c/o pain.     HPI:  48 y/o M pt with PMH CVA (3/30/2022 with residual aphasia and right sided residual weakness), s/p tracheostomy and peg placement, seizures, HTN, HLD, CHF w/ ICD (initially rEF 30%->55-60% on 03/2021), MDD, Recurrent PE, Afib (on Eliquis), obesity s/p bariatric intervention presented to the ED after a fall 3 days prior to admission, and outpatient x-ray showed intertrochanteric fracture of right femur. He also had fevers outpatient, and CXR concerning for left lower lobe infiltrate Patient is being admitted to  for management of right intertrochanteric femur fracture and pneumonia.     1/2/25: Patient anemia possible anemia of chronic disease, PRBC 1 unit transfused for OR, recommended by Ortho Hemoglobin above 10, however will transfuse one unit 1/2/25 as patient with no active bleeding noted. Cardiac cleared for surgery. ICD no detection, however EP recommends no need to replace as ICD placed initially due to previous low EF. TTE resulting LBBB, LVSF normal EF 59%. Patient planned for OR tonight.   01/03/2025: Restarted Eliquis. S/p R hip IM nailing POD #1. PT reccs [facility]. WBAT. Dispo: Placement back to [facility]. Will monitor on AC for 24 hrs to ensure H&H remains stable and monitor surgical wound.   01/04: S/p Code sepsis on 1/3 in the setting of fever, tachycardia and hypotension. CXR shows no consolidation.  Bcx 1/3 + for Gram positive for cocci in pairs and chains, pending sensitivity. Soft BP. Sputum cx from 1/1 + pseudomonas aeruginosa. Zosyn switched to Meropenem. Ucx sent on 1/3. Afebrile. No leukocytosis. Lactate and procal wnl. ID Dr. De La Torre consulted on 1/3. S/p R hip IM nailing POD #2.  Ortho reccs appreciated. AC resumed. KUB to r/o SBO vs Ileus. Bowel regimen.   01/05- Blood cx with Entero Faecalis, ID following and currently on Meropenem.     SUBJECTIVE: Pt seen and examined at bedside, A&Ox3--appears in moderate distress, shaking in bed--when asked if he is in pain, he shows right arm and hip.    OBJECTIVE:  Vital Signs Last 24 Hrs  T(C): 36.8 (09 Jan 2025 14:28), Max: 36.8 (09 Jan 2025 14:28)  T(F): 98.2 (09 Jan 2025 14:28), Max: 98.2 (09 Jan 2025 14:28)  HR: 87 (09 Jan 2025 16:00) (65 - 87)  BP: 130/87 (09 Jan 2025 15:20) (112/74 - 130/87)  BP(mean): --  RR: 18 (09 Jan 2025 14:28) (16 - 18)  SpO2: 95% (09 Jan 2025 16:00) (93% - 100%)    Parameters below as of 09 Jan 2025 20:56  Patient On (Oxygen Delivery Method): room air    PHYSICAL EXAM:  GENERAL: NAD, S/P left craniectomy  HEAD:  S/P left craniectomy.  EYES: PERRLA, conjunctiva and sclera clear  ENMT: No tonsillar erythema, exudates.  NECK: Supple, No JVD, tracheostomy intact.  NERVOUS SYSTEM:  Alert & Oriented X3, Good concentration; Motor Strength 5/5 B/L upper and lower extremities; DTRs 2+ intact and symmetric  CHEST/LUNG: Diminished breath sounds L>R  HEART: Regular rate and rhythm; No murmurs, rubs, or gallops  ABDOMEN: +Peg. Soft, Nontender, Nondistended; Bowel sounds present  EXTREMITIES:  2+ Peripheral Pulses, No clubbing, cyanosis, or edema  LYMPH: No lymphadenopathy noted  SKIN: Sacral wound      LABS:                        8.9    6.42  )-----------( 404      ( 09 Jan 2025 05:58 )             26.5     01-09    141  |  108  |  12  ----------------------------<  148[H]  3.8   |  28  |  0.63    Ca    8.3[L]      09 Jan 2025 05:58  Phos  3.4     01-09  Mg     2.2     01-09    TPro  6.3  /  Alb  2.0[L]  /  TBili  0.6  /  DBili  x   /  AST  27  /  ALT  23  /  AlkPhos  107  01-09        EKG:   IMAGING:    ASSESSMENT/PROBLEM: Right arm and hip pain likely attributed to recent PICC placement today and R hip IM nailing POD #7    PLAN:     -Ofirmev 1G x1 dose now  -Continue current/supportive measures     FOLLOW UP / RESULT:     -Monitor effectiveness of pain medication   -Reassess pain per hospital policy

## 2025-01-09 NOTE — PROGRESS NOTE ADULT - PROBLEM SELECTOR PLAN 8
?HF with recovered EF s/p ?Bi-V ICD (initially EF 30%->55-60% on 03/2021)  Euvolemic off diuretics  EKG showed sinus rhythm with LBBB  TTE 1/1/25 showed normal LV function, mildly dilated aortic root (4.2 cm)  ICD battery depleted. EP Dr. Traylor consulted, no plan for gen change given poor functional status, comorbidities, and recovered EF  Cardiology Dr Dacosta following.

## 2025-01-09 NOTE — PROGRESS NOTE ADULT - SUBJECTIVE AND OBJECTIVE BOX
DANNIE SHIRLEY    SCU progress note    INTERVAL HPI/OVERNIGHT EVENTS: ***Pulled out trach yesterday. trach was reinserted.    DNR [ ]   DNI  [  ]    FULL CODE    Covid - 19 PCR:     The 4Ms    What Matters Most: see GOC  Age appropriate Medications/Screen for High Risk Medication: Yes  Mentation: see CAM below  Mobility: defer to physical exam    The Confusion Assessment Method (CAM) Diagnostic Algorithm     1: Acute Onset or Fluctuating Course  - Is there evidence of an acute change in mental status from the patient’s baseline? Did the (abnormal) behavior  fluctuate during the day, that is, tend to come and go, or increase and decrease in severity?  [ ] YES [X ] NO     2: Inattention  - Did the patient have difficulty focusing attention, being easily distractible, or having difficulty keeping track of what was being said?  [ ] YES [X ] NO     3: Disorganized thinking  -Was the patient’s thinking disorganized or incoherent, such as rambling or irrelevant conversation, unclear or illogical flow of ideas, or unpredictable switching from subject to subject?  [ ] YES [ ] NO   UNABLE TO ACCESS    4: Altered Level of consciousness?  [ ] YES [x ] NO    The diagnosis of delirium by CAM requires the presence of features 1 and 2 and either 3 or 4.    PRESSORS: [ ] YES [x ] NO  ampicillin  IVPB 2 Gram(s) IV Intermittent every 4 hours  cefepime   IVPB 2000 milliGRAM(s) IV Intermittent every 8 hours    Cardiovascular:  Heart Failure  Acute   Acute on Chronic  Chronic       midodrine. 5 milliGRAM(s) Oral three times a day    Pulmonary:  albuterol/ipratropium for Nebulization 3 milliLiter(s) Nebulizer every 6 hours    Hematalogic:  apixaban 5 milliGRAM(s) Oral every 12 hours    Other:  artificial  tears Solution 1 Drop(s) Both EYES daily  ascorbic acid 500 milliGRAM(s) Oral daily  atorvastatin 80 milliGRAM(s) Oral at bedtime  chlorhexidine 2% Cloths 1 Application(s) Topical daily  folic acid 1 milliGRAM(s) Oral daily  lacosamide Solution 200 milliGRAM(s) Oral two times a day  levETIRAcetam  Solution 2000 milliGRAM(s) Enteral Tube two times a day  melatonin 5 milliGRAM(s) Oral at bedtime  multivitamin 1 Tablet(s) Oral daily  naloxone Injectable 0.4 milliGRAM(s) IV Push once  ondansetron Injectable 4 milliGRAM(s) IV Push every 8 hours PRN  pantoprazole  Injectable 40 milliGRAM(s) IV Push daily  polyethylene glycol 3350 17 Gram(s) Oral daily  senna Syrup 10 milliLiter(s) Oral at bedtime  valproic  acid Syrup 1250 milliGRAM(s) Oral three times a day    albuterol/ipratropium for Nebulization 3 milliLiter(s) Nebulizer every 6 hours  ampicillin  IVPB 2 Gram(s) IV Intermittent every 4 hours  apixaban 5 milliGRAM(s) Oral every 12 hours  artificial  tears Solution 1 Drop(s) Both EYES daily  ascorbic acid 500 milliGRAM(s) Oral daily  atorvastatin 80 milliGRAM(s) Oral at bedtime  cefepime   IVPB 2000 milliGRAM(s) IV Intermittent every 8 hours  chlorhexidine 2% Cloths 1 Application(s) Topical daily  folic acid 1 milliGRAM(s) Oral daily  lacosamide Solution 200 milliGRAM(s) Oral two times a day  levETIRAcetam  Solution 2000 milliGRAM(s) Enteral Tube two times a day  melatonin 5 milliGRAM(s) Oral at bedtime  midodrine. 5 milliGRAM(s) Oral three times a day  multivitamin 1 Tablet(s) Oral daily  naloxone Injectable 0.4 milliGRAM(s) IV Push once  ondansetron Injectable 4 milliGRAM(s) IV Push every 8 hours PRN  pantoprazole  Injectable 40 milliGRAM(s) IV Push daily  polyethylene glycol 3350 17 Gram(s) Oral daily  senna Syrup 10 milliLiter(s) Oral at bedtime  valproic  acid Syrup 1250 milliGRAM(s) Oral three times a day    Drug Dosing Weight  Height (cm): 170.2 (26 Aug 2024 12:06)  Weight (kg): 90.7 (31 Dec 2024 18:00)  BMI (kg/m2): 31.3 (31 Dec 2024 18:00)  BSA (m2): 2.02 (31 Dec 2024 18:00)    CENTRAL LINE: [ ] YES [x ] NO  LOCATION:   DATE INSERTED:  REMOVE: [ ] YES [ ] NO  EXPLAIN:    FAULKNER: [ ] YES [x ] NO    DATE INSERTED:  REMOVE:  [ ] YES [ ] NO  EXPLAIN:    PAST MEDICAL & SURGICAL HISTORY:  Heart failure, unspecified HF chronicity, unspecified heart failure type      ETOH abuse  h/o etoh abuse now moderate drinker      Artificial pacemaker                  01-08 @ 07:01  -  01-09 @ 07:00  --------------------------------------------------------  IN: 0 mL / OUT: 1450 mL / NET: -1450 mL            PHYSICAL EXAM:    GENERAL: NAD, S/P left craniectomy  HEAD:  S/P left craniectomy.  EYES: PERRLA, conjunctiva and sclera clear  ENMT: No tonsillar erythema, exudates.  NECK: Supple, No JVD, tracheostomy intact.  NERVOUS SYSTEM:  Alert & Oriented X3, Good concentration; Motor Strength 5/5 B/L upper and lower extremities; DTRs 2+ intact and symmetric  CHEST/LUNG: Diminished breath sounds LLL  HEART: Regular rate and rhythm; No murmurs, rubs, or gallops  ABDOMEN: +Peg. Soft, Nontender, Nondistended; Bowel sounds present  EXTREMITIES:  2+ Peripheral Pulses, No clubbing, cyanosis, or edema  LYMPH: No lymphadenopathy noted  SKIN: Sacral wound      LABS:  CBC Full  -  ( 09 Jan 2025 05:58 )  WBC Count : 6.42 K/uL  RBC Count : 2.62 M/uL  Hemoglobin : 8.9 g/dL  Hematocrit : 26.5 %  Platelet Count - Automated : 404 K/uL  Mean Cell Volume : 101.1 fl  Mean Cell Hemoglobin : 34.0 pg  Mean Cell Hemoglobin Concentration : 33.6 g/dL  Auto Neutrophil # : x  Auto Lymphocyte # : x  Auto Monocyte # : x  Auto Eosinophil # : x  Auto Basophil # : x  Auto Neutrophil % : x  Auto Lymphocyte % : x  Auto Monocyte % : x  Auto Eosinophil % : x  Auto Basophil % : x    01-09    141  |  108  |  12  ----------------------------<  148[H]  3.8   |  28  |  0.63    Ca    8.3[L]      09 Jan 2025 05:58  Phos  3.4     01-09  Mg     2.2     01-09    TPro  6.3  /  Alb  2.0[L]  /  TBili  0.6  /  DBili  x   /  AST  27  /  ALT  23  /  AlkPhos  107  01-09      Urinalysis Basic - ( 09 Jan 2025 05:58 )    Color: x / Appearance: x / SG: x / pH: x  Gluc: 148 mg/dL / Ketone: x  / Bili: x / Urobili: x   Blood: x / Protein: x / Nitrite: x   Leuk Esterase: x / RBC: x / WBC x   Sq Epi: x / Non Sq Epi: x / Bacteria: x            [  ]  DVT Prophylaxis  [  ]  Nutrition, Brand, Rate         Goal Rate        Abnormal Nutritional Status -  Malnutrition   Cachexia          RADIOLOGY & ADDITIONAL STUDIES:  ***  < from: CT Angio Chest Aorta w/wo IV Cont (01.08.25 @ 17:23) >  CHEST:  LUNGS AND LARGE AIRWAYS: Tracheostomy terminates above the giovanni.   Bilateral distal airway secretions/impaction and bibasilar atelectasis.    Dependent anterior right lung thin walled cyst. Subcentimeter pulmonary   nodules up to 5 mm size. For reference, right upper lobe 5 mm nodule   image 46 series 16.  PLEURA: No pleural effusion or pneumothorax.  VESSELS: No acute aortic pathology within the limitations of this non-ECG   gated study. Aortic root is dilated to 4.3 cm at the sinuses of Valsalva   and thoracic aorta is mildlyaneurysmal at mid ascending aorta measuring   4 cm. Aortic arch measures 3.2 cm, caliber within normal limits. Mid   descending thoracic aorta measures 2.6 cm, normal in caliber. Main   pulmonary artery is mildly enlarged measuring 3.1 cm. No central  pulmonary embolus.  HEART: Heart size is within normal limits. Coronary artery   calcifications. Biventricular AICD leads are identified. Trace   pericardial fluid.  MEDIASTINUM AND TRINITY: Small volume nodes of the thorax, not enlarged by   CT size criteria. Esophagus is nondistended  CHEST WALL AND LOWER NECK: Tracheostomy. Left chest wall AICD.   Gynecomastia. Visualized portion of the thyroid is unremarkable.    ABDOMEN AND PELVIS:  LIVER: Mildly enlarged, 19 cm in craniocaudal dimension.  BILE DUCTS: No distention  GALLBLADDER: Absent  SPLEEN: Spleen size within normal limits  PANCREAS: No acute peripancreatic inflammation  ADRENALS: Unremarkable  KIDNEYS/URETERS: No hydronephrosis. Right renal cyst and additional   hypodensities too small to characterize. 4 mm nonobstructing left renal   calculus.    BLADDER: Minimally distended. Small amount of nondependent air within the   bladder may be due to recent instrumentation. Correlate clinically and   with urinalysis as warranted.  REPRODUCTIVE ORGANS: Prostate size is within normal limits, otherwise   poorly assessed due to streak artifact in the pelvis. Few pelvic   phleboliths are noted.    BOWEL: Sleeve gastrectomy. Gastrostomy tube balloon is localized to the   distal stomach versus proximal duodenum. No small bowel distention.   Appendix is not obstructed. Colon is overall nondistended with minimal to   mild stool burden, limiting evaluation of the mucosa.  PERITONEUM/RETROPERITONEUM: No ascites  VESSELS: No abdominal aortoiliac aneurysm or significant stenosis.   Visceral arteries are without significant stenosis. Centrally patency is   not assessed on this study due to timing of contrast.  LYMPH NODES: No enlarged lymph nodes by CT size criteria  ABDOMINAL WALL: Gastrostomy tube as described above.  BONES: Soft tissue infiltrative changes surrounding recently transfixed   right hip fracture. Right fixation hardware is partially imaged, with   surrounding streak artifact. Multilevel degenerative changes of the bones   including multilevel spinal spondylosis. Scoliotic spinal curvature.   Central canal and neural foramina are not adequately assessed on this   study.    IMPRESSION:    VASCULAR:  -No acute aortic pathology within the limitations of this non-ECG gated   study. Thoracic aorta is dilated at the sinuses of Valsalva measuring 4.3   cm, and mid ascending segment measuring 4.0 cm. Follow-up chest CTA with   ECG gating is recommended in 6 months for reevaluation. Pulmonary nodules   up to 5 mm size can be reevaluated at that time.  -Coronary artery calcifications.    NON-VASCULAR:  -Tracheostomy. Bilateral distal airways impaction and bibasilar   atelectasis. Superimposed infection is not definitively excluded.   Correlate clinically.  -Soft tissue infiltrative changes surrounding recently transfixed right   hip fracture. Right fixation hardware is partially imaged, with   surrounding streak artifact.  -Sleeve gastrectomy. Gastrostomy tube balloon is localized to the distal   stomach versus proximal duodenum.  -Small amount of nondependent air within the bladder may be due to recent   instrumentation. Correlate clinically and with urinalysis as warranted.    --- End of Report ---      < end of copied text >  < from: CT Head No Cont (12.31.24 @ 23:18) >  FINDINGS:    BRAIN: No apparent acute infarct, hemorrhage or mass. No hydrocephalus.   Encephalomalacia involving much of the left cerebral hemisphere, left MCA   territory, with ex vacuo dilation of the left lateral ventricle again   demonstrated.    CALVARIUM: Status post left hemicraniectomy. The sinking of the skin flap   seen on the previous study is no longer evident today. No acute fracture.    EXTRACRANIAL SOFT TISSUES: No acute findings.    VISUALIZED PORTIONS OF THE PARANASAL SINUSES AND MASTOID AIR CELLS: No   air fluid levels.    IMPRESSION:  No acute intracranial findings.        --- End of Report ---    < end of copied text >  < from: US Abdomen Limited (09.03.18 @ 16:10) >  FINDINGS:     Liver: Enlarged measuring 23 cm. Normal echogenicity with no visible   focal abnormality.    Intrahepatic ducts: Mildly Dilated.    Common bile duct: Borderline size measuring 0.7 cm.    Gallbladder: Multiple small mobile gallstones. Borderline wall   thickening. No pericholecystic fluid.    Pancreas: Not visualized due to gas.     Abdominal aorta: The visualized portions were unremarkable.    Inferior vena cava: The visualized portions were normal in appearance.    Right kidney: Normal echogenicity. 4.0 cm simple cyst in the upper pole..   Length of  12.6   cm.    Also noted: Mild ascites.    IMPRESSION:    Cholelithiasis with nonspecific borderline wall thickening. Mild biliary   dilatation. Consider MRCP.    Ascites.    Pancreas obscured by bowel gas.        < end of copied text >    Goals of Care Discussion with Family/Proxy/Other   - see note from  01/02/24  FULL CODE

## 2025-01-09 NOTE — PROGRESS NOTE ADULT - PROBLEM SELECTOR PLAN 2
S/p ceftriaxone and azithro.   sputum culture 1/1/2025: + Pseudomonas aeruginosa and blood cx + for Entero Faecalis   Zosyn d/arjun and started on Meropenem on 1/4   Legionella negative  ID Dr. De La Torre consulted  f/u recc  f/u repeat blood culture  Aspiration precautions.

## 2025-01-09 NOTE — PROGRESS NOTE ADULT - PROBLEM SELECTOR PLAN 2
-no fevers, no leucocytosis  -No DEXTER since risk outweighs benefit  -IV antibiotics -empirical treatment  - continue serial blood cultures  -obtain daily ECGs for conduction abnormalities.

## 2025-01-09 NOTE — PROGRESS NOTE ADULT - PROBLEM SELECTOR PLAN 3
Tracheostomy stable.  Patient pulled out trach yesterday.  Trach reinserted..  Maintaining oxygen saturation on trach collar.

## 2025-01-09 NOTE — PROGRESS NOTE ADULT - ASSESSMENT
48 y/o M pt with PMHx CVA (3/30/2022 with residual aphasia and right sided residual weakness), s/p tracheostomy and peg placement, seizures, HTN, HLD, CHF w/ ICD (initially rEF 30%->55-60% on 03/2021), MDD, Recurrent PE, Afib (on Eliquis), obesity s/p bariatric intervention presented to the ED after a fall 3 days prior to admission, and outpatient xray showed intertrochanteric fracture of right femur. He also had fevers outpatient, and CXR concerning for left lower lobe infiltrate Patient is being admitted to  for management of right intertrochanteric femur fracture and pneumonia.     1/2/25: Patient anemia possible anemia of chronic disease, PRBC 1 unit transfused for OR, recommended by Ortho Hemoglobin above 10, however will transfuse one unit 1/2/25 as patient with no active bleeding noted. Cardiac cleared for surgery. ICD no detection, however EP recommends no need to replace as ICD placed initially due to previous low EF. TTE resulting LBBB, LVSF normal EF 59%. Patient planned for OR tonight.   01/03/2025: Restarted Eliquis. S/p R hip IM nailing POD #1. PT reccs [facility]. WBAT. Dispo: Placement back to [facility]. Will monitor on AC for 24 hrs to ensure H&H remains stable and monitor surgical wound.   01/04: S/p Code sepsis on 1/3 in the setting of fever, tachycardia and hypotension. CXR shows no consolidation.  Bcx 1/3 + for Gram positive for cocci in pairs and chains, pending sensitivity. Soft BP. Sputum cx from 1/1 + pseudomonas aeruginosa. Zosyn switched to Meropenem. Ucx sent on 1/3. Afebrile. No leukocytosis. Lactate and procal wnl. ID Dr. De La Torre consulted on 1/3. S/p R hip IM nailing POD #2.  Ortho reccs appreciated. AC resumed. KUB to r/o SBO vs Ileus. Bowel regimen.   01/05- Blood cx with Entero Faecalis, ID following and currently on Meropenem.

## 2025-01-09 NOTE — PROGRESS NOTE ADULT - PROBLEM SELECTOR PLAN 10
original cultures + Faecalis and psuedomonas.  repeat cultures 1/6 NGTD  Continue Cefepime.  ID Dr De La Torre for end date of antibiotics.

## 2025-01-09 NOTE — PROCEDURE NOTE - NSINFORMCONSENT_GEN_A_CORE
Telephone Consent- Sister Klaudia Buchanan/Benefits, risks, and possible complications of procedure explained to patient/caregiver who verbalized understanding and gave written consent.

## 2025-01-09 NOTE — PROGRESS NOTE ADULT - PROBLEM SELECTOR PLAN 9
Secondary to bacteremia and LLL pna.  BCx positive for Faecalis/pseudimonas.  Repeat cultures on 1/6 NGTD  Continue Cefepime

## 2025-01-09 NOTE — PROGRESS NOTE ADULT - NS ATTEND AMEND GEN_ALL_CORE FT
Problem/Plan - 1:  ·  Problem: Fracture of right femur.   ·  Plan: S/p Right Hip IM Nailing POD#5  Ortho following  Pain management  Dressing changed today from Medipore tape to 4x4 and Tegaderm   DVT prophylaxis with venodynes and Eliquis per medicine, no orthopedic contraindications  Daily Physical Therapy:  WBAT of the Right lower extremity with appropriate assistive device   Discharge planning [facility] vs return to prior facility.     Problem/Plan - 2:  ·  Problem: Left lower lobe pneumonia.   ·  Plan: S/p ceftriaxone and azithro.   sputum culture 1/1/2025: + Pseudomonas aeruginosa and blood cx + for Entero Faecalis   Zosyn d/arjun and started on Meropenem on 1/4   Legionella negative  ID Dr. De La Torre consulted  f/u recc  f/u repeat blood culture  Aspiration precautions.     Problem/Plan - 3:  ·  Problem: Tracheostomy dependent.   ·  Plan: Tracheostomy stable.  Patient pulled out trach yesterday.  Trach reinserted..  Maintaining oxygen saturation on trach collar.     Problem/Plan - 4:  ·  Problem: Atrial fibrillation.   ·  Plan: Ventricular rate stable.  Continue with Eliquis and BB.     Problem/Plan - 5:  ·  Problem: Recurrent pulmonary embolism.   ·  Plan: Continue Eliquis 5mg BID.

## 2025-01-09 NOTE — PROGRESS NOTE ADULT - SUBJECTIVE AND OBJECTIVE BOX
PRESENTING CC: Right intertrochanteric femur fracture    OVERNIGHT EVENTS: s/p PICC line placement today    PMH:   PAST MEDICAL & SURGICAL HISTORY:  Heart failure, unspecified HF chronicity, unspecified heart failure type    ETOH abuse  h/o etoh abuse now moderate drinker    Artificial pacemaker        Allergies    shellfish (Unknown)  No Known Drug Allergies    Intolerances        MEDICATIONS  (STANDING):  albuterol/ipratropium for Nebulization 3 milliLiter(s) Nebulizer every 6 hours  ampicillin  IVPB 2 Gram(s) IV Intermittent every 4 hours  apixaban 5 milliGRAM(s) Oral every 12 hours  artificial  tears Solution 1 Drop(s) Both EYES daily  ascorbic acid 500 milliGRAM(s) Oral daily  atorvastatin 80 milliGRAM(s) Oral at bedtime  cefepime   IVPB 2000 milliGRAM(s) IV Intermittent every 8 hours  chlorhexidine 2% Cloths 1 Application(s) Topical daily  folic acid 1 milliGRAM(s) Oral daily  lacosamide Solution 200 milliGRAM(s) Oral two times a day  levETIRAcetam  Solution 2000 milliGRAM(s) Enteral Tube two times a day  melatonin 5 milliGRAM(s) Oral at bedtime  midodrine. 5 milliGRAM(s) Oral three times a day  multivitamin 1 Tablet(s) Oral daily  naloxone Injectable 0.4 milliGRAM(s) IV Push once  pantoprazole  Injectable 40 milliGRAM(s) IV Push daily  polyethylene glycol 3350 17 Gram(s) Oral daily  senna Syrup 10 milliLiter(s) Oral at bedtime  valproic  acid Syrup 1250 milliGRAM(s) Oral three times a day    MEDICATIONS  (PRN):  acetaminophen   Oral Liquid .. 650 milliGRAM(s) Oral every 6 hours PRN Moderate Pain (4 - 6)  ondansetron Injectable 4 milliGRAM(s) IV Push every 8 hours PRN Nausea and/or Vomiting  sodium chloride 0.9% lock flush 10 milliLiter(s) IV Push every 1 hour PRN Pre/post blood products, medications, blood draw, and to maintain line patency      FAMILY HISTORY:  No pertinent family history in first degree relatives      Reviewed; no change from my prior note    SOCIAL HISTORY:  Reviewed, no change from my prior note    REVIEW OF SYSTEMS:  Constitutional: [ ] fever, [ ]weight loss,  [ ]fatigue  Eyes: [ ] visual changes  Respiratory: [ ]shortness of breath;  [ ] cough, [ ]wheezing, [ ]chills, [ ]hemoptysis  Cardiovascular: [ ] chest pain, [ ]palpitations, [ ]dizziness,  [ ]leg swelling [ ]syncope  Gastrointestinal: [ ] abdominal pain, [ ]nausea, [ ]vomiting,  [ ]diarrhea   Genitourinary: [ ] dysuria, [ ] hematuria  Neurologic: [ ] headaches [ ] tremors [ ] weakness [ ] lightheadedness  Skin: [ ] itching, [ ]burning, [ ] rashes  Endocrine: [ ] heat or cold intolerance  Musculoskeletal: [ ] joint pain or swelling; [ ] muscle, back, or extremity pain  Psychiatric: [ ] depression, [ ]anxiety, [ ]mood swings, or [ ]difficulty sleeping  Hematologic: [ ] easy bruising, [ ] bleeding gums    [x] All remaining systems negative except as per above.   [  ] Unable to obtain    Vital Signs Last 24 Hrs  T(C): 36.2 (09 Jan 2025 05:17), Max: 36.4 (08 Jan 2025 20:32)  T(F): 97.2 (09 Jan 2025 05:17), Max: 97.5 (08 Jan 2025 20:32)  HR: 67 (09 Jan 2025 09:00) (66 - 71)  BP: 112/74 (09 Jan 2025 05:17) (97/64 - 131/88)  BP(mean): --  RR: 16 (09 Jan 2025 05:17) (16 - 19)  SpO2: 99% (09 Jan 2025 09:00) (95% - 100%)    Parameters below as of 09 Jan 2025 12:16  Patient On (Oxygen Delivery Method): tracheostomy collar    O2 Concentration (%): 28  I&O's Summary    08 Jan 2025 07:01  -  09 Jan 2025 07:00  --------------------------------------------------------  IN: 0 mL / OUT: 1450 mL / NET: -1450 mL        PHYSICAL EXAM:  General: No acute distress  HEENT: EOMI  Neck: Tracheostomy, No JVD  Lungs: Clear to auscultation bilaterally; No rales or wheezing  Heart: Regular rate and rhythm; No murmurs, rubs, or gallops  Abdomen: Soft, non tender, non distended   Extremities: Warm, no edema   Nervous system:  Alert & Oriented X self and place (hospital). , Good concentration. Nods yes and no. Follows commands. Right side hemiparesis. Contracture of RUE.   Psychiatric: Normal affect  Skin: No rashes or lesions      LABS:  01-09    141  |  108  |  12  ----------------------------<  148[H]  3.8   |  28  |  0.63    Ca    8.3[L]      09 Jan 2025 05:58  Phos  3.4     01-09  Mg     2.2     01-09    TPro  6.3  /  Alb  2.0[L]  /  TBili  0.6  /  DBili  x   /  AST  27  /  ALT  23  /  AlkPhos  107  01-09    Creatinine Trend: 0.63<--, 0.56<--, 0.58<--, 0.56<--, 0.54<--, 0.68<--                        8.9    6.42  )-----------( 404      ( 09 Jan 2025 05:58 )             26.5       Lipid Panel:   Cardiac Enzymes:               RADIOLOGY: < from: Xray Chest 1 View- PORTABLE-Urgent (Xray Chest 1 View- PORTABLE-Urgent .) (01.01.25 @ 00:53) >    IMPRESSION: Tracheostomy cannula reidentified in position. Low lung   volumes. Trace right pleural effusion with right basilar atelectasis.   Correlate clinically for concomitant infection. Otherwise grossly clear   lungs. No other changes.    --- End of Report ---        < end of copied text >  < from: CT Pelvis No Cont (12.31.24 @ 23:18) >  IMPRESSION:  1. Acute comminuted and impacted intertrochanteric proximal right femur   fracture.  2. Circumferential urinary bladder wall thickening which could be due to   underdistention versus a cystitis. Correlate with urinalysis.        --- End of Report ---              < end of copied text >  < from: CT Head No Cont (12.31.24 @ 23:18) >  IMPRESSION:  No acute intracranial findings.        --- End of Report ---    < end of copied text >  < from: Xray Femur 2 Views, Right (12.31.24 @ 19:08) >  IMPRESSION:    Minimally displaced right intertrochanteric femoral fracture    --- End of Report ---        < end of copied text >  < from: Xray Pelvis AP only (12.31.24 @ 19:08) >    IMPRESSION:    Minimally displaced right intertrochanteric femoral fracture    --- End of Report ---      < end of copied text >      ECG :< from: 12 Lead ECG (01.01.25 @ 11:27) >  Diagnosis Line Normal sinus rhythm hr 84  Left bundle branch block  Abnormal ECG    Confirmed by GERTRUDIS BECERRA MD ([id number]) on 1/2/2025 12:36:07 PM    < end of copied text >    < from: 12 Lead ECG (01.07.25 @ 17:33) >  Diagnosis Line Sinus rhythm HR 66  Left bundle branch block  Abnormal ECG    Confirmed by CHRISTINA CANO [facility] ([id number]) on 1/9/2025 7:48:12 AM    < end of copied text >    TELEMETRY: n/A    ECHO: < from: TTE W or WO Ultrasound Enhancing Agent (01.01.25 @ 12:44) >  CONCLUSIONS:      1. Aortic root at the sinuses of Valsalva isdilated, measuring 4.20 cm (indexed 2.00 cm/m²).   2. Abnormal septal motion consistent with left bundle branch block. Left ventricular systolic function is normal with an ejection fraction of 59 % by Garcia's method of disks.   3. There is increased LV mass and concentric hypertrophy.   4. Normal right ventricular cavity size, with normal wall thickness, and normal right ventricular systolic function.   5. Device lead is visualized in the right ventricle.   6. Consider CTA of the chest to better evaluate the aorta if clinically appropriate.    ________________________________________________________________________________________  FINDINGS:     Left Ventricle:  Abnormal (paradoxical) septal motion consistent with left bundle branch block. Left ventricular systolic function is normal with a calculated ejection fraction of 59 % by the Garcia's biplane method of disks. There is increased LV mass and concentric hypertrophy. There is normal left ventricular diastolic function.     Right Ventricle:  The right ventricular cavity is normal in size, with normal wall thickness and right ventricular systolic function is normal. A device lead is visualized in the right ventricle.     Left Atrium:  The left atrium is normal in size with an indexed volume of 31.97 ml/m².     Right Atrium:  The right atrium is normal in size.     Aortic Valve:  The aortic valve appears trileaflet.     Mitral Valve:  There is trace mitral regurgitation.     Tricuspid Valve:  There is mild tricuspid regurgitation. There is insufficient tricuspid regurgitation detected to calculate pulmonary artery systolic pressure.     Pulmonic Valve:  There is trace pulmonic regurgitation.     Aorta:  The aortic root at the sinuses of Valsalva is dilated, measuring 4.20 cm (indexed 2.00 cm/m²).     Pericardium:  No pericardial effusion seen.  ____________________________________________________________________    < end of copied text >    ICD Interrogation-[employer] ICD (INOGEN X4 CRT-D G148)--revealed battery capacity depleted on 08/08/2024

## 2025-01-10 LAB
ALBUMIN SERPL ELPH-MCNC: 2.1 G/DL — LOW (ref 3.5–5)
ALP SERPL-CCNC: 113 U/L — SIGNIFICANT CHANGE UP (ref 40–120)
ALT FLD-CCNC: 20 U/L DA — SIGNIFICANT CHANGE UP (ref 10–60)
ANION GAP SERPL CALC-SCNC: 5 MMOL/L — SIGNIFICANT CHANGE UP (ref 5–17)
AST SERPL-CCNC: 24 U/L — SIGNIFICANT CHANGE UP (ref 10–40)
BILIRUB SERPL-MCNC: 0.6 MG/DL — SIGNIFICANT CHANGE UP (ref 0.2–1.2)
BUN SERPL-MCNC: 10 MG/DL — SIGNIFICANT CHANGE UP (ref 7–18)
CALCIUM SERPL-MCNC: 8.3 MG/DL — LOW (ref 8.4–10.5)
CHLORIDE SERPL-SCNC: 109 MMOL/L — HIGH (ref 96–108)
CO2 SERPL-SCNC: 29 MMOL/L — SIGNIFICANT CHANGE UP (ref 22–31)
CREAT SERPL-MCNC: 0.58 MG/DL — SIGNIFICANT CHANGE UP (ref 0.5–1.3)
EGFR: 119 ML/MIN/1.73M2 — SIGNIFICANT CHANGE UP
GLUCOSE BLDC GLUCOMTR-MCNC: 112 MG/DL — HIGH (ref 70–99)
GLUCOSE BLDC GLUCOMTR-MCNC: 134 MG/DL — HIGH (ref 70–99)
GLUCOSE BLDC GLUCOMTR-MCNC: 162 MG/DL — HIGH (ref 70–99)
GLUCOSE SERPL-MCNC: 99 MG/DL — SIGNIFICANT CHANGE UP (ref 70–99)
HCT VFR BLD CALC: 26.9 % — LOW (ref 39–50)
HGB BLD-MCNC: 8.9 G/DL — LOW (ref 13–17)
MCHC RBC-ENTMCNC: 33.1 G/DL — SIGNIFICANT CHANGE UP (ref 32–36)
MCHC RBC-ENTMCNC: 33.1 PG — SIGNIFICANT CHANGE UP (ref 27–34)
MCV RBC AUTO: 100 FL — SIGNIFICANT CHANGE UP (ref 80–100)
MRSA PCR RESULT.: SIGNIFICANT CHANGE UP
NRBC # BLD: 0 /100 WBCS — SIGNIFICANT CHANGE UP (ref 0–0)
PHOSPHATE SERPL-MCNC: 3.4 MG/DL — SIGNIFICANT CHANGE UP (ref 2.5–4.5)
PLATELET # BLD AUTO: 412 K/UL — HIGH (ref 150–400)
POTASSIUM SERPL-MCNC: 3.9 MMOL/L — SIGNIFICANT CHANGE UP (ref 3.5–5.3)
POTASSIUM SERPL-SCNC: 3.9 MMOL/L — SIGNIFICANT CHANGE UP (ref 3.5–5.3)
PROT SERPL-MCNC: 6.5 G/DL — SIGNIFICANT CHANGE UP (ref 6–8.3)
RBC # BLD: 2.69 M/UL — LOW (ref 4.2–5.8)
RBC # FLD: 16.2 % — HIGH (ref 10.3–14.5)
S AUREUS DNA NOSE QL NAA+PROBE: SIGNIFICANT CHANGE UP
SODIUM SERPL-SCNC: 143 MMOL/L — SIGNIFICANT CHANGE UP (ref 135–145)
WBC # BLD: 6.58 K/UL — SIGNIFICANT CHANGE UP (ref 3.8–10.5)
WBC # FLD AUTO: 6.58 K/UL — SIGNIFICANT CHANGE UP (ref 3.8–10.5)

## 2025-01-10 PROCEDURE — 99232 SBSQ HOSP IP/OBS MODERATE 35: CPT

## 2025-01-10 RX ADMIN — Medication 100 MILLIGRAM(S): at 13:15

## 2025-01-10 RX ADMIN — Medication 100 MILLIGRAM(S): at 22:11

## 2025-01-10 RX ADMIN — Medication 200 GRAM(S): at 05:11

## 2025-01-10 RX ADMIN — SENNOSIDES 10 MILLILITER(S): 8.6 TABLET, FILM COATED ORAL at 22:17

## 2025-01-10 RX ADMIN — LEVETIRACETAM 2000 MILLIGRAM(S): 100 SOLUTION ORAL at 05:12

## 2025-01-10 RX ADMIN — Medication 200 GRAM(S): at 01:02

## 2025-01-10 RX ADMIN — VALPROIC ACID 1250 MILLIGRAM(S): 250 SOLUTION ORAL at 13:15

## 2025-01-10 RX ADMIN — MIDODRINE HYDROCHLORIDE 5 MILLIGRAM(S): 5 TABLET ORAL at 05:13

## 2025-01-10 RX ADMIN — APIXABAN 5 MILLIGRAM(S): 5 TABLET, FILM COATED ORAL at 05:12

## 2025-01-10 RX ADMIN — VALPROIC ACID 1250 MILLIGRAM(S): 250 SOLUTION ORAL at 05:13

## 2025-01-10 RX ADMIN — LACOSAMIDE 200 MILLIGRAM(S): 100 TABLET, FILM COATED ORAL at 05:11

## 2025-01-10 RX ADMIN — VALPROIC ACID 1250 MILLIGRAM(S): 250 SOLUTION ORAL at 22:13

## 2025-01-10 RX ADMIN — POLYSORBATE 80 1 DROP(S): 100 SOLUTION/ DROPS OPHTHALMIC at 11:50

## 2025-01-10 RX ADMIN — LACOSAMIDE 200 MILLIGRAM(S): 100 TABLET, FILM COATED ORAL at 17:33

## 2025-01-10 RX ADMIN — LEVETIRACETAM 2000 MILLIGRAM(S): 100 SOLUTION ORAL at 17:29

## 2025-01-10 RX ADMIN — Medication 5 MILLIGRAM(S): at 22:12

## 2025-01-10 RX ADMIN — ATORVASTATIN CALCIUM 80 MILLIGRAM(S): 40 TABLET, FILM COATED ORAL at 22:11

## 2025-01-10 RX ADMIN — Medication 17 GRAM(S): at 11:50

## 2025-01-10 RX ADMIN — APIXABAN 5 MILLIGRAM(S): 5 TABLET, FILM COATED ORAL at 17:31

## 2025-01-10 RX ADMIN — Medication 200 GRAM(S): at 21:41

## 2025-01-10 RX ADMIN — MIDODRINE HYDROCHLORIDE 5 MILLIGRAM(S): 5 TABLET ORAL at 17:29

## 2025-01-10 RX ADMIN — CHLORHEXIDINE GLUCONATE 1 APPLICATION(S): 1.2 RINSE ORAL at 11:51

## 2025-01-10 RX ADMIN — Medication 1 TABLET(S): at 11:47

## 2025-01-10 RX ADMIN — Medication 100 MILLIGRAM(S): at 05:12

## 2025-01-10 RX ADMIN — Medication 200 GRAM(S): at 17:26

## 2025-01-10 RX ADMIN — PANTOPRAZOLE 40 MILLIGRAM(S): 40 TABLET, DELAYED RELEASE ORAL at 11:50

## 2025-01-10 RX ADMIN — Medication 500 MILLIGRAM(S): at 11:49

## 2025-01-10 RX ADMIN — IPRATROPIUM BROMIDE AND ALBUTEROL SULFATE 3 MILLILITER(S): .5; 2.5 SOLUTION RESPIRATORY (INHALATION) at 20:13

## 2025-01-10 RX ADMIN — MIDODRINE HYDROCHLORIDE 5 MILLIGRAM(S): 5 TABLET ORAL at 11:47

## 2025-01-10 RX ADMIN — Medication 1 MILLIGRAM(S): at 11:48

## 2025-01-10 RX ADMIN — IPRATROPIUM BROMIDE AND ALBUTEROL SULFATE 3 MILLILITER(S): .5; 2.5 SOLUTION RESPIRATORY (INHALATION) at 08:14

## 2025-01-10 RX ADMIN — CHLORHEXIDINE GLUCONATE 1 APPLICATION(S): 1.2 RINSE ORAL at 11:50

## 2025-01-10 RX ADMIN — Medication 200 GRAM(S): at 12:45

## 2025-01-10 RX ADMIN — Medication 200 GRAM(S): at 09:06

## 2025-01-10 NOTE — PROGRESS NOTE ADULT - ASSESSMENT
50 y/o M pt with PMHx CVA (3/30/2022 with residual aphasia and right sided residual weakness), s/p tracheostomy and peg placement, seizures, HTN, HLD, CHF w/ ICD (initially rEF 30%->55-60% on 03/2021), MDD, Recurrent PE, Afib (on Eliquis), obesity s/p bariatric intervention presented to the ED after a fall 3 days prior to admission, and outpatient xray showed intertrochanteric fracture of right femur. He also had fevers outpatient, and CXR concerning for left lower lobe infiltrate Patient is being admitted to  for management of right intertrochanteric femur fracture and pneumonia.     1/2/25: Patient anemia possible anemia of chronic disease, PRBC 1 unit transfused for OR, recommended by Ortho Hemoglobin above 10, however will transfuse one unit 1/2/25 as patient with no active bleeding noted. Cardiac cleared for surgery. ICD no detection, however EP recommends no need to replace as ICD placed initially due to previous low EF. TTE resulting LBBB, LVSF normal EF 59%. Patient planned for OR tonight.   01/03/2025: Restarted Eliquis. S/p R hip IM nailing POD #1. PT reccs [facility]. WBAT. Dispo: Placement back to [facility]. Will monitor on AC for 24 hrs to ensure H&H remains stable and monitor surgical wound.   01/04: S/p Code sepsis on 1/3 in the setting of fever, tachycardia and hypotension. CXR shows no consolidation.  Bcx 1/3 + for Gram positive for cocci in pairs and chains, pending sensitivity. Soft BP. Sputum cx from 1/1 + pseudomonas aeruginosa. Zosyn switched to Meropenem. Ucx sent on 1/3. Afebrile. No leukocytosis. Lactate and procal wnl. ID Dr. De La Torre consulted on 1/3. S/p R hip IM nailing POD #2.  Ortho reccs appreciated. AC resumed. KUB to r/o SBO vs Ileus. Bowel regimen.   01/05- Blood cx with Entero Faecalis, ID following and currently on Meropenem

## 2025-01-10 NOTE — PROGRESS NOTE ADULT - SUBJECTIVE AND OBJECTIVE BOX
DANNIE [id number]  49yMale    Diagnosis: 49yMale S/p Right Hip IM Nailing POD#8    Patient was seen and evaluated at bedside. Patient is able to respond to questions with expression and some minimal verbal communication.   Patient states he has minimal pain to right hip and that his pain has improved since yesterday. Patient answer questions by shacking head "no".   Patient worked with PT yesterday with sitting at edge to bed movements, and standing up at bedside. Patient shacks head "yes" when asked if he is improving with PT daily.   Pt denies Fever, Chest pain, paresthesias, N/V/D, abdominal pain, syncope, or pain anywhere else.     ICU Vital Signs Last 24 Hrs  T(C): 36.5 (10 Ryan 2025 04:55), Max: 36.8 (09 Jan 2025 14:28)  T(F): 97.7 (10 Ryan 2025 04:55), Max: 98.2 (09 Jan 2025 14:28)  HR: 68 (10 Ryan 2025 04:55) (63 - 87)  BP: 97/58 (10 Ryan 2025 04:55) (91/53 - 130/87)  BP(mean): --  ABP: --  ABP(mean): --  RR: 15 (10 Ryan 2025 04:55) (15 - 18)  SpO2: 96% (10 Ryan 2025 04:55) (93% - 99%)    O2 Parameters below as of 10 Ryan 2025 04:55  Patient On (Oxygen Delivery Method): room air      I&O's Detail    10 Ryan 2025 07:01  -  10 Ryan 2025 08:55  --------------------------------------------------------  IN:  Total IN: 0 mL    OUT:    Voided (mL): 800 mL  Total OUT: 800 mL    Total NET: -800 mL      Physical Exam:  General:  NAD, resting comfortably in bed. With trach collar in place.  Right Hip:  Dressing is C/D/I, Tegaderm dressing in place no blood staining at all, no signs of bleeding. Skin is pink and warm.  No erythema. SILT.  Wound with no drainage, healing well. No pain with PROM of hip, knee and ankle.   Lower extremity:  No calf tenderness, calves are soft. 2+pulses. NVI. 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 Pt denies any sensation to the RLE as well.                                               8.9    6.42  )-----------( 404      ( 09 Jan 2025 05:58 )             26.5   01-09    141  |  108  |  12  ----------------------------<  148[H]  3.8   |  28  |  0.63    Ca    8.3[L]      09 Jan 2025 05:58  Phos  3.4     01-09  Mg     2.2     01-09    TPro  6.3  /  Alb  2.0[L]  /  TBili  0.6  /  DBili  x   /  AST  27  /  ALT  23  /  AlkPhos  107  01-09      Impression:  49yMale S/p Right Hip IM Nailing POD#8  Plan:  -  Pain management  -  DVT prophylaxis with venodynes and Eliquis per medicine, no orthopedic contraindications   -  Daily Physical Therapy:  WBAT of the Right lower extremity with appropriate assistive device   -  Discharge planning [facility] vs return to prior facility   -  Will continue to monitor dressing and wound    -  Continue with care as per medical team. Orthopedically stable at this time.     >  Patient is bacteremic per medicine, will monitor right hip closely for signs of infection  -  Case d/w DR. Rodriguez

## 2025-01-10 NOTE — SPEAKING VALVE EVALUATION - REMOVE VALVE FOR
SpO2 < 92%/Increase RR (1.5 x baseline)/Increased HR (1.5 x baseline)/Increased work of breathing/Evidence of air trapping/Excessive coughing/Diaphoresis/Subjective complaints/Aerosolized respiratory treatments

## 2025-01-10 NOTE — PROGRESS NOTE ADULT - PROBLEM SELECTOR PLAN 9
Secondary to bacteremia and LLL pna.  BCx positive for Faecalis/pseudimonas.  Repeat cultures on 1/6 NGTD  Continue Cefepime thru 1/11  Ampicillin 2Gm every 4 hours thru 2/3

## 2025-01-10 NOTE — SPEAKING VALVE EVALUATION - SPECIFY REASON(S)
Subjective assessment of candidacy for  passy-luis alfredo speaking valve.  Pt known by this SLP from prior admission (Feb 2024); Pt was given a PMV at that time.

## 2025-01-10 NOTE — SPEAKING VALVE EVALUATION - SLP PERTINENT HISTORY OF CURRENT PROBLEM
50 y/o M pt with PMHx CVA (3/30/2022 with residual aphasia and right sided residual weakness), s/p tracheostomy and peg placement, seizures, HTN, HLD, CHF w/ ICD (initially rEF 30%->55-60% on 03/2021), MDD, Recurrent PE, Afib (on Eliquis), obesity s/p bariatric intervention presented to the ED after a fall 3 days prior to admission, and outpatient xray showed intertrochanteric fracture of right femur. He also had fevers outpatient, and CXR concerning for left lower lobe infiltrate Patient is being admitted to  for management of right intertrochanteric femur fracture and pneumonia. Pt is now S/p Right Hip IM Nailing. 1/4/25: S/p Code sepsis on 1/3 in the setting of fever, tachycardia and hypotension. CXR shows no consolidation.

## 2025-01-10 NOTE — PROGRESS NOTE ADULT - ASSESSMENT
Pneumonia - doing better and completing his treatment  Bacteremia with Enterococcus Faecalis        Plan - Cont Maxipime 2gms iv q8hrs till tomorrow then DC it  Cont Ampicillin 2gms iv q4 hrs till Pneumonia - doing better and completing his treatment  Bacteremia with Enterococcus Faecalis        Plan - Cont Maxipime 2gms iv q8hrs till tomorrow then DC it  Cont Ampicillin 2gms iv q4 hrs till 2/3/25 ( 28 days post negative blood culture ).  DC planning back to NH  reconsult prn.

## 2025-01-10 NOTE — SPEAKING VALVE EVALUATION - SLP GENERAL OBSERVATIONS
HOB elevated to 90°. Patient is apraxic/aphasic, phonates with and without digital occlusion; currently maintained on RA via trach collar, SpO2 93% at baseline. Dried secretions visualized on hub of trach at baseline. CT Head (-)

## 2025-01-10 NOTE — PROGRESS NOTE ADULT - PROBLEM SELECTOR PLAN 10
Original cultures + Faecalis and pseudomonas.  repeat cultures 1/6 NGTD  Continue Cefepime.  ID Dr De La Torre for end date of antibiotics.

## 2025-01-10 NOTE — PROGRESS NOTE ADULT - SUBJECTIVE AND OBJECTIVE BOX
Cardiology Progress Note  ------------------------------------------------------------------------------------------  SUBJECTIVE:   Unable to provide ROS  -------------------------------------------------------------------------------------------  ROS:  Unable to assess.   -------------------------------------------------------------------------------------------  VS:  T(F): 97.5 (01-10), Max: 97.7 (01-10)  HR: 71 (01-10) (63 - 87)  BP: 121/75 (01-10) (91/53 - 131/86)  RR: 16 (01-10)  SpO2: 94% (01-10)  I&O's Summary    10 Ryan 2025 07:01  -  10 Ryan 2025 14:57  --------------------------------------------------------  IN: 0 mL / OUT: 1300 mL / NET: -1300 mL      ------------------------------------------------------------------------------------------  PHYSICAL EXAM:  General: No acute distress. Awake and conversant.   Eyes: Normal conjunctiva, anicteric. Round symmetric pupils.   ENT: Hearing grossly intact. No nasal discharge.   Neck: Neck is supple. No masses or thyromegaly.   Respiratory: Respirations are non-labored. No wheezing.   Skin: Warm. No rashes or ulcers.   Psych: Alert and oriented. Cooperative, Appropriate mood and affect, Normal judgment.   CV: No lower extremity edema.   MSK: Normal ambulation. No clubbing or cyanosis.   Neuro: Sensation and CN II-XII grossly normal.  -------------------------------------------------------------------------------------------  LABS:  01-10    143  |  109[H]  |  10  ----------------------------<  99  3.9   |  29  |  0.58    Ca    8.3[L]      10 Ryan 2025 10:02  Phos  3.4     01-10  Mg     2.2     01-09    TPro  6.5  /  Alb  2.1[L]  /  TBili  0.6  /  DBili  x   /  AST  24  /  ALT  20  /  AlkPhos  113  01-10    Creatinine Trend: 0.58<--, 0.63<--, 0.56<--, 0.58<--, 0.56<--, 0.54<--                        8.9    6.58  )-----------( 412      ( 10 Ryan 2025 10:02 )             26.9         Lipid Panel: T(F): 97.5 (01-10), Max: 97.7 (01-10)  HR: 71 (01-10) (63 - 87)  BP: 121/75 (01-10) (91/53 - 131/86)  RR: 16 (01-10)  SpO2: 94% (01-10)  Cardiac Enzymes:         -------------------------------------------------------------------------------------------  Meds:  acetaminophen   Oral Liquid .. 650 milliGRAM(s) Oral every 6 hours PRN  albuterol/ipratropium for Nebulization 3 milliLiter(s) Nebulizer every 6 hours  ampicillin  IVPB 2 Gram(s) IV Intermittent every 4 hours  apixaban 5 milliGRAM(s) Oral every 12 hours  artificial  tears Solution 1 Drop(s) Both EYES daily  ascorbic acid 500 milliGRAM(s) Oral daily  atorvastatin 80 milliGRAM(s) Oral at bedtime  cefepime   IVPB 2000 milliGRAM(s) IV Intermittent every 8 hours  chlorhexidine 2% Cloths 1 Application(s) Topical daily  chlorhexidine 2% Cloths 1 Application(s) Topical daily  folic acid 1 milliGRAM(s) Oral daily  lacosamide Solution 200 milliGRAM(s) Oral two times a day  levETIRAcetam  Solution 2000 milliGRAM(s) Enteral Tube two times a day  melatonin 5 milliGRAM(s) Oral at bedtime  midodrine. 5 milliGRAM(s) Oral three times a day  multivitamin 1 Tablet(s) Oral daily  naloxone Injectable 0.4 milliGRAM(s) IV Push once  ondansetron Injectable 4 milliGRAM(s) IV Push every 8 hours PRN  pantoprazole  Injectable 40 milliGRAM(s) IV Push daily  polyethylene glycol 3350 17 Gram(s) Oral daily  senna Syrup 10 milliLiter(s) Oral at bedtime  sodium chloride 0.9% lock flush 10 milliLiter(s) IV Push every 1 hour PRN  valproic  acid Syrup 1250 milliGRAM(s) Oral three times a day    -------------------------------------------------------------------------------------------  Cardiovascular Diagnostic Testing:  -------------------------------------------------------------------------------------------  ECG:     Echo:     Stress Testing:    Cath:    Imaging:    CXR:  reviewed  -------------------------------------------------------------------------------------------  Assessment and Plan:   -------------------------------------------------------------------------------------------  Problems Assessed:   Problem/Plan - 1:  ·  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   ·  Plan: -s/p ?Bi-V ICD (initially EF 30%->55-60% on 03/2021)  - Euvolemic off diuretics  - EKG showed sinus rhythm with LBBB  - TTE 1/1/25 showed normal LV function, mildly dilated aortic root (4.2 cm)  - ICD battery depleted. EP Dr. Traylor consulted, no plan for gen change given poor functional status, comorbidities, and recovered EF.     Problem/Plan - 2:  ·  Problem: Bacteremia.   ·  Plan: -no fevers, no leucocytosis  -No DEXTER since risk outweighs benefit. Multiple discussion with Dr. Traylor and Dr. Mcghee and family.   - continue on conservative management with IV antibiotics.   - continue serial blood cultures  -obtain daily ECGs for conduction abnormalities.     Problem/Plan - 3:  ·  Problem: History of CVA (cerebrovascular accident).   ·  Plan: -3/30/2022 with residual aphasia and right sided residual weakness.     Problem/Plan - 4:  ·  Problem: Atrial fibrillation.   ·  Plan: -patient currently in NSR  -continue Metoprolol 25 mg BID  - resumed on Eliquis     Problem/Plan - 5:  ·  Problem: Hypertension.   ·  Plan: -low BPs  -patient was started on Midodrine.     Problem/Plan - 6:  ·  Problem: Hyperlipidemia.   ·  Plan: -Continue Atorvastatin 80mg PO QHS.     Problem/Plan - 7:  ·  Problem: Aortic root dilatation.   ·  Plan: -4.2 cm on echo  -BP control  - Can obtain non-urgent CTA Aorta.      -------------------------------------------------------------------------------------------    Billing Statement:   36 minutes spent on total encounter. Necessity of time spent during this encounter on this date of service was due to review of medical information in EMR, co-ordination of hospital and outpatient care, discussion with patient and communication with primary team.   -------------------------------------------------------------------------------------------  Manpreet Mock MD   of Cardiology  [facility]  [address], [address]  [place] [id number]  Phone: [phone]  Fax: [phone]    Please check [email or web address]: [email or web address] for cardiology service schedule and contact information.  Cardiology Progress Note  ------------------------------------------------------------------------------------------  SUBJECTIVE:   Unable to provide ROS  -------------------------------------------------------------------------------------------  ROS:  Unable to assess.   -------------------------------------------------------------------------------------------  VS:  T(F): 97.5 (01-10), Max: 97.7 (01-10)  HR: 71 (01-10) (63 - 87)  BP: 121/75 (01-10) (91/53 - 131/86)  RR: 16 (01-10)  SpO2: 94% (01-10)  I&O's Summary    10 Ryan 2025 07:01  -  10 Ryan 2025 14:57  --------------------------------------------------------  IN: 0 mL / OUT: 1300 mL / NET: -1300 mL      ------------------------------------------------------------------------------------------  PHYSICAL EXAM:  General: No acute distress  HEENT: EOMI  Neck: Tracheostomy, No JVD  Lungs: Clear to auscultation bilaterally; No rales or wheezing  Heart: Regular rate and rhythm; No murmurs, rubs, or gallops  Abdomen: Soft, non tender, non distended   Extremities: Warm, no edema   Nervous system:  Alert & Oriented X self and place (hospital). , Good concentration. Nods yes and no. Follows commands. Right side hemiparesis. Contracture of RUE.   Psychiatric: Normal affect  Skin: No rashes or lesions    -------------------------------------------------------------------------------------------  LABS:  01-10    143  |  109[H]  |  10  ----------------------------<  99  3.9   |  29  |  0.58    Ca    8.3[L]      10 Ryan 2025 10:02  Phos  3.4     01-10  Mg     2.2     01-09    TPro  6.5  /  Alb  2.1[L]  /  TBili  0.6  /  DBili  x   /  AST  24  /  ALT  20  /  AlkPhos  113  01-10    Creatinine Trend: 0.58<--, 0.63<--, 0.56<--, 0.58<--, 0.56<--, 0.54<--                        8.9    6.58  )-----------( 412      ( 10 Ryan 2025 10:02 )             26.9         Lipid Panel: T(F): 97.5 (01-10), Max: 97.7 (01-10)  HR: 71 (01-10) (63 - 87)  BP: 121/75 (01-10) (91/53 - 131/86)  RR: 16 (01-10)  SpO2: 94% (01-10)  Cardiac Enzymes:         -------------------------------------------------------------------------------------------  Meds:  acetaminophen   Oral Liquid .. 650 milliGRAM(s) Oral every 6 hours PRN  albuterol/ipratropium for Nebulization 3 milliLiter(s) Nebulizer every 6 hours  ampicillin  IVPB 2 Gram(s) IV Intermittent every 4 hours  apixaban 5 milliGRAM(s) Oral every 12 hours  artificial  tears Solution 1 Drop(s) Both EYES daily  ascorbic acid 500 milliGRAM(s) Oral daily  atorvastatin 80 milliGRAM(s) Oral at bedtime  cefepime   IVPB 2000 milliGRAM(s) IV Intermittent every 8 hours  chlorhexidine 2% Cloths 1 Application(s) Topical daily  chlorhexidine 2% Cloths 1 Application(s) Topical daily  folic acid 1 milliGRAM(s) Oral daily  lacosamide Solution 200 milliGRAM(s) Oral two times a day  levETIRAcetam  Solution 2000 milliGRAM(s) Enteral Tube two times a day  melatonin 5 milliGRAM(s) Oral at bedtime  midodrine. 5 milliGRAM(s) Oral three times a day  multivitamin 1 Tablet(s) Oral daily  naloxone Injectable 0.4 milliGRAM(s) IV Push once  ondansetron Injectable 4 milliGRAM(s) IV Push every 8 hours PRN  pantoprazole  Injectable 40 milliGRAM(s) IV Push daily  polyethylene glycol 3350 17 Gram(s) Oral daily  senna Syrup 10 milliLiter(s) Oral at bedtime  sodium chloride 0.9% lock flush 10 milliLiter(s) IV Push every 1 hour PRN  valproic  acid Syrup 1250 milliGRAM(s) Oral three times a day    -------------------------------------------------------------------------------------------  Cardiovascular Diagnostic Testing:  -------------------------------------------------------------------------------------------  ECG:     Echo:     Stress Testing:    Cath:    Imaging:    CXR:  reviewed  -------------------------------------------------------------------------------------------  Assessment and Plan:   -------------------------------------------------------------------------------------------  Problems Assessed:   Problem/Plan - 1:  ·  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   ·  Plan: -s/p ?Bi-V ICD (initially EF 30%->55-60% on 03/2021)  - Euvolemic off diuretics  - EKG showed sinus rhythm with LBBB  - TTE 1/1/25 showed normal LV function, mildly dilated aortic root (4.2 cm)  - ICD battery depleted. EP Dr. Traylor consulted, no plan for gen change given poor functional status, comorbidities, and recovered EF.     Problem/Plan - 2:  ·  Problem: Bacteremia.   ·  Plan: -no fevers, no leucocytosis  -No DEXTER since risk outweighs benefit. Multiple discussion with Dr. Traylor and Dr. Mcghee and family.   - continue on conservative management with IV antibiotics.   - continue serial blood cultures  -obtain daily ECGs for conduction abnormalities.     Problem/Plan - 3:  ·  Problem: History of CVA (cerebrovascular accident).   ·  Plan: -3/30/2022 with residual aphasia and right sided residual weakness.     Problem/Plan - 4:  ·  Problem: Atrial fibrillation.   ·  Plan: -patient currently in NSR  -continue Metoprolol 25 mg BID  - resumed on Eliquis     Problem/Plan - 5:  ·  Problem: Hypertension.   ·  Plan: -low BPs  -patient was started on Midodrine.     Problem/Plan - 6:  ·  Problem: Hyperlipidemia.   ·  Plan: -Continue Atorvastatin 80mg PO QHS.     Problem/Plan - 7:  ·  Problem: Aortic root dilatation.   ·  Plan: -4.2 cm on echo  -BP control  - Can obtain non-urgent CTA Aorta.      -------------------------------------------------------------------------------------------    Billing Statement:   36 minutes spent on total encounter. Necessity of time spent during this encounter on this date of service was due to review of medical information in EMR, co-ordination of hospital and outpatient care, discussion with patient and communication with primary team.   -------------------------------------------------------------------------------------------  Manpreet Mock MD   of Cardiology  [facility]  [address], [address]  [place] [address]  Phone: [phone]  Fax: [phone]    Please check [email or web address]: [email or web address] for cardiology service schedule and contact information.

## 2025-01-10 NOTE — SPEAKING VALVE EVALUATION - DIAGNOSTIC IMPRESSIONS
Pt is a candidate to wear PMV as tolerated. Patient was able to phonate clearly with reduced diaphragmatic support needed. Vocal quality is hypernasal, volume is good. Patient tolerated valve > 10 mins. VSS stable throughout assessment. No signs of respiratory distress. No increased work of breathing. No subjective complaints. No signs of air trapping. Valve remained after exam completion.
none

## 2025-01-10 NOTE — PROGRESS NOTE ADULT - SUBJECTIVE AND OBJECTIVE BOX
DANNIE SHIRLEY    SCU progress note    INTERVAL HPI/OVERNIGHT EVENTS: ***No overnight events    DNR [ ]   DNI  [  ]   FULL CODE    Covid - 19 PCR:     The 4Ms    What Matters Most: see GOC  Age appropriate Medications/Screen for High Risk Medication: Yes  Mentation: see CAM below  Mobility: defer to physical exam    The Confusion Assessment Method (CAM) Diagnostic Algorithm     1: Acute Onset or Fluctuating Course  - Is there evidence of an acute change in mental status from the patient’s baseline? Did the (abnormal) behavior  fluctuate during the day, that is, tend to come and go, or increase and decrease in severity?  [ ] YES [ X NO     2: Inattention  - Did the patient have difficulty focusing attention, being easily distractible, or having difficulty keeping track of what was being said?  [ ] YES [X ] NO     3: Disorganized thinking  -Was the patient’s thinking disorganized or incoherent, such as rambling or irrelevant conversation, unclear or illogical flow of ideas, or unpredictable switching from subject to subject?  [ ] YES [X ] NO    4: Altered Level of consciousness?  [ ] YES [X ] NO    The diagnosis of delirium by CAM requires the presence of features 1 and 2 and either 3 or 4.    PRESSORS: [ ] YES [ X] NO  ampicillin  IVPB 2 Gram(s) IV Intermittent every 4 hours  cefepime   IVPB 2000 milliGRAM(s) IV Intermittent every 8 hours    Cardiovascular:  Heart Failure  Acute   Acute on Chronic  Chronic       midodrine. 5 milliGRAM(s) Oral three times a day    Pulmonary:  albuterol/ipratropium for Nebulization 3 milliLiter(s) Nebulizer every 6 hours    Hematalogic:  apixaban 5 milliGRAM(s) Oral every 12 hours    Other:  acetaminophen   Oral Liquid .. 650 milliGRAM(s) Oral every 6 hours PRN  artificial  tears Solution 1 Drop(s) Both EYES daily  ascorbic acid 500 milliGRAM(s) Oral daily  atorvastatin 80 milliGRAM(s) Oral at bedtime  chlorhexidine 2% Cloths 1 Application(s) Topical daily  chlorhexidine 2% Cloths 1 Application(s) Topical daily  folic acid 1 milliGRAM(s) Oral daily  lacosamide Solution 200 milliGRAM(s) Oral two times a day  levETIRAcetam  Solution 2000 milliGRAM(s) Enteral Tube two times a day  melatonin 5 milliGRAM(s) Oral at bedtime  multivitamin 1 Tablet(s) Oral daily  naloxone Injectable 0.4 milliGRAM(s) IV Push once  ondansetron Injectable 4 milliGRAM(s) IV Push every 8 hours PRN  pantoprazole  Injectable 40 milliGRAM(s) IV Push daily  polyethylene glycol 3350 17 Gram(s) Oral daily  senna Syrup 10 milliLiter(s) Oral at bedtime  sodium chloride 0.9% lock flush 10 milliLiter(s) IV Push every 1 hour PRN  valproic  acid Syrup 1250 milliGRAM(s) Oral three times a day    acetaminophen   Oral Liquid .. 650 milliGRAM(s) Oral every 6 hours PRN  albuterol/ipratropium for Nebulization 3 milliLiter(s) Nebulizer every 6 hours  ampicillin  IVPB 2 Gram(s) IV Intermittent every 4 hours  apixaban 5 milliGRAM(s) Oral every 12 hours  artificial  tears Solution 1 Drop(s) Both EYES daily  ascorbic acid 500 milliGRAM(s) Oral daily  atorvastatin 80 milliGRAM(s) Oral at bedtime  cefepime   IVPB 2000 milliGRAM(s) IV Intermittent every 8 hours  chlorhexidine 2% Cloths 1 Application(s) Topical daily  chlorhexidine 2% Cloths 1 Application(s) Topical daily  folic acid 1 milliGRAM(s) Oral daily  lacosamide Solution 200 milliGRAM(s) Oral two times a day  levETIRAcetam  Solution 2000 milliGRAM(s) Enteral Tube two times a day  melatonin 5 milliGRAM(s) Oral at bedtime  midodrine. 5 milliGRAM(s) Oral three times a day  multivitamin 1 Tablet(s) Oral daily  naloxone Injectable 0.4 milliGRAM(s) IV Push once  ondansetron Injectable 4 milliGRAM(s) IV Push every 8 hours PRN  pantoprazole  Injectable 40 milliGRAM(s) IV Push daily  polyethylene glycol 3350 17 Gram(s) Oral daily  senna Syrup 10 milliLiter(s) Oral at bedtime  sodium chloride 0.9% lock flush 10 milliLiter(s) IV Push every 1 hour PRN  valproic  acid Syrup 1250 milliGRAM(s) Oral three times a day    Drug Dosing Weight  Height (cm): 170.2 (26 Aug 2024 12:06)  Weight (kg): 90.7 (31 Dec 2024 18:00)  BMI (kg/m2): 31.3 (31 Dec 2024 18:00)  BSA (m2): 2.02 (31 Dec 2024 18:00)    CENTRAL LINE: [ ] YES [X ] NO  LOCATION:   DATE INSERTED:  REMOVE: [ ] YES [ ] NO  EXPLAIN:    FAULKNER: [ ] YES [X ] NO    DATE INSERTED:  REMOVE:  [ ] YES [ ] NO  EXPLAIN:    PAST MEDICAL & SURGICAL HISTORY:  Heart failure, unspecified HF chronicity, unspecified heart failure type      ETOH abuse  h/o etoh abuse now moderate drinker      Artificial pacemaker                          PHYSICAL EXAM:    GENERAL: NAD, s/p L craniectomy  HEAD: S/P left craniectomy  EYES: PERRLA, conjunctiva and sclera clear  ENMT: No tonsillar erythema, exudates  NECK: Supple, No JVD, tracheostomy intact Shiley size 4.0  NERVOUS SYSTEM: Alert & Oriented X3, Follows commands, moving all extremities.  CHEST/LUNG: Diminished breath sounds LLL  HEART: Regular rate and rhythm; No murmurs, rubs, or gallops  ABDOMEN: +Peg Soft, Nontender, Nondistended; Bowel sounds present  EXTREMITIES:  2+ Peripheral Pulses, No clubbing, cyanosis, or edema  LYMPH: No lymphadenopathy noted  SKIN: Sacral wound      LABS:  CBC Full  -  ( 10 Ryan 2025 10:02 )  WBC Count : 6.58 K/uL  RBC Count : 2.69 M/uL  Hemoglobin : 8.9 g/dL  Hematocrit : 26.9 %  Platelet Count - Automated : 412 K/uL  Mean Cell Volume : 100.0 fl  Mean Cell Hemoglobin : 33.1 pg  Mean Cell Hemoglobin Concentration : 33.1 g/dL  Auto Neutrophil # : x  Auto Lymphocyte # : x  Auto Monocyte # : x  Auto Eosinophil # : x  Auto Basophil # : x  Auto Neutrophil % : x  Auto Lymphocyte % : x  Auto Monocyte % : x  Auto Eosinophil % : x  Auto Basophil % : x    01-09    141  |  108  |  12  ----------------------------<  148[H]  3.8   |  28  |  0.63    Ca    8.3[L]      09 Jan 2025 05:58  Phos  3.4     01-09  Mg     2.2     01-09    TPro  6.3  /  Alb  2.0[L]  /  TBili  0.6  /  DBili  x   /  AST  27  /  ALT  23  /  AlkPhos  107  01-09      Urinalysis Basic - ( 09 Jan 2025 05:58 )    Color: x / Appearance: x / SG: x / pH: x  Gluc: 148 mg/dL / Ketone: x  / Bili: x / Urobili: x   Blood: x / Protein: x / Nitrite: x   Leuk Esterase: x / RBC: x / WBC x   Sq Epi: x / Non Sq Epi: x / Bacteria: x            [  ]  DVT Prophylaxis  [  ]  Nutrition, Brand, Rate         Goal Rate        Abnormal Nutritional Status -  Malnutrition   Cachexia          RADIOLOGY & ADDITIONAL STUDIES:  ***  < from: CT Angio Chest Aorta w/wo IV Cont (01.08.25 @ 17:23) >  PROCEDURE:  CT Angiography of the Chest, Abdomen and Pelvis.  Precontrast imaging was performed through the chest followed by arterial   phase imaging of the chest, abdomen and pelvis.  Sagittal and coronal reformats were performed as well as 3D (MIP)   reconstructions.    FINDINGS:    CHEST:  LUNGS AND LARGE AIRWAYS: Tracheostomy terminates above the giovanni.   Bilateral distal airway secretions/impaction and bibasilar atelectasis.    Dependent anterior right lung thin walled cyst. Subcentimeter pulmonary   nodules up to 5 mm size. For reference, right upper lobe 5 mm nodule   image 46 series 16.  PLEURA: No pleural effusion or pneumothorax.  VESSELS: No acute aortic pathology within the limitations of this non-ECG   gated study. Aortic root is dilated to 4.3 cm at the sinuses of Valsalva   and thoracic aorta is mildlyaneurysmal at mid ascending aorta measuring   4 cm. Aortic arch measures 3.2 cm, caliber within normal limits. Mid   descending thoracic aorta measures 2.6 cm, normal in caliber. Main   pulmonary artery is mildly enlarged measuring 3.1 cm. No central  pulmonary embolus.  HEART: Heart size is within normal limits. Coronary artery   calcifications. Biventricular AICD leads are identified. Trace   pericardial fluid.  MEDIASTINUM AND TRINITY: Small volume nodes of the thorax, not enlarged by   CT size criteria. Esophagus is nondistended  CHEST WALL AND LOWER NECK: Tracheostomy. Left chest wall AICD.   Gynecomastia. Visualized portion of the thyroid is unremarkable.    ABDOMEN AND PELVIS:  LIVER: Mildly enlarged, 19 cm in craniocaudal dimension.  BILE DUCTS: No distention  GALLBLADDER: Absent  SPLEEN: Spleen size within normal limits  PANCREAS: No acute peripancreatic inflammation  ADRENALS: Unremarkable  KIDNEYS/URETERS: No hydronephrosis. Right renal cyst and additional   hypodensities too small to characterize. 4 mm nonobstructing left renal   calculus.    BLADDER: Minimally distended. Small amount of nondependent air within the   bladder may be due to recent instrumentation. Correlate clinically and   with urinalysis as warranted.  REPRODUCTIVE ORGANS: Prostate size is within normal limits, otherwise   poorly assessed due to streak artifact in the pelvis. Few pelvic   phleboliths are noted.    BOWEL: Sleeve gastrectomy. Gastrostomy tube balloon is localized to the   distal stomach versus proximal duodenum. No small bowel distention.   Appendix is not obstructed. Colon is overall nondistended with minimal to   mild stool burden, limiting evaluation of the mucosa.  PERITONEUM/RETROPERITONEUM: No ascites  VESSELS: No abdominal aortoiliac aneurysm or significant stenosis.   Visceral arteries are without significant stenosis. Centrally patency is   not assessed on this study due to timing of contrast.  LYMPH NODES: No enlarged lymph nodes by CT size criteria  ABDOMINAL WALL: Gastrostomy tube as described above.  BONES: Soft tissue infiltrative changes surrounding recently transfixed   right hip fracture. Right fixation hardware is partially imaged, with   surrounding streak artifact. Multilevel degenerative changes of the bones   including multilevel spinal spondylosis. Scoliotic spinal curvature.   Central canal and neural foramina are not adequately assessed on this   study.    IMPRESSION:    VASCULAR:  -No acute aortic pathology within the limitations of this non-ECG gated   study. Thoracic aorta is dilated at the sinuses of Valsalva measuring 4.3   cm, and mid ascending segment measuring 4.0 cm. Follow-up chest CTA with   ECG gating is recommended in 6 months for reevaluation. Pulmonary nodules   up to 5 mm size can be reevaluated at that time.  -Coronary artery calcifications.    NON-VASCULAR:  -Tracheostomy. Bilateral distal airways impaction and bibasilar   atelectasis. Superimposed infection is not definitively excluded.   Correlate clinically.  -Soft tissue infiltrative changes surrounding recently transfixed right   hip fracture. Right fixation hardware is partially imaged, with   surrounding streak artifact.  -Sleeve gastrectomy. Gastrostomy tube balloon is localized to the distal   stomach versus proximal duodenum.  -Small amount of nondependent air within the bladder may be due to recent   instrumentation. Correlate clinically and with urinalysis as warranted.    --- End of Report ---          < end of copied text >  < from: CT Head No Cont (12.31.24 @ 23:18) >  FINDINGS:    BRAIN: No apparent acute infarct, hemorrhage or mass. No hydrocephalus.   Encephalomalacia involving much of the left cerebral hemisphere, left MCA   territory, with ex vacuo dilation of the left lateral ventricle again   demonstrated.    CALVARIUM: Status post left hemicraniectomy. The sinking of the skin flap   seen on the previous study is no longer evident today. No acute fracture.    EXTRACRANIAL SOFT TISSUES: No acute findings.    VISUALIZED PORTIONS OF THE PARANASAL SINUSES AND MASTOID AIR CELLS: No   air fluid levels.    IMPRESSION:  No acute intracranial findings.          < end of copied text >  < from: CT Pelvis No Cont (12.31.24 @ 23:18) >  PROCEDURE:  CT of the Pelvis was performed.  Sagittal and coronal reformats were performed.    FINDINGS:  BLADDER: Minimally distended. Circumferential wall thickening.  REPRODUCTIVE ORGANS: Prostate within normal limits.  LYMPH NODES: No pelvic lymphadenopathy.    VISUALIZED PORTIONS:  BOWEL: Within normal limits.  PERITONEUM/RETROPERITONEUM: Within normal limits.  VESSELS: Within normal limits.  ABDOMINAL WALL: Within normal limits.  BONES: Acute comminuted and impacted intertrochanteric proximal right   femur fracture with varus angulation. No additional fracture or   dislocation. Nonspecific cyst in the right femoral head.  SOFT TISSUES: Enlargement of the right iliopsoas, gluteus minimus and   gluteus medius muscles with adjacent inflammatory change likely due to   muscle strain and/or trauma. Mild inflammatory change adjacent to the   intertrochanteric proximal right femur fracture.    IMPRESSION:  1. Acute comminuted and impacted intertrochanteric proximal right femur   fracture.  2. Circumferential urinary bladder wall thickening which could be due to   underdistention versus a cystitis. Correlate with urinalysis.        --- End of Report ---      < end of copied text >    Goals of Care Discussion with Family/Proxy/Other   - see note from 01/02/25

## 2025-01-10 NOTE — SPEAKING VALVE EVALUATION - RECOMMENDATIONS
Pt will tolerate wearing PMV for increased durations of time (5, 10, 20, 30 mins, etc) via daily trials, without s/s of distress, in order to restore airway patency, sensation, and function to oral and nasal cavities, and improve voice production.

## 2025-01-10 NOTE — PROGRESS NOTE ADULT - SUBJECTIVE AND OBJECTIVE BOX
49y Male    Meds:  ampicillin  IVPB 2 Gram(s) IV Intermittent every 4 hours  cefepime   IVPB 2000 milliGRAM(s) IV Intermittent every 8 hours    Allergies    shellfish (Unknown)  No Known Drug Allergies    Intolerances        VITALS:  Vital Signs Last 24 Hrs  T(C): 36.4 (10 Ryan 2025 12:48), Max: 36.5 (10 Ryan 2025 04:55)  T(F): 97.5 (10 Ryan 2025 12:48), Max: 97.7 (10 Ryan 2025 04:55)  HR: 71 (10 Ryan 2025 13:30) (63 - 87)  BP: 121/75 (10 Ryan 2025 13:30) (91/53 - 131/86)  BP(mean): --  RR: 16 (10 Ryan 2025 12:48) (15 - 16)  SpO2: 94% (10 Ryan 2025 13:30) (94% - 99%)    Parameters below as of 10 Ryan 2025 13:30  Patient On (Oxygen Delivery Method): room air        LABS/DIAGNOSTIC TESTS:                          8.9    6.58  )-----------( 412      ( 10 Ryan 2025 10:02 )             26.9         01-10    143  |  109[H]  |  10  ----------------------------<  99  3.9   |  29  |  0.58    Ca    8.3[L]      10 Ryan 2025 10:02  Phos  3.4     01-10  Mg     2.2     01-09    TPro  6.5  /  Alb  2.1[L]  /  TBili  0.6  /  DBili  x   /  AST  24  /  ALT  20  /  AlkPhos  113  01-10      LIVER FUNCTIONS - ( 10 Ryan 2025 10:02 )  Alb: 2.1 g/dL / Pro: 6.5 g/dL / ALK PHOS: 113 U/L / ALT: 20 U/L DA / AST: 24 U/L / GGT: x             CULTURES: .Blood BLOOD  01-06 @ 07:55   No growth at 4 days  --  --      .Blood BLOOD  01-06 @ 07:36   No growth at 4 days  --  --      .Sputum Sputum  01-03 @ 18:50   Few Pseudomonas aeruginosa  Commensal jaime consistent with body site  --  Pseudomonas aeruginosa      .Blood BLOOD  01-03 @ 18:35   Growth in aerobic bottle: Enterococcus faecalis  Direct identification is available within approximately 3-5  hours either by Blood Panel Multiplexed PCR or Direct  MALDI-TOF. Details: [email or web address]  --  Blood Culture PCR  Enterococcus faecalis      .Blood BLOOD  01-03 @ 18:27   No growth at 5 days  --  --      ET Tube ET Tube  01-01 @ 09:25   Few Pseudomonas aeruginosa  Commensal jaime consistent with body site  --  Pseudomonas aeruginosa            RADIOLOGY:      ROS:  [  ] UNABLE TO ELICIT 49y Male who is doing well and is likely going to have his Tracheostomy capped once speech and swallow see him. Cardiology is not going to do a DEXTER as they say it is a high risk and have stated to treat him empirically. Though highly unlikely that he has Endocarditis , he has a AICD in place as well a prosthetic joint. He is finishing his Maxipime for Pneumonia tomorrow and will then be on Ampicillin alone to complete his treatment. He got his PICC line. He is a little frustrated as he can not talk yet though some words come out.     Meds:  ampicillin  IVPB 2 Gram(s) IV Intermittent every 4 hours  cefepime   IVPB 2000 milliGRAM(s) IV Intermittent every 8 hours    Allergies    shellfish (Unknown)  No Known Drug Allergies    Intolerances        VITALS:  Vital Signs Last 24 Hrs  T(C): 36.4 (10 Ryan 2025 12:48), Max: 36.5 (10 Ryan 2025 04:55)  T(F): 97.5 (10 Ryan 2025 12:48), Max: 97.7 (10 Ryna 2025 04:55)  HR: 71 (10 Ryan 2025 13:30) (63 - 87)  BP: 121/75 (10 Ryan 2025 13:30) (91/53 - 131/86)  BP(mean): --  RR: 16 (10 Ryan 2025 12:48) (15 - 16)  SpO2: 94% (10 Ryan 2025 13:30) (94% - 99%)    Parameters below as of 10 Ryan 2025 13:30  Patient On (Oxygen Delivery Method): room air        LABS/DIAGNOSTIC TESTS:                          8.9    6.58  )-----------( 412      ( 10 Ryan 2025 10:02 )             26.9         01-10    143  |  109[H]  |  10  ----------------------------<  99  3.9   |  29  |  0.58    Ca    8.3[L]      10 Ryan 2025 10:02  Phos  3.4     01-10  Mg     2.2     01-09    TPro  6.5  /  Alb  2.1[L]  /  TBili  0.6  /  DBili  x   /  AST  24  /  ALT  20  /  AlkPhos  113  01-10      LIVER FUNCTIONS - ( 10 Ryan 2025 10:02 )  Alb: 2.1 g/dL / Pro: 6.5 g/dL / ALK PHOS: 113 U/L / ALT: 20 U/L DA / AST: 24 U/L / GGT: x             CULTURES: .Blood BLOOD  01-06 @ 07:55   No growth at 4 days  --  --      .Blood BLOOD  01-06 @ 07:36   No growth at 4 days  --  --      .Sputum Sputum  01-03 @ 18:50   Few Pseudomonas aeruginosa  Commensal jaime consistent with body site  --  Pseudomonas aeruginosa      .Blood BLOOD  01-03 @ 18:35   Growth in aerobic bottle: Enterococcus faecalis  Direct identification is available within approximately 3-5  hours either by Blood Panel Multiplexed PCR or Direct  MALDI-TOF. Details: [email or web address]  --  Blood Culture PCR  Enterococcus faecalis      .Blood BLOOD  01-03 @ 18:27   No growth at 5 days  --  --      ET Tube ET Tube  01-01 @ 09:25   Few Pseudomonas aeruginosa  Commensal jaime consistent with body site  --  Pseudomonas aeruginosa            RADIOLOGY:      ROS:  [  ] UNABLE TO ELICIT, only leading questions

## 2025-01-10 NOTE — PROGRESS NOTE ADULT - NS ATTEND AMEND GEN_ALL_CORE FT
Problem/Plan - 1:  ·  Problem: Fracture of right femur.   ·  Plan: S/p Right Hip IM Nailing POD#6  Ortho following  Pain management  Dressing changed today from Medipore tape to 4x4 and Tegaderm   DVT prophylaxis with venodynes and Eliquis per medicine, no orthopedic contraindications  Daily Physical Therapy:  WBAT of the Right lower extremity with appropriate assistive device   Discharge planning [facility] vs return to prior facility.     Problem/Plan - 2:  ·  Problem: Left lower lobe pneumonia.   ·  Plan: S/p ceftriaxone and azithro.   sputum culture 1/1/2025: + Pseudomonas aeruginosa and blood cx + for Entero Faecalis   Zosyn d/arjun and started on Meropenem on 1/4 . Meropenem d/c and cefepime started 1/06.  Cefepime thru 1/11  Continue Ampicillin 2Gms every 4 hours IVPB thru 2/3/25  PICC line inserted 1/09  Legionella negative.  Continue strict aspiration precautions.     Problem/Plan - 3:  ·  Problem: Tracheostomy dependent.   ·  Plan: Continue trach care.  Maintaining oxygen saturation on RA.     Problem/Plan - 4:  ·  Problem: Atrial fibrillation.   ·  Plan: Ventricular rate stable.  Continue with Eliquis and BB.     Problem/Plan - 5:  ·  Problem: Recurrent pulmonary embolism.   ·  Plan: Continue Eliquis 5mg BID.

## 2025-01-10 NOTE — PROGRESS NOTE ADULT - PROBLEM SELECTOR PLAN 2
S/p ceftriaxone and azithro.   sputum culture 1/1/2025: + Pseudomonas aeruginosa and blood cx + for Entero Faecalis   Zosyn d/arjun and started on Meropenem on 1/4 . Meropenem d/c and cefepime started 1/06.  Cefepime thru 1/11  Continue Ampicillin 2Gms every 4 hours IVPB thru 2/3/25  PICC line inserted 1/09  Legionella negative.  Continue strict aspiration precautions.

## 2025-01-10 NOTE — PROGRESS NOTE ADULT - PROBLEM SELECTOR PLAN 1
S/p Right Hip IM Nailing POD#6  Ortho following  Pain management  Dressing changed today from Medipore tape to 4x4 and Tegaderm   DVT prophylaxis with venodynes and Eliquis per medicine, no orthopedic contraindications  Daily Physical Therapy:  WBAT of the Right lower extremity with appropriate assistive device   Discharge planning [facility] vs return to prior facility.

## 2025-01-10 NOTE — PROGRESS NOTE ADULT - SUBJECTIVE AND OBJECTIVE BOX
Cardiology Progress Note  ------------------------------------------------------------------------------------------  SUBJECTIVE:   No events overnight. Denies CP, SOB or Palpitations.   -------------------------------------------------------------------------------------------  ROS:  CV: chest pain (-), palpitation (-), orthopnea (-), PND (-), edema (-)  PULM: SOB (-), cough (-), wheezing (-), hemoptysis (-).   CONST: fever (-), chills (-) or fatigue (-)  GI: abdominal distension (-), abdominal pain (-) , nausea/vomiting (-), hematemesis, (-), melena (-), hematochezia (-)  : dysuria (-), frequency (-), hematuria (-).   NEURO: numbness (-), weakness (-), dizziness (-)  MSK: myalgia (-), joint pain (-)   SKIN: itching (-), rash (-)  HEENT:  visual changes (-); vertigo or throat pain (-);  neck stiffness (-)   Psych: change in mood (-), anxiety (-), depression (-)     All other review of systems is negative unless indicated above.   -------------------------------------------------------------------------------------------  VS:  T(F): 97.7 (01-10), Max: 98.2 (01-09)  HR: 68 (01-10) (63 - 87)  BP: 97/58 (01-10) (91/53 - 130/87)  RR: 15 (01-10)  SpO2: 96% (01-10)  I&O's Summary    10 Rayn 2025 07:01  -  10 Ryan 2025 07:17  --------------------------------------------------------  IN: 0 mL / OUT: 800 mL / NET: -800 mL      ------------------------------------------------------------------------------------------  PHYSICAL EXAM:  General: No acute distress  HEENT: EOMI  Neck: Tracheostomy, No JVD  Lungs: Clear to auscultation bilaterally; No rales or wheezing  Heart: Regular rate and rhythm; No murmurs, rubs, or gallops  Abdomen: Soft, non tender, non distended   Extremities: Warm, no edema   Nervous system:  Alert & Oriented X self and place (hospital). , Good concentration. Nods yes and no. Follows commands. Right side hemiparesis. Contracture of RUE.   Psychiatric: Normal affect  Skin: No rashes or lesions  -------------------------------------------------------------------------------------------  LABS:  01-09    141  |  108  |  12  ----------------------------<  148[H]  3.8   |  28  |  0.63    Ca    8.3[L]      09 Jan 2025 05:58  Phos  3.4     01-09  Mg     2.2     01-09    TPro  6.3  /  Alb  2.0[L]  /  TBili  0.6  /  DBili  x   /  AST  27  /  ALT  23  /  AlkPhos  107  01-09    Creatinine Trend: 0.63<--, 0.56<--, 0.58<--, 0.56<--, 0.54<--, 0.68<--                        8.9    6.42  )-----------( 404      ( 09 Jan 2025 05:58 )             26.5         Lipid Panel: T(F): 97.7 (01-10), Max: 98.2 (01-09)  HR: 68 (01-10) (63 - 87)  BP: 97/58 (01-10) (91/53 - 130/87)  RR: 15 (01-10)  SpO2: 96% (01-10)  Cardiac Enzymes:         -------------------------------------------------------------------------------------------  Meds:  acetaminophen   Oral Liquid .. 650 milliGRAM(s) Oral every 6 hours PRN  albuterol/ipratropium for Nebulization 3 milliLiter(s) Nebulizer every 6 hours  ampicillin  IVPB 2 Gram(s) IV Intermittent every 4 hours  apixaban 5 milliGRAM(s) Oral every 12 hours  artificial  tears Solution 1 Drop(s) Both EYES daily  ascorbic acid 500 milliGRAM(s) Oral daily  atorvastatin 80 milliGRAM(s) Oral at bedtime  cefepime   IVPB 2000 milliGRAM(s) IV Intermittent every 8 hours  chlorhexidine 2% Cloths 1 Application(s) Topical daily  chlorhexidine 2% Cloths 1 Application(s) Topical daily  folic acid 1 milliGRAM(s) Oral daily  lacosamide Solution 200 milliGRAM(s) Oral two times a day  levETIRAcetam  Solution 2000 milliGRAM(s) Enteral Tube two times a day  melatonin 5 milliGRAM(s) Oral at bedtime  midodrine. 5 milliGRAM(s) Oral three times a day  multivitamin 1 Tablet(s) Oral daily  naloxone Injectable 0.4 milliGRAM(s) IV Push once  ondansetron Injectable 4 milliGRAM(s) IV Push every 8 hours PRN  pantoprazole  Injectable 40 milliGRAM(s) IV Push daily  polyethylene glycol 3350 17 Gram(s) Oral daily  senna Syrup 10 milliLiter(s) Oral at bedtime  sodium chloride 0.9% lock flush 10 milliLiter(s) IV Push every 1 hour PRN  valproic  acid Syrup 1250 milliGRAM(s) Oral three times a day    -------------------------------------------------------------------------------------------  Cardiovascular Diagnostic Testing:  -------------------------------------------------------------------------------------------  ECG:     Echo:     Stress Testing:    Cath:    Imaging:    CXR:  reviewed  -------------------------------------------------------------------------------------------  Assessment and Plan:   -------------------------------------------------------------------------------------------  Problems Assessed:   1.  2.  3.  4.    Plan:   •  •  •  •  -------------------------------------------------------------------------------------------  Billing Statement:   76/56/51/36 minutes spent on total encounter. Necessity of time spent during this encounter on this date of service was due to review of medical information in EMR, co-ordination of hospital and outpatient care, discussion with patient and communication with primary team.   -------------------------------------------------------------------------------------------  Manpreet Mock MD   of Cardiology  [facility]  [address], [address]  [place] [id number]  Phone: [phone]  Fax: [phone]    Please check [email or web address] for cardiology service schedule and contact information.

## 2025-01-11 LAB
ALBUMIN SERPL ELPH-MCNC: 2.1 G/DL — LOW (ref 3.5–5)
ALP SERPL-CCNC: 123 U/L — HIGH (ref 40–120)
ALT FLD-CCNC: 17 U/L DA — SIGNIFICANT CHANGE UP (ref 10–60)
ANION GAP SERPL CALC-SCNC: 5 MMOL/L — SIGNIFICANT CHANGE UP (ref 5–17)
AST SERPL-CCNC: 19 U/L — SIGNIFICANT CHANGE UP (ref 10–40)
BILIRUB SERPL-MCNC: 0.5 MG/DL — SIGNIFICANT CHANGE UP (ref 0.2–1.2)
BUN SERPL-MCNC: 10 MG/DL — SIGNIFICANT CHANGE UP (ref 7–18)
CALCIUM SERPL-MCNC: 8.7 MG/DL — SIGNIFICANT CHANGE UP (ref 8.4–10.5)
CHLORIDE SERPL-SCNC: 112 MMOL/L — HIGH (ref 96–108)
CO2 SERPL-SCNC: 27 MMOL/L — SIGNIFICANT CHANGE UP (ref 22–31)
CREAT SERPL-MCNC: 0.68 MG/DL — SIGNIFICANT CHANGE UP (ref 0.5–1.3)
CULTURE RESULTS: SIGNIFICANT CHANGE UP
CULTURE RESULTS: SIGNIFICANT CHANGE UP
EGFR: 113 ML/MIN/1.73M2 — SIGNIFICANT CHANGE UP
GLUCOSE BLDC GLUCOMTR-MCNC: 141 MG/DL — HIGH (ref 70–99)
GLUCOSE SERPL-MCNC: 91 MG/DL — SIGNIFICANT CHANGE UP (ref 70–99)
HCT VFR BLD CALC: 28.3 % — LOW (ref 39–50)
HGB BLD-MCNC: 9.5 G/DL — LOW (ref 13–17)
MAGNESIUM SERPL-MCNC: 2.4 MG/DL — SIGNIFICANT CHANGE UP (ref 1.6–2.6)
MCHC RBC-ENTMCNC: 33.6 G/DL — SIGNIFICANT CHANGE UP (ref 32–36)
MCHC RBC-ENTMCNC: 34.2 PG — HIGH (ref 27–34)
MCV RBC AUTO: 101.8 FL — HIGH (ref 80–100)
NRBC # BLD: 0 /100 WBCS — SIGNIFICANT CHANGE UP (ref 0–0)
PHOSPHATE SERPL-MCNC: 2.8 MG/DL — SIGNIFICANT CHANGE UP (ref 2.5–4.5)
PLATELET # BLD AUTO: 431 K/UL — HIGH (ref 150–400)
POTASSIUM SERPL-MCNC: 3.7 MMOL/L — SIGNIFICANT CHANGE UP (ref 3.5–5.3)
POTASSIUM SERPL-SCNC: 3.7 MMOL/L — SIGNIFICANT CHANGE UP (ref 3.5–5.3)
PROT SERPL-MCNC: 6.6 G/DL — SIGNIFICANT CHANGE UP (ref 6–8.3)
RBC # BLD: 2.78 M/UL — LOW (ref 4.2–5.8)
RBC # FLD: 16 % — HIGH (ref 10.3–14.5)
SODIUM SERPL-SCNC: 144 MMOL/L — SIGNIFICANT CHANGE UP (ref 135–145)
SPECIMEN SOURCE: SIGNIFICANT CHANGE UP
SPECIMEN SOURCE: SIGNIFICANT CHANGE UP
WBC # BLD: 6 K/UL — SIGNIFICANT CHANGE UP (ref 3.8–10.5)
WBC # FLD AUTO: 6 K/UL — SIGNIFICANT CHANGE UP (ref 3.8–10.5)

## 2025-01-11 PROCEDURE — ZZZZZ: CPT

## 2025-01-11 RX ORDER — METOPROLOL TARTRATE 50 MG
12.5 TABLET ORAL
Refills: 0 | Status: DISCONTINUED | OUTPATIENT
Start: 2025-01-11 | End: 2025-01-13

## 2025-01-11 RX ADMIN — Medication 1 TABLET(S): at 11:07

## 2025-01-11 RX ADMIN — VALPROIC ACID 1250 MILLIGRAM(S): 250 SOLUTION ORAL at 13:48

## 2025-01-11 RX ADMIN — Medication 17 GRAM(S): at 11:06

## 2025-01-11 RX ADMIN — LEVETIRACETAM 2000 MILLIGRAM(S): 100 SOLUTION ORAL at 17:37

## 2025-01-11 RX ADMIN — Medication 200 GRAM(S): at 13:48

## 2025-01-11 RX ADMIN — APIXABAN 5 MILLIGRAM(S): 5 TABLET, FILM COATED ORAL at 05:17

## 2025-01-11 RX ADMIN — Medication 200 GRAM(S): at 05:10

## 2025-01-11 RX ADMIN — Medication 200 GRAM(S): at 01:33

## 2025-01-11 RX ADMIN — ATORVASTATIN CALCIUM 80 MILLIGRAM(S): 40 TABLET, FILM COATED ORAL at 21:22

## 2025-01-11 RX ADMIN — Medication 200 GRAM(S): at 21:22

## 2025-01-11 RX ADMIN — VALPROIC ACID 1250 MILLIGRAM(S): 250 SOLUTION ORAL at 21:24

## 2025-01-11 RX ADMIN — CHLORHEXIDINE GLUCONATE 1 APPLICATION(S): 1.2 RINSE ORAL at 11:07

## 2025-01-11 RX ADMIN — MIDODRINE HYDROCHLORIDE 5 MILLIGRAM(S): 5 TABLET ORAL at 17:35

## 2025-01-11 RX ADMIN — APIXABAN 5 MILLIGRAM(S): 5 TABLET, FILM COATED ORAL at 17:35

## 2025-01-11 RX ADMIN — IPRATROPIUM BROMIDE AND ALBUTEROL SULFATE 3 MILLILITER(S): .5; 2.5 SOLUTION RESPIRATORY (INHALATION) at 09:20

## 2025-01-11 RX ADMIN — Medication 100 MILLIGRAM(S): at 05:21

## 2025-01-11 RX ADMIN — POLYSORBATE 80 1 DROP(S): 100 SOLUTION/ DROPS OPHTHALMIC at 11:07

## 2025-01-11 RX ADMIN — LACOSAMIDE 200 MILLIGRAM(S): 100 TABLET, FILM COATED ORAL at 17:41

## 2025-01-11 RX ADMIN — IPRATROPIUM BROMIDE AND ALBUTEROL SULFATE 3 MILLILITER(S): .5; 2.5 SOLUTION RESPIRATORY (INHALATION) at 20:15

## 2025-01-11 RX ADMIN — CHLORHEXIDINE GLUCONATE 1 APPLICATION(S): 1.2 RINSE ORAL at 11:08

## 2025-01-11 RX ADMIN — Medication 500 MILLIGRAM(S): at 11:06

## 2025-01-11 RX ADMIN — Medication 200 GRAM(S): at 17:36

## 2025-01-11 RX ADMIN — SENNOSIDES 10 MILLILITER(S): 8.6 TABLET, FILM COATED ORAL at 21:24

## 2025-01-11 RX ADMIN — LACOSAMIDE 200 MILLIGRAM(S): 100 TABLET, FILM COATED ORAL at 05:17

## 2025-01-11 RX ADMIN — MIDODRINE HYDROCHLORIDE 5 MILLIGRAM(S): 5 TABLET ORAL at 11:06

## 2025-01-11 RX ADMIN — IPRATROPIUM BROMIDE AND ALBUTEROL SULFATE 3 MILLILITER(S): .5; 2.5 SOLUTION RESPIRATORY (INHALATION) at 15:19

## 2025-01-11 RX ADMIN — Medication 5 MILLIGRAM(S): at 21:23

## 2025-01-11 RX ADMIN — Medication 1 MILLIGRAM(S): at 11:07

## 2025-01-11 RX ADMIN — MIDODRINE HYDROCHLORIDE 5 MILLIGRAM(S): 5 TABLET ORAL at 05:18

## 2025-01-11 RX ADMIN — PANTOPRAZOLE 40 MILLIGRAM(S): 40 TABLET, DELAYED RELEASE ORAL at 11:06

## 2025-01-11 RX ADMIN — LEVETIRACETAM 2000 MILLIGRAM(S): 100 SOLUTION ORAL at 05:20

## 2025-01-11 RX ADMIN — VALPROIC ACID 1250 MILLIGRAM(S): 250 SOLUTION ORAL at 05:18

## 2025-01-11 RX ADMIN — Medication 200 GRAM(S): at 09:40

## 2025-01-11 NOTE — PROGRESS NOTE ADULT - ASSESSMENT
50 y/o M pt with PMHx CVA (3/30/2022 with residual aphasia and right sided residual weakness), s/p tracheostomy and peg placement, seizures, HTN, HLD, CHF w/ ICD (initially rEF 30%->55-60% on 03/2021), MDD, Recurrent PE, Afib (on Eliquis), obesity s/p bariatric intervention presented to the ED after a fall 3 days prior to admission, and outpatient xray showed intertrochanteric fracture of right femur. He also had fevers outpatient, and CXR concerning for left lower lobe infiltrate Patient is being admitted to  for management of right intertrochanteric femur fracture and pneumonia.     1/2/25: Patient anemia possible anemia of chronic disease, PRBC 1 unit transfused for OR, recommended by Ortho Hemoglobin above 10, however will transfuse one unit 1/2/25 as patient with no active bleeding noted. Cardiac cleared for surgery. ICD no detection, however EP recommends no need to replace as ICD placed initially due to previous low EF. TTE resulting LBBB, LVSF normal EF 59%. Patient planned for OR tonight.   01/03/2025: Restarted Eliquis. S/p R hip IM nailing POD #1. PT reccs MARKELL. WBAT. Dispo: Placement back to [place]. Will monitor on AC for 24 hrs to ensure H&H remains stable and monitor surgical wound.   01/04: S/p Code sepsis on 1/3 in the setting of fever, tachycardia and hypotension. CXR shows no consolidation.  Bcx 1/3 + for Gram positive for cocci in pairs and chains, pending sensitivity. Soft BP. Sputum cx from 1/1 + pseudomonas aeruginosa. Zosyn switched to Meropenem. Ucx sent on 1/3. Afebrile. No leukocytosis. Lactate and procal wnl. ID Dr. De La Torre consulted on 1/3. S/p R hip IM nailing POD #2.  Ortho reccs appreciated. AC resumed. KUB to r/o SBO vs Ileus. Bowel regimen.   01/05- Blood cx with Entero Faecalis, ID following and currently on Meropenem  1/9 : PICC placed  1/11: tolerating PMV

## 2025-01-11 NOTE — PROGRESS NOTE ADULT - SUBJECTIVE AND OBJECTIVE BOX
DANNIE SHIRLEY    SCU progress note    INTERVAL HPI/OVERNIGHT EVENTS: ***    DNR [ ]   DNI  [  ]       FULL CODE [   ]    Covid - 19 PCR: COVID-19 PCR: Heydi (03 Jan 2025 18:30)      The 4Ms    What Matters Most: see GOC  Age appropriate Medications/Screen for High Risk Medication: Yes  Mentation: see CAM below  Mobility: defer to physical exam    The Confusion Assessment Method (CAM) Diagnostic Algorithm     1: Acute Onset or Fluctuating Course  - Is there evidence of an acute change in mental status from the patient’s baseline? Did the (abnormal) behavior  fluctuate during the day, that is, tend to come and go, or increase and decrease in severity?  [ ] YES [ ] NO    [  ]  UNABLE TO ASSESS      2: Inattention  - Did the patient have difficulty focusing attention, being easily distractible, or having difficulty keeping track of what was being said?  [ ] YES [ ] NO      [  ]  UNABLE TO ASSESS      3: Disorganized thinking  -Was the patient’s thinking disorganized or incoherent, such as rambling or irrelevant conversation, unclear or illogical flow of ideas, or unpredictable switching from subject to subject?  [ ] YES [ ] NO      [  ]  UNABLE TO ASSESS     4: Altered Level of consciousness?  [ ] YES [ ] NO    The diagnosis of delirium by CAM requires the presence of features 1 and 2 and either 3 or 4.    PRESSORS: [ ] YES [ ] NO  ampicillin  IVPB 2 Gram(s) IV Intermittent every 4 hours    Cardiovascular:  Heart Failure  Acute   Acute on Chronic  Chronic       midodrine. 5 milliGRAM(s) Oral three times a day    Pulmonary:  albuterol/ipratropium for Nebulization 3 milliLiter(s) Nebulizer every 6 hours    Hematologic:  apixaban 5 milliGRAM(s) Oral every 12 hours    Other:  acetaminophen   Oral Liquid .. 650 milliGRAM(s) Oral every 6 hours PRN  artificial  tears Solution 1 Drop(s) Both EYES daily  ascorbic acid 500 milliGRAM(s) Oral daily  atorvastatin 80 milliGRAM(s) Oral at bedtime  chlorhexidine 2% Cloths 1 Application(s) Topical daily  chlorhexidine 2% Cloths 1 Application(s) Topical daily  folic acid 1 milliGRAM(s) Oral daily  lacosamide Solution 200 milliGRAM(s) Oral two times a day  levETIRAcetam  Solution 2000 milliGRAM(s) Enteral Tube two times a day  melatonin 5 milliGRAM(s) Oral at bedtime  multivitamin 1 Tablet(s) Oral daily  naloxone Injectable 0.4 milliGRAM(s) IV Push once  ondansetron Injectable 4 milliGRAM(s) IV Push every 8 hours PRN  pantoprazole  Injectable 40 milliGRAM(s) IV Push daily  polyethylene glycol 3350 17 Gram(s) Oral daily  senna Syrup 10 milliLiter(s) Oral at bedtime  sodium chloride 0.9% lock flush 10 milliLiter(s) IV Push every 1 hour PRN  valproic  acid Syrup 1250 milliGRAM(s) Oral three times a day    acetaminophen   Oral Liquid .. 650 milliGRAM(s) Oral every 6 hours PRN  albuterol/ipratropium for Nebulization 3 milliLiter(s) Nebulizer every 6 hours  ampicillin  IVPB 2 Gram(s) IV Intermittent every 4 hours  apixaban 5 milliGRAM(s) Oral every 12 hours  artificial  tears Solution 1 Drop(s) Both EYES daily  ascorbic acid 500 milliGRAM(s) Oral daily  atorvastatin 80 milliGRAM(s) Oral at bedtime  chlorhexidine 2% Cloths 1 Application(s) Topical daily  chlorhexidine 2% Cloths 1 Application(s) Topical daily  folic acid 1 milliGRAM(s) Oral daily  lacosamide Solution 200 milliGRAM(s) Oral two times a day  levETIRAcetam  Solution 2000 milliGRAM(s) Enteral Tube two times a day  melatonin 5 milliGRAM(s) Oral at bedtime  midodrine. 5 milliGRAM(s) Oral three times a day  multivitamin 1 Tablet(s) Oral daily  naloxone Injectable 0.4 milliGRAM(s) IV Push once  ondansetron Injectable 4 milliGRAM(s) IV Push every 8 hours PRN  pantoprazole  Injectable 40 milliGRAM(s) IV Push daily  polyethylene glycol 3350 17 Gram(s) Oral daily  senna Syrup 10 milliLiter(s) Oral at bedtime  sodium chloride 0.9% lock flush 10 milliLiter(s) IV Push every 1 hour PRN  valproic  acid Syrup 1250 milliGRAM(s) Oral three times a day    Drug Dosing Weight  Height (cm): 170.2 (26 Aug 2024 12:06)  Weight (kg): 90.7 (31 Dec 2024 18:00)  BMI (kg/m2): 31.3 (31 Dec 2024 18:00)  BSA (m2): 2.02 (31 Dec 2024 18:00)    CENTRAL LINE: [ ] YES [ ] NO  LOCATION:   DATE INSERTED:  REMOVE: [ ] YES [ ] NO  EXPLAIN:    FAULKNER: [ ] YES [ ] NO    DATE INSERTED:  REMOVE:  [ ] YES [ ] NO  EXPLAIN:    PAST MEDICAL & SURGICAL HISTORY:  Heart failure, unspecified HF chronicity, unspecified heart failure type      ETOH abuse  h/o etoh abuse now moderate drinker      Artificial pacemaker                  01-10 @ 07:01 - 01-11 @ 07:00  --------------------------------------------------------  IN: 0 mL / OUT: 1300 mL / NET: -1300 mL            PHYSICAL EXAM:        LABS:  CBC Full  -  ( 11 Jan 2025 06:52 )  WBC Count : 6.00 K/uL  RBC Count : 2.78 M/uL  Hemoglobin : 9.5 g/dL  Hematocrit : 28.3 %  Platelet Count - Automated : 431 K/uL  Mean Cell Volume : 101.8 fl  Mean Cell Hemoglobin : 34.2 pg  Mean Cell Hemoglobin Concentration : 33.6 g/dL  Auto Neutrophil # : x  Auto Lymphocyte # : x  Auto Monocyte # : x  Auto Eosinophil # : x  Auto Basophil # : x  Auto Neutrophil % : x  Auto Lymphocyte % : x  Auto Monocyte % : x  Auto Eosinophil % : x  Auto Basophil % : x    01-11    144  |  112[H]  |  10  ----------------------------<  91  3.7   |  27  |  0.68    Ca    8.7      11 Jan 2025 06:52  Phos  2.8     01-11  Mg     2.4     01-11    TPro  6.6  /  Alb  2.1[L]  /  TBili  0.5  /  DBili  x   /  AST  19  /  ALT  17  /  AlkPhos  123[H]  01-11      Urinalysis Basic - ( 11 Jan 2025 06:52 )    Color: x / Appearance: x / SG: x / pH: x  Gluc: 91 mg/dL / Ketone: x  / Bili: x / Urobili: x   Blood: x / Protein: x / Nitrite: x   Leuk Esterase: x / RBC: x / WBC x   Sq Epi: x / Non Sq Epi: x / Bacteria: x            [  ]  DVT Prophylaxis  [  ]   Abnormal Nutritional Status -  Malnutrition   Cachexia      Morbid Obesity BMI >/=40  Diet, NPO with Tube Feed:   Tube Feeding Modality: Gastrostomy  Jevity 1.5 Seng  Total Volume for 24 Hours (mL): 1200  Continuous  Starting Tube Feed Rate mL per Hour: 30  Increase Tube Feed Rate by (mL): 20     Every 6 hours  Until Goal Tube Feed Rate (mL per Hour): 50  Tube Feed Duration (in Hours): 24  Tube Feed Start Time: 00:00  No Carb Prosource TF     Qty per Day:  1 PKT BID (01-03-25 @ 11:10) [Active]          RADIOLOGY & ADDITIONAL STUDIES:  ***    Goals of Care Discussion with Family/Proxy/Other   - see note from     DANNIE SHIRLEY    SCU progress note    INTERVAL HPI/OVERNIGHT EVENTS: ***No acute events overnight.   Pt seen and examined this morning. Pt sitting up in bed, awake/alert, tolerating PMV. NAD.   Family at bedside, updated on pt condition and plan of care. All questions and concerns addressed.      FULL CODE [  x ]    Covid - 19 PCR: COVID-19 PCR: Heydi (03 Jan 2025 18:30)      The 4Ms    What Matters Most: see GOC  Age appropriate Medications/Screen for High Risk Medication: Yes  Mentation: see CAM below  Mobility: defer to physical exam    The Confusion Assessment Method (CAM) Diagnostic Algorithm     1: Acute Onset or Fluctuating Course  - Is there evidence of an acute change in mental status from the patient’s baseline? Did the (abnormal) behavior  fluctuate during the day, that is, tend to come and go, or increase and decrease in severity?  [ ] YES [ x] NO    [  ]  UNABLE TO ASSESS      2: Inattention  - Did the patient have difficulty focusing attention, being easily distractible, or having difficulty keeping track of what was being said?  [ ] YES [x ] NO      [  ]  UNABLE TO ASSESS      3: Disorganized thinking  -Was the patient’s thinking disorganized or incoherent, such as rambling or irrelevant conversation, unclear or illogical flow of ideas, or unpredictable switching from subject to subject?  [ ] YES [ x] NO      [  ]  UNABLE TO ASSESS     4: Altered Level of consciousness?  [ ] YES [x ] NO    The diagnosis of delirium by CAM requires the presence of features 1 and 2 and either 3 or 4.    PRESSORS: [ ] YES [ x] NO  ampicillin  IVPB 2 Gram(s) IV Intermittent every 4 hours    Cardiovascular:  Heart Failure  Acute   Acute on Chronic  Chronic       midodrine. 5 milliGRAM(s) Oral three times a day    Pulmonary:  albuterol/ipratropium for Nebulization 3 milliLiter(s) Nebulizer every 6 hours    Hematologic:  apixaban 5 milliGRAM(s) Oral every 12 hours    Other:  acetaminophen   Oral Liquid .. 650 milliGRAM(s) Oral every 6 hours PRN  artificial  tears Solution 1 Drop(s) Both EYES daily  ascorbic acid 500 milliGRAM(s) Oral daily  atorvastatin 80 milliGRAM(s) Oral at bedtime  chlorhexidine 2% Cloths 1 Application(s) Topical daily  chlorhexidine 2% Cloths 1 Application(s) Topical daily  folic acid 1 milliGRAM(s) Oral daily  lacosamide Solution 200 milliGRAM(s) Oral two times a day  levETIRAcetam  Solution 2000 milliGRAM(s) Enteral Tube two times a day  melatonin 5 milliGRAM(s) Oral at bedtime  multivitamin 1 Tablet(s) Oral daily  naloxone Injectable 0.4 milliGRAM(s) IV Push once  ondansetron Injectable 4 milliGRAM(s) IV Push every 8 hours PRN  pantoprazole  Injectable 40 milliGRAM(s) IV Push daily  polyethylene glycol 3350 17 Gram(s) Oral daily  senna Syrup 10 milliLiter(s) Oral at bedtime  sodium chloride 0.9% lock flush 10 milliLiter(s) IV Push every 1 hour PRN  valproic  acid Syrup 1250 milliGRAM(s) Oral three times a day    acetaminophen   Oral Liquid .. 650 milliGRAM(s) Oral every 6 hours PRN  albuterol/ipratropium for Nebulization 3 milliLiter(s) Nebulizer every 6 hours  ampicillin  IVPB 2 Gram(s) IV Intermittent every 4 hours  apixaban 5 milliGRAM(s) Oral every 12 hours  artificial  tears Solution 1 Drop(s) Both EYES daily  ascorbic acid 500 milliGRAM(s) Oral daily  atorvastatin 80 milliGRAM(s) Oral at bedtime  chlorhexidine 2% Cloths 1 Application(s) Topical daily  chlorhexidine 2% Cloths 1 Application(s) Topical daily  folic acid 1 milliGRAM(s) Oral daily  lacosamide Solution 200 milliGRAM(s) Oral two times a day  levETIRAcetam  Solution 2000 milliGRAM(s) Enteral Tube two times a day  melatonin 5 milliGRAM(s) Oral at bedtime  midodrine. 5 milliGRAM(s) Oral three times a day  multivitamin 1 Tablet(s) Oral daily  naloxone Injectable 0.4 milliGRAM(s) IV Push once  ondansetron Injectable 4 milliGRAM(s) IV Push every 8 hours PRN  pantoprazole  Injectable 40 milliGRAM(s) IV Push daily  polyethylene glycol 3350 17 Gram(s) Oral daily  senna Syrup 10 milliLiter(s) Oral at bedtime  sodium chloride 0.9% lock flush 10 milliLiter(s) IV Push every 1 hour PRN  valproic  acid Syrup 1250 milliGRAM(s) Oral three times a day    Drug Dosing Weight  Height (cm): 170.2 (26 Aug 2024 12:06)  Weight (kg): 90.7 (31 Dec 2024 18:00)  BMI (kg/m2): 31.3 (31 Dec 2024 18:00)  BSA (m2): 2.02 (31 Dec 2024 18:00)    CENTRAL LINE: [x ] YES [ ] NO  LOCATION:RUE   DATE INSERTED:1/9/25  REMOVE: [ ] YES [ x] NO  EXPLAIN:for antibiotic thru 2/3/25    FAULKNER: [ ] YES [x ] NO    DATE INSERTED:  REMOVE:  [ ] YES [ ] NO  EXPLAIN:    PAST MEDICAL & SURGICAL HISTORY:  Heart failure, unspecified HF chronicity, unspecified heart failure type      ETOH abuse  h/o etoh abuse now moderate drinker  Artificial pacemaker        01-10 @ 07:01  -  01-11 @ 07:00  --------------------------------------------------------  IN: 0 mL / OUT: 1300 mL / NET: -1300 mL        PHYSICAL EXAM:  GENERAL: NAD  HEAD: s/p Left craniectomy  EYES: EOMI, PERRL, conjunctiva and sclera clear  ENMT: No tonsillar erythema, exudates, or enlargement; Moist mucous membranes, Good dentition, No lesions  NECK: Supple, No JVD. Trach Size 4.0  NERVOUS SYSTEM: Awake/alert. incoherent speech. Follows commands. RUE weak, right hand /fingers contracted. .   CHEST/LUNG: Clear upper lung fields. Decreased at bilat bases on auscultation; No rales, rhonchi, wheezing, or rubs  HEART: Regular rate and rhythm; No murmurs, rubs, or gallops  ABDOMEN: BS(+),Soft, Nontender, Nondistended. PEG tube intact.   EXTREMITIES:  2+ Peripheral Pulses, No clubbing, cyanosis, or edema  LYMPH: No lymphadenopathy noted  SKIN: warm, dry. Sacral wound. Right hip dsg C/D/I      LABS:  CBC Full  -  ( 11 Jan 2025 06:52 )  WBC Count : 6.00 K/uL  RBC Count : 2.78 M/uL  Hemoglobin : 9.5 g/dL  Hematocrit : 28.3 %  Platelet Count - Automated : 431 K/uL  Mean Cell Volume : 101.8 fl  Mean Cell Hemoglobin : 34.2 pg  Mean Cell Hemoglobin Concentration : 33.6 g/dL  Auto Neutrophil # : x  Auto Lymphocyte # : x  Auto Monocyte # : x  Auto Eosinophil # : x  Auto Basophil # : x  Auto Neutrophil % : x  Auto Lymphocyte % : x  Auto Monocyte % : x  Auto Eosinophil % : x  Auto Basophil % : x    01-11    144  |  112[H]  |  10  ----------------------------<  91  3.7   |  27  |  0.68    Ca    8.7      11 Jan 2025 06:52  Phos  2.8     01-11  Mg     2.4     01-11    TPro  6.6  /  Alb  2.1[L]  /  TBili  0.5  /  DBili  x   /  AST  19  /  ALT  17  /  AlkPhos  123[H]  01-11      Urinalysis Basic - ( 11 Jan 2025 06:52 )    Color: x / Appearance: x / SG: x / pH: x  Gluc: 91 mg/dL / Ketone: x  / Bili: x / Urobili: x   Blood: x / Protein: x / Nitrite: x   Leuk Esterase: x / RBC: x / WBC x   Sq Epi: x / Non Sq Epi: x / Bacteria: x      [  ]  DVT Prophylaxis  [  ]   Abnormal Nutritional Status -  Malnutrition   Cachexia        Diet, NPO with Tube Feed:   Tube Feeding Modality: Gastrostomy  Jevity 1.5 Seng  Total Volume for 24 Hours (mL): 1200  Continuous  Starting Tube Feed Rate mL per Hour: 30  Increase Tube Feed Rate by (mL): 20     Every 6 hours  Until Goal Tube Feed Rate (mL per Hour): 50  Tube Feed Duration (in Hours): 24  Tube Feed Start Time: 00:00  No Carb Prosource TF     Qty per Day:  1 PKT BID (01-03-25 @ 11:10) [Active]      RADIOLOGY & ADDITIONAL STUDIES:  ***  < from: IR US Guided Vascular Access (01.09.25 @ 10:24) >  Impression: Successful ultrasound and fluoroscopic guided placement of 38   cm 4 [place] single lumen CT Injectable PICC via the right cephalic vein   as described above.    < end of copied text >  < from: CT Angio Abdomen and Pelvis w/ IV Cont (01.08.25 @ 17:24) >  IMPRESSION:    VASCULAR:  -No acute aortic pathology within the limitations of this non-ECG gated   study. Thoracic aorta is dilated at the sinuses of Valsalva measuring 4.3   cm, and mid ascending segment measuring 4.0 cm. Follow-up chest CTA with   ECG gating is recommended in 6 months for reevaluation. Pulmonary nodules   up to 5 mm size can be reevaluated at that time.  -Coronary artery calcifications.    NON-VASCULAR:  -Tracheostomy. Bilateral distal airways impaction and bibasilar   atelectasis. Superimposed infection is not definitively excluded.   Correlate clinically.  -Soft tissue infiltrative changes surrounding recently transfixed right   hip fracture. Right fixation hardware is partially imaged, with   surrounding streak artifact.  -Sleeve gastrectomy. Gastrostomy tube balloon is localized to the distal   stomach versus proximal duodenum.  -Small amount of nondependent air within the bladder may be due to recent   instrumentation. Correlate clinically and with urinalysis as warranted.    < end of copied text >  < from: Xray Abdomen 1 View PORTABLE -Routine (Xray Abdomen 1 View PORTABLE -Routine .) (01.04.25 @ 11:10) >  FINDINGS: PEG feeding tubeoverlies LEFT upper quadrant  Redundant air-filled transverse colon. Small bowel pattern nonspecific.  No free intra-abdominal air.  No abnormal calcifications.  The osseous structures are intact.    IMPRESSION:    Redundant air-filled transverse colon. Small bowel pattern nonspecific.  If symptoms warrant further investigation either on repeat abdominal   supine/erect views or abdominal CT scan recommended.    < end of copied text >  < from: Xray Chest 1 View AP/PA (01.03.25 @ 19:20) >  IMPRESSION:  No focal consolidation.    < end of copied text >  < from: Xray Chest 1 View- PORTABLE-Urgent (Xray Chest 1 View- PORTABLE-Urgent .) (01.01.25 @ 00:53) >  IMPRESSION: Tracheostomy cannula reidentified in position. Low lung   volumes. Trace right pleural effusion with right basilar atelectasis.   Correlate clinically for concomitant infection. Otherwise grossly clear   lungs. No other changes.    < end of copied text >  < from: CT Pelvis No Cont (12.31.24 @ 23:18) >  IMPRESSION:  1. Acute comminuted and impacted intertrochanteric proximal right femur   fracture.  2. Circumferential urinary bladder wall thickening which could be due to   underdistention versus a cystitis. Correlate with urinalysis.    < end of copied text >  < from: CT Head No Cont (12.31.24 @ 23:18) >    IMPRESSION:  No acute intracranial findings.    < end of copied text >  < from: Xray Femur 2 Views, Right (12.31.24 @ 19:08) >  IMPRESSION:    Minimally displaced right intertrochanteric femoral fracture    < end of copied text >    Goals of Care Discussion with Family/Proxy/Other

## 2025-01-11 NOTE — PROGRESS NOTE ADULT - PROBLEM SELECTOR PLAN 9
Secondary to bacteremia and LLL pna.  BCx positive for Faecalis/pseudimonas.  Repeat cultures on 1/6 NGTD  Continue Cefepime thru 1/11  Ampicillin 2Gm every 4 hours thru 2/3.   ID Dr. De La Torre following  PICC placed 1/9

## 2025-01-11 NOTE — PROGRESS NOTE ADULT - SUBJECTIVE AND OBJECTIVE BOX
DANNIE [id number]  49yMale    Diagnosis: 49yMale S/p Right Hip IM Nailing POD#9    Patient was seen and evaluated at bedside. Patient is able to respond to questions with expression and some minimal verbal communication.   Patient states he has minimal pain to right hip and that his pain has improved since yesterday. Patient answer questions by shaking head "no".   Patient worked with PT yesterday with sitting at edge to bed movements, and standing up at bedside. Patient shakes head "yes" when asked if he is improving with PT daily.   Pt denies Fever, Chest pain, paresthesias, N/V/D, abdominal pain, syncope, or pain anywhere else.     ICU Vital Signs Last 24 Hrs  T(C): 36.4 (11 Jan 2025 05:15), Max: 36.6 (10 Ryan 2025 20:10)  T(F): 97.5 (11 Jan 2025 05:15), Max: 97.8 (10 Ryan 2025 20:10)  HR: 69 (11 Jan 2025 05:15) (63 - 71)  BP: 112/75 (11 Jan 2025 05:15) (111/69 - 131/86)  BP(mean): 78 (10 Ryan 2025 20:10) (78 - 78)  ABP: --  ABP(mean): --  RR: 18 (11 Jan 2025 05:15) (16 - 18)  SpO2: 99% (11 Jan 2025 05:15) (94% - 99%)    O2 Parameters below as of 11 Jan 2025 05:15  Patient On (Oxygen Delivery Method): room air            Physical Exam:  General:  NAD, resting comfortably in bed. With trach collar in place.  Right Hip:  Dressing is C/D/I, Tegaderm dressing in place no staining,Skin is pink and warm.  No erythema. SILT.  Wound with no drainage, healing well. No pain with PROM of hip, knee and ankle.   Lower extremity:  No calf tenderness, calves are soft. 2+pulses.   pt has no ROM to the knee, ankle, toes due to sequelae from the CVA-baseline. Pt denies any sensation to the RLE as well.                                                          9.5    6.00  )-----------( 431      ( 11 Jan 2025 06:52 )             28.3   01-11    144  |  112[H]  |  10  ----------------------------<  91  3.7   |  27  |  0.68    Ca    8.7      11 Jan 2025 06:52  Phos  2.8     01-11  Mg     2.4     01-11    TPro  6.6  /  Alb  2.1[L]  /  TBili  0.5  /  DBili  x   /  AST  19  /  ALT  17  /  AlkPhos  123[H]  01-11        Impression:  49yMale S/p Right Hip IM Nailing POD#9  Plan:  -  Pain management  -  DVT prophylaxis with venodynes and Eliquis per medicine, no orthopedic contraindications   -  Daily Physical Therapy:  WBAT of the Right lower extremity with appropriate assistive device   -  Discharge planning [facility] vs return to prior facility   -  Will continue to monitor dressing and wound    -  Continue with care as per medical team. Orthopedically stable at this time.   -  Case d/w DR. Rodriguez

## 2025-01-11 NOTE — PROGRESS NOTE ADULT - PROBLEM SELECTOR PLAN 10
Original cultures + Faecalis and pseudomonas.  repeat cultures 1/6 NGTD  Ampicillin 2Gm every 4 hours thru 2/3.   ID Dr. De La Torre following  PICC placed 1/9

## 2025-01-11 NOTE — PROGRESS NOTE ADULT - PROBLEM SELECTOR PLAN 4
Ventricular rate stable.  Continue with Eliquis  Metoprolol resumed at decreased dose 12.5mg twice daily with hold parameters   Cardiology Dr. Cisneros following

## 2025-01-11 NOTE — PROGRESS NOTE ADULT - PROBLEM SELECTOR PLAN 11
Continue eliquis 5mg BID.  Ampicillin 2Gm every 4 hours thru 2/3.   ID Dr. De La Torre following  PICC placed 1/9  Discharge planning underway.

## 2025-01-11 NOTE — PROGRESS NOTE ADULT - SUBJECTIVE AND OBJECTIVE BOX
Cardiology Progress Note  ------------------------------------------------------------------------------------------  SUBJECTIVE:   No events overnight. Denies CP, SOB or Palpitations.   -------------------------------------------------------------------------------------------  ROS:  CV: chest pain (-), palpitation (-), orthopnea (-), PND (-), edema (-)  PULM: SOB (-), cough (-), wheezing (-), hemoptysis (-).   CONST: fever (-), chills (-) or fatigue (-)  GI: abdominal distension (-), abdominal pain (-) , nausea/vomiting (-), hematemesis, (-), melena (-), hematochezia (-)  : dysuria (-), frequency (-), hematuria (-).   NEURO: numbness (-), weakness (-), dizziness (-)  MSK: myalgia (-), joint pain (-)   SKIN: itching (-), rash (-)  HEENT:  visual changes (-); vertigo or throat pain (-);  neck stiffness (-)   Psych: change in mood (-), anxiety (-), depression (-)     All other review of systems is negative unless indicated above.   -------------------------------------------------------------------------------------------  VS:  T(F): 97.5 (01-11), Max: 97.8 (01-10)  HR: 68 (01-11) (56 - 69)  BP: 98/59 (01-11) (98/59 - 133/77)  RR: 15 (01-11)  SpO2: 97% (01-11)  I&O's Summary    10 Ryan 2025 07:01 - 11 Jan 2025 07:00  --------------------------------------------------------  IN: 0 mL / OUT: 1300 mL / NET: -1300 mL    11 Jan 2025 07:01  -  11 Jan 2025 18:23  --------------------------------------------------------  IN: 0 mL / OUT: 275 mL / NET: -275 mL      ------------------------------------------------------------------------------------------  PHYSICAL EXAM:  General: No acute distress. Awake and conversant.   Eyes: Normal conjunctiva, anicteric. Round symmetric pupils.   ENT: Hearing grossly intact. No nasal discharge.   Neck: Neck is supple. No masses or thyromegaly.   Respiratory: Respirations are non-labored. No wheezing.   Skin: Warm. No rashes or ulcers.   Psych: Alert and oriented. Cooperative, Appropriate mood and affect, Normal judgment.   CV: No lower extremity edema.   MSK: Normal ambulation. No clubbing or cyanosis.   Neuro: Sensation and CN II-XII grossly normal.  -------------------------------------------------------------------------------------------  LABS:  01-11    144  |  112[H]  |  10  ----------------------------<  91  3.7   |  27  |  0.68    Ca    8.7      11 Jan 2025 06:52  Phos  2.8     01-11  Mg     2.4     01-11    TPro  6.6  /  Alb  2.1[L]  /  TBili  0.5  /  DBili  x   /  AST  19  /  ALT  17  /  AlkPhos  123[H]  01-11    Creatinine Trend: 0.68<--, 0.58<--, 0.63<--, 0.56<--, 0.58<--, 0.56<--                        9.5    6.00  )-----------( 431      ( 11 Jan 2025 06:52 )             28.3         Lipid Panel: T(F): 97.5 (01-11), Max: 97.8 (01-10)  HR: 68 (01-11) (56 - 69)  BP: 98/59 (01-11) (98/59 - 133/77)  RR: 15 (01-11)  SpO2: 97% (01-11)  Cardiac Enzymes:         -------------------------------------------------------------------------------------------  Meds:  acetaminophen   Oral Liquid .. 650 milliGRAM(s) Oral every 6 hours PRN  albuterol/ipratropium for Nebulization 3 milliLiter(s) Nebulizer every 6 hours  ampicillin  IVPB 2 Gram(s) IV Intermittent every 4 hours  apixaban 5 milliGRAM(s) Oral every 12 hours  artificial  tears Solution 1 Drop(s) Both EYES daily  ascorbic acid 500 milliGRAM(s) Oral daily  atorvastatin 80 milliGRAM(s) Oral at bedtime  chlorhexidine 2% Cloths 1 Application(s) Topical daily  chlorhexidine 2% Cloths 1 Application(s) Topical daily  folic acid 1 milliGRAM(s) Oral daily  lacosamide Solution 200 milliGRAM(s) Oral two times a day  levETIRAcetam  Solution 2000 milliGRAM(s) Enteral Tube two times a day  melatonin 5 milliGRAM(s) Oral at bedtime  metoprolol tartrate 12.5 milliGRAM(s) Oral two times a day  midodrine. 5 milliGRAM(s) Oral three times a day  multivitamin 1 Tablet(s) Oral daily  naloxone Injectable 0.4 milliGRAM(s) IV Push once  ondansetron Injectable 4 milliGRAM(s) IV Push every 8 hours PRN  pantoprazole  Injectable 40 milliGRAM(s) IV Push daily  polyethylene glycol 3350 17 Gram(s) Oral daily  senna Syrup 10 milliLiter(s) Oral at bedtime  sodium chloride 0.9% lock flush 10 milliLiter(s) IV Push every 1 hour PRN  valproic  acid Syrup 1250 milliGRAM(s) Oral three times a day    -------------------------------------------------------------------------------------------  Cardiovascular Diagnostic Testing:  -------------------------------------------------------------------------------------------  ECG:     Echo:     Stress Testing:    Cath:    Imaging:    CXR:  reviewed  -------------------------------------------------------------------------------------------  Assessment and Plan:   -------------------------------------------------------------------------------------------  Problems Assessed:   1.  2.  3.  4.    Plan:   •  •  •  •  -------------------------------------------------------------------------------------------  Billing Statement:   76/56/51/36 minutes spent on total encounter. Necessity of time spent during this encounter on this date of service was due to review of medical information in EMR, co-ordination of hospital and outpatient care, discussion with patient and communication with primary team.   -------------------------------------------------------------------------------------------  Manpreet Mock MD   of Cardiology  [facility] at [facility]/[address] [facility], [address]  [place] [address]  Phone: [phone]  Fax: [phone]    Please check [email or web address]: [email or web address] for cardiology service schedule and contact information.  Cardiology Progress Note  ------------------------------------------------------------------------------------------  SUBJECTIVE:   No events overnight. Denies CP, SOB or Palpitations.   -------------------------------------------------------------------------------------------  ROS:  CV: chest pain (-), palpitation (-), orthopnea (-), PND (-), edema (-)  PULM: SOB (-), cough (-), wheezing (-), hemoptysis (-).   CONST: fever (-), chills (-) or fatigue (-)  GI: abdominal distension (-), abdominal pain (-) , nausea/vomiting (-), hematemesis, (-), melena (-), hematochezia (-)  : dysuria (-), frequency (-), hematuria (-).   NEURO: numbness (-), weakness (-), dizziness (-)  MSK: myalgia (-), joint pain (-)   SKIN: itching (-), rash (-)  HEENT:  visual changes (-); vertigo or throat pain (-);  neck stiffness (-)   Psych: change in mood (-), anxiety (-), depression (-)     All other review of systems is negative unless indicated above.   -------------------------------------------------------------------------------------------  VS:  T(F): 97.5 (01-11), Max: 97.8 (01-10)  HR: 68 (01-11) (56 - 69)  BP: 98/59 (01-11) (98/59 - 133/77)  RR: 15 (01-11)  SpO2: 97% (01-11)  I&O's Summary    10 Ryan 2025 07:01  -  11 Jan 2025 07:00  --------------------------------------------------------  IN: 0 mL / OUT: 1300 mL / NET: -1300 mL    11 Jan 2025 07:01  -  11 Jan 2025 18:23  --------------------------------------------------------  IN: 0 mL / OUT: 275 mL / NET: -275 mL      ------------------------------------------------------------------------------------------  PHYSICAL EXAM:  General: No acute distress  HEENT: EOMI  Neck: Tracheostomy, No JVD  Lungs: Clear to auscultation bilaterally; No rales or wheezing  Heart: Regular rate and rhythm; No murmurs, rubs, or gallops  Abdomen: Soft, non tender, non distended   Extremities: Warm, no edema   Nervous system:  Alert & Oriented X self and place (hospital). , Good concentration. Nods yes and no. Follows commands. Right side hemiparesis. Contracture of RUE.   Psychiatric: Normal affect  Skin: No rashes or lesions  -------------------------------------------------------------------------------------------  LABS:  01-11    144  |  112[H]  |  10  ----------------------------<  91  3.7   |  27  |  0.68    Ca    8.7      11 Jan 2025 06:52  Phos  2.8     01-11  Mg     2.4     01-11    TPro  6.6  /  Alb  2.1[L]  /  TBili  0.5  /  DBili  x   /  AST  19  /  ALT  17  /  AlkPhos  123[H]  01-11    Creatinine Trend: 0.68<--, 0.58<--, 0.63<--, 0.56<--, 0.58<--, 0.56<--                        9.5    6.00  )-----------( 431      ( 11 Jan 2025 06:52 )             28.3         Lipid Panel: T(F): 97.5 (01-11), Max: 97.8 (01-10)  HR: 68 (01-11) (56 - 69)  BP: 98/59 (01-11) (98/59 - 133/77)  RR: 15 (01-11)  SpO2: 97% (01-11)  Cardiac Enzymes:         -------------------------------------------------------------------------------------------  Meds:  acetaminophen   Oral Liquid .. 650 milliGRAM(s) Oral every 6 hours PRN  albuterol/ipratropium for Nebulization 3 milliLiter(s) Nebulizer every 6 hours  ampicillin  IVPB 2 Gram(s) IV Intermittent every 4 hours  apixaban 5 milliGRAM(s) Oral every 12 hours  artificial  tears Solution 1 Drop(s) Both EYES daily  ascorbic acid 500 milliGRAM(s) Oral daily  atorvastatin 80 milliGRAM(s) Oral at bedtime  chlorhexidine 2% Cloths 1 Application(s) Topical daily  chlorhexidine 2% Cloths 1 Application(s) Topical daily  folic acid 1 milliGRAM(s) Oral daily  lacosamide Solution 200 milliGRAM(s) Oral two times a day  levETIRAcetam  Solution 2000 milliGRAM(s) Enteral Tube two times a day  melatonin 5 milliGRAM(s) Oral at bedtime  metoprolol tartrate 12.5 milliGRAM(s) Oral two times a day  midodrine. 5 milliGRAM(s) Oral three times a day  multivitamin 1 Tablet(s) Oral daily  naloxone Injectable 0.4 milliGRAM(s) IV Push once  ondansetron Injectable 4 milliGRAM(s) IV Push every 8 hours PRN  pantoprazole  Injectable 40 milliGRAM(s) IV Push daily  polyethylene glycol 3350 17 Gram(s) Oral daily  senna Syrup 10 milliLiter(s) Oral at bedtime  sodium chloride 0.9% lock flush 10 milliLiter(s) IV Push every 1 hour PRN  valproic  acid Syrup 1250 milliGRAM(s) Oral three times a day    -------------------------------------------------------------------------------------------  Cardiovascular Diagnostic Testing:  -------------------------------------------------------------------------------------------  ECG:     Echo:     Stress Testing:    Cath:    Imaging:    CXR:  reviewed  -------------------------------------------------------------------------------------------  Assessment and Plan:   -------------------------------------------------------------------------------------------  Problems Assessed:   Problem/Plan - 1:  ·  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   ·  Plan: -s/p ?Bi-V ICD (initially EF 30%->55-60% on 03/2021)  - Euvolemic off diuretics  - EKG showed sinus rhythm with LBBB  - TTE 1/1/25 showed normal LV function, mildly dilated aortic root (4.2 cm)  - ICD battery depleted. EP Dr. Traylor consulted, no plan for gen change given poor functional status, comorbidities, and recovered EF.     Problem/Plan - 2:  ·  Problem: Bacteremia.   ·  Plan: -no fevers, no leucocytosis  -No DEXTER since risk outweighs benefit. Multiple discussion with Dr. Traylor and Dr. Mcghee and family.   - continue on conservative management with IV antibiotics.   - continue serial blood cultures  -obtain daily ECGs for conduction abnormalities.     Problem/Plan - 3:  ·  Problem: History of CVA (cerebrovascular accident).   ·  Plan: -3/30/2022 with residual aphasia and right sided residual weakness.     Problem/Plan - 4:  ·  Problem: Atrial fibrillation.   ·  Plan: -patient currently in NSR  -continue Metoprolol 25 mg BID  - resumed on Eliquis     Problem/Plan - 5:  ·  Problem: Hypertension.   ·  Plan: -low BPs  -patient was started on Midodrine.     Problem/Plan - 6:  ·  Problem: Hyperlipidemia.   ·  Plan: -Continue Atorvastatin 80mg PO QHS.     Problem/Plan - 7:  ·  Problem: Aortic root dilatation.   ·  Plan: -4.2 cm on echo  -BP control  - Can obtain non-urgent CTA Aorta.      -------------------------------------------------------------------------------------------    Billing Statement:   36 minutes spent on total encounter. Necessity of time spent during this encounter on this date of service was due to review of medical information in EMR, co-ordination of hospital and outpatient care, discussion with patient and communication with primary team.   -----------------------------------------------------------------------------------------  -------------------------------------------------------------------------------------------  Manpreet Mock MD   of Cardiology  [facility] at [facility]  [address] [facility], [address]  [place] [address]  Phone: [phone]  Fax: [phone]    Please check [email or web address]: [email or web address] for cardiology service schedule and contact information.

## 2025-01-11 NOTE — PROGRESS NOTE ADULT - NS ATTEND AMEND GEN_ALL_CORE FT
Problem/Plan - 1:  ·  Problem: Fracture of right femur.   ·  Plan: S/p Right Hip IM Nailing POD#6  Ortho following  Pain management  Dressing changed today from Medipore tape to 4x4 and Tegaderm   DVT prophylaxis with venodynes and Eliquis per medicine, no orthopedic contraindications  Daily Physical Therapy:  WBAT of the Right lower extremity with appropriate assistive device   Discharge planning [facility] vs return to prior facility.     Problem/Plan - 2:  ·  Problem: Left lower lobe pneumonia.   ·  Plan: S/p ceftriaxone and azithro.   sputum culture 1/1/2025: + Pseudomonas aeruginosa and blood cx + for Entero Faecalis   Zosyn d/arjun and started on Meropenem on 1/4 . Meropenem d/c and cefepime started 1/06.  Cefepime thru 1/11  Continue Ampicillin 2Gms every 4 hours IVPB thru 2/3/25  PICC line inserted 1/09  Legionella negative.  Continue strict aspiration precautions.     Problem/Plan - 3:  ·  Problem: Tracheostomy dependent.   ·  Plan: Continue trach care.  Maintaining oxygen saturation on RA.  PMV as tolerated.     Problem/Plan - 4:  ·  Problem: Atrial fibrillation.   ·  Plan: Ventricular rate stable.  Continue with Eliquis  Metoprolol resumed at decreased dose 12.5mg twice daily with hold parameters   Cardiology Dr. Cisneros following.     Problem/Plan - 5:  ·  Problem: Recurrent pulmonary embolism.   ·  Plan: Continue Eliquis 5mg BID.

## 2025-01-12 LAB
ANION GAP SERPL CALC-SCNC: 6 MMOL/L — SIGNIFICANT CHANGE UP (ref 5–17)
BUN SERPL-MCNC: 13 MG/DL — SIGNIFICANT CHANGE UP (ref 7–18)
CALCIUM SERPL-MCNC: 8.4 MG/DL — SIGNIFICANT CHANGE UP (ref 8.4–10.5)
CHLORIDE SERPL-SCNC: 111 MMOL/L — HIGH (ref 96–108)
CO2 SERPL-SCNC: 28 MMOL/L — SIGNIFICANT CHANGE UP (ref 22–31)
CREAT SERPL-MCNC: 0.6 MG/DL — SIGNIFICANT CHANGE UP (ref 0.5–1.3)
EGFR: 118 ML/MIN/1.73M2 — SIGNIFICANT CHANGE UP
GLUCOSE BLDC GLUCOMTR-MCNC: 102 MG/DL — HIGH (ref 70–99)
GLUCOSE BLDC GLUCOMTR-MCNC: 120 MG/DL — HIGH (ref 70–99)
GLUCOSE BLDC GLUCOMTR-MCNC: 132 MG/DL — HIGH (ref 70–99)
GLUCOSE BLDC GLUCOMTR-MCNC: 136 MG/DL — HIGH (ref 70–99)
GLUCOSE BLDC GLUCOMTR-MCNC: 145 MG/DL — HIGH (ref 70–99)
GLUCOSE SERPL-MCNC: 115 MG/DL — HIGH (ref 70–99)
HCT VFR BLD CALC: 27.4 % — LOW (ref 39–50)
HGB BLD-MCNC: 9 G/DL — LOW (ref 13–17)
MAGNESIUM SERPL-MCNC: 2.3 MG/DL — SIGNIFICANT CHANGE UP (ref 1.6–2.6)
MCHC RBC-ENTMCNC: 32.8 G/DL — SIGNIFICANT CHANGE UP (ref 32–36)
MCHC RBC-ENTMCNC: 33.6 PG — SIGNIFICANT CHANGE UP (ref 27–34)
MCV RBC AUTO: 102.2 FL — HIGH (ref 80–100)
NRBC # BLD: 0 /100 WBCS — SIGNIFICANT CHANGE UP (ref 0–0)
PLATELET # BLD AUTO: 451 K/UL — HIGH (ref 150–400)
POTASSIUM SERPL-MCNC: 3.5 MMOL/L — SIGNIFICANT CHANGE UP (ref 3.5–5.3)
POTASSIUM SERPL-SCNC: 3.5 MMOL/L — SIGNIFICANT CHANGE UP (ref 3.5–5.3)
RBC # BLD: 2.68 M/UL — LOW (ref 4.2–5.8)
RBC # FLD: 16.4 % — HIGH (ref 10.3–14.5)
SODIUM SERPL-SCNC: 145 MMOL/L — SIGNIFICANT CHANGE UP (ref 135–145)
WBC # BLD: 4.81 K/UL — SIGNIFICANT CHANGE UP (ref 3.8–10.5)
WBC # FLD AUTO: 4.81 K/UL — SIGNIFICANT CHANGE UP (ref 3.8–10.5)

## 2025-01-12 PROCEDURE — 99232 SBSQ HOSP IP/OBS MODERATE 35: CPT

## 2025-01-12 RX ADMIN — Medication 1 MILLIGRAM(S): at 12:51

## 2025-01-12 RX ADMIN — APIXABAN 5 MILLIGRAM(S): 5 TABLET, FILM COATED ORAL at 18:03

## 2025-01-12 RX ADMIN — Medication 200 GRAM(S): at 14:23

## 2025-01-12 RX ADMIN — Medication 200 GRAM(S): at 02:00

## 2025-01-12 RX ADMIN — Medication 1 TABLET(S): at 12:51

## 2025-01-12 RX ADMIN — VALPROIC ACID 1250 MILLIGRAM(S): 250 SOLUTION ORAL at 14:23

## 2025-01-12 RX ADMIN — POLYSORBATE 80 1 DROP(S): 100 SOLUTION/ DROPS OPHTHALMIC at 12:52

## 2025-01-12 RX ADMIN — VALPROIC ACID 1250 MILLIGRAM(S): 250 SOLUTION ORAL at 05:13

## 2025-01-12 RX ADMIN — CHLORHEXIDINE GLUCONATE 1 APPLICATION(S): 1.2 RINSE ORAL at 12:54

## 2025-01-12 RX ADMIN — Medication 200 GRAM(S): at 10:01

## 2025-01-12 RX ADMIN — VALPROIC ACID 1250 MILLIGRAM(S): 250 SOLUTION ORAL at 21:36

## 2025-01-12 RX ADMIN — LACOSAMIDE 200 MILLIGRAM(S): 100 TABLET, FILM COATED ORAL at 18:03

## 2025-01-12 RX ADMIN — IPRATROPIUM BROMIDE AND ALBUTEROL SULFATE 3 MILLILITER(S): .5; 2.5 SOLUTION RESPIRATORY (INHALATION) at 20:20

## 2025-01-12 RX ADMIN — IPRATROPIUM BROMIDE AND ALBUTEROL SULFATE 3 MILLILITER(S): .5; 2.5 SOLUTION RESPIRATORY (INHALATION) at 03:34

## 2025-01-12 RX ADMIN — APIXABAN 5 MILLIGRAM(S): 5 TABLET, FILM COATED ORAL at 05:11

## 2025-01-12 RX ADMIN — Medication 200 GRAM(S): at 18:02

## 2025-01-12 RX ADMIN — ATORVASTATIN CALCIUM 80 MILLIGRAM(S): 40 TABLET, FILM COATED ORAL at 22:57

## 2025-01-12 RX ADMIN — LACOSAMIDE 200 MILLIGRAM(S): 100 TABLET, FILM COATED ORAL at 05:11

## 2025-01-12 RX ADMIN — Medication 500 MILLIGRAM(S): at 12:54

## 2025-01-12 RX ADMIN — IPRATROPIUM BROMIDE AND ALBUTEROL SULFATE 3 MILLILITER(S): .5; 2.5 SOLUTION RESPIRATORY (INHALATION) at 15:18

## 2025-01-12 RX ADMIN — Medication 200 GRAM(S): at 21:40

## 2025-01-12 RX ADMIN — Medication 200 GRAM(S): at 06:12

## 2025-01-12 RX ADMIN — LEVETIRACETAM 2000 MILLIGRAM(S): 100 SOLUTION ORAL at 05:12

## 2025-01-12 RX ADMIN — PANTOPRAZOLE 40 MILLIGRAM(S): 40 TABLET, DELAYED RELEASE ORAL at 12:51

## 2025-01-12 RX ADMIN — LEVETIRACETAM 2000 MILLIGRAM(S): 100 SOLUTION ORAL at 18:03

## 2025-01-12 RX ADMIN — MIDODRINE HYDROCHLORIDE 5 MILLIGRAM(S): 5 TABLET ORAL at 12:52

## 2025-01-12 RX ADMIN — MIDODRINE HYDROCHLORIDE 5 MILLIGRAM(S): 5 TABLET ORAL at 05:12

## 2025-01-12 RX ADMIN — Medication 5 MILLIGRAM(S): at 21:37

## 2025-01-12 RX ADMIN — MIDODRINE HYDROCHLORIDE 5 MILLIGRAM(S): 5 TABLET ORAL at 18:03

## 2025-01-12 NOTE — PROGRESS NOTE ADULT - SUBJECTIVE AND OBJECTIVE BOX
DANNIE SHIRLEY    SCU progress note    INTERVAL HPI/OVERNIGHT EVENTS: ***    DNR [ ]   DNI  [  ]    Covid - 19 PCR:     The 4Ms    What Matters Most: see GOC  Age appropriate Medications/Screen for High Risk Medication: Yes  Mentation: see CAM below  Mobility: defer to physical exam    The Confusion Assessment Method (CAM) Diagnostic Algorithm     1: Acute Onset or Fluctuating Course  - Is there evidence of an acute change in mental status from the patient’s baseline? Did the (abnormal) behavior  fluctuate during the day, that is, tend to come and go, or increase and decrease in severity?  [ ] YES [ ] NO     2: Inattention  - Did the patient have difficulty focusing attention, being easily distractible, or having difficulty keeping track of what was being said?  [ ] YES [ ] NO     3: Disorganized thinking  -Was the patient’s thinking disorganized or incoherent, such as rambling or irrelevant conversation, unclear or illogical flow of ideas, or unpredictable switching from subject to subject?  [ ] YES [ ] NO    4: Altered Level of consciousness?  [ ] YES [ ] NO    The diagnosis of delirium by CAM requires the presence of features 1 and 2 and either 3 or 4.    PRESSORS: [ ] YES [ ] NO  ampicillin  IVPB 2 Gram(s) IV Intermittent every 4 hours    Cardiovascular:  Heart Failure  Acute   Acute on Chronic  Chronic       metoprolol tartrate 12.5 milliGRAM(s) Oral two times a day  midodrine. 5 milliGRAM(s) Oral three times a day    Pulmonary:  albuterol/ipratropium for Nebulization 3 milliLiter(s) Nebulizer every 6 hours    Hematalogic:  apixaban 5 milliGRAM(s) Oral every 12 hours    Other:  acetaminophen   Oral Liquid .. 650 milliGRAM(s) Oral every 6 hours PRN  artificial  tears Solution 1 Drop(s) Both EYES daily  ascorbic acid 500 milliGRAM(s) Oral daily  atorvastatin 80 milliGRAM(s) Oral at bedtime  chlorhexidine 2% Cloths 1 Application(s) Topical daily  chlorhexidine 2% Cloths 1 Application(s) Topical daily  folic acid 1 milliGRAM(s) Oral daily  lacosamide Solution 200 milliGRAM(s) Oral two times a day  levETIRAcetam  Solution 2000 milliGRAM(s) Enteral Tube two times a day  melatonin 5 milliGRAM(s) Oral at bedtime  multivitamin 1 Tablet(s) Oral daily  naloxone Injectable 0.4 milliGRAM(s) IV Push once  ondansetron Injectable 4 milliGRAM(s) IV Push every 8 hours PRN  pantoprazole  Injectable 40 milliGRAM(s) IV Push daily  polyethylene glycol 3350 17 Gram(s) Oral daily  senna Syrup 10 milliLiter(s) Oral at bedtime  sodium chloride 0.9% lock flush 10 milliLiter(s) IV Push every 1 hour PRN  valproic  acid Syrup 1250 milliGRAM(s) Oral three times a day    acetaminophen   Oral Liquid .. 650 milliGRAM(s) Oral every 6 hours PRN  albuterol/ipratropium for Nebulization 3 milliLiter(s) Nebulizer every 6 hours  ampicillin  IVPB 2 Gram(s) IV Intermittent every 4 hours  apixaban 5 milliGRAM(s) Oral every 12 hours  artificial  tears Solution 1 Drop(s) Both EYES daily  ascorbic acid 500 milliGRAM(s) Oral daily  atorvastatin 80 milliGRAM(s) Oral at bedtime  chlorhexidine 2% Cloths 1 Application(s) Topical daily  chlorhexidine 2% Cloths 1 Application(s) Topical daily  folic acid 1 milliGRAM(s) Oral daily  lacosamide Solution 200 milliGRAM(s) Oral two times a day  levETIRAcetam  Solution 2000 milliGRAM(s) Enteral Tube two times a day  melatonin 5 milliGRAM(s) Oral at bedtime  metoprolol tartrate 12.5 milliGRAM(s) Oral two times a day  midodrine. 5 milliGRAM(s) Oral three times a day  multivitamin 1 Tablet(s) Oral daily  naloxone Injectable 0.4 milliGRAM(s) IV Push once  ondansetron Injectable 4 milliGRAM(s) IV Push every 8 hours PRN  pantoprazole  Injectable 40 milliGRAM(s) IV Push daily  polyethylene glycol 3350 17 Gram(s) Oral daily  senna Syrup 10 milliLiter(s) Oral at bedtime  sodium chloride 0.9% lock flush 10 milliLiter(s) IV Push every 1 hour PRN  valproic  acid Syrup 1250 milliGRAM(s) Oral three times a day    Drug Dosing Weight  Height (cm): 170.2 (26 Aug 2024 12:06)  Weight (kg): 90.7 (31 Dec 2024 18:00)  BMI (kg/m2): 31.3 (31 Dec 2024 18:00)  BSA (m2): 2.02 (31 Dec 2024 18:00)    CENTRAL LINE: [ ] YES [ ] NO  LOCATION:   DATE INSERTED:  REMOVE: [ ] YES [ ] NO  EXPLAIN:    FAULKNER: [ ] YES [ ] NO    DATE INSERTED:  REMOVE:  [ ] YES [ ] NO  EXPLAIN:    PAST MEDICAL & SURGICAL HISTORY:  Heart failure, unspecified HF chronicity, unspecified heart failure type      ETOH abuse  h/o etoh abuse now moderate drinker      Artificial pacemaker                  01-11 @ 07:01  -  01-12 @ 07:00  --------------------------------------------------------  IN: 0 mL / OUT: 275 mL / NET: -275 mL            PHYSICAL EXAM:    GENERAL: NAD, well-groomed, well-developed  HEAD:  Atraumatic, Normocephalic  EYES: EOMI, PERRLA, conjunctiva and sclera clear  ENMT: No tonsillar erythema, exudates, or enlargement; Moist mucous membranes, Good dentition, No lesions  NECK: Supple, No JVD, Normal thyroid  NERVOUS SYSTEM:  Alert & Oriented X3, Good concentration; Motor Strength 5/5 B/L upper and lower extremities; DTRs 2+ intact and symmetric  CHEST/LUNG: Clear to percussion bilaterally; No rales, rhonchi, wheezing, or rubs  HEART: Regular rate and rhythm; No murmurs, rubs, or gallops  ABDOMEN: Soft, Nontender, Nondistended; Bowel sounds present  EXTREMITIES:  2+ Peripheral Pulses, No clubbing, cyanosis, or edema  LYMPH: No lymphadenopathy noted  SKIN: No rashes or lesions      LABS:  CBC Full  -  ( 12 Jan 2025 07:08 )  WBC Count : 4.81 K/uL  RBC Count : 2.68 M/uL  Hemoglobin : 9.0 g/dL  Hematocrit : 27.4 %  Platelet Count - Automated : 451 K/uL  Mean Cell Volume : 102.2 fl  Mean Cell Hemoglobin : 33.6 pg  Mean Cell Hemoglobin Concentration : 32.8 g/dL  Auto Neutrophil # : x  Auto Lymphocyte # : x  Auto Monocyte # : x  Auto Eosinophil # : x  Auto Basophil # : x  Auto Neutrophil % : x  Auto Lymphocyte % : x  Auto Monocyte % : x  Auto Eosinophil % : x  Auto Basophil % : x    01-12    145  |  111[H]  |  13  ----------------------------<  115[H]  3.5   |  28  |  0.60    Ca    8.4      12 Jan 2025 07:08  Phos  2.8     01-11  Mg     2.3     01-12    TPro  6.6  /  Alb  2.1[L]  /  TBili  0.5  /  DBili  x   /  AST  19  /  ALT  17  /  AlkPhos  123[H]  01-11      Urinalysis Basic - ( 12 Jan 2025 07:08 )    Color: x / Appearance: x / SG: x / pH: x  Gluc: 115 mg/dL / Ketone: x  / Bili: x / Urobili: x   Blood: x / Protein: x / Nitrite: x   Leuk Esterase: x / RBC: x / WBC x   Sq Epi: x / Non Sq Epi: x / Bacteria: x            [  ]  DVT Prophylaxis  [  ]  Nutrition, Brand, Rate         Goal Rate        Abnormal Nutritional Status -  Malnutrition   Cachexia      Morbid Obesity BMI >/=40    RADIOLOGY & ADDITIONAL STUDIES:  ***    Goals of Care Discussion with Family/Proxy/Other   - see note from/family meeting set up for...     DANNIE SHIRLEY    SCU progress note    INTERVAL HPI/OVERNIGHT EVENTS: No acute events overnight. Breathing comfortably on RA. Tolerating PMV and speaking.       DNR [ ]   DNI  [  ]- FULL CODE    Covid - 19 PCR:     The 4Ms    What Matters Most: see GOC  Age appropriate Medications/Screen for High Risk Medication: Yes  Mentation: see CAM below  Mobility: defer to physical exam    The Confusion Assessment Method (CAM) Diagnostic Algorithm     1: Acute Onset or Fluctuating Course  - Is there evidence of an acute change in mental status from the patient’s baseline? Did the (abnormal) behavior  fluctuate during the day, that is, tend to come and go, or increase and decrease in severity?  [ ] YES [x ] NO- Unable to assess     2: Inattention  - Did the patient have difficulty focusing attention, being easily distractible, or having difficulty keeping track of what was being said?  [ ] YES [ ] NO- Unable to assess     3: Disorganized thinking  -Was the patient’s thinking disorganized or incoherent, such as rambling or irrelevant conversation, unclear or illogical flow of ideas, or unpredictable switching from subject to subject?  [ ] YES [ ] NO- Unable to assess    4: Altered Level of consciousness?  [ ] YES [ ] NO- Unable to assess    The diagnosis of delirium by CAM requires the presence of features 1 and 2 and either 3 or 4.    PRESSORS: [ ] YES [x ] NO  ampicillin  IVPB 2 Gram(s) IV Intermittent every 4 hours    Cardiovascular:  Heart Failure  Acute   Acute on Chronic  Chronic       metoprolol tartrate 12.5 milliGRAM(s) Oral two times a day  midodrine. 5 milliGRAM(s) Oral three times a day    Pulmonary:  albuterol/ipratropium for Nebulization 3 milliLiter(s) Nebulizer every 6 hours    Hematalogic:  apixaban 5 milliGRAM(s) Oral every 12 hours    Other:  acetaminophen   Oral Liquid .. 650 milliGRAM(s) Oral every 6 hours PRN  artificial  tears Solution 1 Drop(s) Both EYES daily  ascorbic acid 500 milliGRAM(s) Oral daily  atorvastatin 80 milliGRAM(s) Oral at bedtime  chlorhexidine 2% Cloths 1 Application(s) Topical daily  chlorhexidine 2% Cloths 1 Application(s) Topical daily  folic acid 1 milliGRAM(s) Oral daily  lacosamide Solution 200 milliGRAM(s) Oral two times a day  levETIRAcetam  Solution 2000 milliGRAM(s) Enteral Tube two times a day  melatonin 5 milliGRAM(s) Oral at bedtime  multivitamin 1 Tablet(s) Oral daily  naloxone Injectable 0.4 milliGRAM(s) IV Push once  ondansetron Injectable 4 milliGRAM(s) IV Push every 8 hours PRN  pantoprazole  Injectable 40 milliGRAM(s) IV Push daily  polyethylene glycol 3350 17 Gram(s) Oral daily  senna Syrup 10 milliLiter(s) Oral at bedtime  sodium chloride 0.9% lock flush 10 milliLiter(s) IV Push every 1 hour PRN  valproic  acid Syrup 1250 milliGRAM(s) Oral three times a day    acetaminophen   Oral Liquid .. 650 milliGRAM(s) Oral every 6 hours PRN  albuterol/ipratropium for Nebulization 3 milliLiter(s) Nebulizer every 6 hours  ampicillin  IVPB 2 Gram(s) IV Intermittent every 4 hours  apixaban 5 milliGRAM(s) Oral every 12 hours  artificial  tears Solution 1 Drop(s) Both EYES daily  ascorbic acid 500 milliGRAM(s) Oral daily  atorvastatin 80 milliGRAM(s) Oral at bedtime  chlorhexidine 2% Cloths 1 Application(s) Topical daily  chlorhexidine 2% Cloths 1 Application(s) Topical daily  folic acid 1 milliGRAM(s) Oral daily  lacosamide Solution 200 milliGRAM(s) Oral two times a day  levETIRAcetam  Solution 2000 milliGRAM(s) Enteral Tube two times a day  melatonin 5 milliGRAM(s) Oral at bedtime  metoprolol tartrate 12.5 milliGRAM(s) Oral two times a day  midodrine. 5 milliGRAM(s) Oral three times a day  multivitamin 1 Tablet(s) Oral daily  naloxone Injectable 0.4 milliGRAM(s) IV Push once  ondansetron Injectable 4 milliGRAM(s) IV Push every 8 hours PRN  pantoprazole  Injectable 40 milliGRAM(s) IV Push daily  polyethylene glycol 3350 17 Gram(s) Oral daily  senna Syrup 10 milliLiter(s) Oral at bedtime  sodium chloride 0.9% lock flush 10 milliLiter(s) IV Push every 1 hour PRN  valproic  acid Syrup 1250 milliGRAM(s) Oral three times a day    Drug Dosing Weight  Height (cm): 170.2 (26 Aug 2024 12:06)  Weight (kg): 90.7 (31 Dec 2024 18:00)  BMI (kg/m2): 31.3 (31 Dec 2024 18:00)  BSA (m2): 2.02 (31 Dec 2024 18:00)    CENTRAL LINE: [ ] YES [x ] NO  LOCATION:  Rt cephalic vein PICC  DATE INSERTED: 01/09/25  REMOVE: [ ] YES [x ] NO  EXPLAIN: Long term IV Abx    FAULKNER: [ ] YES [ ] NO    DATE INSERTED:  REMOVE:  [ ] YES [ ] NO  EXPLAIN:    PAST MEDICAL & SURGICAL HISTORY:  Heart failure, unspecified HF chronicity, unspecified heart failure type      ETOH abuse  h/o etoh abuse now moderate drinker      Artificial pacemaker                  01-11 @ 07:01  -  01-12 @ 07:00  --------------------------------------------------------  IN: 0 mL / OUT: 275 mL / NET: -275 mL            PHYSICAL EXAM:    GENERAL: sitting up in bed comfortably, well-groomed, well-developed  HEAD:  left temporal deformity from 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   EYES: EOMI, PERRL, conjunctiva and sclera clear  ENMT: No tonsillar erythema, exudates, or enlargement; Moist mucous membranes, Good dentition, No lesions  NECK: + trach, Supple, No JVD  NERVOUS SYSTEM:  Alert & awake with incomprehensible speech, following simple commands, RUE weakness w/ mild contracture  CHEST/LUNG: Clear to ausculation; No rales, rhonchi, wheezing, or rubs  HEART: Regular rate and rhythm; No murmurs, rubs, or gallops  ABDOMEN: peg intact, Soft, Nontender, Nondistended; Bowel sounds present  EXTREMITIES:  Rt hip dressing CDI, 2+ Peripheral Pulses, No clubbing, cyanosis, or edema  LYMPH: No lymphadenopathy noted  SKIN: No rashes or lesions      LABS:  CBC Full  -  ( 12 Jan 2025 07:08 )  WBC Count : 4.81 K/uL  RBC Count : 2.68 M/uL  Hemoglobin : 9.0 g/dL  Hematocrit : 27.4 %  Platelet Count - Automated : 451 K/uL  Mean Cell Volume : 102.2 fl  Mean Cell Hemoglobin : 33.6 pg  Mean Cell Hemoglobin Concentration : 32.8 g/dL  Auto Neutrophil # : x  Auto Lymphocyte # : x  Auto Monocyte # : x  Auto Eosinophil # : x  Auto Basophil # : x  Auto Neutrophil % : x  Auto Lymphocyte % : x  Auto Monocyte % : x  Auto Eosinophil % : x  Auto Basophil % : x    01-12    145  |  111[H]  |  13  ----------------------------<  115[H]  3.5   |  28  |  0.60    Ca    8.4      12 Jan 2025 07:08  Phos  2.8     01-11  Mg     2.3     01-12    TPro  6.6  /  Alb  2.1[L]  /  TBili  0.5  /  DBili  x   /  AST  19  /  ALT  17  /  AlkPhos  123[H]  01-11      Urinalysis Basic - ( 12 Jan 2025 07:08 )    Color: x / Appearance: x / SG: x / pH: x  Gluc: 115 mg/dL / Ketone: x  / Bili: x / Urobili: x   Blood: x / Protein: x / Nitrite: x   Leuk Esterase: x / RBC: x / WBC x   Sq Epi: x / Non Sq Epi: x / Bacteria: x            [  ]  DVT Prophylaxis  [  ]  Nutrition, Brand, Rate         Goal Rate        Abnormal Nutritional Status -  Malnutrition   Cachexia      Morbid Obesity BMI >/=40    RADIOLOGY & ADDITIONAL STUDIES:  ***    Goals of Care Discussion with Family/Proxy/Other   - see note from/family meeting set up for...

## 2025-01-12 NOTE — PROGRESS NOTE ADULT - ASSESSMENT
48 y/o M pt with PMHx CVA (3/30/2022 with residual aphasia and right sided residual weakness), s/p tracheostomy and peg placement, seizures, HTN, HLD, CHF w/ ICD (initially rEF 30%->55-60% on 03/2021), MDD, Recurrent PE, Afib (on Eliquis), obesity s/p bariatric intervention presented to the ED after a fall 3 days prior to admission, and outpatient xray showed intertrochanteric fracture of right femur. He also had fevers outpatient, and CXR concerning for left lower lobe infiltrate Patient is being admitted to  for management of right intertrochanteric femur fracture and pneumonia.     1/2/25: Patient anemia possible anemia of chronic disease, PRBC 1 unit transfused for OR, recommended by Ortho Hemoglobin above 10, however will transfuse one unit 1/2/25 as patient with no active bleeding noted. Cardiac cleared for surgery. ICD no detection, however EP recommends no need to replace as ICD placed initially due to previous low EF. TTE resulting LBBB, LVSF normal EF 59%. Patient planned for OR tonight.   01/03/2025: Restarted Eliquis. S/p R hip IM nailing POD #1. PT reccs [facility]. WBAT. Dispo: Placement back to [facility]. Will monitor on AC for 24 hrs to ensure H&H remains stable and monitor surgical wound.   01/04: S/p Code sepsis on 1/3 in the setting of fever, tachycardia and hypotension. CXR shows no consolidation.  Bcx 1/3 + for Gram positive for cocci in pairs and chains, pending sensitivity. Soft BP. Sputum cx from 1/1 + pseudomonas aeruginosa. Zosyn switched to Meropenem. Ucx sent on 1/3. Afebrile. No leukocytosis. Lactate and procal wnl. ID Dr. De La Torre consulted on 1/3. S/p R hip IM nailing POD #2.  Ortho reccs appreciated. AC resumed. KUB to r/o SBO vs Ileus. Bowel regimen.   01/05- Blood cx with Entero Faecalis, ID following and currently on Meropenem  1/9 : PICC placed  1/11: tolerating PMV  1/12 INCOMPLETE 50 y/o M pt with PMHx CVA (3/30/2022 with residual aphasia and right sided residual weakness), s/p tracheostomy and peg placement, seizures, HTN, HLD, CHF w/ ICD (initially rEF 30%->55-60% on 03/2021), MDD, Recurrent PE, Afib (on Eliquis), obesity s/p bariatric intervention presented to the ED after a fall 3 days prior to admission, and outpatient xray showed intertrochanteric fracture of right femur. He also had fevers outpatient, and CXR concerning for left lower lobe infiltrate Patient is being admitted to  for management of right intertrochanteric femur fracture and pneumonia.     1/2/25: Patient anemia possible anemia of chronic disease, PRBC 1 unit transfused for OR, recommended by Ortho Hemoglobin above 10, however will transfuse one unit 1/2/25 as patient with no active bleeding noted. Cardiac cleared for surgery. ICD no detection, however EP recommends no need to replace as ICD placed initially due to previous low EF. TTE resulting LBBB, LVSF normal EF 59%. Patient planned for OR tonight.   01/03/2025: Restarted Eliquis. S/p R hip IM nailing POD #1. PT reccs [facility]. WBAT. Dispo: Placement back to [facility]. Will monitor on AC for 24 hrs to ensure H&H remains stable and monitor surgical wound.   01/04: S/p Code sepsis on 1/3 in the setting of fever, tachycardia and hypotension. CXR shows no consolidation.  Bcx 1/3 + for Gram positive for cocci in pairs and chains, pending sensitivity. Soft BP. Sputum cx from 1/1 + pseudomonas aeruginosa. Zosyn switched to Meropenem. Ucx sent on 1/3. Afebrile. No leukocytosis. Lactate and procal wnl. ID Dr. De La Torre consulted on 1/3. S/p R hip IM nailing POD #2.  Ortho reccs appreciated. AC resumed. KUB to r/o SBO vs Ileus. Bowel regimen.   01/05- Blood cx with Entero Faecalis, ID following and currently on Meropenem  1/9 : PICC placed  1/11: tolerating PMV  1/12- Condition unchanged.  D/c planning ongoing.

## 2025-01-12 NOTE — PROGRESS NOTE ADULT - SUBJECTIVE AND OBJECTIVE BOX
DANNIE [id number]  49yMale    Diagnosis: 49yMale S/p Right Hip IM Nailing POD#10    Patient was seen and evaluated at bedside. Patient is able to respond to questions with expression and some minimal verbal communication.   Patient states he has minimal pain to right hip   Patient working with PT  Pt denies Fever, Chest pain, paresthesias, N/V/D, abdominal pain, syncope, or pain anywhere else.     ICU Vital Signs Last 24 Hrs  T(C): 36.4 (12 Jan 2025 05:09), Max: 36.7 (11 Jan 2025 20:37)  T(F): 97.5 (12 Jan 2025 05:09), Max: 98.1 (11 Jan 2025 20:37)  HR: 68 (12 Jan 2025 05:09) (56 - 68)  BP: 97/61 (12 Jan 2025 05:09) (97/61 - 133/77)  BP(mean): --  ABP: --  ABP(mean): --  RR: 20 (12 Jan 2025 05:09) (15 - 20)  SpO2: 94% (12 Jan 2025 05:09) (94% - 100%)    O2 Parameters below as of 12 Jan 2025 05:09  Patient On (Oxygen Delivery Method): Trach to Room Air                            9.5    6.00  )-----------( 431      ( 11 Jan 2025 06:52 )             28.3     01-11    144  |  112[H]  |  10  ----------------------------<  91  3.7   |  27  |  0.68    Ca    8.7      11 Jan 2025 06:52  Phos  2.8     01-11  Mg     2.4     01-11    TPro  6.6  /  Alb  2.1[L]  /  TBili  0.5  /  DBili  x   /  AST  19  /  ALT  17  /  AlkPhos  123[H]  01-11    Physical Exam:  General:  NAD, resting comfortably in bed. With trach collar in place.  Right Hip:  Dressing is C/D/I, Tegaderm dressing in place no staining,Skin is pink and warm.  No erythema. SILT.  Wound with no drainage, healing well. No pain with PROM of hip, knee and ankle.   Lower extremity:  No calf tenderness, calves are soft. 2+pulses.   pt has no ROM to the knee, ankle, toes due to sequelae from the CVA-baseline. Pt denies any sensation to the RLE as well.                             Impression:  49yMale S/p Right Hip IM Nailing POD#10  Plan:  -  Pain management  -  DVT prophylaxis with venodynes and Eliquis per medicine, no orthopedic contraindications   -  Daily Physical Therapy:  WBAT of the Right lower extremity with appropriate assistive device   -  Discharge planning [facility] vs return to prior facility   -  Will continue to monitor dressing and wound    -  Continue with care as per medical team. Orthopedically stable at this time.   -  Case d/w DR. Rodriguez

## 2025-01-12 NOTE — PROGRESS NOTE ADULT - SUBJECTIVE AND OBJECTIVE BOX
Cardiology Progress Note  ------------------------------------------------------------------------------------------  SUBJECTIVE:   No events overnight.   -------------------------------------------------------------------------------------------  ROS:  Patient is aphasic unable to provide ROS.   All other review of systems is negative unless indicated above.   -------------------------------------------------------------------------------------------  VS:  T(F): 97 (01-12), Max: 98.1 (01-11)  HR: 80 (01-12) (56 - 80)  BP: 107/67 (01-12) (97/61 - 119/76)  RR: 18 (01-12)  SpO2: 95% (01-12)  I&O's Summary    11 Jan 2025 07:01  -  12 Jan 2025 07:00  --------------------------------------------------------  IN: 0 mL / OUT: 275 mL / NET: -275 mL      ------------------------------------------------------------------------------------------  PHYSICAL EXAM:  General: No acute distress. Awake and conversant.   Eyes: Normal conjunctiva, anicteric. Round symmetric pupils.   ENT: Hearing grossly intact. No nasal discharge.   Neck: Neck is supple. No masses or thyromegaly.   Respiratory: Respirations are non-labored. No wheezing.   Skin: Warm. No rashes or ulcers.   Psych: Alert and oriented. Cooperative, Appropriate mood and affect, Normal judgment.   CV: No lower extremity edema.   MSK: Normal ambulation. No clubbing or cyanosis.   Neuro: Sensation and CN II-XII grossly normal.  -------------------------------------------------------------------------------------------  LABS:  01-12    145  |  111[H]  |  13  ----------------------------<  115[H]  3.5   |  28  |  0.60    Ca    8.4      12 Jan 2025 07:08  Phos  2.8     01-11  Mg     2.3     01-12    TPro  6.6  /  Alb  2.1[L]  /  TBili  0.5  /  DBili  x   /  AST  19  /  ALT  17  /  AlkPhos  123[H]  01-11    Creatinine Trend: 0.60<--, 0.68<--, 0.58<--, 0.63<--, 0.56<--, 0.58<--                        9.0    4.81  )-----------( 451      ( 12 Jan 2025 07:08 )             27.4         Lipid Panel: T(F): 97 (01-12), Max: 98.1 (01-11)  HR: 80 (01-12) (56 - 80)  BP: 107/67 (01-12) (97/61 - 119/76)  RR: 18 (01-12)  SpO2: 95% (01-12)  Cardiac Enzymes:         -------------------------------------------------------------------------------------------  Meds:  acetaminophen   Oral Liquid .. 650 milliGRAM(s) Oral every 6 hours PRN  albuterol/ipratropium for Nebulization 3 milliLiter(s) Nebulizer every 6 hours  ampicillin  IVPB 2 Gram(s) IV Intermittent every 4 hours  apixaban 5 milliGRAM(s) Oral every 12 hours  artificial  tears Solution 1 Drop(s) Both EYES daily  ascorbic acid 500 milliGRAM(s) Oral daily  atorvastatin 80 milliGRAM(s) Oral at bedtime  chlorhexidine 2% Cloths 1 Application(s) Topical daily  chlorhexidine 2% Cloths 1 Application(s) Topical daily  folic acid 1 milliGRAM(s) Oral daily  lacosamide Solution 200 milliGRAM(s) Oral two times a day  levETIRAcetam  Solution 2000 milliGRAM(s) Enteral Tube two times a day  melatonin 5 milliGRAM(s) Oral at bedtime  metoprolol tartrate 12.5 milliGRAM(s) Oral two times a day  midodrine. 5 milliGRAM(s) Oral three times a day  multivitamin 1 Tablet(s) Oral daily  naloxone Injectable 0.4 milliGRAM(s) IV Push once  ondansetron Injectable 4 milliGRAM(s) IV Push every 8 hours PRN  pantoprazole  Injectable 40 milliGRAM(s) IV Push daily  polyethylene glycol 3350 17 Gram(s) Oral daily  senna Syrup 10 milliLiter(s) Oral at bedtime  sodium chloride 0.9% lock flush 10 milliLiter(s) IV Push every 1 hour PRN  valproic  acid Syrup 1250 milliGRAM(s) Oral three times a day    -------------------------------------------------------------------------------------------  Cardiovascular Diagnostic Testing:  -------------------------------------------------------------------------------------------  ECG:     Echo:     Stress Testing:    Cath:    Imaging:    CXR:  reviewed  -------------------------------------------------------------------------------------------  Assessment and Plan:   -------------------------------------------------------------------------------------------  Problems Assessed:   1.  2.  3.  4.    Plan:   •  •  •  •  -------------------------------------------------------------------------------------------  Billing Statement:   76/56/51/36 minutes spent on total encounter. Necessity of time spent during this encounter on this date of service was due to review of medical information in EMR, co-ordination of hospital and outpatient care, discussion with patient and communication with primary team.   -------------------------------------------------------------------------------------------  Manpreet Mock MD   of Cardiology  [facility]  [address], [address] [place], [place] [address]  Phone: [phone]  Fax: [phone]    Please check [email or web address]: [email or web address] for cardiology service schedule and contact information.  Cardiology Progress Note  ------------------------------------------------------------------------------------------  SUBJECTIVE:   No events overnight.   -------------------------------------------------------------------------------------------  ROS:  Patient is aphasic unable to provide ROS.   All other review of systems is negative unless indicated above.   -------------------------------------------------------------------------------------------  VS:  T(F): 97 (01-12), Max: 98.1 (01-11)  HR: 80 (01-12) (56 - 80)  BP: 107/67 (01-12) (97/61 - 119/76)  RR: 18 (01-12)  SpO2: 95% (01-12)  I&O's Summary    11 Jan 2025 07:01  -  12 Jan 2025 07:00  --------------------------------------------------------  IN: 0 mL / OUT: 275 mL / NET: -275 mL      ------------------------------------------------------------------------------------------  PHYSICAL EXAM:  General: No acute distress  HEENT: EOMI  Neck: Tracheostomy, No JVD  Lungs: Clear to auscultation bilaterally; No rales or wheezing  Heart: Regular rate and rhythm; No murmurs, rubs, or gallops  Abdomen: Soft, non tender, non distended   Extremities: Warm, no edema   Nervous system:  Alert & Oriented X self and place (hospital). , Good concentration. Nods yes and no. Follows commands. Right side hemiparesis. Contracture of RUE.   Psychiatric: Normal affect  Skin: No rashes or lesions  -------------------------------------------------------------------------------------------  LABS:  01-12    145  |  111[H]  |  13  ----------------------------<  115[H]  3.5   |  28  |  0.60    Ca    8.4      12 Jan 2025 07:08  Phos  2.8     01-11  Mg     2.3     01-12    TPro  6.6  /  Alb  2.1[L]  /  TBili  0.5  /  DBili  x   /  AST  19  /  ALT  17  /  AlkPhos  123[H]  01-11    Creatinine Trend: 0.60<--, 0.68<--, 0.58<--, 0.63<--, 0.56<--, 0.58<--                        9.0    4.81  )-----------( 451      ( 12 Jan 2025 07:08 )             27.4         Lipid Panel: T(F): 97 (01-12), Max: 98.1 (01-11)  HR: 80 (01-12) (56 - 80)  BP: 107/67 (01-12) (97/61 - 119/76)  RR: 18 (01-12)  SpO2: 95% (01-12)  Cardiac Enzymes:         -------------------------------------------------------------------------------------------  Meds:  acetaminophen   Oral Liquid .. 650 milliGRAM(s) Oral every 6 hours PRN  albuterol/ipratropium for Nebulization 3 milliLiter(s) Nebulizer every 6 hours  ampicillin  IVPB 2 Gram(s) IV Intermittent every 4 hours  apixaban 5 milliGRAM(s) Oral every 12 hours  artificial  tears Solution 1 Drop(s) Both EYES daily  ascorbic acid 500 milliGRAM(s) Oral daily  atorvastatin 80 milliGRAM(s) Oral at bedtime  chlorhexidine 2% Cloths 1 Application(s) Topical daily  chlorhexidine 2% Cloths 1 Application(s) Topical daily  folic acid 1 milliGRAM(s) Oral daily  lacosamide Solution 200 milliGRAM(s) Oral two times a day  levETIRAcetam  Solution 2000 milliGRAM(s) Enteral Tube two times a day  melatonin 5 milliGRAM(s) Oral at bedtime  metoprolol tartrate 12.5 milliGRAM(s) Oral two times a day  midodrine. 5 milliGRAM(s) Oral three times a day  multivitamin 1 Tablet(s) Oral daily  naloxone Injectable 0.4 milliGRAM(s) IV Push once  ondansetron Injectable 4 milliGRAM(s) IV Push every 8 hours PRN  pantoprazole  Injectable 40 milliGRAM(s) IV Push daily  polyethylene glycol 3350 17 Gram(s) Oral daily  senna Syrup 10 milliLiter(s) Oral at bedtime  sodium chloride 0.9% lock flush 10 milliLiter(s) IV Push every 1 hour PRN  valproic  acid Syrup 1250 milliGRAM(s) Oral three times a day    -------------------------------------------------------------------------------------------  Cardiovascular Diagnostic Testing:  -------------------------------------------------------------------------------------------  Problems Assessed:   Problem/Plan - 1:  ·  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   ·  Plan: -s/p ?Bi-V ICD (initially EF 30%->55-60% on 03/2021)  - Euvolemic off diuretics  - EKG showed sinus rhythm with LBBB  - TTE 1/1/25 showed normal LV function, mildly dilated aortic root (4.2 cm)  - ICD battery depleted. EP Dr. Traylor consulted, no plan for gen change given poor functional status, comorbidities, and recovered EF.     Problem/Plan - 2:  ·  Problem: Bacteremia.   ·  Plan: -no fevers, no leucocytosis  -No DEXTER since risk outweighs benefit. Multiple discussion with Dr. Traylor and Dr. Mcghee and family.   - continue on conservative management with IV antibiotics.   - continue serial blood cultures  -obtain daily ECGs for conduction abnormalities.     Problem/Plan - 3:  ·  Problem: History of CVA (cerebrovascular accident).   ·  Plan: -3/30/2022 with residual aphasia and right sided residual weakness.     Problem/Plan - 4:  ·  Problem: Atrial fibrillation.   ·  Plan: -patient currently in NSR  -continue Metoprolol 25 mg BID  - resumed on Eliquis     Problem/Plan - 5:  ·  Problem: Hypertension.   ·  Plan: -low BPs  -patient was started on Midodrine.     Problem/Plan - 6:  ·  Problem: Hyperlipidemia.   ·  Plan: -Continue Atorvastatin 80mg PO QHS.     Problem/Plan - 7:  ·  Problem: Aortic root dilatation.   ·  Plan: -4.2 cm on echo  -BP control  - Can obtain non-urgent CTA Aorta.      -------------------------------------------------------------------------------------------    Billing Statement:   36 minutes spent on total encounter. Necessity of time spent during this encounter on this date of service was due to review of medical information in EMR, co-ordination of hospital and outpatient care, discussion with patient and communication with primary team.   -----------------------------------------------------------------------------------------  Manpreet Mock MD   of Cardiology  [facility]  [address], [address] [place] [address]  Phone: [phone]  Fax: [phone]    Please check [email or web address]: [email or web address] for cardiology service schedule and contact information.

## 2025-01-12 NOTE — CHART NOTE - NSCHARTNOTEFT_GEN_A_CORE
Case discussed with pt's girlfriend Klaudia Lara at bedside and updated her regarding pt's current medical condition.  RN Natasha burciaga.  Plan of care discussed and all questions answered.

## 2025-01-12 NOTE — PROGRESS NOTE ADULT - NS ATTEND AMEND GEN_ALL_CORE FT
Problem/Plan - 1:  ·  Problem: Fracture of right femur.   ·  Plan: S/p Right Hip IM Nailing POD#6  Ortho following  Pain management  Dressing changed today from Medipore tape to 4x4 and Tegaderm   DVT prophylaxis with venodynes and Eliquis per medicine, no orthopedic contraindications  Daily Physical Therapy:  WBAT of the Right lower extremity with appropriate assistive device   Discharge planning [facility] vs return to prior facility.     Problem/Plan - 2:  ·  Problem: Left lower lobe pneumonia.   ·  Plan: S/p ceftriaxone and azithro.   sputum culture 1/1/2025: + Pseudomonas aeruginosa and blood cx + for Entero Faecalis   Zosyn d/arjun and started on Meropenem on 1/4 . Meropenem d/c and cefepime started 1/06.  Cefepime thru 1/11  Continue Ampicillin 2Gms every 4 hours IVPB thru 2/3/25  PICC line inserted 1/09  Legionella negative.  Continue strict aspiration precautions.     Problem/Plan - 3:  ·  Problem: Tracheostomy dependent.   ·  Plan: Continue trach care.  Maintaining oxygen saturation on RA.  PMV as tolerated.     Problem/Plan - 4:  ·  Problem: Atrial fibrillation.   ·  Plan: Ventricular rate stable.  Continue with Eliquis  Metoprolol resumed at decreased dose 12.5mg twice daily with hold parameters   Cardiology Dr. Blayne neal.     Problem/Plan - 5:  ·  Problem: Recurrent pulmonary embolism.   ·  Plan: Continue Eliquis 5mg BID.     Problem/Plan - 6:  ·  Problem: Seizures.   ·  Plan: Continue Vimpat, Valproic acid and keppra.  Maintain seizure precautions.     Problem/Plan - 7:  ·  Problem: Hypertension.   ·  Plan: Antihypertensives currently on hold.  Continue midodrine 5mg every 8 hours with parameters.     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   ·  Plan: ?HF with recovered EF s/p ?Bi-V ICD (initially EF 30%->55-60% on 03/2021)  Euvolemic off diuretics  EKG showed sinus rhythm with LBBB  TTE 1/1/25 showed normal LV function, mildly dilated aortic root (4.2 cm)  ICD battery depleted. EP Dr. Traylor consulted, no plan for gen change given poor functional status, comorbidities, and recovered EF  Cardiology Dr Dacosta following.     Problem/Plan - 9:  ·  Problem: Sepsis.   ·  Plan: Secondary to bacteremia and LLL pna.  BCx positive for Faecalis/pseudimonas.  Repeat cultures on 1/6 NGTD  Continue Cefepime thru 1/11  Ampicillin 2Gm every 4 hours thru 2/3.   ID Dr. De La Torre following  PICC placed 1/9.     Problem/Plan - 10:  ·  Problem: Bacteremia.   ·  Plan; Original cultures + Faecalis and pseudomonas.  repeat cultures 1/6 NGTD  Ampicillin 2Gm every 4 hours thru 2/3.   ID Dr. De La Torre following  PICC placed 1/9.

## 2025-01-13 ENCOUNTER — TRANSCRIPTION ENCOUNTER (OUTPATIENT)
Age: 50
End: 2025-01-13

## 2025-01-13 VITALS
DIASTOLIC BLOOD PRESSURE: 68 MMHG | OXYGEN SATURATION: 98 % | SYSTOLIC BLOOD PRESSURE: 120 MMHG | RESPIRATION RATE: 14 BRPM | TEMPERATURE: 98 F | HEART RATE: 63 BPM

## 2025-01-13 LAB
ANION GAP SERPL CALC-SCNC: 4 MMOL/L — LOW (ref 5–17)
BUN SERPL-MCNC: 8 MG/DL — SIGNIFICANT CHANGE UP (ref 7–18)
CALCIUM SERPL-MCNC: 8.6 MG/DL — SIGNIFICANT CHANGE UP (ref 8.4–10.5)
CHLORIDE SERPL-SCNC: 107 MMOL/L — SIGNIFICANT CHANGE UP (ref 96–108)
CO2 SERPL-SCNC: 30 MMOL/L — SIGNIFICANT CHANGE UP (ref 22–31)
CREAT SERPL-MCNC: 0.56 MG/DL — SIGNIFICANT CHANGE UP (ref 0.5–1.3)
EGFR: 120 ML/MIN/1.73M2 — SIGNIFICANT CHANGE UP
GLUCOSE BLDC GLUCOMTR-MCNC: 96 MG/DL — SIGNIFICANT CHANGE UP (ref 70–99)
GLUCOSE SERPL-MCNC: 114 MG/DL — HIGH (ref 70–99)
HCT VFR BLD CALC: 28.3 % — LOW (ref 39–50)
HGB BLD-MCNC: 9.3 G/DL — LOW (ref 13–17)
MAGNESIUM SERPL-MCNC: 2 MG/DL — SIGNIFICANT CHANGE UP (ref 1.6–2.6)
MCHC RBC-ENTMCNC: 32.9 G/DL — SIGNIFICANT CHANGE UP (ref 32–36)
MCHC RBC-ENTMCNC: 33.6 PG — SIGNIFICANT CHANGE UP (ref 27–34)
MCV RBC AUTO: 102.2 FL — HIGH (ref 80–100)
NRBC # BLD: 0 /100 WBCS — SIGNIFICANT CHANGE UP (ref 0–0)
PHOSPHATE SERPL-MCNC: 2.8 MG/DL — SIGNIFICANT CHANGE UP (ref 2.5–4.5)
PLATELET # BLD AUTO: 449 K/UL — HIGH (ref 150–400)
POTASSIUM SERPL-MCNC: 3.5 MMOL/L — SIGNIFICANT CHANGE UP (ref 3.5–5.3)
POTASSIUM SERPL-SCNC: 3.5 MMOL/L — SIGNIFICANT CHANGE UP (ref 3.5–5.3)
RBC # BLD: 2.77 M/UL — LOW (ref 4.2–5.8)
RBC # FLD: 16.2 % — HIGH (ref 10.3–14.5)
SODIUM SERPL-SCNC: 141 MMOL/L — SIGNIFICANT CHANGE UP (ref 135–145)
WBC # BLD: 5.31 K/UL — SIGNIFICANT CHANGE UP (ref 3.8–10.5)
WBC # FLD AUTO: 5.31 K/UL — SIGNIFICANT CHANGE UP (ref 3.8–10.5)

## 2025-01-13 PROCEDURE — 76000 FLUOROSCOPY <1 HR PHYS/QHP: CPT

## 2025-01-13 PROCEDURE — 86901 BLOOD TYPING SEROLOGIC RH(D): CPT

## 2025-01-13 PROCEDURE — 87186 SC STD MICRODIL/AGAR DIL: CPT

## 2025-01-13 PROCEDURE — 87205 SMEAR GRAM STAIN: CPT

## 2025-01-13 PROCEDURE — 80048 BASIC METABOLIC PNL TOTAL CA: CPT

## 2025-01-13 PROCEDURE — 80053 COMPREHEN METABOLIC PANEL: CPT

## 2025-01-13 PROCEDURE — 82962 GLUCOSE BLOOD TEST: CPT

## 2025-01-13 PROCEDURE — 77001 FLUOROGUIDE FOR VEIN DEVICE: CPT

## 2025-01-13 PROCEDURE — 36415 COLL VENOUS BLD VENIPUNCTURE: CPT

## 2025-01-13 PROCEDURE — 85025 COMPLETE CBC W/AUTO DIFF WBC: CPT

## 2025-01-13 PROCEDURE — 87641 MR-STAPH DNA AMP PROBE: CPT

## 2025-01-13 PROCEDURE — 86738 MYCOPLASMA ANTIBODY: CPT

## 2025-01-13 PROCEDURE — 84145 PROCALCITONIN (PCT): CPT

## 2025-01-13 PROCEDURE — 82652 VIT D 1 25-DIHYDROXY: CPT

## 2025-01-13 PROCEDURE — 97110 THERAPEUTIC EXERCISES: CPT

## 2025-01-13 PROCEDURE — 87040 BLOOD CULTURE FOR BACTERIA: CPT

## 2025-01-13 PROCEDURE — P9047: CPT

## 2025-01-13 PROCEDURE — 87070 CULTURE OTHR SPECIMN AEROBIC: CPT

## 2025-01-13 PROCEDURE — 97163 PT EVAL HIGH COMPLEX 45 MIN: CPT

## 2025-01-13 PROCEDURE — 86923 COMPATIBILITY TEST ELECTRIC: CPT

## 2025-01-13 PROCEDURE — 73552 X-RAY EXAM OF FEMUR 2/>: CPT

## 2025-01-13 PROCEDURE — 87635 SARS-COV-2 COVID-19 AMP PRB: CPT

## 2025-01-13 PROCEDURE — 99285 EMERGENCY DEPT VISIT HI MDM: CPT

## 2025-01-13 PROCEDURE — 87640 STAPH A DNA AMP PROBE: CPT

## 2025-01-13 PROCEDURE — 36573 INSJ PICC RS&I 5 YR+: CPT

## 2025-01-13 PROCEDURE — 36430 TRANSFUSION BLD/BLD COMPNT: CPT

## 2025-01-13 PROCEDURE — 87449 NOS EACH ORGANISM AG IA: CPT

## 2025-01-13 PROCEDURE — C1751: CPT

## 2025-01-13 PROCEDURE — 81003 URINALYSIS AUTO W/O SCOPE: CPT

## 2025-01-13 PROCEDURE — 86900 BLOOD TYPING SEROLOGIC ABO: CPT

## 2025-01-13 PROCEDURE — 94760 N-INVAS EAR/PLS OXIMETRY 1: CPT

## 2025-01-13 PROCEDURE — C1713: CPT

## 2025-01-13 PROCEDURE — 76937 US GUIDE VASCULAR ACCESS: CPT

## 2025-01-13 PROCEDURE — 87637 SARSCOV2&INF A&B&RSV AMP PRB: CPT

## 2025-01-13 PROCEDURE — 83735 ASSAY OF MAGNESIUM: CPT

## 2025-01-13 PROCEDURE — 85610 PROTHROMBIN TIME: CPT

## 2025-01-13 PROCEDURE — 87077 CULTURE AEROBIC IDENTIFY: CPT

## 2025-01-13 PROCEDURE — 92507 TX SP LANG VOICE COMM INDIV: CPT

## 2025-01-13 PROCEDURE — 93306 TTE W/DOPPLER COMPLETE: CPT

## 2025-01-13 PROCEDURE — 94640 AIRWAY INHALATION TREATMENT: CPT

## 2025-01-13 PROCEDURE — 70450 CT HEAD/BRAIN W/O DYE: CPT | Mod: MC

## 2025-01-13 PROCEDURE — 85730 THROMBOPLASTIN TIME PARTIAL: CPT

## 2025-01-13 PROCEDURE — C1769: CPT

## 2025-01-13 PROCEDURE — 74018 RADEX ABDOMEN 1 VIEW: CPT

## 2025-01-13 PROCEDURE — 86850 RBC ANTIBODY SCREEN: CPT

## 2025-01-13 PROCEDURE — 71045 X-RAY EXAM CHEST 1 VIEW: CPT

## 2025-01-13 PROCEDURE — P9040: CPT

## 2025-01-13 PROCEDURE — 71275 CT ANGIOGRAPHY CHEST: CPT | Mod: MC

## 2025-01-13 PROCEDURE — 87150 DNA/RNA AMPLIFIED PROBE: CPT

## 2025-01-13 PROCEDURE — 72170 X-RAY EXAM OF PELVIS: CPT

## 2025-01-13 PROCEDURE — 72192 CT PELVIS W/O DYE: CPT | Mod: MC

## 2025-01-13 PROCEDURE — 97530 THERAPEUTIC ACTIVITIES: CPT

## 2025-01-13 PROCEDURE — 92597 ORAL SPEECH DEVICE EVAL: CPT

## 2025-01-13 PROCEDURE — C9254: CPT

## 2025-01-13 PROCEDURE — 84100 ASSAY OF PHOSPHORUS: CPT

## 2025-01-13 PROCEDURE — 74174 CTA ABD&PLVS W/CONTRAST: CPT | Mod: MC

## 2025-01-13 PROCEDURE — 85027 COMPLETE CBC AUTOMATED: CPT

## 2025-01-13 PROCEDURE — 87899 AGENT NOS ASSAY W/OPTIC: CPT

## 2025-01-13 PROCEDURE — 83605 ASSAY OF LACTIC ACID: CPT

## 2025-01-13 PROCEDURE — 93005 ELECTROCARDIOGRAM TRACING: CPT

## 2025-01-13 PROCEDURE — 97162 PT EVAL MOD COMPLEX 30 MIN: CPT

## 2025-01-13 RX ORDER — SENNOSIDES 8.6 MG/1
10 TABLET, FILM COATED ORAL
Qty: 0 | Refills: 0 | DISCHARGE
Start: 2025-01-13

## 2025-01-13 RX ORDER — SODIUM CHLORIDE 9 MG/ML
1 INJECTION, SOLUTION INTRAMUSCULAR; INTRAVENOUS; SUBCUTANEOUS
Qty: 0 | Refills: 0 | DISCHARGE
Start: 2025-01-13

## 2025-01-13 RX ORDER — POLYETHYLENE GLYCOL 3350 17 G/DOSE
17 POWDER (GRAM) ORAL
Qty: 0 | Refills: 0 | DISCHARGE
Start: 2025-01-13

## 2025-01-13 RX ORDER — MIDODRINE HYDROCHLORIDE 5 MG/1
1 TABLET ORAL
Qty: 0 | Refills: 0 | DISCHARGE
Start: 2025-01-13

## 2025-01-13 RX ORDER — AMPICILLIN TRIHYDRATE 125 MG/5ML
2 SUSPENSION, RECONSTITUTED, ORAL (ML) ORAL
Qty: 0 | Refills: 0 | DISCHARGE
Start: 2025-01-13 | End: 2025-02-03

## 2025-01-13 RX ADMIN — Medication 12.5 MILLIGRAM(S): at 05:19

## 2025-01-13 RX ADMIN — MIDODRINE HYDROCHLORIDE 5 MILLIGRAM(S): 5 TABLET ORAL at 05:15

## 2025-01-13 RX ADMIN — VALPROIC ACID 1250 MILLIGRAM(S): 250 SOLUTION ORAL at 14:28

## 2025-01-13 RX ADMIN — LEVETIRACETAM 2000 MILLIGRAM(S): 100 SOLUTION ORAL at 05:13

## 2025-01-13 RX ADMIN — Medication 500 MILLIGRAM(S): at 11:37

## 2025-01-13 RX ADMIN — Medication 200 GRAM(S): at 01:14

## 2025-01-13 RX ADMIN — CHLORHEXIDINE GLUCONATE 1 APPLICATION(S): 1.2 RINSE ORAL at 11:39

## 2025-01-13 RX ADMIN — POLYSORBATE 80 1 DROP(S): 100 SOLUTION/ DROPS OPHTHALMIC at 11:38

## 2025-01-13 RX ADMIN — PANTOPRAZOLE 40 MILLIGRAM(S): 40 TABLET, DELAYED RELEASE ORAL at 11:38

## 2025-01-13 RX ADMIN — IPRATROPIUM BROMIDE AND ALBUTEROL SULFATE 3 MILLILITER(S): .5; 2.5 SOLUTION RESPIRATORY (INHALATION) at 08:55

## 2025-01-13 RX ADMIN — APIXABAN 5 MILLIGRAM(S): 5 TABLET, FILM COATED ORAL at 05:12

## 2025-01-13 RX ADMIN — Medication 1 TABLET(S): at 11:37

## 2025-01-13 RX ADMIN — VALPROIC ACID 1250 MILLIGRAM(S): 250 SOLUTION ORAL at 05:16

## 2025-01-13 RX ADMIN — Medication 17 GRAM(S): at 11:38

## 2025-01-13 RX ADMIN — Medication 200 GRAM(S): at 09:25

## 2025-01-13 RX ADMIN — Medication 1 MILLIGRAM(S): at 11:37

## 2025-01-13 RX ADMIN — MIDODRINE HYDROCHLORIDE 5 MILLIGRAM(S): 5 TABLET ORAL at 11:37

## 2025-01-13 RX ADMIN — Medication 200 GRAM(S): at 05:18

## 2025-01-13 RX ADMIN — LACOSAMIDE 200 MILLIGRAM(S): 100 TABLET, FILM COATED ORAL at 05:12

## 2025-01-13 RX ADMIN — Medication 200 GRAM(S): at 12:54

## 2025-01-13 NOTE — PROGRESS NOTE ADULT - PROVIDER SPECIALTY LIST ADULT
Cardiology
Critical Care
Infectious Disease
Orthopedics
Pulmonology
Cardiology
Critical Care
Orthopedics
Cardiology
Cardiology
Critical Care
Orthopedics
Critical Care
Cardiology
Critical Care
Cardiology
Critical Care
Cardiology
Critical Care

## 2025-01-13 NOTE — PROGRESS NOTE ADULT - SUBJECTIVE AND OBJECTIVE BOX
DANNIE SHIRLEY    SCU progress note    INTERVAL HPI/OVERNIGHT EVENTS: ***    DNR [ ]   DNI  [  ]    Covid - 19 PCR:     The 4Ms    What Matters Most: see GOC  Age appropriate Medications/Screen for High Risk Medication: Yes  Mentation: see CAM below  Mobility: defer to physical exam    The Confusion Assessment Method (CAM) Diagnostic Algorithm     1: Acute Onset or Fluctuating Course  - Is there evidence of an acute change in mental status from the patient’s baseline? Did the (abnormal) behavior  fluctuate during the day, that is, tend to come and go, or increase and decrease in severity?  [ ] YES [ ] NO     2: Inattention  - Did the patient have difficulty focusing attention, being easily distractible, or having difficulty keeping track of what was being said?  [ ] YES [ ] NO     3: Disorganized thinking  -Was the patient’s thinking disorganized or incoherent, such as rambling or irrelevant conversation, unclear or illogical flow of ideas, or unpredictable switching from subject to subject?  [ ] YES [ ] NO    4: Altered Level of consciousness?  [ ] YES [ ] NO    The diagnosis of delirium by CAM requires the presence of features 1 and 2 and either 3 or 4.    PRESSORS: [ ] YES [ ] NO  ampicillin  IVPB 2 Gram(s) IV Intermittent every 4 hours    Cardiovascular:  Heart Failure  Acute   Acute on Chronic  Chronic       metoprolol tartrate 12.5 milliGRAM(s) Oral two times a day  midodrine. 5 milliGRAM(s) Oral three times a day    Pulmonary:  albuterol/ipratropium for Nebulization 3 milliLiter(s) Nebulizer every 6 hours    Hematalogic:  apixaban 5 milliGRAM(s) Oral every 12 hours    Other:  acetaminophen   Oral Liquid .. 650 milliGRAM(s) Oral every 6 hours PRN  artificial  tears Solution 1 Drop(s) Both EYES daily  ascorbic acid 500 milliGRAM(s) Oral daily  atorvastatin 80 milliGRAM(s) Oral at bedtime  chlorhexidine 2% Cloths 1 Application(s) Topical daily  chlorhexidine 2% Cloths 1 Application(s) Topical daily  folic acid 1 milliGRAM(s) Oral daily  lacosamide Solution 200 milliGRAM(s) Oral two times a day  levETIRAcetam  Solution 2000 milliGRAM(s) Enteral Tube two times a day  melatonin 5 milliGRAM(s) Oral at bedtime  multivitamin 1 Tablet(s) Oral daily  naloxone Injectable 0.4 milliGRAM(s) IV Push once  ondansetron Injectable 4 milliGRAM(s) IV Push every 8 hours PRN  pantoprazole  Injectable 40 milliGRAM(s) IV Push daily  polyethylene glycol 3350 17 Gram(s) Oral daily  senna Syrup 10 milliLiter(s) Oral at bedtime  sodium chloride 0.9% lock flush 10 milliLiter(s) IV Push every 1 hour PRN  valproic  acid Syrup 1250 milliGRAM(s) Oral three times a day    acetaminophen   Oral Liquid .. 650 milliGRAM(s) Oral every 6 hours PRN  albuterol/ipratropium for Nebulization 3 milliLiter(s) Nebulizer every 6 hours  ampicillin  IVPB 2 Gram(s) IV Intermittent every 4 hours  apixaban 5 milliGRAM(s) Oral every 12 hours  artificial  tears Solution 1 Drop(s) Both EYES daily  ascorbic acid 500 milliGRAM(s) Oral daily  atorvastatin 80 milliGRAM(s) Oral at bedtime  chlorhexidine 2% Cloths 1 Application(s) Topical daily  chlorhexidine 2% Cloths 1 Application(s) Topical daily  folic acid 1 milliGRAM(s) Oral daily  lacosamide Solution 200 milliGRAM(s) Oral two times a day  levETIRAcetam  Solution 2000 milliGRAM(s) Enteral Tube two times a day  melatonin 5 milliGRAM(s) Oral at bedtime  metoprolol tartrate 12.5 milliGRAM(s) Oral two times a day  midodrine. 5 milliGRAM(s) Oral three times a day  multivitamin 1 Tablet(s) Oral daily  naloxone Injectable 0.4 milliGRAM(s) IV Push once  ondansetron Injectable 4 milliGRAM(s) IV Push every 8 hours PRN  pantoprazole  Injectable 40 milliGRAM(s) IV Push daily  polyethylene glycol 3350 17 Gram(s) Oral daily  senna Syrup 10 milliLiter(s) Oral at bedtime  sodium chloride 0.9% lock flush 10 milliLiter(s) IV Push every 1 hour PRN  valproic  acid Syrup 1250 milliGRAM(s) Oral three times a day    Drug Dosing Weight  Height (cm): 170.2 (26 Aug 2024 12:06)  Weight (kg): 90.7 (31 Dec 2024 18:00)  BMI (kg/m2): 31.3 (31 Dec 2024 18:00)  BSA (m2): 2.02 (31 Dec 2024 18:00)    CENTRAL LINE: [ ] YES [ ] NO  LOCATION:   DATE INSERTED:  REMOVE: [ ] YES [ ] NO  EXPLAIN:    FAULKNER: [ ] YES [ ] NO    DATE INSERTED:  REMOVE:  [ ] YES [ ] NO  EXPLAIN:    PAST MEDICAL & SURGICAL HISTORY:  Heart failure, unspecified HF chronicity, unspecified heart failure type      ETOH abuse  h/o etoh abuse now moderate drinker      Artificial pacemaker                        PHYSICAL EXAM:    GENERAL: NAD, well-groomed, well-developed  HEAD:  Atraumatic, Normocephalic  EYES: EOMI, PERRLA, conjunctiva and sclera clear  ENMT: No tonsillar erythema, exudates, or enlargement; Moist mucous membranes, Good dentition, No lesions  NECK: Supple, No JVD, Normal thyroid  NERVOUS SYSTEM:  Alert & Oriented X3, Good concentration; Motor Strength 5/5 B/L upper and lower extremities; DTRs 2+ intact and symmetric  CHEST/LUNG: Clear to percussion bilaterally; No rales, rhonchi, wheezing, or rubs  HEART: Regular rate and rhythm; No murmurs, rubs, or gallops  ABDOMEN: Soft, Nontender, Nondistended; Bowel sounds present  EXTREMITIES:  2+ Peripheral Pulses, No clubbing, cyanosis, or edema  LYMPH: No lymphadenopathy noted  SKIN: No rashes or lesions      LABS:  CBC Full  -  ( 13 Jan 2025 07:09 )  WBC Count : 5.31 K/uL  RBC Count : 2.77 M/uL  Hemoglobin : 9.3 g/dL  Hematocrit : 28.3 %  Platelet Count - Automated : 449 K/uL  Mean Cell Volume : 102.2 fl  Mean Cell Hemoglobin : 33.6 pg  Mean Cell Hemoglobin Concentration : 32.9 g/dL  Auto Neutrophil # : x  Auto Lymphocyte # : x  Auto Monocyte # : x  Auto Eosinophil # : x  Auto Basophil # : x  Auto Neutrophil % : x  Auto Lymphocyte % : x  Auto Monocyte % : x  Auto Eosinophil % : x  Auto Basophil % : x    01-13    141  |  107  |  8   ----------------------------<  114[H]  3.5   |  30  |  0.56    Ca    8.6      13 Jan 2025 07:09  Phos  2.8     01-13  Mg     2.0     01-13        Urinalysis Basic - ( 13 Jan 2025 07:09 )    Color: x / Appearance: x / SG: x / pH: x  Gluc: 114 mg/dL / Ketone: x  / Bili: x / Urobili: x   Blood: x / Protein: x / Nitrite: x   Leuk Esterase: x / RBC: x / WBC x   Sq Epi: x / Non Sq Epi: x / Bacteria: x            [  ]  DVT Prophylaxis  [  ]  Nutrition, Brand, Rate         Goal Rate        Abnormal Nutritional Status -  Malnutrition   Cachexia      Morbid Obesity BMI >/=40    RADIOLOGY & ADDITIONAL STUDIES:  ***    Goals of Care Discussion with Family/Proxy/Other   - see note from/family meeting set up for...     DANNIE SHIRLEY    SCU progress note    INTERVAL HPI/OVERNIGHT EVENTS: No events    FULL CODE    Covid - 19 PCR: Negative    The 4Ms    What Matters Most: see GOC  Age appropriate Medications/Screen for High Risk Medication: Yes  Mentation: see CAM below  Mobility: defer to physical exam    The Confusion Assessment Method (CAM) Diagnostic Algorithm     1: Acute Onset or Fluctuating Course  - Is there evidence of an acute change in mental status from the patient’s baseline? Did the (abnormal) behavior  fluctuate during the day, that is, tend to come and go, or increase and decrease in severity?  [ ] YES [ x] NO     2: Inattention  - Did the patient have difficulty focusing attention, being easily distractible, or having difficulty keeping track of what was being said?  [ ] YES [x ] NO     3: Disorganized thinking  -Was the patient’s thinking disorganized or incoherent, such as rambling or irrelevant conversation, unclear or illogical flow of ideas, or unpredictable switching from subject to subject?  [ ] YES [x] NO    4: Altered Level of consciousness?  [ ] YES [ x] NO    The diagnosis of delirium by CAM requires the presence of features 1 and 2 and either 3 or 4.    PRESSORS: [ ] YES [x ] NO  ampicillin  IVPB 2 Gram(s) IV Intermittent every 4 hours    Cardiovascular:  Heart Failure  Acute   Acute on Chronic  Chronic       metoprolol tartrate 12.5 milliGRAM(s) Oral two times a day  midodrine. 5 milliGRAM(s) Oral three times a day    Pulmonary:  albuterol/ipratropium for Nebulization 3 milliLiter(s) Nebulizer every 6 hours    Hematalogic:  apixaban 5 milliGRAM(s) Oral every 12 hours    Other:  acetaminophen   Oral Liquid .. 650 milliGRAM(s) Oral every 6 hours PRN  artificial  tears Solution 1 Drop(s) Both EYES daily  ascorbic acid 500 milliGRAM(s) Oral daily  atorvastatin 80 milliGRAM(s) Oral at bedtime  chlorhexidine 2% Cloths 1 Application(s) Topical daily  chlorhexidine 2% Cloths 1 Application(s) Topical daily  folic acid 1 milliGRAM(s) Oral daily  lacosamide Solution 200 milliGRAM(s) Oral two times a day  levETIRAcetam  Solution 2000 milliGRAM(s) Enteral Tube two times a day  melatonin 5 milliGRAM(s) Oral at bedtime  multivitamin 1 Tablet(s) Oral daily  naloxone Injectable 0.4 milliGRAM(s) IV Push once  ondansetron Injectable 4 milliGRAM(s) IV Push every 8 hours PRN  pantoprazole  Injectable 40 milliGRAM(s) IV Push daily  polyethylene glycol 3350 17 Gram(s) Oral daily  senna Syrup 10 milliLiter(s) Oral at bedtime  sodium chloride 0.9% lock flush 10 milliLiter(s) IV Push every 1 hour PRN  valproic  acid Syrup 1250 milliGRAM(s) Oral three times a day    acetaminophen   Oral Liquid .. 650 milliGRAM(s) Oral every 6 hours PRN  albuterol/ipratropium for Nebulization 3 milliLiter(s) Nebulizer every 6 hours  ampicillin  IVPB 2 Gram(s) IV Intermittent every 4 hours  apixaban 5 milliGRAM(s) Oral every 12 hours  artificial  tears Solution 1 Drop(s) Both EYES daily  ascorbic acid 500 milliGRAM(s) Oral daily  atorvastatin 80 milliGRAM(s) Oral at bedtime  chlorhexidine 2% Cloths 1 Application(s) Topical daily  chlorhexidine 2% Cloths 1 Application(s) Topical daily  folic acid 1 milliGRAM(s) Oral daily  lacosamide Solution 200 milliGRAM(s) Oral two times a day  levETIRAcetam  Solution 2000 milliGRAM(s) Enteral Tube two times a day  melatonin 5 milliGRAM(s) Oral at bedtime  metoprolol tartrate 12.5 milliGRAM(s) Oral two times a day  midodrine. 5 milliGRAM(s) Oral three times a day  multivitamin 1 Tablet(s) Oral daily  naloxone Injectable 0.4 milliGRAM(s) IV Push once  ondansetron Injectable 4 milliGRAM(s) IV Push every 8 hours PRN  pantoprazole  Injectable 40 milliGRAM(s) IV Push daily  polyethylene glycol 3350 17 Gram(s) Oral daily  senna Syrup 10 milliLiter(s) Oral at bedtime  sodium chloride 0.9% lock flush 10 milliLiter(s) IV Push every 1 hour PRN  valproic  acid Syrup 1250 milliGRAM(s) Oral three times a day    Drug Dosing Weight  Height (cm): 170.2 (26 Aug 2024 12:06)  Weight (kg): 90.7 (31 Dec 2024 18:00)  BMI (kg/m2): 31.3 (31 Dec 2024 18:00)  BSA (m2): 2.02 (31 Dec 2024 18:00)    CENTRAL LINE: [x ] YES [ ] NO  LOCATION:   DATE INSERTED:  REMOVE: [ ] YES [ ] NO  EXPLAIN:    FAULKNER: [ ] YES [ ] NO    DATE INSERTED:  REMOVE:  [ ] YES [ ] NO  EXPLAIN:    PAST MEDICAL & SURGICAL HISTORY:  Heart failure, unspecified HF chronicity, unspecified heart failure type      ETOH abuse  h/o etoh abuse now moderate drinker      Artificial pacemaker    ROS: Denies pain.       PHYSICAL EXAM:    GENERAL: NAD  HEAD:  asymmetrical   EYES:  PERRLA, conjunctiva and sclera clear  ENMT: Moist mucous membranes  NECK: Tracheostomy   NERVOUS SYSTEM:  Alert & Oriented X self, place (hospital), Good concentration; Right hemiparesis. Moves left side AG.   CHEST/LUNG: Diminished at the bases bilaterally; No rales, rhonchi, wheezing, or rubs. Trach collar to RA  HEART: Regular rate and rhythm; No murmurs, rubs, or gallops  ABDOMEN: Soft, Nontender, Nondistended; Bowel sounds present. PEG tube  EXTREMITIES:  2+ Peripheral Pulses, No clubbing, cyanosis, or edema  SKIN: No rashes.       LABS:  CBC Full  -  ( 13 Jan 2025 07:09 )  WBC Count : 5.31 K/uL  RBC Count : 2.77 M/uL  Hemoglobin : 9.3 g/dL  Hematocrit : 28.3 %  Platelet Count - Automated : 449 K/uL  Mean Cell Volume : 102.2 fl  Mean Cell Hemoglobin : 33.6 pg  Mean Cell Hemoglobin Concentration : 32.9 g/dL  Auto Neutrophil # : x  Auto Lymphocyte # : x  Auto Monocyte # : x  Auto Eosinophil # : x  Auto Basophil # : x  Auto Neutrophil % : x  Auto Lymphocyte % : x  Auto Monocyte % : x  Auto Eosinophil % : x  Auto Basophil % : x    01-13    141  |  107  |  8   ----------------------------<  114[H]  3.5   |  30  |  0.56    Ca    8.6      13 Jan 2025 07:09  Phos  2.8     01-13  Mg     2.0     01-13        Urinalysis Basic - ( 13 Jan 2025 07:09 )    Color: x / Appearance: x / SG: x / pH: x  Gluc: 114 mg/dL / Ketone: x  / Bili: x / Urobili: x   Blood: x / Protein: x / Nitrite: x   Leuk Esterase: x / RBC: x / WBC x   Sq Epi: x / Non Sq Epi: x / Bacteria: x

## 2025-01-13 NOTE — PROGRESS NOTE ADULT - PROBLEM SELECTOR PLAN 11
Continue eliquis 5mg BID.  Ampicillin 2Gm every 4 hours thru 2/3.   ID Dr. De La Torre following  PICC placed 1/9  Discharge planning underway. Continue eliquis 5mg BID.  Ampicillin 2Gm every 4 hours thru 2/3.   ID Dr. De La Torre following  PICC placed 1/9  Discharge planning to [facility] today.

## 2025-01-13 NOTE — PROGRESS NOTE ADULT - NS ATTEND OPT1A GEN_ALL_CORE
Medical decision making
Exam/Medical decision making
Medical decision making

## 2025-01-13 NOTE — PROGRESS NOTE ADULT - PROBLEM SELECTOR PROBLEM 2
Left lower lobe pneumonia
Left lower lobe pneumonia
Bacteremia
Left lower lobe pneumonia
Bacteremia
History of CVA (cerebrovascular accident)
Bacteremia
Left lower lobe pneumonia
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
Left lower lobe pneumonia

## 2025-01-13 NOTE — PROGRESS NOTE ADULT - ASSESSMENT
50 y/o M pt with PMHx CVA (3/30/2022 with residual aphasia and right sided residual weakness), s/p tracheostomy and peg placement, seizures, HTN, HLD, CHF w/ ICD (initially rEF 30%->55-60% on 03/2021), MDD, Recurrent PE, Afib (on Eliquis), obesity s/p bariatric intervention presented to the ED after a fall 3 days prior to admission, and outpatient xray showed intertrochanteric fracture of right femur. He also had fevers outpatient, and CXR concerning for left lower lobe infiltrate Patient is being admitted to  for management of right intertrochanteric femur fracture and pneumonia.     1/2/25: Patient anemia possible anemia of chronic disease, PRBC 1 unit transfused for OR, recommended by Ortho Hemoglobin above 10, however will transfuse one unit 1/2/25 as patient with no active bleeding noted. Cardiac cleared for surgery. ICD no detection, however EP recommends no need to replace as ICD placed initially due to previous low EF. TTE resulting LBBB, LVSF normal EF 59%. Patient planned for OR tonight.   01/03/2025: Restarted Eliquis. S/p R hip IM nailing POD #1. PT reccs [facility]. WBAT. Dispo: Placement back to [facility] [place]. Will monitor on AC for 24 hrs to ensure H&H remains stable and monitor surgical wound.   01/04: S/p Code sepsis on 1/3 in the setting of fever, tachycardia and hypotension. CXR shows no consolidation.  Bcx 1/3 + for Gram positive for cocci in pairs and chains, pending sensitivity. Soft BP. Sputum cx from 1/1 + pseudomonas aeruginosa. Zosyn switched to Meropenem. Ucx sent on 1/3. Afebrile. No leukocytosis. Lactate and procal wnl. ID Dr. De La Torre consulted on 1/3. S/p R hip IM nailing POD #2.  Ortho reccs appreciated. AC resumed. KUB to r/o SBO vs Ileus. Bowel regimen.   01/05- Blood cx with Entero Faecalis, ID following and currently on Meropenem  1/9 : PICC placed  1/11: tolerating PMV  1/12- Condition unchanged.  D/c planning ongoing.     01/113: Dispo back to [facility]. PICC in [facility]. Meropenem. F/u ID following.    48 y/o M pt with PMHx CVA (3/30/2022 with residual aphasia and right sided residual weakness), s/p tracheostomy and peg placement, seizures, HTN, HLD, CHF w/ ICD (initially rEF 30%->55-60% on 03/2021), MDD, Recurrent PE, Afib (on Eliquis), obesity s/p bariatric intervention presented to the ED after a fall 3 days prior to admission, and outpatient xray showed intertrochanteric fracture of right femur. He also had fevers outpatient, and CXR concerning for left lower lobe infiltrate Patient is being admitted to  for management of right intertrochanteric femur fracture and pneumonia.     1/2/25: Patient anemia possible anemia of chronic disease, PRBC 1 unit transfused for OR, recommended by Ortho Hemoglobin above 10, however will transfuse one unit 1/2/25 as patient with no active bleeding noted. Cardiac cleared for surgery. ICD no detection, however EP recommends no need to replace as ICD placed initially due to previous low EF. TTE resulting LBBB, LVSF normal EF 59%. Patient planned for OR tonight.   01/03/2025: Restarted Eliquis. S/p R hip IM nailing POD #1. PT reccs MARKE[facility]. WBAT. Dispo: Placement back to [facility]. Will monitor on AC for 24 hrs to ensure H&H remains stable and monitor surgical wound.   01/04: S/p Code sepsis on 1/3 in the setting of fever, tachycardia and hypotension. CXR shows no consolidation.  Bcx 1/3 + for Gram positive for cocci in pairs and chains, pending sensitivity. Soft BP. Sputum cx from 1/1 + pseudomonas aeruginosa. Zosyn switched to Meropenem. Ucx sent on 1/3. Afebrile. No leukocytosis. Lactate and procal wnl. ID Dr. De La Torre consulted on 1/3. S/p R hip IM nailing POD #2.  Ortho reccs appreciated. AC resumed. KUB to r/o SBO vs Ileus. Bowel regimen.   01/05- Blood cx with Entero Faecalis, ID following and currently on Meropenem  1/9 : PICC placed  1/11: tolerating PMV  1/12- Condition unchanged.  D/c planning ongoing.     01/13: Dispo back to [facility]. PICC in place. Ampicillin 2 GM until 2/3/2025.  ID following. Discussed with Ortho team and staples to be removed on 1/16/2025 at facility.

## 2025-01-13 NOTE — DISCHARGE NOTE NURSING/CASE MANAGEMENT/SOCIAL WORK - NSDCPEFALRISK_GEN_ALL_CORE
For information on Fall & Injury Prevention, visit: [email or web address] OR  [email or web address] OR  https://www.cdc.gov/steadi/patient.html

## 2025-01-13 NOTE — PROGRESS NOTE ADULT - PROBLEM SELECTOR PLAN 7
-4.2 cm on echo  -BP control  - Can obtain non-urgent CTA Aorta. -4.2 cm on echo  -BP control  - Can obtain non-urgent CTA Aorta.          Cardiology will sign off.  Re-consult if you have any questions

## 2025-01-13 NOTE — PROGRESS NOTE ADULT - PROBLEM SELECTOR PLAN 1
S/p Right Hip IM Nailing POD#6  Ortho following  Pain management  Dressing changed today from Medipore tape to 4x4 and Tegaderm   DVT prophylaxis with venodynes and Eliquis per medicine, no orthopedic contraindications  Daily Physical Therapy:  WBAT of the Right lower extremity with appropriate assistive device   Discharge planning [facility] vs return to prior facility. S/p Right Hip IM Nailing POD#11>>Staples to be removed at facility on 1/16.   Ortho following  Pain management.   Dressing changed today from Medipore tape to 4x4 and Tegaderm   DVT prophylaxis with venodynes and Eliquis per medicine, no orthopedic contraindications  Daily Physical Therapy:  WBAT of the Right lower extremity with appropriate assistive device   Discharge planning [facility] vs return to prior facility.

## 2025-01-13 NOTE — PROGRESS NOTE ADULT - SUBJECTIVE AND OBJECTIVE BOX
PRESENTING CC: Right intertrochanteric femur fracture    OVERNIGHT EVENTS: No new events overnight    PMH:   PAST MEDICAL & SURGICAL HISTORY:  Heart failure, unspecified HF chronicity, unspecified heart failure type    ETOH abuse  h/o etoh abuse now moderate drinker    Artificial pacemaker        Allergies    shellfish (Unknown)  No Known Drug Allergies    Intolerances        MEDICATIONS  (STANDING):  albuterol/ipratropium for Nebulization 3 milliLiter(s) Nebulizer every 6 hours  ampicillin  IVPB 2 Gram(s) IV Intermittent every 4 hours  apixaban 5 milliGRAM(s) Oral every 12 hours  artificial  tears Solution 1 Drop(s) Both EYES daily  ascorbic acid 500 milliGRAM(s) Oral daily  atorvastatin 80 milliGRAM(s) Oral at bedtime  chlorhexidine 2% Cloths 1 Application(s) Topical daily  chlorhexidine 2% Cloths 1 Application(s) Topical daily  folic acid 1 milliGRAM(s) Oral daily  lacosamide Solution 200 milliGRAM(s) Oral two times a day  levETIRAcetam  Solution 2000 milliGRAM(s) Enteral Tube two times a day  melatonin 5 milliGRAM(s) Oral at bedtime  metoprolol tartrate 12.5 milliGRAM(s) Oral two times a day  midodrine. 5 milliGRAM(s) Oral three times a day  multivitamin 1 Tablet(s) Oral daily  naloxone Injectable 0.4 milliGRAM(s) IV Push once  pantoprazole  Injectable 40 milliGRAM(s) IV Push daily  polyethylene glycol 3350 17 Gram(s) Oral daily  senna Syrup 10 milliLiter(s) Oral at bedtime  valproic  acid Syrup 1250 milliGRAM(s) Oral three times a day    MEDICATIONS  (PRN):  acetaminophen   Oral Liquid .. 650 milliGRAM(s) Oral every 6 hours PRN Moderate Pain (4 - 6)  ondansetron Injectable 4 milliGRAM(s) IV Push every 8 hours PRN Nausea and/or Vomiting  sodium chloride 0.9% lock flush 10 milliLiter(s) IV Push every 1 hour PRN Pre/post blood products, medications, blood draw, and to maintain line patency      FAMILY HISTORY:  No pertinent family history in first degree relatives      Reviewed; no change from my prior note    SOCIAL HISTORY:  Reviewed, no change from my prior note    REVIEW OF SYSTEMS:  Constitutional: [ ] fever, [ ]weight loss,  [ ]fatigue  Eyes: [ ] visual changes  Respiratory: [ ]shortness of breath;  [ ] cough, [ ]wheezing, [ ]chills, [ ]hemoptysis  Cardiovascular: [ ] chest pain, [ ]palpitations, [ ]dizziness,  [ ]leg swelling [ ]syncope  Gastrointestinal: [ ] abdominal pain, [ ]nausea, [ ]vomiting,  [ ]diarrhea   Genitourinary: [ ] dysuria, [ ] hematuria  Neurologic: [ ] headaches [ ] tremors [ ] weakness [ ] lightheadedness  Skin: [ ] itching, [ ]burning, [ ] rashes  Endocrine: [ ] heat or cold intolerance  Musculoskeletal: [ ] joint pain or swelling; [ ] muscle, back, or extremity pain  Psychiatric: [ ] depression, [ ]anxiety, [ ]mood swings, or [ ]difficulty sleeping  Hematologic: [ ] easy bruising, [ ] bleeding gums    [x] All remaining systems negative except as per above.   [  ] Unable to obtain    Vital Signs Last 24 Hrs  T(C): 36.6 (13 Jan 2025 12:11), Max: 36.7 (13 Jan 2025 05:09)  T(F): 97.8 (13 Jan 2025 12:11), Max: 98 (13 Jan 2025 05:09)  HR: 63 (13 Jan 2025 12:11) (53 - 63)  BP: 120/68 (13 Jan 2025 12:11) (117/73 - 144/78)  BP(mean): --  RR: 14 (13 Jan 2025 12:11) (14 - 20)  SpO2: 98% (13 Jan 2025 12:11) (96% - 98%)    Parameters below as of 13 Jan 2025 12:11  Patient On (Oxygen Delivery Method): tracheostomy collar, To Room Air.      I&O's Summary      PHYSICAL EXAM:  General: No acute distress  HEENT: EOMI  Neck: Tracheostomy, No JVD  Lungs: Clear to auscultation bilaterally; No rales or wheezing  Heart: Regular rate and rhythm; No murmurs, rubs, or gallops  Abdomen: Soft, non tender, non distended   Extremities: Warm, no edema   Nervous system:  Alert & Oriented X self and place (hospital). , Good concentration. Nods yes and no. Follows commands. Right side hemiparesis. Contracture of RUE.   Psychiatric: Normal affect  Skin: No rashes or lesions      LABS:  01-13    141  |  107  |  8   ----------------------------<  114[H]  3.5   |  30  |  0.56    Ca    8.6      13 Jan 2025 07:09  Phos  2.8     01-13  Mg     2.0     01-13      Creatinine Trend: 0.56<--, 0.60<--, 0.68<--, 0.58<--, 0.63<--, 0.56<--                        9.3    5.31  )-----------( 449      ( 13 Jan 2025 07:09 )             28.3       Lipid Panel:   Cardiac Enzymes:                 RADIOLOGY: < from: Xray Chest 1 View- PORTABLE-Urgent (Xray Chest 1 View- PORTABLE-Urgent .) (01.01.25 @ 00:53) >    IMPRESSION: Tracheostomy cannula reidentified in position. Low lung   volumes. Trace right pleural effusion with right basilar atelectasis.   Correlate clinically for concomitant infection. Otherwise grossly clear   lungs. No other changes.    --- End of Report ---        < end of copied text >  < from: CT Pelvis No Cont (12.31.24 @ 23:18) >  IMPRESSION:  1. Acute comminuted and impacted intertrochanteric proximal right femur   fracture.  2. Circumferential urinary bladder wall thickening which could be due to   underdistention versus a cystitis. Correlate with urinalysis.        --- End of Report ---              < end of copied text >  < from: CT Head No Cont (12.31.24 @ 23:18) >  IMPRESSION:  No acute intracranial findings.        --- End of Report ---    < end of copied text >  < from: Xray Femur 2 Views, Right (12.31.24 @ 19:08) >  IMPRESSION:    Minimally displaced right intertrochanteric femoral fracture    --- End of Report ---        < end of copied text >  < from: Xray Pelvis AP only (12.31.24 @ 19:08) >    IMPRESSION:    Minimally displaced right intertrochanteric femoral fracture    --- End of Report ---      < end of copied text >      ECG :< from: 12 Lead ECG (01.01.25 @ 11:27) >  Diagnosis Line Normal sinus rhythm hr 84  Left bundle branch block  Abnormal ECG    Confirmed by GERTRUDIS BECERRA MD ([id number]) on 1/2/2025 12:36:07 PM    < end of copied text >    < from: 12 Lead ECG (01.07.25 @ 17:33) >  Diagnosis Line Sinus rhythm HR 66  Left bundle branch block  Abnormal ECG    Confirmed by WINSOME VASQUEZ MD ([id number]) on 1/9/2025 7:48:12 AM    < end of copied text >    TELEMETRY: n/A    ECHO: < from: TTE W or WO Ultrasound Enhancing Agent (01.01.25 @ 12:44) >  CONCLUSIONS:      1. Aortic root at the sinuses of Valsalva isdilated, measuring 4.20 cm (indexed 2.00 cm/m²).   2. Abnormal septal motion consistent with left bundle branch block. Left ventricular systolic function is normal with an ejection fraction of 59 % by Garcia's method of disks.   3. There is increased LV mass and concentric hypertrophy.   4. Normal right ventricular cavity size, with normal wall thickness, and normal right ventricular systolic function.   5. Device lead is visualized in the right ventricle.   6. Consider CTA of the chest to better evaluate the aorta if clinically appropriate.    ________________________________________________________________________________________  FINDINGS:     Left Ventricle:  Abnormal (paradoxical) septal motion consistent with left bundle branch block. Left ventricular systolic function is normal with a calculated ejection fraction of 59 % by the Garcia's biplane method of disks. There is increased LV mass and concentric hypertrophy. There is normal left ventricular diastolic function.     Right Ventricle:  The right ventricular cavity is normal in size, with normal wall thickness and right ventricular systolic function is normal. A device lead is visualized in the right ventricle.     Left Atrium:  The left atrium is normal in size with an indexed volume of 31.97 ml/m².     Right Atrium:  The right atrium is normal in size.     Aortic Valve:  The aortic valve appears trileaflet.     Mitral Valve:  There is trace mitral regurgitation.     Tricuspid Valve:  There is mild tricuspid regurgitation. There is insufficient tricuspid regurgitation detected to calculate pulmonary artery systolic pressure.     Pulmonic Valve:  There is trace pulmonic regurgitation.     Aorta:  The aortic root at the sinuses of Valsalva is dilated, measuring 4.20 cm (indexed 2.00 cm/m²).     Pericardium:  No pericardial effusion seen.  ____________________________________________________________________    < end of copied text >    ICD Interrogation-[employer] ICD (INOGEN X4 CRT-D G148)--revealed battery capacity depleted on 08/08/2024

## 2025-01-13 NOTE — DISCHARGE NOTE NURSING/CASE MANAGEMENT/SOCIAL WORK - PATIENT PORTAL LINK FT
You can access the FollowMyHealth Patient Portal offered by [facility] by registering at the following website: [email or web address]. By joining [facility]’s FollowMyHealth portal, you will also be able to view your health information using other applications (apps) compatible with our system.

## 2025-01-13 NOTE — DISCHARGE NOTE NURSING/CASE MANAGEMENT/SOCIAL WORK - FINANCIAL ASSISTANCE
[facility] provides services at a reduced cost to those who are determined to be eligible through [facility]’s financial assistance program. Information regarding [facility]’s financial assistance program can be found by going to [email or web address] or by calling [phone].

## 2025-01-13 NOTE — PROGRESS NOTE ADULT - NS ATTEND OPT1 GEN_ALL_CORE
I attest my time as attending is greater than 50% of the total combined time spent on qualifying patient care activities by the PA/NP and attending.
I independently performed the documented:
I attest my time as attending is greater than 50% of the total combined time spent on qualifying patient care activities by the PA/NP and attending.
I independently performed the documented:
I attest my time as attending is greater than 50% of the total combined time spent on qualifying patient care activities by the PA/NP and attending.
I independently performed the documented:
I attest my time as attending is greater than 50% of the total combined time spent on qualifying patient care activities by the PA/NP and attending.
I independently performed the documented:
I independently performed the documented:
I attest my time as attending is greater than 50% of the total combined time spent on qualifying patient care activities by the PA/NP and attending.
I independently performed the documented:

## 2025-01-13 NOTE — PROGRESS NOTE ADULT - NS ATTEND AMEND GEN_ALL_CORE FT
Problem/Plan - 1:  ·  Problem: Fracture of right femur.   ·  Plan: S/p Right Hip IM Nailing POD#6  Ortho following  Pain management  Dressing changed today from Medipore tape to 4x4 and Tegaderm   DVT prophylaxis with venodynes and Eliquis per medicine, no orthopedic contraindications  Daily Physical Therapy:  WBAT of the Right lower extremity with appropriate assistive device   Discharge planning [facility] vs return to prior facility.     Problem/Plan - 2:  ·  Problem: Left lower lobe pneumonia.   ·  Plan: S/p ceftriaxone and azithro.   sputum culture 1/1/2025: + Pseudomonas aeruginosa and blood cx + for Entero Faecalis   Zosyn d/arjun and started on Meropenem on 1/4 . Meropenem d/c and cefepime started 1/06.  Cefepime thru 1/11  Continue Ampicillin 2Gms every 4 hours IVPB thru 2/3/25  PICC line inserted 1/09  Legionella negative.  Continue strict aspiration precautions.     Problem/Plan - 3:  ·  Problem: Tracheostomy dependent.   ·  Plan: Continue trach care.  Maintaining oxygen saturation on RA.  PMV as tolerated.     Problem/Plan - 4:  ·  Problem: Atrial fibrillation.   ·  Plan: Ventricular rate stable.  Continue with Eliquis  Metoprolol resumed at decreased dose 12.5mg twice daily with hold parameters   Cardiology Dr. Blayne neal.     Problem/Plan - 5:  ·  Problem: Recurrent pulmonary embolism.   ·  Plan: Continue Eliquis 5mg BID.     Problem/Plan - 6:  ·  Problem: Seizures.   ·  Plan: Continue Vimpat, Valproic acid and keppra.  Maintain seizure precautions.     Problem/Plan - 7:  ·  Problem: Hypertension.   ·  Plan: Antihypertensives currently on hold.  Continue midodrine 5mg every 8 hours with parameters.     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   ·  Plan: ?HF with recovered EF s/p ?Bi-V ICD (initially EF 30%->55-60% on 03/2021)  Euvolemic off diuretics  EKG showed sinus rhythm with LBBB  TTE 1/1/25 showed normal LV function, mildly dilated aortic root (4.2 cm)  ICD battery depleted. EP Dr. Traylor consulted, no plan for gen change given poor functional status, comorbidities, and recovered EF  Cardiology Dr Dacosta following.

## 2025-01-13 NOTE — PROGRESS NOTE ADULT - SUBJECTIVE AND OBJECTIVE BOX
DANNIE [id number]  49yMale    Diagnosis: 49yMale S/p Right Hip IM Nailing POD#11    Patient was seen and evaluated at bedside. Patient is able to respond to questions with expression and some minimal verbal communication.   Patient states he has minimal pain to right hip   Patient working with PT  Pt denies Fever, Chest pain, paresthesias, N/V/D, abdominal pain, syncope, or pain anywhere else.     ICU Vital Signs Last 24 Hrs  T(C): 36.7 (13 Jan 2025 05:09), Max: 36.7 (13 Jan 2025 05:09)  T(F): 98 (13 Jan 2025 05:09), Max: 98 (13 Jan 2025 05:09)  HR: 61 (13 Jan 2025 05:09) (53 - 80)  BP: 117/73 (13 Jan 2025 05:09) (107/67 - 144/78)  BP(mean): 91 (12 Jan 2025 08:36) (91 - 91)  ABP: --  ABP(mean): --  RR: 20 (13 Jan 2025 05:09) (18 - 20)  SpO2: 97% (13 Jan 2025 05:09) (95% - 98%)    O2 Parameters below as of 13 Jan 2025 05:09  Patient On (Oxygen Delivery Method): Trach to Room Air      Physical Exam:  General:  NAD, resting comfortably in bed. With trach collar in place.  Right Hip:  Dressing is C/D/I, Tegaderm dressing in place no staining,Skin is pink and warm.  No erythema. SILT.  Wound with no drainage, healing well. No pain with PROM of hip, knee and ankle.   Lower extremity:  No calf tenderness, calves are soft. 2+pulses.   pt has no ROM to the knee, ankle, toes due to sequelae from the CVA-baseline. Pt denies any sensation to the RLE as well.                                      9.0    4.81  )-----------( 451      ( 12 Jan 2025 07:08 )             27.4   01-12    145  |  111[H]  |  13  ----------------------------<  115[H]  3.5   |  28  |  0.60    Ca    8.4      12 Jan 2025 07:08  Phos  2.8     01-11  Mg     2.3     01-12    TPro  6.6  /  Alb  2.1[L]  /  TBili  0.5  /  DBili  x   /  AST  19  /  ALT  17  /  AlkPhos  123[H]  01-11               Impression:  49yMale S/p Right Hip IM Nailing POD#11  Plan:  -  Pain management  -  DVT prophylaxis with venodynes and Eliquis   -  Daily Physical Therapy:  WBAT of the Right lower extremity with appropriate assistive device   -  Discharge planning [facility] vs return to prior facility   -  Dressing to be changed today   -  Continue with care as per medical team. Orthopedically stable at this time.   -  Case d/w DR. Rodriguez  DANNIE [id number]  49yMale    Diagnosis: 49yMale S/p Right Hip IM Nailing POD#11    Patient was seen and evaluated at bedside. Patient is able to respond to questions with expression and some minimal verbal communication.   Patient states he has minimal pain to right hip   Patient working with PT  Pt denies Fever, Chest pain, paresthesias, N/V/D, abdominal pain, syncope, or pain anywhere else.     ICU Vital Signs Last 24 Hrs  T(C): 36.7 (13 Jan 2025 05:09), Max: 36.7 (13 Jan 2025 05:09)  T(F): 98 (13 Jan 2025 05:09), Max: 98 (13 Jan 2025 05:09)  HR: 61 (13 Jan 2025 05:09) (53 - 80)  BP: 117/73 (13 Jan 2025 05:09) (107/67 - 144/78)  BP(mean): 91 (12 Jan 2025 08:36) (91 - 91)  ABP: --  ABP(mean): --  RR: 20 (13 Jan 2025 05:09) (18 - 20)  SpO2: 97% (13 Jan 2025 05:09) (95% - 98%)    O2 Parameters below as of 13 Jan 2025 05:09  Patient On (Oxygen Delivery Method): Trach to Room Air      Physical Exam:  General:  NAD, resting comfortably in bed. With trach collar in place.  Right Hip: Dressing changed to 2 Mepilex, today 1/13/25. Dressing is C/D/I. Skin is pink and warm.  No erythema. SILT.  Wound with no drainage, healing well. No pain with PROM of hip, knee and ankle.   Lower extremity:  No calf tenderness, calves are soft. 2+pulses.   pt has no ROM to the knee, ankle, toes due to sequelae from the CVA-baseline. Pt denies any sensation to the RLE as well.                                      9.0    4.81  )-----------( 451      ( 12 Jan 2025 07:08 )             27.4   01-12    145  |  111[H]  |  13  ----------------------------<  115[H]  3.5   |  28  |  0.60    Ca    8.4      12 Jan 2025 07:08  Phos  2.8     01-11  Mg     2.3     01-12    TPro  6.6  /  Alb  2.1[L]  /  TBili  0.5  /  DBili  x   /  AST  19  /  ALT  17  /  AlkPhos  123[H]  01-11               Impression:  49yMale S/p Right Hip IM Nailing POD#11  Plan:  -  Pain management  -  DVT prophylaxis with venodynes and Eliquis   -  Daily Physical Therapy:  WBAT of the Right lower extremity with appropriate assistive device   -  Discharge planning [facility] vs return to prior facility   -  Dressing to be changed today   -  Continue with care as per medical team. Orthopedically stable at this time.   -  Case d/w DR. Rodriguez  DANNIE [id number]  49yMale    Diagnosis: 49yMale S/p Right Hip IM Nailing POD#11    Patient was seen and evaluated at bedside. Patient is able to respond to questions with expression and some minimal verbal communication.   Patient states he has minimal pain to right hip   Patient working with PT  Pt denies Fever, Chest pain, paresthesias, N/V/D, abdominal pain, syncope, or pain anywhere else.     ICU Vital Signs Last 24 Hrs  T(C): 36.7 (13 Jan 2025 05:09), Max: 36.7 (13 Jan 2025 05:09)  T(F): 98 (13 Jan 2025 05:09), Max: 98 (13 Jan 2025 05:09)  HR: 61 (13 Jan 2025 05:09) (53 - 80)  BP: 117/73 (13 Jan 2025 05:09) (107/67 - 144/78)  BP(mean): 91 (12 Jan 2025 08:36) (91 - 91)  ABP: --  ABP(mean): --  RR: 20 (13 Jan 2025 05:09) (18 - 20)  SpO2: 97% (13 Jan 2025 05:09) (95% - 98%)    O2 Parameters below as of 13 Jan 2025 05:09  Patient On (Oxygen Delivery Method): Trach to Room Air      Physical Exam:  General:  NAD, resting comfortably in bed. With trach collar in place.  Right Hip: Dressing changed to 2 Mepilex, today 1/13/25. Dressing is C/D/I. Skin is pink and warm. Staples intact. No erythema. SILT.  Wound with no drainage, healing well. No pain with PROM of hip, knee and ankle.   Lower extremity:  No calf tenderness, calves are soft. 2+pulses.   pt has no ROM to the knee, ankle, toes due to sequelae from the CVA-baseline. Pt denies any sensation to the RLE as well.                                      9.0    4.81  )-----------( 451      ( 12 Jan 2025 07:08 )             27.4   01-12    145  |  111[H]  |  13  ----------------------------<  115[H]  3.5   |  28  |  0.60    Ca    8.4      12 Jan 2025 07:08  Phos  2.8     01-11  Mg     2.3     01-12    TPro  6.6  /  Alb  2.1[L]  /  TBili  0.5  /  DBili  x   /  AST  19  /  ALT  17  /  AlkPhos  123[H]  01-11               Impression:  49yMale S/p Right Hip IM Nailing POD#11  Plan:  -  Pain management  -  DVT prophylaxis with venodynes and Eliquis   -  Daily Physical Therapy:  WBAT of the Right lower extremity with appropriate assistive device   -  Discharge planning [facility] vs return to prior facility   -  Dressing to be changed today to 2 small Mepilex, change dressing 2-3x a week after discharge or when dressing is saturated.   -  Staples to be removed POD#14   -  Continue with care as per medical team. Orthopedically stable at this time.   -  Case d/w DR. Rodriguez

## 2025-01-13 NOTE — PROGRESS NOTE ADULT - PROBLEM SELECTOR PROBLEM 1
Pre-op evaluation
Fracture of right femur
Bacteremia
Fracture of right femur
Fracture of right femur
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
Fracture of right femur
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
Bacteremia
Fracture of right femur
Fracture of right femur
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
Fracture of right femur

## 2025-01-13 NOTE — PROGRESS NOTE ADULT - REASON FOR ADMISSION
Right intertrochanteric femur fracture

## 2025-01-13 NOTE — PROGRESS NOTE ADULT - PROBLEM SELECTOR PLAN 4
Ventricular rate stable.  Continue with Eliquis  Metoprolol resumed at decreased dose 12.5mg twice daily with hold parameters   Cardiology Dr. Cisneros following Ventricular rate stable.  Continue with Eliquis  C/w Metoprolol   Cardiology Dr. Cisneros following

## 2025-01-13 NOTE — PROGRESS NOTE ADULT - PROBLEM SELECTOR PROBLEM 3
History of CVA (cerebrovascular accident)
Tracheostomy dependent
History of CVA (cerebrovascular accident)
Fracture of right femur
Atrial fibrillation
Tracheostomy dependent
Fracture of right femur
Hypotension
Tracheostomy dependent
Hypotension
Tracheostomy dependent
Hypotension

## 2025-01-13 NOTE — DISCHARGE NOTE NURSING/CASE MANAGEMENT/SOCIAL WORK - NSDCVIVACCINE_GEN_ALL_CORE_FT
influenza, injectable, quadrivalent, preservative free; 05-Sep-2018 14:29; Ros Felipe (RN); [employer]; [id number]; IntraMuscular; Deltoid Right.; 0.5 milliLiter(s); VIS (VIS Published: 07-Aug-2015, VIS Presented: 05-Sep-2018);

## 2025-01-13 NOTE — DISCHARGE NOTE NURSING/CASE MANAGEMENT/SOCIAL WORK - NSDCPETBCESMAN_GEN_ALL_CORE
General
If you are a smoker, it is important for your health to stop smoking. Please be aware that second hand smoke is also harmful.

## 2025-01-13 NOTE — PROGRESS NOTE ADULT - PROBLEM SELECTOR PLAN 2
no fevers, no leucocytosis  -No DEXTER since risk outweighs benefit. Multiple discussion with Dr. Traylor and Dr. Mcghee and family.   - continue on conservative management with IV antibiotics.   - continue serial blood cultures  -obtain daily ECGs for conduction abnormalities.

## 2025-01-28 ENCOUNTER — APPOINTMENT (OUTPATIENT)
Age: 50
End: 2025-01-28
Payer: MEDICAID

## 2025-01-28 VITALS
SYSTOLIC BLOOD PRESSURE: 115 MMHG | HEART RATE: 50 BPM | DIASTOLIC BLOOD PRESSURE: 61 MMHG | OXYGEN SATURATION: 94 % | WEIGHT: 198 LBS

## 2025-01-28 DIAGNOSIS — S72.001D FRACTURE OF UNSPECIFIED PART OF NECK OF RIGHT FEMUR, SUBSEQUENT ENCOUNTER FOR CLOSED FRACTURE WITH ROUTINE HEALING: ICD-10-CM

## 2025-01-28 DIAGNOSIS — R56.9 UNSPECIFIED CONVULSIONS: ICD-10-CM

## 2025-01-28 DIAGNOSIS — J18.9 PNEUMONIA, UNSPECIFIED ORGANISM: ICD-10-CM

## 2025-01-28 DIAGNOSIS — I25.10 ATHEROSCLEROTIC HEART DISEASE OF NATIVE CORONARY ARTERY W/OUT ANGINA PECTORIS: ICD-10-CM

## 2025-01-28 DIAGNOSIS — E78.5 HYPERLIPIDEMIA, UNSPECIFIED: ICD-10-CM

## 2025-01-28 DIAGNOSIS — G47.00 INSOMNIA, UNSPECIFIED: ICD-10-CM

## 2025-01-28 DIAGNOSIS — D64.9 ANEMIA, UNSPECIFIED: ICD-10-CM

## 2025-01-28 DIAGNOSIS — I10 ESSENTIAL (PRIMARY) HYPERTENSION: ICD-10-CM

## 2025-01-28 DIAGNOSIS — K11.9 DISEASE OF SALIVARY GLAND, UNSPECIFIED: ICD-10-CM

## 2025-01-28 DIAGNOSIS — R13.10 DYSPHAGIA, UNSPECIFIED: ICD-10-CM

## 2025-01-28 PROCEDURE — 99024 POSTOP FOLLOW-UP VISIT: CPT

## 2025-01-28 PROCEDURE — 73502 X-RAY EXAM HIP UNI 2-3 VIEWS: CPT

## 2025-01-28 RX ORDER — FOLIC ACID 1 MG/1
1 TABLET ORAL
Refills: 0 | Status: ACTIVE | COMMUNITY

## 2025-01-28 RX ORDER — ACETAMINOPHEN 325 MG/1
325 TABLET ORAL
Refills: 0 | Status: ACTIVE | COMMUNITY

## 2025-01-28 RX ORDER — ATORVASTATIN CALCIUM 80 MG/1
80 TABLET, FILM COATED ORAL
Refills: 0 | Status: ACTIVE | COMMUNITY

## 2025-01-28 RX ORDER — MIDODRINE HYDROCHLORIDE 5 MG/1
5 TABLET ORAL
Refills: 0 | Status: ACTIVE | COMMUNITY

## 2025-01-28 RX ORDER — LACOSAMIDE 200 MG/1
200 TABLET, FILM COATED ORAL
Refills: 0 | Status: ACTIVE | COMMUNITY

## 2025-01-28 RX ORDER — LEVETIRACETAM 100 MG/ML
100 SOLUTION ORAL
Refills: 0 | Status: ACTIVE | COMMUNITY

## 2025-01-28 RX ORDER — LISINOPRIL 2.5 MG/1
2.5 TABLET ORAL
Refills: 0 | Status: ACTIVE | COMMUNITY

## 2025-01-28 RX ORDER — APIXABAN 5 MG/1
5 TABLET, FILM COATED ORAL
Refills: 0 | Status: ACTIVE | COMMUNITY

## 2025-02-05 ENCOUNTER — APPOINTMENT (OUTPATIENT)
Age: 50
End: 2025-02-05

## 2025-02-11 NOTE — PHARMACOTHERAPY INTERVENTION NOTE - NSPHARMCOMMASP
[facility] Nurse Msg Pool  INFO NEEDED - REQUESTED BY [employer]    Please document this information in a Telephone Encounter to be sent to [employer]    Has patient tried any of the below Conservative Treatment?  - NSAIDs/Tylenol [please specify]:  YES  - Cortisone Injection(s):  YES  - Ice:  YES  - Heat: YES  - Brace: YES  - Home Exercise: YES  - Physical Therapy: YES  - Aquatic Exercise: NO       ASP - Duration of therapy

## 2025-03-11 ENCOUNTER — APPOINTMENT (OUTPATIENT)
Age: 50
End: 2025-03-11
Payer: MEDICAID

## 2025-03-11 VITALS
SYSTOLIC BLOOD PRESSURE: 101 MMHG | WEIGHT: 198 LBS | HEART RATE: 56 BPM | DIASTOLIC BLOOD PRESSURE: 66 MMHG | OXYGEN SATURATION: 95 %

## 2025-03-11 DIAGNOSIS — S72.001D FRACTURE OF UNSPECIFIED PART OF NECK OF RIGHT FEMUR, SUBSEQUENT ENCOUNTER FOR CLOSED FRACTURE WITH ROUTINE HEALING: ICD-10-CM

## 2025-03-11 PROCEDURE — 73502 X-RAY EXAM HIP UNI 2-3 VIEWS: CPT

## 2025-03-11 PROCEDURE — 99024 POSTOP FOLLOW-UP VISIT: CPT

## 2025-06-13 ENCOUNTER — APPOINTMENT (OUTPATIENT)
Age: 50
End: 2025-06-13
Payer: MEDICAID

## 2025-06-13 VITALS
DIASTOLIC BLOOD PRESSURE: 58 MMHG | WEIGHT: 198 LBS | HEART RATE: 72 BPM | SYSTOLIC BLOOD PRESSURE: 83 MMHG | OXYGEN SATURATION: 95 %

## 2025-06-13 PROCEDURE — 99213 OFFICE O/P EST LOW 20 MIN: CPT

## 2025-06-13 PROCEDURE — 73502 X-RAY EXAM HIP UNI 2-3 VIEWS: CPT

## 2025-08-14 PROBLEM — F10.10 ALCOHOL ABUSE, UNCOMPLICATED: Chronic | Status: INACTIVE | Noted: 2018-09-02 | Resolved: 2025-01-01

## 2025-08-23 LAB
CULTURE RESULTS: SIGNIFICANT CHANGE UP
CULTURE RESULTS: SIGNIFICANT CHANGE UP
SPECIMEN SOURCE: SIGNIFICANT CHANGE UP
SPECIMEN SOURCE: SIGNIFICANT CHANGE UP

## (undated) DEVICE — SUT VICRYL 0 27" UR-6

## (undated) DEVICE — SUT VICRYL 2-0 27" CT-2 UNDYED

## (undated) DEVICE — STAPLER SKIN VISI-STAT 35 WIDE

## (undated) DEVICE — MARKING PEN W RULER

## (undated) DEVICE — FORMALIN CUPS 10% BUFFERED

## (undated) DEVICE — DRSG OPSITE 13.75 X 4"

## (undated) DEVICE — FOR-ESU VALLEYLAB T7E15008DX: Type: DURABLE MEDICAL EQUIPMENT

## (undated) DEVICE — SAW BLADE STRYKER RECIPROCATING 77.6X0.77X11.2MM

## (undated) DEVICE — DRSG TEGADERM 6"X8"

## (undated) DEVICE — PACK MAJOR ABDOMINAL WITH LAP

## (undated) DEVICE — GLV 7 PROTEXIS (WHITE)

## (undated) DEVICE — PACK BASIN SPECIAL PROCEDURE

## (undated) DEVICE — GLV 7.5 PROTEXIS (WHITE)

## (undated) DEVICE — SOL IRR POUR NS 0.9% 500ML

## (undated) DEVICE — SOL IRR POUR NS 0.9% 1500ML

## (undated) DEVICE — SUT VICRYL PLUS 0 27" CT-1 UNDYED

## (undated) DEVICE — FOLEY TRAY 16FR SURESTEP LTX BG

## (undated) DEVICE — Device

## (undated) DEVICE — WARMING BLANKET UPPER ADULT

## (undated) DEVICE — SPECIMEN CONTAINER 100ML

## (undated) DEVICE — MEDICATION LABELS W MARKER

## (undated) DEVICE — GLV 6.5 PROTEXIS (WHITE)

## (undated) DEVICE — BLADE SCALPEL SAFETYLOCK #15

## (undated) DEVICE — SUT VICRYL PLUS 2-0 27" CP-1 UNDYED

## (undated) DEVICE — DRILL BIT STRYKER ORTHO LOKG 4.2X360MM

## (undated) DEVICE — DRSG AQUACEL 3.5 X 10"

## (undated) DEVICE — SOLIDIFIER ISOLYZER 2000 CC

## (undated) DEVICE — DRAPE INSTRUMENT POUCH 6.75" X 11"

## (undated) DEVICE — SUT SOFSILK 3-0 18" V-20 (POP-OFF)

## (undated) DEVICE — ELCTR BOVIE BLADE 3/4" EXTENDED LENGTH 6"

## (undated) DEVICE — VALVE ENDO SURESEAL II 0-5FR

## (undated) DEVICE — PACK TOTAL HIP

## (undated) DEVICE — SUT VICRYL 1 27" OS-8 UNDYED

## (undated) DEVICE — NDL BIOPSY TRU-CUT 14G X 6"

## (undated) DEVICE — SUT MAXON 1 96" T-60

## (undated) DEVICE — SUT SOFSILK 0 18" TIES

## (undated) DEVICE — POSITIONER FOAM MATTRESS PAD CONVOLUTED (PINK)

## (undated) DEVICE — HOOD T5 PEELAWAY

## (undated) DEVICE — STAPLER COVIDIEN ENDO GIA STANDARD HANDLE

## (undated) DEVICE — LIGASURE SMALL JAW

## (undated) DEVICE — DRAPE SHOWER CURTAIN ISOLATION

## (undated) DEVICE — SUT SOFSILK 3-0 30" V-20

## (undated) DEVICE — GLV 8 PROTEXIS (WHITE)

## (undated) DEVICE — FOR-ESU VALLEYLAB T7E14830DX: Type: DURABLE MEDICAL EQUIPMENT

## (undated) DEVICE — ELCTR AQUAMANTYS BIPOLAR SEALER 6.0

## (undated) DEVICE — SOL INJ NS 0.9% 500ML 1-PORT

## (undated) DEVICE — POSITIONER FOAM HEAD DONUT 9" (PINK)

## (undated) DEVICE — SOL IRR POUR H2O 250ML

## (undated) DEVICE — DRSG MASTISOL

## (undated) DEVICE — SOL IRR POUR H2O 1000ML

## (undated) DEVICE — SOL IRR POUR H2O 1500ML

## (undated) DEVICE — SUT BIOSYN 4-0 18" P-12

## (undated) DEVICE — DRAPE IOBAN 33" X 23"

## (undated) DEVICE — SUT SOFSILK 2-0 30" V-20

## (undated) DEVICE — DRAPE SPLIT SHEET 77" X 108"

## (undated) DEVICE — PREP CHLORAPREP HI-LITE ORANGE 26ML

## (undated) DEVICE — ELCTR GROUNDING PAD ADULT COVIDIEN

## (undated) DEVICE — PACK MAJOR ABDOMINAL SUPINE

## (undated) DEVICE — BLADE SCALPEL SAFETYLOCK #10

## (undated) DEVICE — KIT ENDO PROCEDURE CUST 10/BX

## (undated) DEVICE — GLV 7.5 PROTEXIS (BLUE)

## (undated) DEVICE — DRSG XEROFORM 1 X 8"

## (undated) DEVICE — DRSG TRACH DRAINAGE 4X4

## (undated) DEVICE — FOLEY TRAY 16FR 5CC LTX UMETER CLOSED

## (undated) DEVICE — TUBING CANNULA SALTER LABS NASAL ADULT 7FT

## (undated) DEVICE — SUT QUILL PDO 2 36CM 48MM

## (undated) DEVICE — SUT HEWSON RETRIEVER

## (undated) DEVICE — SUT SOFSILK 2-0 18" C-15

## (undated) DEVICE — DRAPE 3/4 SHEET 52X76"

## (undated) DEVICE — VENODYNE/SCD SLEEVE CALF MEDIUM

## (undated) DEVICE — TUBING STRYKEFLOW II SUCTION / IRRIGATOR

## (undated) DEVICE — POSITIONER FOAM EGG CRATE ULNAR 2PCS (PINK)

## (undated) DEVICE — GLV 8.5 PROTEXIS (WHITE)

## (undated) DEVICE — BITE BLOCK ADULT 20 X 27MM (GREEN)

## (undated) DEVICE — VISITEC 4X4

## (undated) DEVICE — DRAPE 1/2 SHEET 40X57"

## (undated) DEVICE — DRAPE TOWEL BLUE 17" X 24"

## (undated) DEVICE — AMPLATZ RENAL DILATOR 8FR-30FR X 35CM

## (undated) DEVICE — DRSG CURITY GAUZE SPONGE 4 X 4" 12-PLY

## (undated) DEVICE — FOLEY TRAY 16FR 5CC LF UMETER CLOSED

## (undated) DEVICE — GOWN XL

## (undated) DEVICE — DRAPE LIGHT HANDLE COVER (BLUE)

## (undated) DEVICE — DRAPE MAYO STAND 30"

## (undated) DEVICE — NDL HYPO SAFE 22G X 1.5" (BLACK)

## (undated) DEVICE — SUT MAXON 1 36" GS-24

## (undated) DEVICE — DRSG MEDIPORE DRESS IT 5-7/8 X 11"

## (undated) DEVICE — SUT SILK 0 30" TIES

## (undated) DEVICE — SOL IRR POUR NS 0.9% 1000ML